# Patient Record
Sex: FEMALE | Race: WHITE | NOT HISPANIC OR LATINO | Employment: FULL TIME | URBAN - METROPOLITAN AREA
[De-identification: names, ages, dates, MRNs, and addresses within clinical notes are randomized per-mention and may not be internally consistent; named-entity substitution may affect disease eponyms.]

---

## 2017-05-26 ENCOUNTER — OFFICE VISIT - HEALTHEAST (OUTPATIENT)
Dept: CARDIOLOGY | Facility: CLINIC | Age: 62
End: 2017-05-26

## 2017-05-26 DIAGNOSIS — I10 ESSENTIAL HYPERTENSION: ICD-10-CM

## 2017-05-26 DIAGNOSIS — E78.5 DYSLIPIDEMIA: ICD-10-CM

## 2017-05-26 DIAGNOSIS — I25.10 CORONARY ARTERY DISEASE INVOLVING NATIVE CORONARY ARTERY OF NATIVE HEART WITHOUT ANGINA PECTORIS: ICD-10-CM

## 2017-05-26 LAB — LDLC SERPL CALC-MCNC: 98 MG/DL

## 2017-05-26 RX ORDER — GLIPIZIDE 2.5 MG/1
1 TABLET, EXTENDED RELEASE ORAL PRN
Refills: 11 | Status: ON HOLD | COMMUNITY
Start: 2017-05-11 | End: 2023-04-18

## 2017-05-26 ASSESSMENT — MIFFLIN-ST. JEOR: SCORE: 1269.83

## 2017-06-17 ENCOUNTER — HEALTH MAINTENANCE LETTER (OUTPATIENT)
Age: 62
End: 2017-06-17

## 2018-03-22 ENCOUNTER — COMMUNICATION - HEALTHEAST (OUTPATIENT)
Dept: CARDIOLOGY | Facility: CLINIC | Age: 63
End: 2018-03-22

## 2018-09-20 ENCOUNTER — AMBULATORY - HEALTHEAST (OUTPATIENT)
Dept: CARDIOLOGY | Facility: CLINIC | Age: 63
End: 2018-09-20

## 2018-09-20 ENCOUNTER — COMMUNICATION - HEALTHEAST (OUTPATIENT)
Dept: ADMINISTRATIVE | Facility: CLINIC | Age: 63
End: 2018-09-20

## 2018-09-20 DIAGNOSIS — E78.5 DYSLIPIDEMIA: ICD-10-CM

## 2021-05-28 ENCOUNTER — RECORDS - HEALTHEAST (OUTPATIENT)
Dept: ADMINISTRATIVE | Facility: CLINIC | Age: 66
End: 2021-05-28

## 2021-05-31 VITALS — HEIGHT: 60 IN | WEIGHT: 176 LBS | BODY MASS INDEX: 34.55 KG/M2

## 2021-06-16 PROBLEM — I65.02 VERTEBRAL ARTERY OCCLUSION, LEFT: Status: ACTIVE | Noted: 2019-04-10

## 2021-06-25 NOTE — PROGRESS NOTES
Progress Notes by Vitaly Salazar MD at 5/26/2017  1:50 PM     Author: Vitaly Salazar MD Service: -- Author Type: Physician    Filed: 5/26/2017  2:30 PM Encounter Date: 5/26/2017 Status: Signed    : Vitaly Salazar MD (Physician)                  [facility]/Heart           Thank you Dr. Cortez for asking the [facility] team to participate in the care of your patient, Lisseth Qureshi.     Impression and Plan     1. Coronary artery disease. Lissteh has known coronary artery disease as described below in History of Present Illness.     This is been stable.  Patient at this time reports no concerning chest discomfort or other similar symptoms.     2. Hypertension. Blood pressure is very reasonable in the office today at 114/60 mmHg..     3.  Dyslipidemia.  Lipid profile 24 May 2016 revealed  mg/dL and HDL 47 mg/dL.  Patient has been intolerant to statin therapy.  Plan to continue ezetimibe and will obtain a repeat direct LDL today.    Plan on follow up in one year.         History of Present Illness    Once again I would like to thank you again for asking me to participate in the care of your patient, Lisseth Qureshi.  As you know, but to reiterate for my own records, Lisseth Qureshi is a 62 y.o. female with coronary disease. She underwent coronary angiography 19 October 2012, at which time she was found to have significant disease involving the 1st obtuse marginal. She had successful angioplasty and stenting of the vessel.     In January 2015 the patient had presented to [facility] with symptoms of chest discomfort, albeit felt to be somewhat atypical. Nuclear perfusion study was performed during that admission which did return abnormal (described below). The findings, however, were not necessarily commensurate with her angiographic findings of October 2012. Consequently, CT angiography was pursued as opposed to direct angiography.      CT scan revealed  perhaps some limiting flow in her prior stented region (obtuse marginal), but otherwise no significant obstructive disease. It is worth noting that she did have worsening renal insufficiency after her CT scan due to the contrast. We discussed her symptoms and objective studies in detail at prior visit. Given the patient's subjective stability, her renal insufficiency, and in accordance with her wishes; medical therapy was pursued.     In follow-up today, patient continues to deny any chest discomfort. She states her breathing is at/near baseline.  She reports no palpitations or lightheadedness.    Further review of systems is otherwise negative/noncontributory (medical record and 13 point review of systems reviewed as well and pertinent positives noted).         Cardiac Diagnostics       CT coronary angiogram 29 January 2015:  1. Coronary atherosclerosis with previous stent in the obtuse marginal branch showing signs of in-stent restenosis.  2. Mild-to-moderate plaque demonstrated throughout other vessel distributions but no high-grade stenosis is identified.     Nuclear perfusion imaging 5 January 2015:  1. Regadenoson stress nuclear study is abnormal.    2. There is evidence of diffuse sub-endocardial ischemia which can indicate multivessel coronary artery disease.  3. Normal resting left ventricular ejection fraction of 64%.     Coronary angiogram 19 October 2012:  1. Right dominant coronary arterial system.    2. There was moderate disease involving the mid LAD.    3. The right coronary artery had mild-moderate disease in the mid segment.    4. The 1st obtuse marginal had severe disease proximally. This was successfully angioplastied and stented (2.75×12 mm Promus Element Plus stent).    5. Left ventriculography was deferred due to renal insufficiency.             Physical Examination       /60 (Patient Site: Right Arm, Patient Position: Sitting, Cuff Size: Adult Regular)  Pulse 72  Resp 16  Ht 5'  (1.524 m)  Wt 176 lb (79.8 kg)  BMI 34.37 kg/m2        Wt Readings from Last 3 Encounters:   05/26/17 176 lb (79.8 kg)   05/31/16 176 lb (79.8 kg)   11/17/15 172 lb (78 kg)     The patient is alert and oriented times three. Sclerae are anicteric. Mucosal membranes are moist. Jugular venous pressure is normal. No significant adenopathy/thyromegally appreciated. Lungs are clear with good expansion. On cardiovascular exam, the patient has a regular S1 and S2. Abdomen is soft and non-tender. Extremities reveal no clubbing, cyanosis, or edema.         Family History/Social History/Risk Factors   Patient does not smoke.  Family history of hypertension.         Medications  Allergies   Current Outpatient Prescriptions   Medication Sig Dispense Refill   ? BUPROPION HCL (WELLBUTRIN ORAL) Take 300 mg by mouth every morning.      ? coenzyme Q10 (COQ-10) 100 mg capsule Use As Directed.     ? glipiZIDE (GLUCOTROL) 2.5 MG 24 hr tablet Take 1 tablet by mouth as needed.  11   ? levothyroxine (SYNTHROID, LEVOTHROID) 75 MCG tablet Take 75 mcg by mouth daily.     ? lisinopril (PRINIVIL,ZESTRIL) 40 MG tablet Take 40 mg by mouth daily.     ? melatonin 3 mg Tab Take 3 mg by mouth bedtime as needed.     ? metFORMIN (GLUCOPHAGE) 1000 MG tablet Take 500 mg by mouth 2 (two) times a day with meals.      ? multivitamin therapeutic (THERAGRAN) tablet Take 1 tablet by mouth daily.     ? omega-3 fatty acids-vitamin E (FISH OIL) 1,000 mg cap Take 1 capsule by mouth 2 (two) times a day.     ? red yeast rice 600 mg Tab Take 1 tablet by mouth daily.     ? ezetimibe (ZETIA) 10 mg tablet Take 1 tablet (10 mg total) by mouth daily. 90 tablet 3   ? glipiZIDE (GLUCOTROL) 5 MG 24 hr tablet      ? LORazepam (ATIVAN) 0.5 MG tablet        No current facility-administered medications for this visit.       Allergies   Allergen Reactions   ? Guaifenesin Swelling     Felt throat swelling  Guaifenesin with codeine   ? Penicillins      Unknown, allergy occurred  at 6 months old   ? Rosuvastatin Calcium Unknown          Lab Results   Lab Results   Component Value Date     (L) 06/07/2015    K 4.5 06/07/2015     06/07/2015    CO2 22 06/07/2015    BUN 23 (H) 06/07/2015    CREATININE 1.55 (H) 06/07/2015    CALCIUM 9.3 06/07/2015     Lab Results   Component Value Date    WBC 5.1 06/07/2015    WBC 6.3 01/04/2015    HGB 11.7 (L) 06/07/2015    HCT 33.7 (L) 06/07/2015    MCV 84 06/07/2015     06/07/2015     Lab Results   Component Value Date    CHOL 188 05/24/2016    TRIG 85 05/24/2016    HDL 47 (L) 05/24/2016    LDLCALC 124 05/24/2016    LDLDIRECT 122 03/20/2015     Lab Results   Component Value Date    INR 0.98 01/04/2015     Lab Results   Component Value Date    TROPONINI 0.02 01/05/2015    TROPONINI <0.01 01/04/2015    TROPONINI <0.01 10/18/2012     Lab Results   Component Value Date    TSH 1.10 03/20/2015

## 2021-07-10 ENCOUNTER — HEALTH MAINTENANCE LETTER (OUTPATIENT)
Age: 66
End: 2021-07-10

## 2021-09-04 ENCOUNTER — HEALTH MAINTENANCE LETTER (OUTPATIENT)
Age: 66
End: 2021-09-04

## 2021-10-30 ENCOUNTER — HEALTH MAINTENANCE LETTER (OUTPATIENT)
Age: 66
End: 2021-10-30

## 2022-02-19 ENCOUNTER — HEALTH MAINTENANCE LETTER (OUTPATIENT)
Age: 67
End: 2022-02-19

## 2022-06-11 ENCOUNTER — HEALTH MAINTENANCE LETTER (OUTPATIENT)
Age: 67
End: 2022-06-11

## 2022-10-16 ENCOUNTER — HEALTH MAINTENANCE LETTER (OUTPATIENT)
Age: 67
End: 2022-10-16

## 2023-04-01 ENCOUNTER — HEALTH MAINTENANCE LETTER (OUTPATIENT)
Age: 68
End: 2023-04-01

## 2023-04-06 ENCOUNTER — APPOINTMENT (OUTPATIENT)
Dept: RADIOLOGY | Facility: HOSPITAL | Age: 68
End: 2023-04-06
Attending: EMERGENCY MEDICINE
Payer: COMMERCIAL

## 2023-04-06 ENCOUNTER — HOSPITAL ENCOUNTER (EMERGENCY)
Facility: HOSPITAL | Age: 68
Discharge: HOME | End: 2023-04-07
Attending: EMERGENCY MEDICINE | Admitting: EMERGENCY MEDICINE
Payer: COMMERCIAL

## 2023-04-06 VITALS
HEART RATE: 78 BPM | RESPIRATION RATE: 16 BRPM | WEIGHT: 172 LBS | BODY MASS INDEX: 33.77 KG/M2 | DIASTOLIC BLOOD PRESSURE: 78 MMHG | OXYGEN SATURATION: 97 % | HEIGHT: 60 IN | SYSTOLIC BLOOD PRESSURE: 143 MMHG | TEMPERATURE: 97.4 F

## 2023-04-06 DIAGNOSIS — R07.9 CHEST PAIN, UNSPECIFIED TYPE: ICD-10-CM

## 2023-04-06 LAB
ANION GAP SERPL CALCULATED.3IONS-SCNC: 12 MMOL/L (ref 7–15)
BASOPHILS # BLD AUTO: 0.1 10E3/UL (ref 0–0.2)
BASOPHILS NFR BLD AUTO: 1 %
BUN SERPL-MCNC: 20 MG/DL (ref 8–23)
CALCIUM SERPL-MCNC: 9.2 MG/DL (ref 8.8–10.2)
CHLORIDE SERPL-SCNC: 101 MMOL/L (ref 98–107)
CREAT SERPL-MCNC: 1.05 MG/DL (ref 0.51–0.95)
DEPRECATED HCO3 PLAS-SCNC: 25 MMOL/L (ref 22–29)
EOSINOPHIL # BLD AUTO: 0.2 10E3/UL (ref 0–0.7)
EOSINOPHIL NFR BLD AUTO: 3 %
ERYTHROCYTE [DISTWIDTH] IN BLOOD BY AUTOMATED COUNT: 13.8 % (ref 10–15)
GFR SERPL CREATININE-BSD FRML MDRD: 59 ML/MIN/1.73M2
GLUCOSE BLDC GLUCOMTR-MCNC: 236 MG/DL (ref 70–99)
GLUCOSE SERPL-MCNC: 242 MG/DL (ref 70–99)
HCT VFR BLD AUTO: 37.7 % (ref 35–47)
HGB BLD-MCNC: 12.3 G/DL (ref 11.7–15.7)
IMM GRANULOCYTES # BLD: 0 10E3/UL
IMM GRANULOCYTES NFR BLD: 0 %
LYMPHOCYTES # BLD AUTO: 1.8 10E3/UL (ref 0.8–5.3)
LYMPHOCYTES NFR BLD AUTO: 27 %
MCH RBC QN AUTO: 28.1 PG (ref 26.5–33)
MCHC RBC AUTO-ENTMCNC: 32.6 G/DL (ref 31.5–36.5)
MCV RBC AUTO: 86 FL (ref 78–100)
MONOCYTES # BLD AUTO: 0.5 10E3/UL (ref 0–1.3)
MONOCYTES NFR BLD AUTO: 7 %
NEUTROPHILS # BLD AUTO: 4.1 10E3/UL (ref 1.6–8.3)
NEUTROPHILS NFR BLD AUTO: 62 %
NRBC # BLD AUTO: 0 10E3/UL
NRBC BLD AUTO-RTO: 0 /100
PLATELET # BLD AUTO: 213 10E3/UL (ref 150–450)
POTASSIUM SERPL-SCNC: 3.8 MMOL/L (ref 3.4–5.3)
RBC # BLD AUTO: 4.37 10E6/UL (ref 3.8–5.2)
SODIUM SERPL-SCNC: 138 MMOL/L (ref 136–145)
TROPONIN T SERPL HS-MCNC: 11 NG/L
WBC # BLD AUTO: 6.7 10E3/UL (ref 4–11)

## 2023-04-06 PROCEDURE — 99285 EMERGENCY DEPT VISIT HI MDM: CPT | Mod: 25

## 2023-04-06 PROCEDURE — 36415 COLL VENOUS BLD VENIPUNCTURE: CPT | Performed by: EMERGENCY MEDICINE

## 2023-04-06 PROCEDURE — 71046 X-RAY EXAM CHEST 2 VIEWS: CPT

## 2023-04-06 PROCEDURE — 80048 BASIC METABOLIC PNL TOTAL CA: CPT | Performed by: EMERGENCY MEDICINE

## 2023-04-06 PROCEDURE — 84484 ASSAY OF TROPONIN QUANT: CPT | Performed by: EMERGENCY MEDICINE

## 2023-04-06 PROCEDURE — 93005 ELECTROCARDIOGRAM TRACING: CPT | Performed by: STUDENT IN AN ORGANIZED HEALTH CARE EDUCATION/TRAINING PROGRAM

## 2023-04-06 PROCEDURE — 85025 COMPLETE CBC W/AUTO DIFF WBC: CPT | Performed by: EMERGENCY MEDICINE

## 2023-04-06 PROCEDURE — 82962 GLUCOSE BLOOD TEST: CPT

## 2023-04-06 PROCEDURE — 93005 ELECTROCARDIOGRAM TRACING: CPT | Performed by: EMERGENCY MEDICINE

## 2023-04-06 ASSESSMENT — ENCOUNTER SYMPTOMS
FEVER: 0
NAUSEA: 0
SHORTNESS OF BREATH: 1
DIAPHORESIS: 0
LIGHT-HEADEDNESS: 0
COUGH: 0

## 2023-04-06 ASSESSMENT — ACTIVITIES OF DAILY LIVING (ADL): ADLS_ACUITY_SCORE: 35

## 2023-04-07 LAB
ATRIAL RATE - MUSE: 78 BPM
DIASTOLIC BLOOD PRESSURE - MUSE: NORMAL MMHG
INTERPRETATION ECG - MUSE: NORMAL
P AXIS - MUSE: 55 DEGREES
PR INTERVAL - MUSE: 154 MS
QRS DURATION - MUSE: 86 MS
QT - MUSE: 384 MS
QTC - MUSE: 437 MS
R AXIS - MUSE: 55 DEGREES
SYSTOLIC BLOOD PRESSURE - MUSE: NORMAL MMHG
T AXIS - MUSE: 83 DEGREES
VENTRICULAR RATE- MUSE: 78 BPM

## 2023-04-07 NOTE — DISCHARGE INSTRUCTIONS
Start taking a daily aspirin 81 mg is fine.  Do that until you are seen in the cardiology clinic and complete the work-up.  Discussed with them whether or not to continue taking the aspirin after that.    In the meantime return here as needed if you have any more episodes of chest pain or difficulty breathing for repeat evaluation.    Continue your current medications.    Purchase a blood pressure cuff from the pharmacy.  I would like you to start checking your blood pressure once in the morning and once in the evening, write down the numbers and bring them with to your cardiology clinic appointment.  Do not check your blood pressure more often than that.

## 2023-04-07 NOTE — ED PROVIDER NOTES
Emergency Department Encounter     Evaluation Date & Time:   4/6/2023 10:26 PM    CHIEF COMPLAINT:  Chest Pain and Back Pain      Triage Note:  Pt arrives from home with a friend for evaluation of chest pain that started around 9pm, Pt was laying in bed reading when she had a sudden onset of midsternal chest tightness. Pain radiates to her upper back and she feels SOB. Pain has been constant since. Had a stent placed in 2012 and is not on blood thinners.   Pt took a full dose ASA PTA.      Triage Assessment       Row Name 04/06/23 2221       Triage Assessment (Adult)    Airway WDL WDL       Respiratory WDL    Respiratory WDL X;rhythm/pattern    Rhythm/Pattern, Respiratory shortness of breath       Skin Circulation/Temperature WDL    Skin Circulation/Temperature WDL WDL       Cardiac WDL    Cardiac WDL X;chest pain       Chest Pain Assessment    Chest Pain Location midsternal    Chest Pain Radiation back    Character tightness    Precipitating Factors nothing    Associated Signs/Symptoms dyspnea       Peripheral/Neurovascular WDL    Peripheral Neurovascular WDL WDL       Cognitive/Neuro/Behavioral WDL    Cognitive/Neuro/Behavioral WDL WDL                      FINAL IMPRESSION:  No diagnosis found.    Impression and Plan     ED COURSE & MEDICAL DECISION MAKING:        ED Course as of 04/06/23 2353   Thu Apr 06, 2023   2322 Lisseth is here with an episode of chest pain.  She does have a history of coronary artery disease with a history of remote stent greater than 10 years ago but has not had any warning symptoms recently of progression of heart disease.  She does have a history of GERD and her symptoms did start while she was trying to lay down in bed at night to get ready for sleeping, so was thinking it may have been an attack of her GERD but it did not go away as it usually does when she sat up.  With the pain radiating to the back and her history of high blood pressure would have to consider the possibility of  dissection, but her symptoms are entirely resolved making that certainly less likely and she does have equal pulses in all extremities.  She has no infectious complaints to suggest pneumonia although on examination there are some slight crackles in her right upper lobe so we will do a chest x-ray on her.  Her initial EKG shows no acute findings suggestive of cardiac ischemia so we will pare with high-sensitivity troponin and based on the results of that will either be able to discharge home with close outpatient follow-up with a rapid access clinic for cardiology versus need repeat troponin.  She will notify us if she has any further episodes while here.  She did take aspirin at home so we will not need to repeat that at this time, and she is having no further chest pain so no nitroglycerin at this time.  Her blood pressure is elevated but she is a bit anxious and in the ER so if discharged we will have her follow her blood pressures at home.  She has no gi complaints associated with this to suggest cholecystitis or pancreatitis.   2343 Her high-sensitivity troponin is within normal limits for female.  Her other blood work is unremarkable without any acute significant renal failure.  So, she will be discharged home.  She will follow-up with her primary care physician for her elevated blood pressures here and return as needed if cxr unremarkable   2346 Cxr wnl.  She is only on Norvasc now for blood pressure control and has not been checking her blood pressures at home so she will start to do that to get an idea of what they have been at home before she goes into her clinic appointment.  Referral made for rapid access clinic.       11:18 PM I met with patient for initial encounter.  11:46 PM I updated patient with results and plan for discharge.    At the conclusion of the encounter I discussed the results of all the tests and the disposition. The questions were answered. The patient or family acknowledged  understanding and was agreeable with the care plan.          0 minutes of critical care time        MEDICATIONS GIVEN IN THE EMERGENCY DEPARTMENT:  Medications - No data to display    NEW PRESCRIPTIONS STARTED AT TODAY'S ED VISIT:  New Prescriptions    No medications on file       HPI     The history is provided by the patient. No  was used.        Lisseth Qureshi is a 67 year old female with a pertinent history of DMII, HTN, CAD, who presents to this ED via walk-in for evaluation of chest pain.    Patient reports chest pain that began around 9 PM when she was laying in bed a few hours after eating, and lasted until 11 PM. She notes that the pain radiated to her back with associated shortness of breath. She die take aspirin with mild relief. Patient denies any history of similar symptoms. She denies any recent travel or tobacco use.     Patient denies diaphoresis, light headedness, cough, fever, leg swelling, nausea, belching, and all other complaints at this time.    REVIEW OF SYSTEMS:  Review of Systems   Constitutional: Negative for diaphoresis and fever.   Respiratory: Positive for shortness of breath (Resolved). Negative for cough.    Cardiovascular: Positive for chest pain (resolved). Negative for leg swelling.   Gastrointestinal: Negative for nausea.   Neurological: Negative for light-headedness.   All other systems reviewed and are negative.    remainder of systems are all otherwise negative.        Medical History     No past medical history on file.    Past Surgical History:   Procedure Laterality Date     CORONARY STENT PLACEMENT  10/19/2012    glenna 1st obtuse marginal     SHOULDER SURGERY      frozen shoulder       Family History   Problem Relation Age of Onset     Hypertension Mother      Mitral Valve Replacement No family hx of        Social History     Tobacco Use     Smoking status: Former     Packs/day: 1.50     Years: 35.00     Pack years: 52.50     Types: Cigarettes     Quit  date: 2005     Years since quittin.2     Tobacco comments:     pt quit 10 years ago (she had smoked x 35 years)   Substance Use Topics     Alcohol use: No     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  LORazepam (ATIVAN) 0.5 MG tablet  MEDICATION CANNOT BE REORDERED - PLEASE MANUALLY REORDER AND DISCONTINUE THE OLD ORDER  melatonin 3 mg Tab  metFORMIN (GLUCOPHAGE) 1000 MG tablet  multivitamin therapeutic (THERAGRAN) tablet  ondansetron (ZOFRAN) 4 MG tablet  red yeast rice 600 mg Tab        Physical Exam     First Vitals:  Patient Vitals for the past 24 hrs:   BP Temp Temp src Pulse Resp SpO2 Height Weight   23 2245 (!) 194/83 -- -- 83 16 97 % -- --   23 2218 (!) 162/102 97.4  F (36.3  C) Temporal 97 22 97 % 1.524 m (5') 78 kg (172 lb)       PHYSICAL EXAM:   Constitutional:   Patient sitting up in bed, easily conversive  HENT:  Patient wearing a mask, voice normal   Eyes:  PERRL, EOMI, Conjunctiva normal, No discharge, no scleral icterus.  Respiratory:  Breathing easily, slight crackles to right upper lobe, otherwise clear to auscultation  Cardiovascular:   Regular rate and rhythm, nl s1s2 0 murmurs, rubs, or gallops.  Peripheral pulses dp, pt, and radial are wnl.  No peripheral edema   GI:  Bowel sounds normal, Soft, No tenderness, No flank tenderness, nondistended.  : No CVA tenderness.   Musculoskeletal:  Moves all extremities.  No erythematous or swollen major joints,   Integument:  Skin color normal  Lymphatic:  No cervical lymphadenopathy  Neurologic:   Alert & oriented x 3, Normal motor function, Normal sensory function, No focal deficits noted. Normal speech.  Psychiatric:  Affect normal, Judgment normal, Mood normal.     Results     LAB AND RADIOLOGY:  All pertinent labs reviewed and interpreted  Results for orders placed or performed during the hospital encounter of 04/06/23   Glucose by meter     Status: Abnormal   Result Value Ref Range    GLUCOSE BY METER POCT 236 (H) 70 - 99 mg/dL   CBC with platelets differential     Status: None (In process)    Narrative    The following orders were created for panel order CBC with platelets differential.  Procedure                               Abnormality         Status                     ---------                               -----------         ------                     CBC with platelets and d...[[id number]]                      In process                   Please view results for these tests on the individual orders.         ECG:    Performed at: 2233    Impression: nsr rate of 78, pr 154ms, qrs 86ms, qtc 437 ms, prt axes 55 55 83.  Nonspecific diffuse flat st segments.  Compared to 1/4/15 no significant changes noted.      I have independently reviewed and interpreted the EKS(s) documented above    PROCEDURES:  Procedures:      [facility] Documentation     Medical Decision Making    History:    Supplemental history from: Documented in chart, if applicable and N/A    External Record(s) reviewed: Documented in chart, if applicable.    Work Up:    Chart documentation includes differential considered and any EKGs or imaging independently interpreted by provider, where specified.    In additional to work up documented, I considered the following work up: Documented in chart, if applicable.    External consultation:    Discussion of management with another provider: Documented in chart, if applicable    Complicating factors:    Care impacted by chronic illness: Diabetes, Heart Disease and Hypertension    Care  affected by social determinants of health: N/A    Disposition considerations: Discharge. I prescribed additional prescription strength medication(s) as charted. See documentation for any additional details.       The creation of this record is based on the scribe s observations of the work being performed by Emanuel Merchant and the provider s statements to them. This document has been checked and approved by MD Sarah Carlson MD  Emergency Medicine  [facility] EMERGENCY DEPARTMENT        Sarah Guevara MD  04/06/23 8649

## 2023-04-07 NOTE — ED TRIAGE NOTES
Pt arrives from home with a friend for evaluation of chest pain that started around 9pm, Pt was laying in bed reading when she had a sudden onset of midsternal chest tightness. Pain radiates to her upper back and she feels SOB. Pain has been constant since. Had a stent placed in 2012 and is not on blood thinners.   Pt took a full dose ASA PTA.      Triage Assessment       Row Name 04/06/23 2226       Triage Assessment (Adult)    Airway WDL WDL       Respiratory WDL    Respiratory WDL X;rhythm/pattern    Rhythm/Pattern, Respiratory shortness of breath       Skin Circulation/Temperature WDL    Skin Circulation/Temperature WDL WDL       Cardiac WDL    Cardiac WDL X;chest pain       Chest Pain Assessment    Chest Pain Location midsternal    Chest Pain Radiation back    Character tightness    Precipitating Factors nothing    Associated Signs/Symptoms dyspnea       Peripheral/Neurovascular WDL    Peripheral Neurovascular WDL WDL       Cognitive/Neuro/Behavioral WDL    Cognitive/Neuro/Behavioral WDL WDL

## 2023-04-11 LAB
ABO/RH(D): NORMAL
ANTIBODY SCREEN: NEGATIVE
SPECIMEN EXPIRATION DATE: NORMAL

## 2023-04-12 ENCOUNTER — PREP FOR PROCEDURE (OUTPATIENT)
Dept: CARDIOLOGY | Facility: CLINIC | Age: 68
End: 2023-04-12

## 2023-04-12 ENCOUNTER — OFFICE VISIT (OUTPATIENT)
Dept: CARDIOLOGY | Facility: CLINIC | Age: 68
End: 2023-04-12
Attending: EMERGENCY MEDICINE
Payer: COMMERCIAL

## 2023-04-12 ENCOUNTER — TELEPHONE (OUTPATIENT)
Dept: CARDIOLOGY | Facility: CLINIC | Age: 68
End: 2023-04-12

## 2023-04-12 VITALS
BODY MASS INDEX: 34.36 KG/M2 | HEART RATE: 77 BPM | HEIGHT: 60 IN | OXYGEN SATURATION: 96 % | SYSTOLIC BLOOD PRESSURE: 140 MMHG | WEIGHT: 175 LBS | RESPIRATION RATE: 12 BRPM | DIASTOLIC BLOOD PRESSURE: 64 MMHG

## 2023-04-12 DIAGNOSIS — E78.2 MIXED HYPERLIPIDEMIA: ICD-10-CM

## 2023-04-12 DIAGNOSIS — I25.84 CORONARY ARTERY DISEASE DUE TO CALCIFIED CORONARY LESION: ICD-10-CM

## 2023-04-12 DIAGNOSIS — I20.0 ACCELERATING ANGINA (H): ICD-10-CM

## 2023-04-12 DIAGNOSIS — I20.0 ACCELERATING ANGINA (H): Primary | ICD-10-CM

## 2023-04-12 DIAGNOSIS — R06.09 DYSPNEA ON EXERTION: ICD-10-CM

## 2023-04-12 DIAGNOSIS — Z78.9 STATIN INTOLERANCE: ICD-10-CM

## 2023-04-12 DIAGNOSIS — Z79.4 TYPE 2 DIABETES MELLITUS WITHOUT COMPLICATION, WITH LONG-TERM CURRENT USE OF INSULIN (H): ICD-10-CM

## 2023-04-12 DIAGNOSIS — I25.10 CORONARY ARTERY DISEASE DUE TO CALCIFIED CORONARY LESION: Primary | ICD-10-CM

## 2023-04-12 DIAGNOSIS — I25.10 CORONARY ARTERY DISEASE DUE TO CALCIFIED CORONARY LESION: ICD-10-CM

## 2023-04-12 DIAGNOSIS — I25.110 CORONARY ARTERY DISEASE INVOLVING NATIVE CORONARY ARTERY OF NATIVE HEART WITH UNSTABLE ANGINA PECTORIS (H): Primary | ICD-10-CM

## 2023-04-12 DIAGNOSIS — Z01.812 PRE-PROCEDURE LAB EXAM: ICD-10-CM

## 2023-04-12 DIAGNOSIS — I25.84 CORONARY ARTERY DISEASE DUE TO CALCIFIED CORONARY LESION: Primary | ICD-10-CM

## 2023-04-12 DIAGNOSIS — E11.9 TYPE 2 DIABETES MELLITUS WITHOUT COMPLICATION, WITH LONG-TERM CURRENT USE OF INSULIN (H): ICD-10-CM

## 2023-04-12 DIAGNOSIS — R07.9 CHEST PAIN, UNSPECIFIED TYPE: ICD-10-CM

## 2023-04-12 LAB
ANION GAP SERPL CALCULATED.3IONS-SCNC: 14 MMOL/L (ref 5–18)
BLOOD BANK CHART COMMENT: NORMAL
BUN SERPL-MCNC: 20 MG/DL (ref 8–22)
CALCIUM SERPL-MCNC: 9.7 MG/DL (ref 8.5–10.5)
CHLORIDE BLD-SCNC: 102 MMOL/L (ref 98–107)
CO2 SERPL-SCNC: 22 MMOL/L (ref 22–31)
CREAT SERPL-MCNC: 1.09 MG/DL (ref 0.6–1.1)
ERYTHROCYTE [DISTWIDTH] IN BLOOD BY AUTOMATED COUNT: 13.8 % (ref 10–15)
GFR SERPL CREATININE-BSD FRML MDRD: 56 ML/MIN/1.73M2
GLUCOSE BLD-MCNC: 132 MG/DL (ref 70–125)
HCT VFR BLD AUTO: 37.6 % (ref 35–47)
HGB BLD-MCNC: 12.4 G/DL (ref 11.7–15.7)
MCH RBC QN AUTO: 28 PG (ref 26.5–33)
MCHC RBC AUTO-ENTMCNC: 33 G/DL (ref 31.5–36.5)
MCV RBC AUTO: 85 FL (ref 78–100)
PLATELET # BLD AUTO: 263 10E3/UL (ref 150–450)
POTASSIUM BLD-SCNC: 3.9 MMOL/L (ref 3.5–5)
RBC # BLD AUTO: 4.43 10E6/UL (ref 3.8–5.2)
SODIUM SERPL-SCNC: 138 MMOL/L (ref 136–145)
SPECIMEN EXPIRATION DATE: NORMAL
WBC # BLD AUTO: 5.6 10E3/UL (ref 4–11)

## 2023-04-12 PROCEDURE — 99205 OFFICE O/P NEW HI 60 MIN: CPT | Performed by: INTERNAL MEDICINE

## 2023-04-12 PROCEDURE — 86900 BLOOD TYPING SEROLOGIC ABO: CPT | Performed by: INTERNAL MEDICINE

## 2023-04-12 PROCEDURE — 80048 BASIC METABOLIC PNL TOTAL CA: CPT | Performed by: INTERNAL MEDICINE

## 2023-04-12 PROCEDURE — 36415 COLL VENOUS BLD VENIPUNCTURE: CPT | Performed by: INTERNAL MEDICINE

## 2023-04-12 PROCEDURE — 85027 COMPLETE CBC AUTOMATED: CPT | Performed by: INTERNAL MEDICINE

## 2023-04-12 PROCEDURE — 86901 BLOOD TYPING SEROLOGIC RH(D): CPT | Performed by: INTERNAL MEDICINE

## 2023-04-12 PROCEDURE — 86850 RBC ANTIBODY SCREEN: CPT | Performed by: INTERNAL MEDICINE

## 2023-04-12 RX ORDER — NICOTINE POLACRILEX 4 MG
15-30 LOZENGE BUCCAL
Status: CANCELLED | OUTPATIENT
Start: 2023-04-18

## 2023-04-12 RX ORDER — METOPROLOL SUCCINATE 25 MG/1
25 TABLET, EXTENDED RELEASE ORAL DAILY
Qty: 90 TABLET | Refills: 3 | Status: SHIPPED | OUTPATIENT
Start: 2023-04-12 | End: 2023-05-03

## 2023-04-12 RX ORDER — AMLODIPINE BESYLATE 10 MG/1
10 TABLET ORAL DAILY
COMMUNITY
Start: 2022-12-08

## 2023-04-12 RX ORDER — LIDOCAINE 40 MG/G
CREAM TOPICAL
Status: CANCELLED | OUTPATIENT
Start: 2023-04-12

## 2023-04-12 RX ORDER — DEXTROSE MONOHYDRATE 25 G/50ML
25-50 INJECTION, SOLUTION INTRAVENOUS
Status: CANCELLED | OUTPATIENT
Start: 2023-04-18

## 2023-04-12 RX ORDER — SODIUM CHLORIDE 9 MG/ML
INJECTION, SOLUTION INTRAVENOUS CONTINUOUS
Status: CANCELLED | OUTPATIENT
Start: 2023-04-18

## 2023-04-12 RX ORDER — ASPIRIN 81 MG/1
243 TABLET, CHEWABLE ORAL ONCE
Status: CANCELLED | OUTPATIENT
Start: 2023-04-18

## 2023-04-12 RX ORDER — ASPIRIN 325 MG
325 TABLET ORAL ONCE
Status: CANCELLED | OUTPATIENT
Start: 2023-04-18 | End: 2023-04-12

## 2023-04-12 RX ORDER — FENTANYL CITRATE 50 UG/ML
25 INJECTION, SOLUTION INTRAMUSCULAR; INTRAVENOUS
Status: CANCELLED | OUTPATIENT
Start: 2023-04-18

## 2023-04-12 NOTE — H&P (VIEW-ONLY)
HEART CARE ENCOUNTER CONSULTATON NOTE      [facility]  [phone]      Assessment/Recommendations   Assessment:   1.  Unstable angina (new), anginal chest pressure with walking flat surface, resolves with rest.  Noted the past 2 weeks.  Seen in ED for severe chest pain episode on 4/6/2023  2. Coronary Artery Disease s/p D1 stenting (2012, 2.75×12 mm Promus Element Plus stent)  2.  Hypertension  3.  Hyperlipidemia, LDL: 180 (uncontrolled). ASCVD (10 year risk): 35.5% (very high)  4.  Statin intolerance   5.  Diabetes mellitus type 2.  A1c 6.5  6.  Carotid artery plaque on CT scan    Plan:   1. Start ASA 81 mg daily  2.  Discussed PCSK9-inhibitor given CAD, LDL>70, DM, ASCVD 10 year >35%.  Recommend starting Repatha 140 mg Q14 days.    3.  Continue lisinopril for hypertension  4.  Discussed starting beta-blocker as well (metoprolol XL 25 mg daily)  5.  Recommend coronary angiogram poss percutaneous coronary intevention.  Advised patient to have angiogram urgently due to progressive anginal symptoms.  She is aware of the severity of this condition which is severe and life-threatening.  6.  Recommend complete echocardiogram    Follow-up post discharge.  Patient prefers to follow-up with myself long-term.       History of Present Illness/Subjective    HPI: Lisesth Qureshi is a 67 year old female known coronary disease, statin intolerance not on statin therapy with elevated LDL, diabetes, hypertension who presents to cardiology clinic in consultation for unstable angina.    According to patient she has been developing chest pressure with walking on a flat surface requiring her to stop.  When she stops her symptoms resolved in 2 to 3 minutes.  She also had 1 episode of severe chest pain which woke her from sleep on April 6, 2023 requiring her to come to the emergency department.  Reviewed emergency department notes.    Currently she has ongoing dyspnea which is new.  She was previously able to do  yoga and workout but unable to do any of her routine exercise at this time.    She is at high risk for progression of her coronary artery disease due to statin intolerance and an LDL of 183.  She is not aware of PCSK9 inhibitors and will plan to start her on a PCSK9 inhibitor after angiogram.  She is also not on aspirin therapy.  We will restart aspirin 81 mg daily.  She should also be started on beta-blocker which we will consider starting as well.     Echocardiogram Results: Pending    Coronary Angiogram: 2007  CORONARY FINDINGS:   1. Coronary circulation is right dominant.   2. Left main:  Left main coronary artery is medium large in size and length with minor nonocclusive disease present, gives rise to the left anterior descending and left circumflex coronary artery.     3. Left circumflex coronary artery is a medium large caliber vessel, gives rise to 2 major obtuse marginal branches.     4. First obtuse marginal branch is a small size branch, which arises very proximally from the circumflex.  It has mild nonocclusive disease present.   5. Second obtuse marginal branch is a medium size vessel, which arises from the mid circumflex.  It is tortuous with mild nonocclusive coronary artery disease.   6. The circumflex in the mid to distal segment after the second obtuse marginal branch has a discrete 40% lesion seen.  The remainder of the circumflex tapers distally and has minor nonocclusive disease present.     7. Left anterior descending:  The left anterior descending is a large caliber vessel, it extends all the way to apex.  Gives rise to several septal perforators and one major diagonal vessel.  There is minor nonocclusive disease present in the left anterior descending and its branches.  8. Right coronary artery:  The right coronary artery is a large caliber dominant vessel, gives rise to a conus branch in the very proximal portion, RV marginal branch in the midportion, and bifurcates into a posterior  descending artery branch and a posterolateral branch.  Posterolateral branch then further subdivides into smaller posterolateral segments.  The proximal portion of the vessel has a 25% discrete narrowing.  The midportion of the right coronary artery has a 40% diffuse disease.  The distal portion of the right coronary artery and its branches have minor nonocclusive disease following this.          CTA coronary: 2015  CT OF THE HEART WITH CONTRAST, CORONARY CT ANGIOGRAPHY:  DOMINANT SYSTEM:  Right.  Normal coronary origins.     LEFT CORONARY ARTERY:  LEFT MAIN TRUNK:  There is extrinsic calcification but no stenosis is identified.   This is a short vessel.  LEFT ANTERIOR DESCENDING: There is an ostial stenosis of 25% to 50% consisting of  mixed plaque.  There is scattered foci throughout the proximal and mid-left anterior  descending of mixed plaque narrowing the lumen by 25% to 50%.  No high-grade  stenosis is demonstrated in the left anterior descending or its diagonal branches.       LEFT CIRCUMFLEX:  There is calcified plaque narrowing the lumen by less than 25% in  the proximal portion of the vessel.  In the mid-portion of the vessel there is a  calcified plaque narrowing the lumen by 25% to 50%.  The first and major obtuse  marginal branch has a stent; the stent size is unspecified.  There appears to be  diminished distal flow and poor visualization of graft patency suggestive of  in-stent restenosis.  The more distal obtuse marginal branch is widely patent with  no stenosis or plaque.   RIGHT CORONARY ARTERY:   Dominant distribution.  There is irregular mixed plaque  throughout the proximal portion of the vessel.  In the mid- and distal portion of  the vessel, there is more mixed plaque 25% to 50% stenotic.  The posterior  descending and posterior ventricular branches are small in caliber but have no  evidence of stenosis or plaque.         CT: 2019:   The right common carotid artery is patent. Examination of  "the right carotid bulb demonstrates calcified and atherosclerotic plaque without evidence of hemodynamically significant stenosis.     The left common carotid artery is patent. Examination of the left carotid bulb demonstrates calcified and atherosclerotic plaque without evidence of hemodynamically significant stenosis.     Physical Examination  Review of Systems   Vitals: BP (!) 140/64 (BP Location: Right arm, Patient Position: Sitting, Cuff Size: Adult Regular)   Pulse 77   Resp 12   Ht 1.511 m (4' 11.5\")   Wt 79.4 kg (175 lb)   SpO2 96%   BMI 34.75 kg/m    BMI= There is no height or weight on file to calculate BMI.  Wt Readings from Last 3 Encounters:   04/06/23 78 kg (172 lb)   05/26/17 79.8 kg (176 lb)   05/31/16 79.8 kg (176 lb)        Pleasant   ENT/Mouth: membranes moist, no oral lesions or bleeding gums.      EYES:  no scleral icterus, normal conjunctivae       Chest/Lungs:   lungs are clear to auscultation, no rales or wheezing, no sternal scar, equal chest wall expansion    Cardiovascular:   Regular. Normal first and second heart sounds with no murmurs, rubs, or gallops; the carotid, radial and posterior tibial pulses are intact, Jugular venous pressure normal, no edema bilaterally    Abdomen:  no tenderness; bowel sounds are present   Extremities: no cyanosis or clubbing   Skin: no xanthelasma, warm.    Neurologic: normal  bilateral, no tremors     Psychiatric: alert and oriented x3, calm        Please refer above for cardiac ROS details.        Medical History  Surgical History Family History Social History   No past medical history on file.  Past Surgical History:   Procedure Laterality Date     CORONARY STENT PLACEMENT  10/19/2012    glenna 1st obtuse marginal     SHOULDER SURGERY      frozen shoulder     Family History   Problem Relation Age of Onset     Hypertension Mother      Mitral Valve Replacement No family hx of         Social History     Socioeconomic History     Marital status: Single "     Spouse name: Not on file     Number of children: Not on file     Years of education: Not on file     Highest education level: Not on file   Occupational History     Not on file   Tobacco Use     Smoking status: Former     Packs/day: 1.50     Years: 35.00     Pack years: 52.50     Types: Cigarettes     Quit date: 2005     Years since quittin.2     Smokeless tobacco: Not on file     Tobacco comments:     pt quit 10 years ago (she had smoked x 35 years)   Vaping Use     Vaping status: Not on file   Substance and Sexual Activity     Alcohol use: No     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     Drug use: No     Sexual activity: Not on file   Other Topics Concern     Not on file   Social History Narrative    Exercises 3-4 days per week     Social Determinants of Health     Financial Resource Strain: Not on file   Food Insecurity: Not on file   Transportation Needs: Not on file   Physical Activity: Not on file   Stress: Not on file   Social Connections: Not on file   Intimate Partner Violence: Not on file   Housing Stability: Not on file           Medications  Allergies   Current Outpatient Medications   Medication Sig Dispense Refill     BUPROPION HCL (WELLBUTRIN ORAL) [BUPROPION HCL (WELLBUTRIN ORAL)] Take 300 mg by mouth every morning.        coenzyme Q10 (COQ-10) 100 mg capsule [COENZYME Q10 (COQ-10) 100 MG CAPSULE] Use As Directed.       cyclobenzaprine (FLEXERIL) 10 MG tablet [CYCLOBENZAPRINE (FLEXERIL) 10 MG TABLET] Take 1 tablet (10 mg total) by mouth 2 (two) times a day as needed for muscle spasms. 20 tablet 0     ezetimibe (ZETIA) 10 mg tablet [EZETIMIBE (ZETIA) 10 MG TABLET] Take 1 tablet (10 mg total) by mouth daily. 90 tablet 3     glipiZIDE (GLUCOTROL) 2.5 MG 24 hr tablet [GLIPIZIDE (GLUCOTROL) 2.5 MG 24 HR TABLET] Take 1 tablet by mouth as needed.  11     glipiZIDE (GLUCOTROL) 5 MG 24 hr tablet [GLIPIZIDE (GLUCOTROL) 5 MG 24 HR TABLET]        levothyroxine (SYNTHROID,  LEVOTHROID) 75 MCG tablet [LEVOTHYROXINE (SYNTHROID, LEVOTHROID) 75 MCG TABLET] Take 75 mcg by mouth daily.       lidocaine (LIDODERM) 5 % [LIDOCAINE (LIDODERM) 5 %] Remove & Discard patch within 12 hours or as directed by MD 15 patch 0     lisinopril (PRINIVIL,ZESTRIL) 40 MG tablet [LISINOPRIL (PRINIVIL,ZESTRIL) 40 MG TABLET] Take 40 mg by mouth daily.       LORazepam (ATIVAN) 0.5 MG tablet [LORAZEPAM (ATIVAN) 0.5 MG TABLET]        MEDICATION CANNOT BE REORDERED - PLEASE MANUALLY REORDER AND DISCONTINUE THE OLD ORDER [OMEGA-3 FATTY ACIDS-VITAMIN E (FISH OIL) 1,000 MG CAP] Take 1 capsule by mouth 2 (two) times a day.       melatonin 3 mg Tab [MELATONIN 3 MG TAB] Take 3 mg by mouth bedtime as needed.       metFORMIN (GLUCOPHAGE) 1000 MG tablet [METFORMIN (GLUCOPHAGE) 1000 MG TABLET] Take 500 mg by mouth 2 (two) times a day with meals.        multivitamin therapeutic (THERAGRAN) tablet [MULTIVITAMIN THERAPEUTIC (THERAGRAN) TABLET] Take 1 tablet by mouth daily.       ondansetron (ZOFRAN) 4 MG tablet [ONDANSETRON (ZOFRAN) 4 MG TABLET] Take 1 tablet (4 mg total) by mouth every 6 (six) hours. 12 tablet 0     red yeast rice 600 mg Tab [RED YEAST RICE 600 MG TAB] Take 1 tablet by mouth daily.         Allergies   Allergen Reactions     Guaifenesin Swelling     Felt throat swelling, Guaifenesin with codeine     Penicillins Unknown     Unknown, allergy occurred at 6 months old     Rosuvastatin Calcium [Rosuvastatin] Unknown          Lab Results    Chemistry/lipid CBC Cardiac Enzymes/BNP/TSH/INR   Recent Labs   Lab Test 05/26/17  1420 05/24/16  0735   CHOL  --  188   HDL  --  47*   LDL 98 124   TRIG  --  85     Recent Labs   Lab Test 05/26/17  1420 05/24/16  0735 03/20/15  1703   LDL 98 124 122     Recent Labs   Lab Test 04/06/23  2300      POTASSIUM 3.8   CHLORIDE 101   CO2 25   *   BUN 20.0   CR 1.05*   GFRESTIMATED 59*   AMBERLY 9.2     Recent Labs   Lab Test 04/06/23  2300 04/10/19  1851   CR 1.05* 1.27*     Recent  Labs   Lab Test 03/20/15  1703   A1C 6.5*          Recent Labs   Lab Test 04/06/23  2300   WBC 6.7   HGB 12.3   HCT 37.7   MCV 86        Recent Labs   Lab Test 04/06/23  2300 04/10/19  1851   HGB 12.3 12.2    No results for input(s): TROPONINI in the last 84583 hours.  No results for input(s): BNP, NTBNPI, NTBNP in the last 89059 hours.  No results for input(s): TSH in the last 89926 hours.  Recent Labs   Lab Test 04/10/19  1851   INR 1.01        Oswald Shaffer DO

## 2023-04-12 NOTE — TELEPHONE ENCOUNTER
Lisseth Qureshi  [address] [address]  [phone] (home)     PCP:  System, Provider Not In  H&P completed by:  BONITA  Admit date 4/18 Arrival time:  0730  Anticoagulation:  NA  Previous PCI: Yes  Bypass Grafts: No  Renal Issues: No  Diabetic?: Yes  Device?: No    Ambulation status: ambulatory     Reason for Visit:  Patient seen for pre-procedure education in preparation for: Coronary Angiogram with Possible PCI    Procedure Prep:  EKG results obtained, dated: To be completed on day of cath lab procedure  Hemogram results obtained: Completed on 4/12  Basic Metabolic Panel results obtained: Completed on 4/12  Pertinent cardiac test results: in epic       Patient Education    1. Your arrival time is 0730.  [place] [address] - Main Entrance of the Hospital  2. Please plan on being at the hospital all day.  3. At any time, emergencies and/or urgent cases may come up which could delay the start of your procedure.    COVID Testing Instructions  *Mandatory COVID Testing:   ALL Patients will need to complete a COVID test no sooner than 4 days prior to their procedure (regardless of vaccination status).      To schedule COVID testing Please call [phone]    If you want to complete this at an outside facility please call them to find out if they will have the results within the appropriate time frame and their fax number.  You will need to provide us with that information so we can send the order.    The facility completing the test will need to fax the results to [phone]    If you are running into and issues please call us.     Pre-procedure instructions  Take your temperature in the morning prior to coming in.  If your temperature is 100 F please call  [facility] [phone] and notify them.  If you do not have access to a thermometer at home, please come in for testing.  If you are running a temperature your procedure may be  rescheduled.  Patient instructed to not Eat or Drink after midnight.  Patient instructed to shower the evening before or the morning of the procedure.  Patient instructed to arrange for transportation home following procedure from a responsible family member or friend. No driving for at least 24 hours.  Patient instructed to have a responsible adult with them for 24 hours post-procedure.  Post-procedure follow up process.  Conscious sedation discussed.      Pre-procedure medication instructions.  Continue medications as scheduled, with a small amount of water on the day of the procedure unless indicated. (NO Diabetic Medications or Blood Thinners)  Pt instructed not to consume Alcohol, Tobacco, Caffeine, or Carbonated beverages 12 hours prior to procedure.  Patient instructed to take 324 mg of Aspirin the night before and morning of procedure: Yes  Other medication: instructed to only take prescription medications the  a.m. of the procedure. NO oral DM meds              Diabetic Medication Instructions  ? DO NOT take any oral diabetic medication, short-acting diabetes medications/insulin, humalog or regular insulin the morning of your test  ? Take   dose of long-acting insulin (Lantus, Levemir) the day of your test  ? Hold Oral Diabetic on the day of the procedure and for 48 hours after IV contrast given  ? Remember to  bring your glucometer and insulin with you to take after your test if needed                Patient states understanding of procedure and agrees to proceed.    *PATIENTS RECORDS AVAILABLE IN [facility] UNLESS OTHERWISE INDICATED*      Patient Active Problem List   Diagnosis     Hypothyroidism     Type 2 Diabetes Mellitus     Anemia     Hypertension     Coronary Artery Disease     Chest pain     BILL (acute kidney injury) (H)     Vertebral artery occlusion, left       Current Outpatient Medications   Medication Sig Dispense Refill     amLODIPine (NORVASC) 10 MG tablet Take 10 mg by mouth daily        BUPROPION HCL (WELLBUTRIN ORAL) [BUPROPION HCL (WELLBUTRIN ORAL)] Take 300 mg by mouth every morning.        coenzyme Q10 (COQ-10) 100 mg capsule [COENZYME Q10 (COQ-10) 100 MG CAPSULE] Use As Directed.       cyclobenzaprine (FLEXERIL) 10 MG tablet [CYCLOBENZAPRINE (FLEXERIL) 10 MG TABLET] Take 1 tablet (10 mg total) by mouth 2 (two) times a day as needed for muscle spasms. (Patient not taking: Reported on 4/12/2023) 20 tablet 0     ezetimibe (ZETIA) 10 mg tablet [EZETIMIBE (ZETIA) 10 MG TABLET] Take 1 tablet (10 mg total) by mouth daily. (Patient not taking: Reported on 4/12/2023) 90 tablet 3     glipiZIDE (GLUCOTROL) 2.5 MG 24 hr tablet [GLIPIZIDE (GLUCOTROL) 2.5 MG 24 HR TABLET] Take 1 tablet by mouth as needed.  11     glipiZIDE (GLUCOTROL) 5 MG 24 hr tablet [GLIPIZIDE (GLUCOTROL) 5 MG 24 HR TABLET]        levothyroxine (SYNTHROID, LEVOTHROID) 75 MCG tablet [LEVOTHYROXINE (SYNTHROID, LEVOTHROID) 75 MCG TABLET] Take 75 mcg by mouth daily.       lidocaine (LIDODERM) 5 % [LIDOCAINE (LIDODERM) 5 %] Remove & Discard patch within 12 hours or as directed by MD (Patient not taking: Reported on 4/12/2023) 15 patch 0     lisinopril (PRINIVIL,ZESTRIL) 40 MG tablet [LISINOPRIL (PRINIVIL,ZESTRIL) 40 MG TABLET] Take 40 mg by mouth daily. (Patient not taking: Reported on 4/12/2023)       LORazepam (ATIVAN) 0.5 MG tablet [LORAZEPAM (ATIVAN) 0.5 MG TABLET]  (Patient not taking: Reported on 4/12/2023)       MEDICATION CANNOT BE REORDERED - PLEASE MANUALLY REORDER AND DISCONTINUE THE OLD ORDER [OMEGA-3 FATTY ACIDS-VITAMIN E (FISH OIL) 1,000 MG CAP] Take 1 capsule by mouth 2 (two) times a day.       melatonin 3 mg Tab [MELATONIN 3 MG TAB] Take 3 mg by mouth bedtime as needed.       metFORMIN (GLUCOPHAGE) 1000 MG tablet [METFORMIN (GLUCOPHAGE) 1000 MG TABLET] Take 500 mg by mouth 2 (two) times a day with meals.        metoprolol succinate ER (TOPROL XL) 25 MG 24 hr tablet Take 1 tablet (25 mg) by mouth daily 90 tablet 3      multivitamin therapeutic (THERAGRAN) tablet [MULTIVITAMIN THERAPEUTIC (THERAGRAN) TABLET] Take 1 tablet by mouth daily.       ondansetron (ZOFRAN) 4 MG tablet [ONDANSETRON (ZOFRAN) 4 MG TABLET] Take 1 tablet (4 mg total) by mouth every 6 (six) hours. (Patient not taking: Reported on 4/12/2023) 12 tablet 0     red yeast rice 600 mg Tab [RED YEAST RICE 600 MG TAB] Take 1 tablet by mouth daily.         Allergies   Allergen Reactions     Guaifenesin Swelling     Felt throat swelling, Guaifenesin with codeine     Penicillins Unknown     Unknown, allergy occurred at 6 months old     Rosuvastatin Calcium [Rosuvastatin] Unknown       Luz Elena Payton RN on 4/12/2023 at 10:37 AM        Diana will be patient's

## 2023-04-12 NOTE — LETTER
4/12/2023    Provider Not In System       RE: Lisseth Qureshi       Dear Colleague,     I had the pleasure of seeing Lisseth Qureshi in the [facility].    HEART CARE ENCOUNTER CONSULTATON NOTE      [facility]  [phone]      Assessment/Recommendations   Assessment:   1.  Unstable angina (new), anginal chest pressure with walking flat surface, resolves with rest.  Noted the past 2 weeks.  Seen in ED for severe chest pain episode on 4/6/2023  2. Coronary Artery Disease s/p D1 stenting (2012, 2.75×12 mm Promus Element Plus stent)  2.  Hypertension  3.  Hyperlipidemia, LDL: 180 (uncontrolled). ASCVD (10 year risk): 35.5% (very high)  4.  Statin intolerance   5.  Diabetes mellitus type 2.  A1c 6.5  6.  Carotid artery plaque on CT scan    Plan:   1. Start ASA 81 mg daily  2.  Discussed PCSK9-inhibitor given CAD, LDL>70, DM, ASCVD 10 year >35%.  Recommend starting Repatha 140 mg Q14 days.    3.  Continue lisinopril for hypertension  4.  Discussed starting beta-blocker as well (metoprolol XL 25 mg daily)  5.  Recommend coronary angiogram poss percutaneous coronary intevention.  Advised patient to have angiogram urgently due to progressive anginal symptoms.  She is aware of the severity of this condition which is severe and life-threatening.  6.  Recommend complete echocardiogram    Follow-up post discharge.  Patient prefers to follow-up with myself long-term.       History of Present Illness/Subjective    HPI: Lisseth Qureshi is a 67 year old female known coronary disease, statin intolerance not on statin therapy with elevated LDL, diabetes, hypertension who presents to cardiology clinic in consultation for unstable angina.    According to patient she has been developing chest pressure with walking on a flat surface requiring her to stop.  When she stops her symptoms resolved in 2 to 3 minutes.  She also had 1 episode of severe chest pain which woke her from sleep on April 6, 2023  requiring her to come to the emergency department.  Reviewed emergency department notes.    Currently she has ongoing dyspnea which is new.  She was previously able to do yoga and workout but unable to do any of her routine exercise at this time.    She is at high risk for progression of her coronary artery disease due to statin intolerance and an LDL of 183.  She is not aware of PCSK9 inhibitors and will plan to start her on a PCSK9 inhibitor after angiogram.  She is also not on aspirin therapy.  We will restart aspirin 81 mg daily.  She should also be started on beta-blocker which we will consider starting as well.     Echocardiogram Results: Pending    Coronary Angiogram: 2007  CORONARY FINDINGS:   1. Coronary circulation is right dominant.   2. Left main:  Left main coronary artery is medium large in size and length with minor nonocclusive disease present, gives rise to the left anterior descending and left circumflex coronary artery.     3. Left circumflex coronary artery is a medium large caliber vessel, gives rise to 2 major obtuse marginal branches.     4. First obtuse marginal branch is a small size branch, which arises very proximally from the circumflex.  It has mild nonocclusive disease present.   5. Second obtuse marginal branch is a medium size vessel, which arises from the mid circumflex.  It is tortuous with mild nonocclusive coronary artery disease.   6. The circumflex in the mid to distal segment after the second obtuse marginal branch has a discrete 40% lesion seen.  The remainder of the circumflex tapers distally and has minor nonocclusive disease present.     7. Left anterior descending:  The left anterior descending is a large caliber vessel, it extends all the way to apex.  Gives rise to several septal perforators and one major diagonal vessel.  There is minor nonocclusive disease present in the left anterior descending and its branches.  8. Right coronary artery:  The right coronary artery is  a large caliber dominant vessel, gives rise to a conus branch in the very proximal portion, RV marginal branch in the midportion, and bifurcates into a posterior descending artery branch and a posterolateral branch.  Posterolateral branch then further subdivides into smaller posterolateral segments.  The proximal portion of the vessel has a 25% discrete narrowing.  The midportion of the right coronary artery has a 40% diffuse disease.  The distal portion of the right coronary artery and its branches have minor nonocclusive disease following this.          CTA coronary: 2015  CT OF THE HEART WITH CONTRAST, CORONARY CT ANGIOGRAPHY:  DOMINANT SYSTEM:  Right.  Normal coronary origins.     LEFT CORONARY ARTERY:  LEFT MAIN TRUNK:  There is extrinsic calcification but no stenosis is identified.   This is a short vessel.  LEFT ANTERIOR DESCENDING: There is an ostial stenosis of 25% to 50% consisting of  mixed plaque.  There is scattered foci throughout the proximal and mid-left anterior  descending of mixed plaque narrowing the lumen by 25% to 50%.  No high-grade  stenosis is demonstrated in the left anterior descending or its diagonal branches.       LEFT CIRCUMFLEX:  There is calcified plaque narrowing the lumen by less than 25% in  the proximal portion of the vessel.  In the mid-portion of the vessel there is a  calcified plaque narrowing the lumen by 25% to 50%.  The first and major obtuse  marginal branch has a stent; the stent size is unspecified.  There appears to be  diminished distal flow and poor visualization of graft patency suggestive of  in-stent restenosis.  The more distal obtuse marginal branch is widely patent with  no stenosis or plaque.   RIGHT CORONARY ARTERY:   Dominant distribution.  There is irregular mixed plaque  throughout the proximal portion of the vessel.  In the mid- and distal portion of  the vessel, there is more mixed plaque 25% to 50% stenotic.  The posterior  descending and posterior  "ventricular branches are small in caliber but have no  evidence of stenosis or plaque.         CT: 2019:   The right common carotid artery is patent. Examination of the right carotid bulb demonstrates calcified and atherosclerotic plaque without evidence of hemodynamically significant stenosis.     The left common carotid artery is patent. Examination of the left carotid bulb demonstrates calcified and atherosclerotic plaque without evidence of hemodynamically significant stenosis.     Physical Examination  Review of Systems   Vitals: BP (!) 140/64 (BP Location: Right arm, Patient Position: Sitting, Cuff Size: Adult Regular)   Pulse 77   Resp 12   Ht 1.511 m (4' 11.5\")   Wt 79.4 kg (175 lb)   SpO2 96%   BMI 34.75 kg/m    BMI= There is no height or weight on file to calculate BMI.  Wt Readings from Last 3 Encounters:   04/06/23 78 kg (172 lb)   05/26/17 79.8 kg (176 lb)   05/31/16 79.8 kg (176 lb)        Pleasant   ENT/Mouth: membranes moist, no oral lesions or bleeding gums.      EYES:  no scleral icterus, normal conjunctivae       Chest/Lungs:   lungs are clear to auscultation, no rales or wheezing, no sternal scar, equal chest wall expansion    Cardiovascular:   Regular. Normal first and second heart sounds with no murmurs, rubs, or gallops; the carotid, radial and posterior tibial pulses are intact, Jugular venous pressure normal, no edema bilaterally    Abdomen:  no tenderness; bowel sounds are present   Extremities: no cyanosis or clubbing   Skin: no xanthelasma, warm.    Neurologic: normal  bilateral, no tremors     Psychiatric: alert and oriented x3, calm        Please refer above for cardiac ROS details.        Medical History  Surgical History Family History Social History   No past medical history on file.  Past Surgical History:   Procedure Laterality Date    CORONARY STENT PLACEMENT  10/19/2012    glenna 1st obtuse marginal    SHOULDER SURGERY      frozen shoulder     Family History   Problem " Relation Age of Onset    Hypertension Mother     Mitral Valve Replacement No family hx of         Social History     Socioeconomic History    Marital status: Single     Spouse name: Not on file    Number of children: Not on file    Years of education: Not on file    Highest education level: Not on file   Occupational History    Not on file   Tobacco Use    Smoking status: Former     Packs/day: 1.50     Years: 35.00     Pack years: 52.50     Types: Cigarettes     Quit date: 2005     Years since quittin.2    Smokeless tobacco: Not on file    Tobacco comments:     pt quit 10 years ago (she had smoked x 35 years)   Vaping Use    Vaping status: Not on file   Substance and Sexual Activity    Alcohol use: No     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    Drug use: No    Sexual activity: Not on file   Other Topics Concern    Not on file   Social History Narrative    Exercises 3-4 days per week     Social Determinants of Health     Financial Resource Strain: Not on file   Food Insecurity: Not on file   Transportation Needs: Not on file   Physical Activity: Not on file   Stress: Not on file   Social Connections: Not on file   Intimate Partner Violence: Not on file   Housing Stability: Not on file           Medications  Allergies   Current Outpatient Medications   Medication Sig Dispense Refill    BUPROPION HCL (WELLBUTRIN ORAL) [BUPROPION HCL (WELLBUTRIN ORAL)] Take 300 mg by mouth every morning.       coenzyme Q10 (COQ-10) 100 mg capsule [COENZYME Q10 (COQ-10) 100 MG CAPSULE] Use As Directed.      cyclobenzaprine (FLEXERIL) 10 MG tablet [CYCLOBENZAPRINE (FLEXERIL) 10 MG TABLET] Take 1 tablet (10 mg total) by mouth 2 (two) times a day as needed for muscle spasms. 20 tablet 0    ezetimibe (ZETIA) 10 mg tablet [EZETIMIBE (ZETIA) 10 MG TABLET] Take 1 tablet (10 mg total) by mouth daily. 90 tablet 3    glipiZIDE (GLUCOTROL) 2.5 MG 24 hr tablet [GLIPIZIDE (GLUCOTROL) 2.5 MG 24 HR TABLET] Take 1 tablet by  mouth as needed.  11    glipiZIDE (GLUCOTROL) 5 MG 24 hr tablet [GLIPIZIDE (GLUCOTROL) 5 MG 24 HR TABLET]       levothyroxine (SYNTHROID, LEVOTHROID) 75 MCG tablet [LEVOTHYROXINE (SYNTHROID, LEVOTHROID) 75 MCG TABLET] Take 75 mcg by mouth daily.      lidocaine (LIDODERM) 5 % [LIDOCAINE (LIDODERM) 5 %] Remove & Discard patch within 12 hours or as directed by MD 15 patch 0    lisinopril (PRINIVIL,ZESTRIL) 40 MG tablet [LISINOPRIL (PRINIVIL,ZESTRIL) 40 MG TABLET] Take 40 mg by mouth daily.      LORazepam (ATIVAN) 0.5 MG tablet [LORAZEPAM (ATIVAN) 0.5 MG TABLET]       MEDICATION CANNOT BE REORDERED - PLEASE MANUALLY REORDER AND DISCONTINUE THE OLD ORDER [OMEGA-3 FATTY ACIDS-VITAMIN E (FISH OIL) 1,000 MG CAP] Take 1 capsule by mouth 2 (two) times a day.      melatonin 3 mg Tab [MELATONIN 3 MG TAB] Take 3 mg by mouth bedtime as needed.      metFORMIN (GLUCOPHAGE) 1000 MG tablet [METFORMIN (GLUCOPHAGE) 1000 MG TABLET] Take 500 mg by mouth 2 (two) times a day with meals.       multivitamin therapeutic (THERAGRAN) tablet [MULTIVITAMIN THERAPEUTIC (THERAGRAN) TABLET] Take 1 tablet by mouth daily.      ondansetron (ZOFRAN) 4 MG tablet [ONDANSETRON (ZOFRAN) 4 MG TABLET] Take 1 tablet (4 mg total) by mouth every 6 (six) hours. 12 tablet 0    red yeast rice 600 mg Tab [RED YEAST RICE 600 MG TAB] Take 1 tablet by mouth daily.         Allergies   Allergen Reactions    Guaifenesin Swelling     Felt throat swelling, Guaifenesin with codeine    Penicillins Unknown     Unknown, allergy occurred at 6 months old    Rosuvastatin Calcium [Rosuvastatin] Unknown          Lab Results    Chemistry/lipid CBC Cardiac Enzymes/BNP/TSH/INR   Recent Labs   Lab Test 05/26/17  1420 05/24/16  0735   CHOL  --  188   HDL  --  47*   LDL 98 124   TRIG  --  85     Recent Labs   Lab Test 05/26/17  1420 05/24/16  0735 03/20/15  1703   LDL 98 124 122     Recent Labs   Lab Test 04/06/23  2300      POTASSIUM 3.8   CHLORIDE 101   CO2 25   *   BUN  20.0   CR 1.05*   GFRESTIMATED 59*   AMBERLY 9.2     Recent Labs   Lab Test 04/06/23  2300 04/10/19  1851   CR 1.05* 1.27*     Recent Labs   Lab Test 03/20/15  1703   A1C 6.5*          Recent Labs   Lab Test 04/06/23  2300   WBC 6.7   HGB 12.3   HCT 37.7   MCV 86        Recent Labs   Lab Test 04/06/23 2300 04/10/19  1851   HGB 12.3 12.2    No results for input(s): TROPONINI in the last 65419 hours.  No results for input(s): BNP, NTBNPI, NTBNP in the last 51505 hours.  No results for input(s): TSH in the last 51784 hours.  Recent Labs   Lab Test 04/10/19  1851   INR 1.01        Oswald Shaffer DO        Thank you for allowing me to participate in the care of your patient.      Sincerely,     Oswald Shaffer DO     [facility]  cc:   Sarah Guevara MD  [address] [place] [address]

## 2023-04-12 NOTE — TELEPHONE ENCOUNTER
----- Message from Candice Dupree sent at 4/12/2023 10:20 AM CDT -----  Regarding: MAT ORDERING  Case type: CA POSS PCI    Procedure Physician(s): BRANDAN/CHETAN    Procedure Date and Patient Arrival Time: Tuesday 4/18, with arrival time of 730AM    H&P: Completed on 4/12 MAT    Pre-Procedure Lab Appt: Wednesday 4/12 at     Alerts/Important Info: DIABETIC    THANKS!  СЕРГЕЙ

## 2023-04-12 NOTE — PROGRESS NOTES
HEART CARE ENCOUNTER CONSULTATON NOTE      [facility]  [phone]      Assessment/Recommendations   Assessment:   1.  Unstable angina (new), anginal chest pressure with walking flat surface, resolves with rest.  Noted the past 2 weeks.  Seen in ED for severe chest pain episode on 4/6/2023  2. Coronary Artery Disease s/p D1 stenting (2012, 2.75×12 mm Promus Element Plus stent)  2.  Hypertension  3.  Hyperlipidemia, LDL: 180 (uncontrolled). ASCVD (10 year risk): 35.5% (very high)  4.  Statin intolerance   5.  Diabetes mellitus type 2.  A1c 6.5  6.  Carotid artery plaque on CT scan    Plan:   1. Start ASA 81 mg daily  2.  Discussed PCSK9-inhibitor given CAD, LDL>70, DM, ASCVD 10 year >35%.  Recommend starting Repatha 140 mg Q14 days.    3.  Continue lisinopril for hypertension  4.  Discussed starting beta-blocker as well (metoprolol XL 25 mg daily)  5.  Recommend coronary angiogram poss percutaneous coronary intevention.  Advised patient to have angiogram urgently due to progressive anginal symptoms.  She is aware of the severity of this condition which is severe and life-threatening.  6.  Recommend complete echocardiogram    Follow-up post discharge.  Patient prefers to follow-up with myself long-term.       History of Present Illness/Subjective    HPI: Lisseth Qureshi is a 67 year old female known coronary disease, statin intolerance not on statin therapy with elevated LDL, diabetes, hypertension who presents to cardiology clinic in consultation for unstable angina.    According to patient she has been developing chest pressure with walking on a flat surface requiring her to stop.  When she stops her symptoms resolved in 2 to 3 minutes.  She also had 1 episode of severe chest pain which woke her from sleep on April 6, 2023 requiring her to come to the emergency department.  Reviewed emergency department notes.    Currently she has ongoing dyspnea which is new.  She was previously able to do  yoga and workout but unable to do any of her routine exercise at this time.    She is at high risk for progression of her coronary artery disease due to statin intolerance and an LDL of 183.  She is not aware of PCSK9 inhibitors and will plan to start her on a PCSK9 inhibitor after angiogram.  She is also not on aspirin therapy.  We will restart aspirin 81 mg daily.  She should also be started on beta-blocker which we will consider starting as well.     Echocardiogram Results: Pending    Coronary Angiogram: 2007  CORONARY FINDINGS:   1. Coronary circulation is right dominant.   2. Left main:  Left main coronary artery is medium large in size and length with minor nonocclusive disease present, gives rise to the left anterior descending and left circumflex coronary artery.     3. Left circumflex coronary artery is a medium large caliber vessel, gives rise to 2 major obtuse marginal branches.     4. First obtuse marginal branch is a small size branch, which arises very proximally from the circumflex.  It has mild nonocclusive disease present.   5. Second obtuse marginal branch is a medium size vessel, which arises from the mid circumflex.  It is tortuous with mild nonocclusive coronary artery disease.   6. The circumflex in the mid to distal segment after the second obtuse marginal branch has a discrete 40% lesion seen.  The remainder of the circumflex tapers distally and has minor nonocclusive disease present.     7. Left anterior descending:  The left anterior descending is a large caliber vessel, it extends all the way to apex.  Gives rise to several septal perforators and one major diagonal vessel.  There is minor nonocclusive disease present in the left anterior descending and its branches.  8. Right coronary artery:  The right coronary artery is a large caliber dominant vessel, gives rise to a conus branch in the very proximal portion, RV marginal branch in the midportion, and bifurcates into a posterior  descending artery branch and a posterolateral branch.  Posterolateral branch then further subdivides into smaller posterolateral segments.  The proximal portion of the vessel has a 25% discrete narrowing.  The midportion of the right coronary artery has a 40% diffuse disease.  The distal portion of the right coronary artery and its branches have minor nonocclusive disease following this.          CTA coronary: 2015  CT OF THE HEART WITH CONTRAST, CORONARY CT ANGIOGRAPHY:  DOMINANT SYSTEM:  Right.  Normal coronary origins.     LEFT CORONARY ARTERY:  LEFT MAIN TRUNK:  There is extrinsic calcification but no stenosis is identified.   This is a short vessel.  LEFT ANTERIOR DESCENDING: There is an ostial stenosis of 25% to 50% consisting of  mixed plaque.  There is scattered foci throughout the proximal and mid-left anterior  descending of mixed plaque narrowing the lumen by 25% to 50%.  No high-grade  stenosis is demonstrated in the left anterior descending or its diagonal branches.       LEFT CIRCUMFLEX:  There is calcified plaque narrowing the lumen by less than 25% in  the proximal portion of the vessel.  In the mid-portion of the vessel there is a  calcified plaque narrowing the lumen by 25% to 50%.  The first and major obtuse  marginal branch has a stent; the stent size is unspecified.  There appears to be  diminished distal flow and poor visualization of graft patency suggestive of  in-stent restenosis.  The more distal obtuse marginal branch is widely patent with  no stenosis or plaque.   RIGHT CORONARY ARTERY:   Dominant distribution.  There is irregular mixed plaque  throughout the proximal portion of the vessel.  In the mid- and distal portion of  the vessel, there is more mixed plaque 25% to 50% stenotic.  The posterior  descending and posterior ventricular branches are small in caliber but have no  evidence of stenosis or plaque.         CT: 2019:   The right common carotid artery is patent. Examination of  "the right carotid bulb demonstrates calcified and atherosclerotic plaque without evidence of hemodynamically significant stenosis.     The left common carotid artery is patent. Examination of the left carotid bulb demonstrates calcified and atherosclerotic plaque without evidence of hemodynamically significant stenosis.     Physical Examination  Review of Systems   Vitals: BP (!) 140/64 (BP Location: Right arm, Patient Position: Sitting, Cuff Size: Adult Regular)   Pulse 77   Resp 12   Ht 1.511 m (4' 11.5\")   Wt 79.4 kg (175 lb)   SpO2 96%   BMI 34.75 kg/m    BMI= There is no height or weight on file to calculate BMI.  Wt Readings from Last 3 Encounters:   04/06/23 78 kg (172 lb)   05/26/17 79.8 kg (176 lb)   05/31/16 79.8 kg (176 lb)        Pleasant   ENT/Mouth: membranes moist, no oral lesions or bleeding gums.      EYES:  no scleral icterus, normal conjunctivae       Chest/Lungs:   lungs are clear to auscultation, no rales or wheezing, no sternal scar, equal chest wall expansion    Cardiovascular:   Regular. Normal first and second heart sounds with no murmurs, rubs, or gallops; the carotid, radial and posterior tibial pulses are intact, Jugular venous pressure normal, no edema bilaterally    Abdomen:  no tenderness; bowel sounds are present   Extremities: no cyanosis or clubbing   Skin: no xanthelasma, warm.    Neurologic: normal  bilateral, no tremors     Psychiatric: alert and oriented x3, calm        Please refer above for cardiac ROS details.        Medical History  Surgical History Family History Social History   No past medical history on file.  Past Surgical History:   Procedure Laterality Date     CORONARY STENT PLACEMENT  10/19/2012    glenna 1st obtuse marginal     SHOULDER SURGERY      frozen shoulder     Family History   Problem Relation Age of Onset     Hypertension Mother      Mitral Valve Replacement No family hx of         Social History     Socioeconomic History     Marital status: Single "     Spouse name: Not on file     Number of children: Not on file     Years of education: Not on file     Highest education level: Not on file   Occupational History     Not on file   Tobacco Use     Smoking status: Former     Packs/day: 1.50     Years: 35.00     Pack years: 52.50     Types: Cigarettes     Quit date: 2005     Years since quittin.2     Smokeless tobacco: Not on file     Tobacco comments:     pt quit 10 years ago (she had smoked x 35 years)   Vaping Use     Vaping status: Not on file   Substance and Sexual Activity     Alcohol use: No     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     Drug use: No     Sexual activity: Not on file   Other Topics Concern     Not on file   Social History Narrative    Exercises 3-4 days per week     Social Determinants of Health     Financial Resource Strain: Not on file   Food Insecurity: Not on file   Transportation Needs: Not on file   Physical Activity: Not on file   Stress: Not on file   Social Connections: Not on file   Intimate Partner Violence: Not on file   Housing Stability: Not on file           Medications  Allergies   Current Outpatient Medications   Medication Sig Dispense Refill     BUPROPION HCL (WELLBUTRIN ORAL) [BUPROPION HCL (WELLBUTRIN ORAL)] Take 300 mg by mouth every morning.        coenzyme Q10 (COQ-10) 100 mg capsule [COENZYME Q10 (COQ-10) 100 MG CAPSULE] Use As Directed.       cyclobenzaprine (FLEXERIL) 10 MG tablet [CYCLOBENZAPRINE (FLEXERIL) 10 MG TABLET] Take 1 tablet (10 mg total) by mouth 2 (two) times a day as needed for muscle spasms. 20 tablet 0     ezetimibe (ZETIA) 10 mg tablet [EZETIMIBE (ZETIA) 10 MG TABLET] Take 1 tablet (10 mg total) by mouth daily. 90 tablet 3     glipiZIDE (GLUCOTROL) 2.5 MG 24 hr tablet [GLIPIZIDE (GLUCOTROL) 2.5 MG 24 HR TABLET] Take 1 tablet by mouth as needed.  11     glipiZIDE (GLUCOTROL) 5 MG 24 hr tablet [GLIPIZIDE (GLUCOTROL) 5 MG 24 HR TABLET]        levothyroxine (SYNTHROID,  LEVOTHROID) 75 MCG tablet [LEVOTHYROXINE (SYNTHROID, LEVOTHROID) 75 MCG TABLET] Take 75 mcg by mouth daily.       lidocaine (LIDODERM) 5 % [LIDOCAINE (LIDODERM) 5 %] Remove & Discard patch within 12 hours or as directed by MD 15 patch 0     lisinopril (PRINIVIL,ZESTRIL) 40 MG tablet [LISINOPRIL (PRINIVIL,ZESTRIL) 40 MG TABLET] Take 40 mg by mouth daily.       LORazepam (ATIVAN) 0.5 MG tablet [LORAZEPAM (ATIVAN) 0.5 MG TABLET]        MEDICATION CANNOT BE REORDERED - PLEASE MANUALLY REORDER AND DISCONTINUE THE OLD ORDER [OMEGA-3 FATTY ACIDS-VITAMIN E (FISH OIL) 1,000 MG CAP] Take 1 capsule by mouth 2 (two) times a day.       melatonin 3 mg Tab [MELATONIN 3 MG TAB] Take 3 mg by mouth bedtime as needed.       metFORMIN (GLUCOPHAGE) 1000 MG tablet [METFORMIN (GLUCOPHAGE) 1000 MG TABLET] Take 500 mg by mouth 2 (two) times a day with meals.        multivitamin therapeutic (THERAGRAN) tablet [MULTIVITAMIN THERAPEUTIC (THERAGRAN) TABLET] Take 1 tablet by mouth daily.       ondansetron (ZOFRAN) 4 MG tablet [ONDANSETRON (ZOFRAN) 4 MG TABLET] Take 1 tablet (4 mg total) by mouth every 6 (six) hours. 12 tablet 0     red yeast rice 600 mg Tab [RED YEAST RICE 600 MG TAB] Take 1 tablet by mouth daily.         Allergies   Allergen Reactions     Guaifenesin Swelling     Felt throat swelling, Guaifenesin with codeine     Penicillins Unknown     Unknown, allergy occurred at 6 months old     Rosuvastatin Calcium [Rosuvastatin] Unknown          Lab Results    Chemistry/lipid CBC Cardiac Enzymes/BNP/TSH/INR   Recent Labs   Lab Test 05/26/17  1420 05/24/16  0735   CHOL  --  188   HDL  --  47*   LDL 98 124   TRIG  --  85     Recent Labs   Lab Test 05/26/17  1420 05/24/16  0735 03/20/15  1703   LDL 98 124 122     Recent Labs   Lab Test 04/06/23  2300      POTASSIUM 3.8   CHLORIDE 101   CO2 25   *   BUN 20.0   CR 1.05*   GFRESTIMATED 59*   AMBERLY 9.2     Recent Labs   Lab Test 04/06/23  2300 04/10/19  1851   CR 1.05* 1.27*     Recent  Labs   Lab Test 03/20/15  1703   A1C 6.5*          Recent Labs   Lab Test 04/06/23  2300   WBC 6.7   HGB 12.3   HCT 37.7   MCV 86        Recent Labs   Lab Test 04/06/23  2300 04/10/19  1851   HGB 12.3 12.2    No results for input(s): TROPONINI in the last 47766 hours.  No results for input(s): BNP, NTBNPI, NTBNP in the last 83053 hours.  No results for input(s): TSH in the last 46467 hours.  Recent Labs   Lab Test 04/10/19  1851   INR 1.01        Oswald Shaffer DO

## 2023-04-12 NOTE — TELEPHONE ENCOUNTER
Plan:   1. Start ASA 81 mg daily  2.  Discussed PCSK9-inhibitor given CAD, LDL>70, DM, ASCVD 10 year >35%.  Recommend starting Repatha 140 mg Q14 days.    3.  Continue lisinopril for hypertension  4.  Discussed starting beta-blocker as well (metoprolol XL 25 mg daily)  5.  Recommend coronary angiogram poss percutaneous coronary intevention.  Advised patient to have angiogram urgently due to progressive anginal symptoms.  She is aware of the severity of this condition which is severe and life-threatening.  6.  Recommend complete echocardiogram          ==echo placed- noted in chart note from Dr. Shaffer. Msg to procedure  to see if this can be done same day of procedure. -[facility]

## 2023-04-18 ENCOUNTER — HOSPITAL ENCOUNTER (OUTPATIENT)
Dept: CARDIOLOGY | Facility: HOSPITAL | Age: 68
Discharge: HOME | End: 2023-04-18
Attending: INTERNAL MEDICINE | Admitting: INTERNAL MEDICINE
Payer: COMMERCIAL

## 2023-04-18 ENCOUNTER — HOSPITAL ENCOUNTER (OUTPATIENT)
Facility: HOSPITAL | Age: 68
Discharge: HOME | End: 2023-04-18
Attending: INTERNAL MEDICINE | Admitting: INTERNAL MEDICINE
Payer: COMMERCIAL

## 2023-04-18 VITALS
RESPIRATION RATE: 18 BRPM | BODY MASS INDEX: 35.28 KG/M2 | TEMPERATURE: 97.6 F | WEIGHT: 175 LBS | SYSTOLIC BLOOD PRESSURE: 135 MMHG | DIASTOLIC BLOOD PRESSURE: 62 MMHG | HEART RATE: 57 BPM | OXYGEN SATURATION: 92 % | HEIGHT: 59 IN

## 2023-04-18 DIAGNOSIS — I25.10 CORONARY ARTERY DISEASE DUE TO CALCIFIED CORONARY LESION: ICD-10-CM

## 2023-04-18 DIAGNOSIS — I25.84 CORONARY ARTERY DISEASE DUE TO CALCIFIED CORONARY LESION: ICD-10-CM

## 2023-04-18 DIAGNOSIS — R06.09 DYSPNEA ON EXERTION: ICD-10-CM

## 2023-04-18 DIAGNOSIS — I20.0 ACCELERATING ANGINA (H): ICD-10-CM

## 2023-04-18 LAB
ACT BLD: 284 SECONDS (ref 74–150)
ACT BLD: 326 SECONDS (ref 74–150)
ATRIAL RATE - MUSE: 55 BPM
CHOLEST SERPL-MCNC: 199 MG/DL
DIASTOLIC BLOOD PRESSURE - MUSE: NORMAL MMHG
HDLC SERPL-MCNC: 41 MG/DL
INTERPRETATION ECG - MUSE: NORMAL
LDLC SERPL CALC-MCNC: 132 MG/DL
LVEF ECHO: NORMAL
NONHDLC SERPL-MCNC: 158 MG/DL
P AXIS - MUSE: 46 DEGREES
PR INTERVAL - MUSE: 170 MS
QRS DURATION - MUSE: 82 MS
QT - MUSE: 468 MS
QTC - MUSE: 447 MS
R AXIS - MUSE: 27 DEGREES
SYSTOLIC BLOOD PRESSURE - MUSE: NORMAL MMHG
T AXIS - MUSE: 21 DEGREES
TRIGL SERPL-MCNC: 129 MG/DL
VENTRICULAR RATE- MUSE: 55 BPM

## 2023-04-18 PROCEDURE — 80061 LIPID PANEL: CPT | Performed by: INTERNAL MEDICINE

## 2023-04-18 PROCEDURE — 93458 L HRT ARTERY/VENTRICLE ANGIO: CPT | Mod: 26 | Performed by: INTERNAL MEDICINE

## 2023-04-18 PROCEDURE — 99153 MOD SED SAME PHYS/QHP EA: CPT | Performed by: INTERNAL MEDICINE

## 2023-04-18 PROCEDURE — 255N000002 HC RX 255 OP 636: Performed by: INTERNAL MEDICINE

## 2023-04-18 PROCEDURE — 272N000001 HC OR GENERAL SUPPLY STERILE: Performed by: INTERNAL MEDICINE

## 2023-04-18 PROCEDURE — 250N000009 HC RX 250: Performed by: INTERNAL MEDICINE

## 2023-04-18 PROCEDURE — 92928 PRQ TCAT PLMT NTRAC ST 1 LES: CPT | Mod: RC | Performed by: INTERNAL MEDICINE

## 2023-04-18 PROCEDURE — 250N000011 HC RX IP 250 OP 636: Performed by: INTERNAL MEDICINE

## 2023-04-18 PROCEDURE — 85347 COAGULATION TIME ACTIVATED: CPT

## 2023-04-18 PROCEDURE — C1874 STENT, COATED/COV W/DEL SYS: HCPCS | Performed by: INTERNAL MEDICINE

## 2023-04-18 PROCEDURE — 250N000013 HC RX MED GY IP 250 OP 250 PS 637: Performed by: INTERNAL MEDICINE

## 2023-04-18 PROCEDURE — 93010 ELECTROCARDIOGRAM REPORT: CPT | Performed by: INTERNAL MEDICINE

## 2023-04-18 PROCEDURE — 93005 ELECTROCARDIOGRAM TRACING: CPT

## 2023-04-18 PROCEDURE — C1887 CATHETER, GUIDING: HCPCS | Performed by: INTERNAL MEDICINE

## 2023-04-18 PROCEDURE — 999N000054 HC STATISTIC EKG NON-CHARGEABLE

## 2023-04-18 PROCEDURE — C1725 CATH, TRANSLUMIN NON-LASER: HCPCS | Performed by: INTERNAL MEDICINE

## 2023-04-18 PROCEDURE — 93306 TTE W/DOPPLER COMPLETE: CPT | Mod: 26 | Performed by: INTERNAL MEDICINE

## 2023-04-18 PROCEDURE — C9600 PERC DRUG-EL COR STENT SING: HCPCS | Performed by: INTERNAL MEDICINE

## 2023-04-18 PROCEDURE — 99152 MOD SED SAME PHYS/QHP 5/>YRS: CPT | Performed by: INTERNAL MEDICINE

## 2023-04-18 PROCEDURE — C1894 INTRO/SHEATH, NON-LASER: HCPCS | Performed by: INTERNAL MEDICINE

## 2023-04-18 PROCEDURE — 93010 ELECTROCARDIOGRAM REPORT: CPT | Mod: 77 | Performed by: INTERNAL MEDICINE

## 2023-04-18 PROCEDURE — 93460 R&L HRT ART/VENTRICLE ANGIO: CPT | Performed by: INTERNAL MEDICINE

## 2023-04-18 PROCEDURE — 93458 L HRT ARTERY/VENTRICLE ANGIO: CPT | Mod: XU | Performed by: INTERNAL MEDICINE

## 2023-04-18 PROCEDURE — 36415 COLL VENOUS BLD VENIPUNCTURE: CPT | Performed by: INTERNAL MEDICINE

## 2023-04-18 PROCEDURE — 258N000003 HC RX IP 258 OP 636: Performed by: INTERNAL MEDICINE

## 2023-04-18 PROCEDURE — C1769 GUIDE WIRE: HCPCS | Performed by: INTERNAL MEDICINE

## 2023-04-18 DEVICE — STENT CORONARY DES SYNERGY XD MR US 2.50X32MM H7493941832250: Type: IMPLANTABLE DEVICE | Status: FUNCTIONAL

## 2023-04-18 DEVICE — STENT CORONARY DES SYNERGY XD MR US 2.25X38MM H7493941838220: Type: IMPLANTABLE DEVICE | Site: CORONARY | Status: FUNCTIONAL

## 2023-04-18 RX ORDER — FLUMAZENIL 0.1 MG/ML
0.2 INJECTION, SOLUTION INTRAVENOUS
Status: DISCONTINUED | OUTPATIENT
Start: 2023-04-18 | End: 2023-04-18 | Stop reason: HOSPADM

## 2023-04-18 RX ORDER — LIDOCAINE 40 MG/G
CREAM TOPICAL
Status: DISCONTINUED | OUTPATIENT
Start: 2023-04-18 | End: 2023-04-18 | Stop reason: HOSPADM

## 2023-04-18 RX ORDER — FENTANYL CITRATE 50 UG/ML
25 INJECTION, SOLUTION INTRAMUSCULAR; INTRAVENOUS
Status: DISCONTINUED | OUTPATIENT
Start: 2023-04-18 | End: 2023-04-18 | Stop reason: HOSPADM

## 2023-04-18 RX ORDER — HYDRALAZINE HYDROCHLORIDE 20 MG/ML
10 INJECTION INTRAMUSCULAR; INTRAVENOUS EVERY 4 HOURS PRN
Status: DISCONTINUED | OUTPATIENT
Start: 2023-04-18 | End: 2023-04-18 | Stop reason: HOSPADM

## 2023-04-18 RX ORDER — NITROGLYCERIN 5 MG/ML
VIAL (ML) INTRAVENOUS
Status: DISCONTINUED | OUTPATIENT
Start: 2023-04-18 | End: 2023-04-18 | Stop reason: HOSPADM

## 2023-04-18 RX ORDER — NITROGLYCERIN 0.4 MG/1
0.4 TABLET SUBLINGUAL EVERY 5 MIN PRN
Status: DISCONTINUED | OUTPATIENT
Start: 2023-04-18 | End: 2023-04-18 | Stop reason: HOSPADM

## 2023-04-18 RX ORDER — NICOTINE POLACRILEX 4 MG
15-30 LOZENGE BUCCAL
Status: DISCONTINUED | OUTPATIENT
Start: 2023-04-18 | End: 2023-04-18 | Stop reason: HOSPADM

## 2023-04-18 RX ORDER — DIPHENHYDRAMINE HYDROCHLORIDE 50 MG/ML
INJECTION INTRAMUSCULAR; INTRAVENOUS
Status: DISCONTINUED | OUTPATIENT
Start: 2023-04-18 | End: 2023-04-18 | Stop reason: HOSPADM

## 2023-04-18 RX ORDER — HEPARIN SODIUM 1000 [USP'U]/ML
INJECTION, SOLUTION INTRAVENOUS; SUBCUTANEOUS
Status: DISCONTINUED | OUTPATIENT
Start: 2023-04-18 | End: 2023-04-18 | Stop reason: HOSPADM

## 2023-04-18 RX ORDER — ASPIRIN 81 MG/1
243 TABLET, CHEWABLE ORAL ONCE
Status: COMPLETED | OUTPATIENT
Start: 2023-04-18 | End: 2023-04-18

## 2023-04-18 RX ORDER — NALOXONE HYDROCHLORIDE 0.4 MG/ML
0.2 INJECTION, SOLUTION INTRAMUSCULAR; INTRAVENOUS; SUBCUTANEOUS
Status: DISCONTINUED | OUTPATIENT
Start: 2023-04-18 | End: 2023-04-18 | Stop reason: HOSPADM

## 2023-04-18 RX ORDER — GLIPIZIDE 2.5 MG/1
2.5 TABLET, EXTENDED RELEASE ORAL EVERY EVENING
COMMUNITY
End: 2023-11-13

## 2023-04-18 RX ORDER — EPTIFIBATIDE 2 MG/ML
INJECTION, SOLUTION INTRAVENOUS
Status: DISCONTINUED | OUTPATIENT
Start: 2023-04-18 | End: 2023-04-18 | Stop reason: HOSPADM

## 2023-04-18 RX ORDER — FENTANYL CITRATE 50 UG/ML
INJECTION, SOLUTION INTRAMUSCULAR; INTRAVENOUS
Status: DISCONTINUED | OUTPATIENT
Start: 2023-04-18 | End: 2023-04-18 | Stop reason: HOSPADM

## 2023-04-18 RX ORDER — NALOXONE HYDROCHLORIDE 0.4 MG/ML
0.4 INJECTION, SOLUTION INTRAMUSCULAR; INTRAVENOUS; SUBCUTANEOUS
Status: DISCONTINUED | OUTPATIENT
Start: 2023-04-18 | End: 2023-04-18 | Stop reason: HOSPADM

## 2023-04-18 RX ORDER — EXENATIDE 2 MG/.85ML
2 INJECTION, SUSPENSION, EXTENDED RELEASE SUBCUTANEOUS
COMMUNITY

## 2023-04-18 RX ORDER — ASPIRIN 325 MG
325 TABLET ORAL ONCE
Status: COMPLETED | OUTPATIENT
Start: 2023-04-18 | End: 2023-04-18

## 2023-04-18 RX ORDER — IODIXANOL 320 MG/ML
INJECTION, SOLUTION INTRAVASCULAR
Status: DISCONTINUED | OUTPATIENT
Start: 2023-04-18 | End: 2023-04-18 | Stop reason: HOSPADM

## 2023-04-18 RX ORDER — ASPIRIN 81 MG/1
81 TABLET, CHEWABLE ORAL ONCE
Status: DISCONTINUED | OUTPATIENT
Start: 2023-04-18 | End: 2023-04-18

## 2023-04-18 RX ORDER — ONDANSETRON 4 MG/1
4 TABLET, ORALLY DISINTEGRATING ORAL EVERY 6 HOURS PRN
Status: DISCONTINUED | OUTPATIENT
Start: 2023-04-18 | End: 2023-04-18 | Stop reason: HOSPADM

## 2023-04-18 RX ORDER — ONDANSETRON 2 MG/ML
4 INJECTION INTRAMUSCULAR; INTRAVENOUS EVERY 6 HOURS PRN
Status: DISCONTINUED | OUTPATIENT
Start: 2023-04-18 | End: 2023-04-18 | Stop reason: HOSPADM

## 2023-04-18 RX ORDER — SODIUM CHLORIDE 9 MG/ML
INJECTION, SOLUTION INTRAVENOUS CONTINUOUS
Status: DISCONTINUED | OUTPATIENT
Start: 2023-04-18 | End: 2023-04-18 | Stop reason: HOSPADM

## 2023-04-18 RX ORDER — DEXTROSE MONOHYDRATE 25 G/50ML
25-50 INJECTION, SOLUTION INTRAVENOUS
Status: DISCONTINUED | OUTPATIENT
Start: 2023-04-18 | End: 2023-04-18 | Stop reason: HOSPADM

## 2023-04-18 RX ORDER — OXYCODONE HYDROCHLORIDE 5 MG/1
5 TABLET ORAL EVERY 4 HOURS PRN
Status: DISCONTINUED | OUTPATIENT
Start: 2023-04-18 | End: 2023-04-18 | Stop reason: HOSPADM

## 2023-04-18 RX ORDER — CLOPIDOGREL BISULFATE 75 MG/1
TABLET ORAL
Status: DISCONTINUED | OUTPATIENT
Start: 2023-04-18 | End: 2023-04-18 | Stop reason: HOSPADM

## 2023-04-18 RX ORDER — ACETAMINOPHEN 325 MG/1
650 TABLET ORAL EVERY 4 HOURS PRN
Status: DISCONTINUED | OUTPATIENT
Start: 2023-04-18 | End: 2023-04-18 | Stop reason: HOSPADM

## 2023-04-18 RX ORDER — CHLORAL HYDRATE 500 MG
2 CAPSULE ORAL EVERY EVENING
COMMUNITY

## 2023-04-18 RX ORDER — ATROPINE SULFATE 0.1 MG/ML
0.5 INJECTION INTRAVENOUS
Status: DISCONTINUED | OUTPATIENT
Start: 2023-04-18 | End: 2023-04-18 | Stop reason: HOSPADM

## 2023-04-18 RX ORDER — CLOPIDOGREL BISULFATE 75 MG/1
75 TABLET ORAL DAILY
Status: DISCONTINUED | OUTPATIENT
Start: 2023-04-19 | End: 2023-04-18 | Stop reason: HOSPADM

## 2023-04-18 RX ORDER — METOPROLOL TARTRATE 1 MG/ML
5 INJECTION, SOLUTION INTRAVENOUS
Status: DISCONTINUED | OUTPATIENT
Start: 2023-04-18 | End: 2023-04-18 | Stop reason: HOSPADM

## 2023-04-18 RX ORDER — ASPIRIN 81 MG/1
81 TABLET ORAL DAILY
Status: DISCONTINUED | OUTPATIENT
Start: 2023-04-19 | End: 2023-04-18 | Stop reason: HOSPADM

## 2023-04-18 RX ORDER — OXYCODONE HYDROCHLORIDE 5 MG/1
10 TABLET ORAL EVERY 4 HOURS PRN
Status: DISCONTINUED | OUTPATIENT
Start: 2023-04-18 | End: 2023-04-18 | Stop reason: HOSPADM

## 2023-04-18 RX ORDER — CLOPIDOGREL BISULFATE 75 MG/1
75 TABLET ORAL DAILY
Qty: 90 TABLET | Refills: 3 | Status: SHIPPED | OUTPATIENT
Start: 2023-04-19 | End: 2023-05-12

## 2023-04-18 RX ADMIN — SODIUM CHLORIDE: 9 INJECTION, SOLUTION INTRAVENOUS at 09:07

## 2023-04-18 RX ADMIN — PERFLUTREN 2.5 ML: 6.52 INJECTION, SUSPENSION INTRAVENOUS at 14:45

## 2023-04-18 ASSESSMENT — ACTIVITIES OF DAILY LIVING (ADL)
ADLS_ACUITY_SCORE: 35

## 2023-04-18 ASSESSMENT — EJECTION FRACTION: EF_VALUE: .13

## 2023-04-18 NOTE — Clinical Note
Pt noted to have swelling under right eye and sclera also swelling, Dr Guerra notified, benadryl given

## 2023-04-18 NOTE — INTERVAL H&P NOTE
"I have reviewed the surgical (or preoperative) H&P that is linked to this encounter, and examined the patient. There are no significant changes    Clinical Conditions Present on Arrival:  Clinically Significant Risk Factors Present on Admission                  # Obesity: Estimated body mass index is 35.35 kg/m  as calculated from the following:    Height as of this encounter: 1.499 m (4' 11\").    Weight as of this encounter: 79.4 kg (175 lb).       "

## 2023-04-18 NOTE — Clinical Note
Stent deployed in the middle right coronary artery. Max pressure = 16 carley. Total duration = 32 seconds.

## 2023-04-18 NOTE — Clinical Note
The first balloon was inserted into the right coronary artery and middle right coronary artery.Max pressure = 8 carley. Total duration = 20 seconds.     Max pressure = 8 carley. Total duration = 15 seconds.    Balloon reinflated a second time: Max pressure = 8 carley. Total duration = 15 seconds.  Balloon reinflated a third time: Max pressure = 8 carley. Total duration = 20 seconds.

## 2023-04-18 NOTE — Clinical Note
Stent deployed in the distal right coronary artery. Max pressure = 16 carley. Total duration = 22 seconds.

## 2023-04-18 NOTE — Clinical Note
The first balloon was inserted into the right coronary artery and middle right coronary artery.Max pressure = 16 carley. Total duration = 7 seconds.     Max pressure = 16 carley. Total duration = 10 seconds.    Balloon reinflated a second time: Max pressure = 16 carley. Total duration = 10 seconds.  Balloon reinflated a third time: Max pressure = 16 carley. Total duration = 6 seconds.  Balloon reinflated a fourth time: Max pressure = 16 carley. Total  duration = 10 seconds.  Balloon reinflated a fourth time: Max pressure = 16 carley. Total duration = 16 seconds.

## 2023-04-18 NOTE — Clinical Note
Max pressure = 7 carley. Total duration = 26 seconds.     Max pressure = 8 carley. Total duration = 15 seconds.    Balloon reinflated a second time: Max pressure = 8 carley. Total duration = 15 seconds.  Balloon reinflated a third time: Max pressure = 12 carley. Total duration = 33 seconds.  Balloon reinflated a fourth time: Max pressure = 10 carley. Total duration = 14 seconds.

## 2023-04-18 NOTE — Clinical Note
The first balloon was inserted into the right coronary artery and distal right coronary artery.Max pressure = 6 carley. Total duration = 5 seconds.     Max pressure = 12 carley. Total duration = 6 seconds.    Balloon reinflated a second time: Max pressure = 12 carley. Total duration = 6 seconds.  Balloon reinflated a third time: Max pressure = 12 carley. Total duration = 5 seconds.

## 2023-04-18 NOTE — PROGRESS NOTES
Went over discharge instructions with Pt and family including follow up care. Pt and family verbalized understanding of instructions.

## 2023-04-18 NOTE — PRE-PROCEDURE
GENERAL PRE-PROCEDURE:   Procedure:  Coronary angiogram with possible PCI  Date/Time:  4/18/2023 9:30 AM    Written consent obtained?: Yes    Risks and benefits: Risks, benefits and alternatives were discussed    Consent given by:  Patient  Patient states understanding of procedure being performed: Yes    Patient's understanding of procedure matches consent: Yes    Procedure consent matches procedure scheduled: Yes    Expected level of sedation:  Moderate  Appropriately NPO:  Yes  ASA Class:  3 (UA, CAD; s/p D1 PCI, HTN, HLD, statin intolerance, DM type II, carotid artery plaque on CT, Class I obesity; BMI 34.75kg/m2 )  Mallampati  :  Grade 2- soft palate, base of uvula, tonsillar pillars, and portion of posterior pharyngeal wall visible  Lungs:  Lungs clear with good breath sounds bilaterally  Heart:  Normal heart sounds and rate  History & Physical reviewed:  History and physical reviewed and no updates needed  Statement of review:  I have reviewed the lab findings, diagnostic data, medications, and the plan for sedation

## 2023-04-18 NOTE — Clinical Note
The first balloon was inserted into the right coronary artery and middle right coronary artery.Max pressure = 10 carley. Total duration = 27 seconds.     Max pressure = 10 carley. Total duration = 18 seconds.    Balloon reinflated a second time: Max pressure = 10 carley. Total duration = 18 seconds.

## 2023-04-18 NOTE — Clinical Note
The first balloon was inserted into the right coronary artery and distal right coronary artery.Max pressure = 6 carley. Total duration = 30 seconds.

## 2023-04-19 ENCOUNTER — TELEPHONE (OUTPATIENT)
Dept: CARDIOLOGY | Facility: CLINIC | Age: 68
End: 2023-04-19
Payer: COMMERCIAL

## 2023-04-19 LAB
ATRIAL RATE - MUSE: 58 BPM
DIASTOLIC BLOOD PRESSURE - MUSE: NORMAL MMHG
INTERPRETATION ECG - MUSE: NORMAL
P AXIS - MUSE: 41 DEGREES
PR INTERVAL - MUSE: 174 MS
QRS DURATION - MUSE: 76 MS
QT - MUSE: 342 MS
QTC - MUSE: 335 MS
R AXIS - MUSE: 19 DEGREES
SYSTOLIC BLOOD PRESSURE - MUSE: NORMAL MMHG
T AXIS - MUSE: 43 DEGREES
VENTRICULAR RATE- MUSE: 58 BPM

## 2023-04-19 NOTE — TELEPHONE ENCOUNTER
[facility] Call Center    Phone Message    May a detailed message be left on voicemail: yes     Reason for Call: Other: when pt picked up her new meds yesterday she was also given a new script for amLODIPine (NORVASC) 10 MG tablet.  Pt states she already takes that and wanted to know if she should now take 2 tabs.  Advised pt to stick w/one until she receives clarification from Dr. Guerra.  Please call pt on her cell to confirm.     Action Taken: Message routed to:  [facility] cardio    Travel Screening: Not Applicable     Thank you!  [facility]

## 2023-04-19 NOTE — TELEPHONE ENCOUNTER
Return call to patient who stated she was given new Rx for Amlodipine when she picked up her Plavix and ASA yesterday and questioned if she is supposed to be taking an additional dose - patient confirmed she is presently taking Amlodipine 10mg daily and that Dr. Guerra's name was not on Rx - patient stated name of prescriber is Anne Rosado.    Informed patient that records indicate Amlodipine Rx has not been filled by cardiology team and was refilled thru PCP clinic - explained to patient that Dr. Guerra did not adjust Amlodipine dose yesterday after PCI and that she should continue taking only 10mg daily - patient verbalized understanding and confirmed follow-up with Dr. Guerra on 4-25-23.  mg

## 2023-04-19 NOTE — H&P
Chart reviewed. Patient seen and examined.   Questions answered.   Right radial pulse good.  Agree with assessment and plan outlined below by DAVION Ponce, CARLEEN  Will proceed with study as planned.  Javier Guerra MD on 4/19/2023 at 8:46 AM

## 2023-04-25 ENCOUNTER — OFFICE VISIT (OUTPATIENT)
Dept: CARDIOLOGY | Facility: CLINIC | Age: 68
End: 2023-04-25
Payer: COMMERCIAL

## 2023-04-25 VITALS
BODY MASS INDEX: 35.55 KG/M2 | RESPIRATION RATE: 14 BRPM | WEIGHT: 176 LBS | HEART RATE: 64 BPM | DIASTOLIC BLOOD PRESSURE: 50 MMHG | SYSTOLIC BLOOD PRESSURE: 124 MMHG

## 2023-04-25 DIAGNOSIS — I20.9 ANGINA PECTORIS (H): Primary | ICD-10-CM

## 2023-04-25 PROCEDURE — 99214 OFFICE O/P EST MOD 30 MIN: CPT | Performed by: INTERNAL MEDICINE

## 2023-04-25 RX ORDER — BUPROPION HYDROCHLORIDE 300 MG/1
300 TABLET ORAL DAILY
COMMUNITY
Start: 2023-04-19

## 2023-04-25 NOTE — PROGRESS NOTES
HEART CARE ENCOUNTER NOTE      [facility]  [phone]      Assessment/Recommendations   Assessment:   1.CAD with angina, no recurrence of angina since PCI last week      Plan:   1.  No medication changes  2.  keep rehab appointment in June  3.  Follow-up 1 year.  Return sooner if symptoms recur or progress.           History of Present Illness/Subjective    HPI: Lisseth Qureshi is a 67 year old female with a history of coronary disease and previous stenting of circumflex marginal branch was admitted last week with progressive symptoms of back and neck pain which is her anginal equivalent.  At study she is found to have sequential subtotal stenosis in the right coronary artery underwent a long complicated stenting procedure with a nice angiographic result.  She is discharged home.  She is done well the last few days without any recurrence of her anginal equivalent symptoms.    Studies reviewed:  Coronary angiography was reviewed directly with the patient.  The proximal mid and distal right coronary stenoses were well covered by 2 long stents.  BALTA-3 flow post completion.         Physical Examination  Review of Systems   /50 (BP Location: Right arm, Patient Position: Sitting, Cuff Size: Adult Regular)   Pulse 64   Resp 14   Wt 79.8 kg (176 lb)   BMI 35.55 kg/m    Body mass index is 35.55 kg/m .  Wt Readings from Last 3 Encounters:   04/25/23 79.8 kg (176 lb)   04/18/23 79.4 kg (175 lb)   04/12/23 79.4 kg (175 lb)       General Appearance:   Pleasant middle-age female appears stated age. no acute distress, normal body habitus   ENT/Mouth: Oropharynx normal    EYES:  no scleral icterus, normal conjunctivae. No eyelid xanthelasma   Neck: No cervical lymphadenopathy  Thyroid not enlarged or nodular  No JVD   Respiratory:   lungs clear , no rales or wheezing, Normal chest wall expansion    Cardiovascular:   Regular rhythm, normal rate. Normal 1st and 2nd heart sounds.  No significant  murmurs, no rubs or gallops;   radial pulses are full and equal   Jugular venous pressure is normal   Abdomen/GI:  No tenderness, no organomegaly, no pulsatile masses. NBS.   Extremities: No cyanosis or clubbing.  No peripheral edema right radial puncture site is healed normally.  Normal radial pulse and capillary fill the fingertips.   Skin: No rash or lesions   Heme/lymph/ Immunology No lymphadenopathy, petechiae   Neurologic: Alert and oriented. No focal motor weakness, gait appears normal.       Psychiatric: Pleasant, calm, appropriate affect.          No known hepatic, renal, pulmonary, or CNS disorders. The remainder of his complete ROS is negative except as noted above.         Medical History  Surgical History Family History Social History   No past medical history on file.  Past Surgical History:   Procedure Laterality Date     CORONARY STENT PLACEMENT  10/19/2012    glenna 1st obtuse marginal     CV CORONARY ANGIOGRAM N/A 2023    Procedure: Coronary Angiogram;  Surgeon: Javier Guerra MD;  Location: [facility] CATH [facility] CV     CV LEFT HEART CATH N/A 2023    Procedure: Left Heart Catheterization;  Surgeon: Javier Guerra MD;  Location: [facility] LAB CV     CV PCI N/A 2023    Procedure: Percutaneous Coronary Intervention;  Surgeon: Javier Guerra MD;  Location: [facility] CATH LAB CV     SHOULDER SURGERY      frozen shoulder     Family History   Problem Relation Age of Onset     Hypertension Mother      Mitral Valve Replacement No family hx of         Social History     Socioeconomic History     Marital status: Single     Spouse name: Not on file     Number of children: Not on file     Years of education: Not on file     Highest education level: Not on file   Occupational History     Not on file   Tobacco Use     Smoking status: Former     Packs/day: 1.50     Years: 35.00     Pack years: 52.50     Types: Cigarettes     Quit date: 2005     Years since quittin.2     Smokeless  tobacco: Not on file     Tobacco comments:     pt quit 10 years ago (she had smoked x 35 years)   Vaping Use     Vaping status: Not on file   Substance and Sexual Activity     Alcohol use: No     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     Drug use: No     Sexual activity: Not on file   Other Topics Concern     Not on file   Social History Narrative    Exercises 3-4 days per week     Social Determinants of Health     Financial Resource Strain: Not on file   Food Insecurity: Not on file   Transportation Needs: Not on file   Physical Activity: Not on file   Stress: Not on file   Social Connections: Not on file   Intimate Partner Violence: Not on file   Housing Stability: Not on file           Medications  Allergies   Current Outpatient Medications   Medication Sig Dispense Refill     amLODIPine (NORVASC) 10 MG tablet Take 10 mg by mouth daily       aspirin (ASA) 81 MG EC tablet Take 1 tablet (81 mg) by mouth daily Start tomorrow. 30 tablet 3     buPROPion (WELLBUTRIN XL) 300 MG 24 hr tablet Take 300 mg by mouth daily       clopidogrel (PLAVIX) 75 MG tablet Take 1 tablet (75 mg) by mouth daily 90 tablet 3     coenzyme Q10 (COQ-10) 100 mg capsule 100 mg every evening       exenatide ER (BYDUREON BCISE) 2 MG/0.85ML auto-injector Inject 2 mg Subcutaneous every 7 days Once a week on Sunday       fish oil-omega-3 fatty acids 1000 MG capsule Take 2 g by mouth every evening       levothyroxine (SYNTHROID, LEVOTHROID) 75 MCG tablet [LEVOTHYROXINE (SYNTHROID, LEVOTHROID) 75 MCG TABLET] Take 75 mcg by mouth daily.       melatonin 3 mg Tab [MELATONIN 3 MG TAB] Take 3 mg by mouth bedtime as needed.       metFORMIN (GLUCOPHAGE) 1000 MG tablet Take 1,000 mg by mouth 2 times daily (with meals)       metoprolol succinate ER (TOPROL XL) 25 MG 24 hr tablet Take 1 tablet (25 mg) by mouth daily 90 tablet 3     multivitamin therapeutic (THERAGRAN) tablet [MULTIVITAMIN THERAPEUTIC (THERAGRAN) TABLET] Take 1 tablet by mouth  daily.       red yeast rice 600 mg Tab [RED YEAST RICE 600 MG TAB] Take 1 tablet by mouth daily.       ezetimibe (ZETIA) 10 mg tablet [EZETIMIBE (ZETIA) 10 MG TABLET] Take 1 tablet (10 mg total) by mouth daily. (Patient not taking: Reported on 4/25/2023) 90 tablet 3     glipiZIDE (GLUCOTROL XL) 2.5 MG 24 hr tablet Take 2.5 mg by mouth every evening Takes 2 tablets every evening. (Patient not taking: Reported on 4/25/2023)         Allergies   Allergen Reactions     Codeine      Other reaction(s): *Unknown     Guaifenesin Swelling     Felt throat swelling, Guaifenesin with codeine     Penicillins Unknown     Unknown, allergy occurred at 6 months old     Rosuvastatin Calcium [Rosuvastatin] Unknown          Lab Results    Chemistry/lipid CBC Cardiac Enzymes/BNP/TSH/INR   Recent Labs   Lab Test 04/18/23  0836   CHOL 199   HDL 41*   *   TRIG 129     Recent Labs   Lab Test 04/18/23  0836 05/26/17  1420 05/24/16  0735   * 98 124     Recent Labs   Lab Test 04/12/23  1054      POTASSIUM 3.9   CHLORIDE 102   CO2 22   *   BUN 20   CR 1.09   GFRESTIMATED 56*   AMBERLY 9.7     Recent Labs   Lab Test 04/12/23  1054 04/06/23  2300 04/10/19  1851   CR 1.09 1.05* 1.27*     Recent Labs   Lab Test 03/20/15  1703   A1C 6.5*          Recent Labs   Lab Test 04/12/23  1054   WBC 5.6   HGB 12.4   HCT 37.6   MCV 85        Recent Labs   Lab Test 04/12/23  1054 04/06/23  2300 04/10/19  1851   HGB 12.4 12.3 12.2    No results for input(s): TROPONINI in the last 44354 hours.  No results for input(s): BNP, NTBNPI, NTBNP in the last 96548 hours.  No results for input(s): TSH in the last 90083 hours.  Recent Labs   Lab Test 04/10/19  1851   INR 1.01        Javier Guerra MD

## 2023-04-25 NOTE — LETTER
4/25/2023    Provider Not In System       RE: Lisseth Qureshi       Dear Colleague,     I had the pleasure of seeing Lisseth Qureshi in the [facility] [place] [facility].    HEART CARE ENCOUNTER NOTE      [facility]  [phone]      Assessment/Recommendations   Assessment:   1.CAD with angina, no recurrence of angina since PCI last week      Plan:   1.  No medication changes  2.  keep rehab appointment in June  3.  Follow-up 1 year.  Return sooner if symptoms recur or progress.           History of Present Illness/Subjective    HPI: Lisseth Qureshi is a 67 year old female with a history of coronary disease and previous stenting of circumflex marginal branch was admitted last week with progressive symptoms of back and neck pain which is her anginal equivalent.  At study she is found to have sequential subtotal stenosis in the right coronary artery underwent a long complicated stenting procedure with a nice angiographic result.  She is discharged home.  She is done well the last few days without any recurrence of her anginal equivalent symptoms.    Studies reviewed:  Coronary angiography was reviewed directly with the patient.  The proximal mid and distal right coronary stenoses were well covered by 2 long stents.  BALTA-3 flow post completion.         Physical Examination  Review of Systems   /50 (BP Location: Right arm, Patient Position: Sitting, Cuff Size: Adult Regular)   Pulse 64   Resp 14   Wt 79.8 kg (176 lb)   BMI 35.55 kg/m    Body mass index is 35.55 kg/m .  Wt Readings from Last 3 Encounters:   04/25/23 79.8 kg (176 lb)   04/18/23 79.4 kg (175 lb)   04/12/23 79.4 kg (175 lb)       General Appearance:   Pleasant middle-age female appears stated age. no acute distress, normal body habitus   ENT/Mouth: Oropharynx normal    EYES:  no scleral icterus, normal conjunctivae. No eyelid xanthelasma   Neck: No cervical lymphadenopathy  Thyroid not enlarged or nodular  No JVD    Respiratory:   lungs clear , no rales or wheezing, Normal chest wall expansion    Cardiovascular:   Regular rhythm, normal rate. Normal 1st and 2nd heart sounds.  No significant murmurs, no rubs or gallops;   radial pulses are full and equal   Jugular venous pressure is normal   Abdomen/GI:  No tenderness, no organomegaly, no pulsatile masses. NBS.   Extremities: No cyanosis or clubbing.  No peripheral edema right radial puncture site is healed normally.  Normal radial pulse and capillary fill the fingertips.   Skin: No rash or lesions   Heme/lymph/ Immunology No lymphadenopathy, petechiae   Neurologic: Alert and oriented. No focal motor weakness, gait appears normal.       Psychiatric: Pleasant, calm, appropriate affect.          No known hepatic, renal, pulmonary, or CNS disorders. The remainder of his complete ROS is negative except as noted above.         Medical History  Surgical History Family History Social History   No past medical history on file.  Past Surgical History:   Procedure Laterality Date    CORONARY STENT PLACEMENT  10/19/2012    glenna 1st obtuse marginal    CV CORONARY ANGIOGRAM N/A 4/18/2023    Procedure: Coronary Angiogram;  Surgeon: Javier Guerra MD;  Location: [facility] CV    CV LEFT HEART CATH N/A 4/18/2023    Procedure: Left Heart Catheterization;  Surgeon: Javier Guerra MD;  Location: [facility] CV    CV PCI N/A 4/18/2023    Procedure: Percutaneous Coronary Intervention;  Surgeon: Javier Guerra MD;  Location: [facility] CV    SHOULDER SURGERY      frozen shoulder     Family History   Problem Relation Age of Onset    Hypertension Mother     Mitral Valve Replacement No family hx of         Social History     Socioeconomic History    Marital status: Single     Spouse name: Not on file    Number of children: Not on file    Years of education: Not on file    Highest education level: Not on file   Occupational History    Not on file   Tobacco Use    Smoking  status: Former     Packs/day: 1.50     Years: 35.00     Pack years: 52.50     Types: Cigarettes     Quit date: 2005     Years since quittin.2    Smokeless tobacco: Not on file    Tobacco comments:     pt quit 10 years ago (she had smoked x 35 years)   Vaping Use    Vaping status: Not on file   Substance and Sexual Activity    Alcohol use: No     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    Drug use: No    Sexual activity: Not on file   Other Topics Concern    Not on file   Social History Narrative    Exercises 3-4 days per week     Social Determinants of Health     Financial Resource Strain: Not on file   Food Insecurity: Not on file   Transportation Needs: Not on file   Physical Activity: Not on file   Stress: Not on file   Social Connections: Not on file   Intimate Partner Violence: Not on file   Housing Stability: Not on file           Medications  Allergies   Current Outpatient Medications   Medication Sig Dispense Refill    amLODIPine (NORVASC) 10 MG tablet Take 10 mg by mouth daily      aspirin (ASA) 81 MG EC tablet Take 1 tablet (81 mg) by mouth daily Start tomorrow. 30 tablet 3    buPROPion (WELLBUTRIN XL) 300 MG 24 hr tablet Take 300 mg by mouth daily      clopidogrel (PLAVIX) 75 MG tablet Take 1 tablet (75 mg) by mouth daily 90 tablet 3    coenzyme Q10 (COQ-10) 100 mg capsule 100 mg every evening      exenatide ER (BYDUREON BCISE) 2 MG/0.85ML auto-injector Inject 2 mg Subcutaneous every 7 days Once a week on       fish oil-omega-3 fatty acids 1000 MG capsule Take 2 g by mouth every evening      levothyroxine (SYNTHROID, LEVOTHROID) 75 MCG tablet [LEVOTHYROXINE (SYNTHROID, LEVOTHROID) 75 MCG TABLET] Take 75 mcg by mouth daily.      melatonin 3 mg Tab [MELATONIN 3 MG TAB] Take 3 mg by mouth bedtime as needed.      metFORMIN (GLUCOPHAGE) 1000 MG tablet Take 1,000 mg by mouth 2 times daily (with meals)      metoprolol succinate ER (TOPROL XL) 25 MG 24 hr tablet Take 1 tablet (25  mg) by mouth daily 90 tablet 3    multivitamin therapeutic (THERAGRAN) tablet [MULTIVITAMIN THERAPEUTIC (THERAGRAN) TABLET] Take 1 tablet by mouth daily.      red yeast rice 600 mg Tab [RED YEAST RICE 600 MG TAB] Take 1 tablet by mouth daily.      ezetimibe (ZETIA) 10 mg tablet [EZETIMIBE (ZETIA) 10 MG TABLET] Take 1 tablet (10 mg total) by mouth daily. (Patient not taking: Reported on 4/25/2023) 90 tablet 3    glipiZIDE (GLUCOTROL XL) 2.5 MG 24 hr tablet Take 2.5 mg by mouth every evening Takes 2 tablets every evening. (Patient not taking: Reported on 4/25/2023)         Allergies   Allergen Reactions    Codeine      Other reaction(s): *Unknown    Guaifenesin Swelling     Felt throat swelling, Guaifenesin with codeine    Penicillins Unknown     Unknown, allergy occurred at 6 months old    Rosuvastatin Calcium [Rosuvastatin] Unknown          Lab Results    Chemistry/lipid CBC Cardiac Enzymes/BNP/TSH/INR   Recent Labs   Lab Test 04/18/23  0836   CHOL 199   HDL 41*   *   TRIG 129     Recent Labs   Lab Test 04/18/23  0836 05/26/17  1420 05/24/16  0735   * 98 124     Recent Labs   Lab Test 04/12/23  1054      POTASSIUM 3.9   CHLORIDE 102   CO2 22   *   BUN 20   CR 1.09   GFRESTIMATED 56*   AMBERLY 9.7     Recent Labs   Lab Test 04/12/23  1054 04/06/23  2300 04/10/19  1851   CR 1.09 1.05* 1.27*     Recent Labs   Lab Test 03/20/15  1703   A1C 6.5*          Recent Labs   Lab Test 04/12/23  1054   WBC 5.6   HGB 12.4   HCT 37.6   MCV 85        Recent Labs   Lab Test 04/12/23  1054 04/06/23  2300 04/10/19  1851   HGB 12.4 12.3 12.2    No results for input(s): TROPONINI in the last 94119 hours.  No results for input(s): BNP, NTBNPI, NTBNP in the last 23132 hours.  No results for input(s): TSH in the last 74986 hours.  Recent Labs   Lab Test 04/10/19  1851   INR 1.01        Javier Guerra MD        Thank you for allowing me to participate in the care of your patient.      Sincerely,     Javier  TEJA Geurra MD     [facility]  cc:   No referring provider defined for this encounter.

## 2023-05-03 ENCOUNTER — OFFICE VISIT (OUTPATIENT)
Dept: CARDIOLOGY | Facility: CLINIC | Age: 68
End: 2023-05-03
Payer: COMMERCIAL

## 2023-05-03 VITALS
SYSTOLIC BLOOD PRESSURE: 138 MMHG | RESPIRATION RATE: 14 BRPM | HEART RATE: 68 BPM | DIASTOLIC BLOOD PRESSURE: 62 MMHG | BODY MASS INDEX: 35.75 KG/M2 | WEIGHT: 177 LBS

## 2023-05-03 DIAGNOSIS — Z79.4 TYPE 2 DIABETES MELLITUS WITHOUT COMPLICATION, WITH LONG-TERM CURRENT USE OF INSULIN (H): ICD-10-CM

## 2023-05-03 DIAGNOSIS — Z78.9 STATIN INTOLERANCE: ICD-10-CM

## 2023-05-03 DIAGNOSIS — E11.9 TYPE 2 DIABETES MELLITUS WITHOUT COMPLICATION, WITH LONG-TERM CURRENT USE OF INSULIN (H): ICD-10-CM

## 2023-05-03 DIAGNOSIS — E78.2 MIXED HYPERLIPIDEMIA: ICD-10-CM

## 2023-05-03 DIAGNOSIS — I25.110 CORONARY ARTERY DISEASE INVOLVING NATIVE CORONARY ARTERY OF NATIVE HEART WITH UNSTABLE ANGINA PECTORIS (H): ICD-10-CM

## 2023-05-03 PROCEDURE — 99214 OFFICE O/P EST MOD 30 MIN: CPT | Performed by: INTERNAL MEDICINE

## 2023-05-03 NOTE — PATIENT INSTRUCTIONS
Please contact direct nurses line Monday through Friday 8 AM to 5 PM @ [phone]    After-hours contact cardiology office at [phone].

## 2023-05-03 NOTE — LETTER
5/3/2023    Provider Not In System       RE: Lisseth Qureshi       Dear Colleague,     I had the pleasure of seeing Lisseth Qureshi in the ealth [facility].    HEART CARE ENCOUNTER CONSULTATON NOTE      [facility]  [phone]      Assessment/Recommendations   Assessment:   1.  Coronary Artery Disease s/p D1 stenting (2012, 2.75×12 mm Promus Element Plus stent), recent stent to mid and distal RCA (4/18/2023).  2.  Hypertension  3.  Hyperlipidemia, LDL: 180 (uncontrolled). ASCVD (10 year risk): 35.5% (very high)  LDL Cholesterol Calculated   Date Value Ref Range Status   04/18/2023 132 (H) <=100 mg/dL Final     LDL Cholesterol Direct   Date Value Ref Range Status   05/26/2017 98 <=129 mg/dl Final     4.  Statin intolerance   5.  Diabetes mellitus type 2.  A1c 6.5  6.  Carotid artery plaque on CT scan    Plan:   1.  ASA 81 mg daily  2.  Plavix 75 mg daily.   3.  Start Repatha 140 mg Q14 days.   LDL > 70.  Statin intolearnace.  Progressive CAD.     4.  Continue lisinopril for hypertension  5.  Hold Metoprolol XL 25 mg daily given fatigue, mood changes and sleep changes  6.  Patient scheduled to start cardiac rehab.        History of Present Illness/Subjective    HPI: Lisseth Qureshi is a 67 year old female known coronary disease, statin intolerance not on statin therapy with elevated LDL, diabetes, hypertension who presents to cardiology clinic after recent coronary angiogram and percutaneous intervention.      Patient notes resolution of dyspnea and chest pain.  Severe neck pain resolved after PCI as well.   Noting fatigue and change in sleep with metoprolol.  Will hold.     Discussed LDL goals and PCSK9-inhibitors.        Echo: 4/18/2023  Left ventricular size, wall motion and function are normal. The ejection  fraction is 60-65%.  Normal right ventricle size and systolic function.  No hemodynamically significant valvular abnormalities on 2D or color flow  imaging.    Coronary  Angiogram: 4/18/2023    1st Mrg lesion is 60% stenosed.    Ost Cx to Prox Cx lesion is 30% stenosed.    Mid RCA lesion is 95% stenosed.    Dist RCA lesion is 99% stenosed.    Ost RCA to Prox RCA lesion is 50% stenosed.     60% in-stent narrowing small 1st OM(this has the appearance of incomplete stent expansion from original deployment.)  Sequential 90 and 95% narrowing in mid-distal RCA (smaller caliber vessel)  LAD small caliber but no severe  stenoses  Successful PCI MID AND DISTAL RCA with JI x2 (long stent used to cover mid lesion and extended to the ostial plaque as the ostium was probably disrupted by deeply intubating guiding catheter necessary to deliver stents distally.)     Coronary Angiogram: 2007  CORONARY FINDINGS:   1. Coronary circulation is right dominant.   2. Left main:  Left main coronary artery is medium large in size and length with minor nonocclusive disease present, gives rise to the left anterior descending and left circumflex coronary artery.     3. Left circumflex coronary artery is a medium large caliber vessel, gives rise to 2 major obtuse marginal branches.     4. First obtuse marginal branch is a small size branch, which arises very proximally from the circumflex.  It has mild nonocclusive disease present.   5. Second obtuse marginal branch is a medium size vessel, which arises from the mid circumflex.  It is tortuous with mild nonocclusive coronary artery disease.   6. The circumflex in the mid to distal segment after the second obtuse marginal branch has a discrete 40% lesion seen.  The remainder of the circumflex tapers distally and has minor nonocclusive disease present.     7. Left anterior descending:  The left anterior descending is a large caliber vessel, it extends all the way to apex.  Gives rise to several septal perforators and one major diagonal vessel.  There is minor nonocclusive disease present in the left anterior descending and its branches.  8. Right coronary  artery:  The right coronary artery is a large caliber dominant vessel, gives rise to a conus branch in the very proximal portion, RV marginal branch in the midportion, and bifurcates into a posterior descending artery branch and a posterolateral branch.  Posterolateral branch then further subdivides into smaller posterolateral segments.  The proximal portion of the vessel has a 25% discrete narrowing.  The midportion of the right coronary artery has a 40% diffuse disease.  The distal portion of the right coronary artery and its branches have minor nonocclusive disease following this.          CTA coronary: 2015  CT OF THE HEART WITH CONTRAST, CORONARY CT ANGIOGRAPHY:  DOMINANT SYSTEM:  Right.  Normal coronary origins.     LEFT CORONARY ARTERY:  LEFT MAIN TRUNK:  There is extrinsic calcification but no stenosis is identified.   This is a short vessel.  LEFT ANTERIOR DESCENDING: There is an ostial stenosis of 25% to 50% consisting of  mixed plaque.  There is scattered foci throughout the proximal and mid-left anterior  descending of mixed plaque narrowing the lumen by 25% to 50%.  No high-grade  stenosis is demonstrated in the left anterior descending or its diagonal branches.       LEFT CIRCUMFLEX:  There is calcified plaque narrowing the lumen by less than 25% in  the proximal portion of the vessel.  In the mid-portion of the vessel there is a  calcified plaque narrowing the lumen by 25% to 50%.  The first and major obtuse  marginal branch has a stent; the stent size is unspecified.  There appears to be  diminished distal flow and poor visualization of graft patency suggestive of  in-stent restenosis.  The more distal obtuse marginal branch is widely patent with  no stenosis or plaque.   RIGHT CORONARY ARTERY:   Dominant distribution.  There is irregular mixed plaque  throughout the proximal portion of the vessel.  In the mid- and distal portion of  the vessel, there is more mixed plaque 25% to 50% stenotic.  The  posterior  descending and posterior ventricular branches are small in caliber but have no  evidence of stenosis or plaque.         CT: 2019:   The right common carotid artery is patent. Examination of the right carotid bulb demonstrates calcified and atherosclerotic plaque without evidence of hemodynamically significant stenosis.     The left common carotid artery is patent. Examination of the left carotid bulb demonstrates calcified and atherosclerotic plaque without evidence of hemodynamically significant stenosis.     Physical Examination  Review of Systems   Vitals: /62 (BP Location: Right arm, Patient Position: Sitting, Cuff Size: Adult Regular)   Pulse 68   Resp 14   Wt 80.3 kg (177 lb)   BMI 35.75 kg/m    BMI= There is no height or weight on file to calculate BMI.  Wt Readings from Last 3 Encounters:   04/25/23 79.8 kg (176 lb)   04/18/23 79.4 kg (175 lb)   04/12/23 79.4 kg (175 lb)        Pleasant   ENT/Mouth: membranes moist, no oral lesions or bleeding gums.      EYES:  no scleral icterus, normal conjunctivae       Chest/Lungs:   lungs are clear to auscultation, no rales or wheezing, no sternal scar, equal chest wall expansion    Cardiovascular:   Regular. Normal first and second heart sounds with no murmurs, rubs, or gallops; the carotid, radial and posterior tibial pulses are intact, Jugular venous pressure normal, no edema bilaterally    Abdomen:  no tenderness; bowel sounds are present   Extremities: no cyanosis or clubbing   Skin: no xanthelasma, warm.    Neurologic: normal  bilateral, no tremors     Psychiatric: alert and oriented x3, calm        Please refer above for cardiac ROS details.        Medical History  Surgical History Family History Social History   No past medical history on file.  Past Surgical History:   Procedure Laterality Date    CORONARY STENT PLACEMENT  10/19/2012    glenna 1st obtuse marginal    CV CORONARY ANGIOGRAM N/A 4/18/2023    Procedure: Coronary Angiogram;   Surgeon: Javier Guerra MD;  Location: [facility] CATH LAB CV    CV LEFT HEART CATH N/A 2023    Procedure: Left Heart Catheterization;  Surgeon: Javier Guerra MD;  Location: [facility] CATH LAB CV    CV PCI N/A 2023    Procedure: Percutaneous Coronary Intervention;  Surgeon: Javier Guerra MD;  Location: [facility] CATH [facility] CV    SHOULDER SURGERY      frozen shoulder     Family History   Problem Relation Age of Onset    Hypertension Mother     Mitral Valve Replacement No family hx of         Social History     Socioeconomic History    Marital status: Single     Spouse name: Not on file    Number of children: Not on file    Years of education: Not on file    Highest education level: Not on file   Occupational History    Not on file   Tobacco Use    Smoking status: Former     Packs/day: 1.50     Years: 35.00     Pack years: 52.50     Types: Cigarettes     Quit date: 2005     Years since quittin.2    Smokeless tobacco: Not on file    Tobacco comments:     pt quit 10 years ago (she had smoked x 35 years)   Vaping Use    Vaping status: Not on file   Substance and Sexual Activity    Alcohol use: No     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    Drug use: No    Sexual activity: Not on file   Other Topics Concern    Not on file   Social History Narrative    Exercises 3-4 days per week     Social Determinants of Health     Financial Resource Strain: Not on file   Food Insecurity: Not on file   Transportation Needs: Not on file   Physical Activity: Not on file   Stress: Not on file   Social Connections: Not on file   Intimate Partner Violence: Not on file   Housing Stability: Not on file           Medications  Allergies   Current Outpatient Medications   Medication Sig Dispense Refill    amLODIPine (NORVASC) 10 MG tablet Take 10 mg by mouth daily      aspirin (ASA) 81 MG EC tablet Take 1 tablet (81 mg) by mouth daily Start tomorrow. 30 tablet 3    buPROPion (WELLBUTRIN XL) 300 MG 24 hr  tablet Take 300 mg by mouth daily      clopidogrel (PLAVIX) 75 MG tablet Take 1 tablet (75 mg) by mouth daily 90 tablet 3    coenzyme Q10 (COQ-10) 100 mg capsule 100 mg every evening      exenatide ER (BYDUREON BCISE) 2 MG/0.85ML auto-injector Inject 2 mg Subcutaneous every 7 days Once a week on Sunday      ezetimibe (ZETIA) 10 mg tablet [EZETIMIBE (ZETIA) 10 MG TABLET] Take 1 tablet (10 mg total) by mouth daily. (Patient not taking: Reported on 4/25/2023) 90 tablet 3    fish oil-omega-3 fatty acids 1000 MG capsule Take 2 g by mouth every evening      glipiZIDE (GLUCOTROL XL) 2.5 MG 24 hr tablet Take 2.5 mg by mouth every evening Takes 2 tablets every evening. (Patient not taking: Reported on 4/25/2023)      levothyroxine (SYNTHROID, LEVOTHROID) 75 MCG tablet [LEVOTHYROXINE (SYNTHROID, LEVOTHROID) 75 MCG TABLET] Take 75 mcg by mouth daily.      melatonin 3 mg Tab [MELATONIN 3 MG TAB] Take 3 mg by mouth bedtime as needed.      metFORMIN (GLUCOPHAGE) 1000 MG tablet Take 1,000 mg by mouth 2 times daily (with meals)      metoprolol succinate ER (TOPROL XL) 25 MG 24 hr tablet Take 1 tablet (25 mg) by mouth daily 90 tablet 3    multivitamin therapeutic (THERAGRAN) tablet [MULTIVITAMIN THERAPEUTIC (THERAGRAN) TABLET] Take 1 tablet by mouth daily.      red yeast rice 600 mg Tab [RED YEAST RICE 600 MG TAB] Take 1 tablet by mouth daily.         Allergies   Allergen Reactions    Codeine      Other reaction(s): *Unknown    Guaifenesin Swelling     Felt throat swelling, Guaifenesin with codeine    Penicillins Unknown     Unknown, allergy occurred at 6 months old    Rosuvastatin Calcium [Rosuvastatin] Unknown          Lab Results    Chemistry/lipid CBC Cardiac Enzymes/BNP/TSH/INR   Recent Labs   Lab Test 05/26/17  1420 05/24/16  0735   CHOL  --  188   HDL  --  47*   LDL 98 124   TRIG  --  85     Recent Labs   Lab Test 05/26/17  1420 05/24/16  0735 03/20/15  1703   LDL 98 124 122     Recent Labs   Lab Test 04/06/23  2300       POTASSIUM 3.8   CHLORIDE 101   CO2 25   *   BUN 20.0   CR 1.05*   GFRESTIMATED 59*   AMBERLY 9.2     Recent Labs   Lab Test 04/06/23  2300 04/10/19  1851   CR 1.05* 1.27*     Recent Labs   Lab Test 03/20/15  1703   A1C 6.5*          Recent Labs   Lab Test 04/06/23 2300   WBC 6.7   HGB 12.3   HCT 37.7   MCV 86        Recent Labs   Lab Test 04/06/23  2300 04/10/19  1851   HGB 12.3 12.2    No results for input(s): TROPONINI in the last 82076 hours.  No results for input(s): BNP, NTBNPI, NTBNP in the last 90336 hours.  No results for input(s): TSH in the last 80643 hours.  Recent Labs   Lab Test 04/10/19  1851   INR 1.01        Oswald Shaffer DO          Thank you for allowing me to participate in the care of your patient.      Sincerely,     Oswald Shaffer DO     [facility]  cc:   Oswald Shaffer DO  [address] [place] [address]

## 2023-05-03 NOTE — PROGRESS NOTES
HEART CARE ENCOUNTER CONSULTATON NOTE      [facility]  [phone]      Assessment/Recommendations   Assessment:   1.  Coronary Artery Disease s/p D1 stenting (2012, 2.75×12 mm Promus Element Plus stent), recent stent to mid and distal RCA (4/18/2023).  2.  Hypertension  3.  Hyperlipidemia, LDL: 180 (uncontrolled). ASCVD (10 year risk): 35.5% (very high)  LDL Cholesterol Calculated   Date Value Ref Range Status   04/18/2023 132 (H) <=100 mg/dL Final     LDL Cholesterol Direct   Date Value Ref Range Status   05/26/2017 98 <=129 mg/dl Final     4.  Statin intolerance   5.  Diabetes mellitus type 2.  A1c 6.5  6.  Carotid artery plaque on CT scan    Plan:   1.  ASA 81 mg daily  2.  Plavix 75 mg daily.   3.  Start Repatha 140 mg Q14 days.   LDL > 70.  Statin intolearnace.  Progressive CAD.     4.  Continue lisinopril for hypertension  5.  Hold Metoprolol XL 25 mg daily given fatigue, mood changes and sleep changes  6.  Patient scheduled to start cardiac rehab.        History of Present Illness/Subjective    HPI: Lisseth Qureshi is a 67 year old female known coronary disease, statin intolerance not on statin therapy with elevated LDL, diabetes, hypertension who presents to cardiology clinic after recent coronary angiogram and percutaneous intervention.      Patient notes resolution of dyspnea and chest pain.  Severe neck pain resolved after PCI as well.   Noting fatigue and change in sleep with metoprolol.  Will hold.     Discussed LDL goals and PCSK9-inhibitors.        Echo: 4/18/2023  Left ventricular size, wall motion and function are normal. The ejection  fraction is 60-65%.  Normal right ventricle size and systolic function.  No hemodynamically significant valvular abnormalities on 2D or color flow  imaging.    Coronary Angiogram: 4/18/2023     1st Mrg lesion is 60% stenosed.     Ost Cx to Prox Cx lesion is 30% stenosed.     Mid RCA lesion is 95% stenosed.     Dist RCA lesion is 99%  stenosed.     Ost RCA to Prox RCA lesion is 50% stenosed.     1. 60% in-stent narrowing small 1st OM(this has the appearance of incomplete stent expansion from original deployment.)  2. Sequential 90 and 95% narrowing in mid-distal RCA (smaller caliber vessel)  3. LAD small caliber but no severe  stenoses  4. Successful PCI MID AND DISTAL RCA with JI x2 (long stent used to cover mid lesion and extended to the ostial plaque as the ostium was probably disrupted by deeply intubating guiding catheter necessary to deliver stents distally.)     Coronary Angiogram: 2007  CORONARY FINDINGS:   1. Coronary circulation is right dominant.   2. Left main:  Left main coronary artery is medium large in size and length with minor nonocclusive disease present, gives rise to the left anterior descending and left circumflex coronary artery.     3. Left circumflex coronary artery is a medium large caliber vessel, gives rise to 2 major obtuse marginal branches.     4. First obtuse marginal branch is a small size branch, which arises very proximally from the circumflex.  It has mild nonocclusive disease present.   5. Second obtuse marginal branch is a medium size vessel, which arises from the mid circumflex.  It is tortuous with mild nonocclusive coronary artery disease.   6. The circumflex in the mid to distal segment after the second obtuse marginal branch has a discrete 40% lesion seen.  The remainder of the circumflex tapers distally and has minor nonocclusive disease present.     7. Left anterior descending:  The left anterior descending is a large caliber vessel, it extends all the way to apex.  Gives rise to several septal perforators and one major diagonal vessel.  There is minor nonocclusive disease present in the left anterior descending and its branches.  8. Right coronary artery:  The right coronary artery is a large caliber dominant vessel, gives rise to a conus branch in the very proximal portion, RV marginal branch in the  midportion, and bifurcates into a posterior descending artery branch and a posterolateral branch.  Posterolateral branch then further subdivides into smaller posterolateral segments.  The proximal portion of the vessel has a 25% discrete narrowing.  The midportion of the right coronary artery has a 40% diffuse disease.  The distal portion of the right coronary artery and its branches have minor nonocclusive disease following this.          CTA coronary: 2015  CT OF THE HEART WITH CONTRAST, CORONARY CT ANGIOGRAPHY:  DOMINANT SYSTEM:  Right.  Normal coronary origins.     LEFT CORONARY ARTERY:  LEFT MAIN TRUNK:  There is extrinsic calcification but no stenosis is identified.   This is a short vessel.  LEFT ANTERIOR DESCENDING: There is an ostial stenosis of 25% to 50% consisting of  mixed plaque.  There is scattered foci throughout the proximal and mid-left anterior  descending of mixed plaque narrowing the lumen by 25% to 50%.  No high-grade  stenosis is demonstrated in the left anterior descending or its diagonal branches.       LEFT CIRCUMFLEX:  There is calcified plaque narrowing the lumen by less than 25% in  the proximal portion of the vessel.  In the mid-portion of the vessel there is a  calcified plaque narrowing the lumen by 25% to 50%.  The first and major obtuse  marginal branch has a stent; the stent size is unspecified.  There appears to be  diminished distal flow and poor visualization of graft patency suggestive of  in-stent restenosis.  The more distal obtuse marginal branch is widely patent with  no stenosis or plaque.   RIGHT CORONARY ARTERY:   Dominant distribution.  There is irregular mixed plaque  throughout the proximal portion of the vessel.  In the mid- and distal portion of  the vessel, there is more mixed plaque 25% to 50% stenotic.  The posterior  descending and posterior ventricular branches are small in caliber but have no  evidence of stenosis or plaque.         CT: 2019:   The right  common carotid artery is patent. Examination of the right carotid bulb demonstrates calcified and atherosclerotic plaque without evidence of hemodynamically significant stenosis.     The left common carotid artery is patent. Examination of the left carotid bulb demonstrates calcified and atherosclerotic plaque without evidence of hemodynamically significant stenosis.     Physical Examination  Review of Systems   Vitals: /62 (BP Location: Right arm, Patient Position: Sitting, Cuff Size: Adult Regular)   Pulse 68   Resp 14   Wt 80.3 kg (177 lb)   BMI 35.75 kg/m    BMI= There is no height or weight on file to calculate BMI.  Wt Readings from Last 3 Encounters:   04/25/23 79.8 kg (176 lb)   04/18/23 79.4 kg (175 lb)   04/12/23 79.4 kg (175 lb)        Pleasant   ENT/Mouth: membranes moist, no oral lesions or bleeding gums.      EYES:  no scleral icterus, normal conjunctivae       Chest/Lungs:   lungs are clear to auscultation, no rales or wheezing, no sternal scar, equal chest wall expansion    Cardiovascular:   Regular. Normal first and second heart sounds with no murmurs, rubs, or gallops; the carotid, radial and posterior tibial pulses are intact, Jugular venous pressure normal, no edema bilaterally    Abdomen:  no tenderness; bowel sounds are present   Extremities: no cyanosis or clubbing   Skin: no xanthelasma, warm.    Neurologic: normal  bilateral, no tremors     Psychiatric: alert and oriented x3, calm        Please refer above for cardiac ROS details.        Medical History  Surgical History Family History Social History   No past medical history on file.  Past Surgical History:   Procedure Laterality Date     CORONARY STENT PLACEMENT  10/19/2012    glenna 1st obtuse marginal     CV CORONARY ANGIOGRAM N/A 4/18/2023    Procedure: Coronary Angiogram;  Surgeon: Javier Guerra MD;  [facility] CATH LAB CV     CV LEFT HEART CATH N/A 4/18/2023    Procedure: Left Heart Catheterization;  Surgeon:  Javier Guerra MD;  Location: [facility] CV     CV PCI N/A 2023    Procedure: Percutaneous Coronary Intervention;  Surgeon: Javier Guerra MD;  Location: [facility] CV     SHOULDER SURGERY      frozen shoulder     Family History   Problem Relation Age of Onset     Hypertension Mother      Mitral Valve Replacement No family hx of         Social History     Socioeconomic History     Marital status: Single     Spouse name: Not on file     Number of children: Not on file     Years of education: Not on file     Highest education level: Not on file   Occupational History     Not on file   Tobacco Use     Smoking status: Former     Packs/day: 1.50     Years: 35.00     Pack years: 52.50     Types: Cigarettes     Quit date: 2005     Years since quittin.2     Smokeless tobacco: Not on file     Tobacco comments:     pt quit 10 years ago (she had smoked x 35 years)   Vaping Use     Vaping status: Not on file   Substance and Sexual Activity     Alcohol use: No     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     Drug use: No     Sexual activity: Not on file   Other Topics Concern     Not on file   Social History Narrative    Exercises 3-4 days per week     Social Determinants of Health     Financial Resource Strain: Not on file   Food Insecurity: Not on file   Transportation Needs: Not on file   Physical Activity: Not on file   Stress: Not on file   Social Connections: Not on file   Intimate Partner Violence: Not on file   Housing Stability: Not on file           Medications  Allergies   Current Outpatient Medications   Medication Sig Dispense Refill     amLODIPine (NORVASC) 10 MG tablet Take 10 mg by mouth daily       aspirin (ASA) 81 MG EC tablet Take 1 tablet (81 mg) by mouth daily Start tomorrow. 30 tablet 3     buPROPion (WELLBUTRIN XL) 300 MG 24 hr tablet Take 300 mg by mouth daily       clopidogrel (PLAVIX) 75 MG tablet Take 1 tablet (75 mg) by mouth daily 90 tablet 3      coenzyme Q10 (COQ-10) 100 mg capsule 100 mg every evening       exenatide ER (BYDUREON BCISE) 2 MG/0.85ML auto-injector Inject 2 mg Subcutaneous every 7 days Once a week on Sunday       ezetimibe (ZETIA) 10 mg tablet [EZETIMIBE (ZETIA) 10 MG TABLET] Take 1 tablet (10 mg total) by mouth daily. (Patient not taking: Reported on 4/25/2023) 90 tablet 3     fish oil-omega-3 fatty acids 1000 MG capsule Take 2 g by mouth every evening       glipiZIDE (GLUCOTROL XL) 2.5 MG 24 hr tablet Take 2.5 mg by mouth every evening Takes 2 tablets every evening. (Patient not taking: Reported on 4/25/2023)       levothyroxine (SYNTHROID, LEVOTHROID) 75 MCG tablet [LEVOTHYROXINE (SYNTHROID, LEVOTHROID) 75 MCG TABLET] Take 75 mcg by mouth daily.       melatonin 3 mg Tab [MELATONIN 3 MG TAB] Take 3 mg by mouth bedtime as needed.       metFORMIN (GLUCOPHAGE) 1000 MG tablet Take 1,000 mg by mouth 2 times daily (with meals)       metoprolol succinate ER (TOPROL XL) 25 MG 24 hr tablet Take 1 tablet (25 mg) by mouth daily 90 tablet 3     multivitamin therapeutic (THERAGRAN) tablet [MULTIVITAMIN THERAPEUTIC (THERAGRAN) TABLET] Take 1 tablet by mouth daily.       red yeast rice 600 mg Tab [RED YEAST RICE 600 MG TAB] Take 1 tablet by mouth daily.         Allergies   Allergen Reactions     Codeine      Other reaction(s): *Unknown     Guaifenesin Swelling     Felt throat swelling, Guaifenesin with codeine     Penicillins Unknown     Unknown, allergy occurred at 6 months old     Rosuvastatin Calcium [Rosuvastatin] Unknown          Lab Results    Chemistry/lipid CBC Cardiac Enzymes/BNP/TSH/INR   Recent Labs   Lab Test 05/26/17  1420 05/24/16  0735   CHOL  --  188   HDL  --  47*   LDL 98 124   TRIG  --  85     Recent Labs   Lab Test 05/26/17  1420 05/24/16  0735 03/20/15  1703   LDL 98 124 122     Recent Labs   Lab Test 04/06/23  2300      POTASSIUM 3.8   CHLORIDE 101   CO2 25   *   BUN 20.0   CR 1.05*   GFRESTIMATED 59*   AMBERLY 9.2      Recent Labs   Lab Test 04/06/23  2300 04/10/19  1851   CR 1.05* 1.27*     Recent Labs   Lab Test 03/20/15  1703   A1C 6.5*          Recent Labs   Lab Test 04/06/23  2300   WBC 6.7   HGB 12.3   HCT 37.7   MCV 86        Recent Labs   Lab Test 04/06/23  2300 04/10/19  1851   HGB 12.3 12.2    No results for input(s): TROPONINI in the last 47971 hours.  No results for input(s): BNP, NTBNPI, NTBNP in the last 06683 hours.  No results for input(s): TSH in the last 52262 hours.  Recent Labs   Lab Test 04/10/19  1851   INR 1.01        Oswald Shaffer DO

## 2023-05-04 ENCOUNTER — TELEPHONE (OUTPATIENT)
Dept: CARDIOLOGY | Facility: CLINIC | Age: 68
End: 2023-05-04
Payer: COMMERCIAL

## 2023-05-04 DIAGNOSIS — E78.2 MIXED HYPERLIPIDEMIA: ICD-10-CM

## 2023-05-04 DIAGNOSIS — R06.09 DYSPNEA ON EXERTION: ICD-10-CM

## 2023-05-04 DIAGNOSIS — I25.110 CORONARY ARTERY DISEASE INVOLVING NATIVE CORONARY ARTERY OF NATIVE HEART WITH UNSTABLE ANGINA PECTORIS (H): Primary | ICD-10-CM

## 2023-05-04 DIAGNOSIS — Z78.9 STATIN INTOLERANCE: ICD-10-CM

## 2023-05-04 NOTE — TELEPHONE ENCOUNTER
----- Message from Oswald Shaffer DO sent at 5/3/2023  7:48 PM CDT -----  Patient needs to be started on Repatha 140 mg Q14 days.  Indication: CAD, statin intolerance, coronary PCI.     Thanks,     Vamshi

## 2023-05-10 NOTE — TELEPHONE ENCOUNTER
Central Prior Authorization Team   Phone: [phone]    PA Initiation    Medication: Repatha SureClick 140MG/ML auto-injectors     Insurance Company: [employer] ([facility]) - Phone [phone] Fax [phone]  Pharmacy Filling the Rx: [facility] - [place], [place] - [address]  Filling Pharmacy Phone: [phone]  Filling Pharmacy Fax:    Start Date: 5/10/2023

## 2023-05-12 ENCOUNTER — TELEPHONE (OUTPATIENT)
Dept: CARDIOLOGY | Facility: CLINIC | Age: 68
End: 2023-05-12
Payer: COMMERCIAL

## 2023-05-12 DIAGNOSIS — I25.10 CORONARY ARTERY DISEASE DUE TO CALCIFIED CORONARY LESION: ICD-10-CM

## 2023-05-12 DIAGNOSIS — I25.84 CORONARY ARTERY DISEASE DUE TO CALCIFIED CORONARY LESION: ICD-10-CM

## 2023-05-12 RX ORDER — CLOPIDOGREL BISULFATE 75 MG/1
75 TABLET ORAL DAILY
Qty: 90 TABLET | Refills: 3 | Status: ON HOLD | OUTPATIENT
Start: 2023-05-12 | End: 2023-11-27

## 2023-05-12 NOTE — TELEPHONE ENCOUNTER
[facility] Call Center    Phone Message    May a detailed message be left on voicemail: yes     Reason for Call: Medication Refill Request    Has the patient contacted the pharmacy for the refill? Yes   Name of medication being requested: clopidogrel (PLAVIX)  aspirin (ASA)  Provider who prescribed the medication: mike  Pharmacy:    [facility] - [facility] [address]    Date medication is needed: asap   Patient stated these medications were refilled but got [facility] patient [facility], patient is out of theses medications, please advise thank you    Action Taken: Other: cardiology    Travel Screening: Not Applicable   Thank you!  [facility]

## 2023-05-12 NOTE — TELEPHONE ENCOUNTER
Prior Authorization Approval    Authorization Effective Date: 5/10/2023  Authorization Expiration Date: 11/10/2023  Medication: Repatha SureClick 140MG/ML auto-injectors    Approved Dose/Quantity:   Reference #:     Insurance Company: [employer] ([facility]) - Phone [phone] Fax [phone]  Expected CoPay:       CoPay Card Available:      Foundation Assistance Needed:    Which Pharmacy is filling the prescription (Not needed for infusion/clinic administered): [facility] - [place], [place] - [address]  Pharmacy Notified: Yes  Patient Notified: Yes

## 2023-05-15 RX ORDER — EVOLOCUMAB 140 MG/ML
140 INJECTION, SOLUTION SUBCUTANEOUS
Qty: 6 ML | Refills: 3 | Status: SHIPPED | OUTPATIENT
Start: 2023-05-15 | End: 2023-06-14

## 2023-06-06 ENCOUNTER — HOSPITAL ENCOUNTER (OUTPATIENT)
Dept: CARDIAC REHAB | Facility: HOSPITAL | Age: 68
Discharge: HOME | End: 2023-06-06
Attending: INTERNAL MEDICINE
Payer: COMMERCIAL

## 2023-06-06 DIAGNOSIS — I25.84 CORONARY ARTERY DISEASE DUE TO CALCIFIED CORONARY LESION: ICD-10-CM

## 2023-06-06 DIAGNOSIS — I25.10 CORONARY ARTERY DISEASE DUE TO CALCIFIED CORONARY LESION: ICD-10-CM

## 2023-06-06 LAB
GLUCOSE BLDC GLUCOMTR-MCNC: 69 MG/DL (ref 70–99)
GLUCOSE BLDC GLUCOMTR-MCNC: 96 MG/DL (ref 70–99)
GLUCOSE BLDC GLUCOMTR-MCNC: 98 MG/DL (ref 70–99)

## 2023-06-06 PROCEDURE — 93798 PHYS/QHP OP CAR RHAB W/ECG: CPT

## 2023-06-06 PROCEDURE — 93797 PHYS/QHP OP CAR RHAB WO ECG: CPT

## 2023-06-12 ENCOUNTER — HOSPITAL ENCOUNTER (OUTPATIENT)
Dept: CARDIAC REHAB | Facility: HOSPITAL | Age: 68
Discharge: HOME | End: 2023-06-12
Attending: INTERNAL MEDICINE
Payer: COMMERCIAL

## 2023-06-12 PROCEDURE — 93798 PHYS/QHP OP CAR RHAB W/ECG: CPT

## 2023-06-14 ENCOUNTER — HOSPITAL ENCOUNTER (OUTPATIENT)
Dept: CARDIAC REHAB | Facility: HOSPITAL | Age: 68
Discharge: HOME | End: 2023-06-14
Attending: INTERNAL MEDICINE
Payer: COMMERCIAL

## 2023-06-14 DIAGNOSIS — Z78.9 STATIN INTOLERANCE: ICD-10-CM

## 2023-06-14 DIAGNOSIS — R06.09 DYSPNEA ON EXERTION: ICD-10-CM

## 2023-06-14 DIAGNOSIS — E78.2 MIXED HYPERLIPIDEMIA: ICD-10-CM

## 2023-06-14 DIAGNOSIS — I25.110 CORONARY ARTERY DISEASE INVOLVING NATIVE CORONARY ARTERY OF NATIVE HEART WITH UNSTABLE ANGINA PECTORIS (H): ICD-10-CM

## 2023-06-14 PROCEDURE — 93798 PHYS/QHP OP CAR RHAB W/ECG: CPT

## 2023-06-14 RX ORDER — EVOLOCUMAB 140 MG/ML
140 INJECTION, SOLUTION SUBCUTANEOUS
Qty: 6 ML | Refills: 3 | Status: SHIPPED | OUTPATIENT
Start: 2023-06-14 | End: 2024-03-01

## 2023-06-16 ENCOUNTER — HOSPITAL ENCOUNTER (OUTPATIENT)
Dept: CARDIAC REHAB | Facility: HOSPITAL | Age: 68
Discharge: HOME | End: 2023-06-16
Attending: INTERNAL MEDICINE
Payer: COMMERCIAL

## 2023-06-16 PROCEDURE — 93798 PHYS/QHP OP CAR RHAB W/ECG: CPT

## 2023-06-19 ENCOUNTER — HOSPITAL ENCOUNTER (OUTPATIENT)
Dept: CARDIAC REHAB | Facility: HOSPITAL | Age: 68
Discharge: HOME | End: 2023-06-19
Attending: INTERNAL MEDICINE
Payer: COMMERCIAL

## 2023-06-19 PROCEDURE — 93798 PHYS/QHP OP CAR RHAB W/ECG: CPT

## 2023-06-21 ENCOUNTER — HOSPITAL ENCOUNTER (OUTPATIENT)
Dept: CARDIAC REHAB | Facility: HOSPITAL | Age: 68
Discharge: HOME | End: 2023-06-21
Attending: INTERNAL MEDICINE
Payer: COMMERCIAL

## 2023-06-21 PROCEDURE — 93797 PHYS/QHP OP CAR RHAB WO ECG: CPT

## 2023-06-21 PROCEDURE — 93798 PHYS/QHP OP CAR RHAB W/ECG: CPT

## 2023-06-23 ENCOUNTER — HOSPITAL ENCOUNTER (OUTPATIENT)
Dept: CARDIAC REHAB | Facility: HOSPITAL | Age: 68
Discharge: HOME | End: 2023-06-23
Attending: INTERNAL MEDICINE
Payer: COMMERCIAL

## 2023-06-23 LAB — GLUCOSE BLDC GLUCOMTR-MCNC: 88 MG/DL (ref 70–99)

## 2023-06-23 PROCEDURE — 93798 PHYS/QHP OP CAR RHAB W/ECG: CPT

## 2023-06-26 ENCOUNTER — HOSPITAL ENCOUNTER (OUTPATIENT)
Dept: CARDIAC REHAB | Facility: HOSPITAL | Age: 68
Discharge: HOME | End: 2023-06-26
Attending: INTERNAL MEDICINE
Payer: COMMERCIAL

## 2023-06-26 PROCEDURE — 93798 PHYS/QHP OP CAR RHAB W/ECG: CPT

## 2023-06-28 ENCOUNTER — HOSPITAL ENCOUNTER (OUTPATIENT)
Dept: CARDIAC REHAB | Facility: HOSPITAL | Age: 68
Discharge: HOME | End: 2023-06-28
Attending: INTERNAL MEDICINE
Payer: COMMERCIAL

## 2023-06-28 PROCEDURE — 93798 PHYS/QHP OP CAR RHAB W/ECG: CPT

## 2023-06-30 ENCOUNTER — HOSPITAL ENCOUNTER (OUTPATIENT)
Dept: CARDIAC REHAB | Facility: HOSPITAL | Age: 68
Discharge: HOME | End: 2023-06-30
Attending: INTERNAL MEDICINE
Payer: COMMERCIAL

## 2023-06-30 PROCEDURE — 93798 PHYS/QHP OP CAR RHAB W/ECG: CPT

## 2023-07-05 ENCOUNTER — HOSPITAL ENCOUNTER (OUTPATIENT)
Dept: CARDIAC REHAB | Facility: HOSPITAL | Age: 68
Discharge: HOME | End: 2023-07-05
Attending: INTERNAL MEDICINE
Payer: COMMERCIAL

## 2023-07-05 PROCEDURE — 93798 PHYS/QHP OP CAR RHAB W/ECG: CPT

## 2023-07-07 ENCOUNTER — HOSPITAL ENCOUNTER (OUTPATIENT)
Dept: CARDIAC REHAB | Facility: HOSPITAL | Age: 68
Discharge: HOME | End: 2023-07-07
Attending: INTERNAL MEDICINE
Payer: COMMERCIAL

## 2023-07-07 PROCEDURE — 93798 PHYS/QHP OP CAR RHAB W/ECG: CPT

## 2023-07-10 ENCOUNTER — HOSPITAL ENCOUNTER (OUTPATIENT)
Dept: CARDIAC REHAB | Facility: HOSPITAL | Age: 68
Discharge: HOME | End: 2023-07-10
Attending: INTERNAL MEDICINE
Payer: COMMERCIAL

## 2023-07-10 PROCEDURE — 93798 PHYS/QHP OP CAR RHAB W/ECG: CPT

## 2023-07-12 ENCOUNTER — HOSPITAL ENCOUNTER (OUTPATIENT)
Dept: CARDIAC REHAB | Facility: HOSPITAL | Age: 68
Discharge: HOME | End: 2023-07-12
Attending: INTERNAL MEDICINE
Payer: COMMERCIAL

## 2023-07-12 PROCEDURE — 93798 PHYS/QHP OP CAR RHAB W/ECG: CPT

## 2023-07-14 ENCOUNTER — HOSPITAL ENCOUNTER (OUTPATIENT)
Dept: CARDIAC REHAB | Facility: HOSPITAL | Age: 68
Discharge: HOME | End: 2023-07-14
Attending: INTERNAL MEDICINE
Payer: COMMERCIAL

## 2023-07-14 PROCEDURE — 93798 PHYS/QHP OP CAR RHAB W/ECG: CPT

## 2023-07-17 ENCOUNTER — HOSPITAL ENCOUNTER (OUTPATIENT)
Dept: CARDIAC REHAB | Facility: HOSPITAL | Age: 68
Discharge: HOME | End: 2023-07-17
Attending: INTERNAL MEDICINE
Payer: COMMERCIAL

## 2023-07-17 PROCEDURE — 93798 PHYS/QHP OP CAR RHAB W/ECG: CPT

## 2023-07-21 ENCOUNTER — HOSPITAL ENCOUNTER (OUTPATIENT)
Dept: CARDIAC REHAB | Facility: HOSPITAL | Age: 68
Discharge: HOME | End: 2023-07-21
Attending: INTERNAL MEDICINE
Payer: COMMERCIAL

## 2023-07-21 PROCEDURE — 93798 PHYS/QHP OP CAR RHAB W/ECG: CPT

## 2023-07-26 ENCOUNTER — HOSPITAL ENCOUNTER (OUTPATIENT)
Dept: CARDIAC REHAB | Facility: HOSPITAL | Age: 68
Discharge: HOME | End: 2023-07-26
Attending: INTERNAL MEDICINE
Payer: COMMERCIAL

## 2023-07-26 PROCEDURE — 93798 PHYS/QHP OP CAR RHAB W/ECG: CPT

## 2023-07-27 ENCOUNTER — HOSPITAL ENCOUNTER (OUTPATIENT)
Dept: CARDIAC REHAB | Facility: HOSPITAL | Age: 68
Discharge: HOME | End: 2023-07-27
Attending: INTERNAL MEDICINE | Admitting: INTERNAL MEDICINE
Payer: COMMERCIAL

## 2023-07-27 PROCEDURE — 93798 PHYS/QHP OP CAR RHAB W/ECG: CPT

## 2023-07-27 PROCEDURE — 93797 PHYS/QHP OP CAR RHAB WO ECG: CPT

## 2023-08-15 ENCOUNTER — OFFICE VISIT (OUTPATIENT)
Dept: CARDIOLOGY | Facility: CLINIC | Age: 68
End: 2023-08-15
Attending: INTERNAL MEDICINE
Payer: COMMERCIAL

## 2023-08-15 VITALS
BODY MASS INDEX: 33.57 KG/M2 | HEART RATE: 65 BPM | HEIGHT: 60 IN | WEIGHT: 171 LBS | SYSTOLIC BLOOD PRESSURE: 134 MMHG | RESPIRATION RATE: 16 BRPM | DIASTOLIC BLOOD PRESSURE: 72 MMHG

## 2023-08-15 DIAGNOSIS — E78.2 MIXED HYPERLIPIDEMIA: ICD-10-CM

## 2023-08-15 DIAGNOSIS — E11.9 TYPE 2 DIABETES MELLITUS WITHOUT COMPLICATION, WITH LONG-TERM CURRENT USE OF INSULIN (H): ICD-10-CM

## 2023-08-15 DIAGNOSIS — Z79.4 TYPE 2 DIABETES MELLITUS WITHOUT COMPLICATION, WITH LONG-TERM CURRENT USE OF INSULIN (H): ICD-10-CM

## 2023-08-15 DIAGNOSIS — Z78.9 STATIN INTOLERANCE: ICD-10-CM

## 2023-08-15 DIAGNOSIS — I10 ESSENTIAL HYPERTENSION: ICD-10-CM

## 2023-08-15 DIAGNOSIS — I25.10 CORONARY ARTERY DISEASE INVOLVING NATIVE CORONARY ARTERY OF NATIVE HEART WITHOUT ANGINA PECTORIS: Primary | ICD-10-CM

## 2023-08-15 PROBLEM — I20.0 ACCELERATING ANGINA (H): Status: RESOLVED | Noted: 2023-04-18 | Resolved: 2023-08-15

## 2023-08-15 LAB
ALBUMIN SERPL BCG-MCNC: 4.6 G/DL (ref 3.5–5.2)
ALP SERPL-CCNC: 77 U/L (ref 35–104)
ALT SERPL W P-5'-P-CCNC: 24 U/L (ref 0–50)
ANION GAP SERPL CALCULATED.3IONS-SCNC: 11 MMOL/L (ref 7–15)
AST SERPL W P-5'-P-CCNC: 21 U/L (ref 0–45)
BILIRUB SERPL-MCNC: 0.3 MG/DL
BUN SERPL-MCNC: 22 MG/DL (ref 8–23)
CALCIUM SERPL-MCNC: 9.5 MG/DL (ref 8.8–10.2)
CHLORIDE SERPL-SCNC: 103 MMOL/L (ref 98–107)
CHOLEST SERPL-MCNC: 186 MG/DL
CREAT SERPL-MCNC: 1.09 MG/DL (ref 0.51–0.95)
DEPRECATED HCO3 PLAS-SCNC: 27 MMOL/L (ref 22–29)
GFR SERPL CREATININE-BSD FRML MDRD: 56 ML/MIN/1.73M2
GLUCOSE SERPL-MCNC: 154 MG/DL (ref 70–99)
HDLC SERPL-MCNC: 56 MG/DL
LDLC SERPL CALC-MCNC: 106 MG/DL
NONHDLC SERPL-MCNC: 130 MG/DL
POTASSIUM SERPL-SCNC: 4.6 MMOL/L (ref 3.4–5.3)
PROT SERPL-MCNC: 7.1 G/DL (ref 6.4–8.3)
SODIUM SERPL-SCNC: 141 MMOL/L (ref 136–145)
TRIGL SERPL-MCNC: 122 MG/DL

## 2023-08-15 PROCEDURE — 99214 OFFICE O/P EST MOD 30 MIN: CPT | Performed by: INTERNAL MEDICINE

## 2023-08-15 PROCEDURE — 36415 COLL VENOUS BLD VENIPUNCTURE: CPT | Performed by: INTERNAL MEDICINE

## 2023-08-15 PROCEDURE — 80061 LIPID PANEL: CPT | Performed by: INTERNAL MEDICINE

## 2023-08-15 PROCEDURE — 80053 COMPREHEN METABOLIC PANEL: CPT | Performed by: INTERNAL MEDICINE

## 2023-08-15 NOTE — PATIENT INSTRUCTIONS
Please contact direct nurses line Monday through Friday 8 AM to 5 PM @ [phone]    After-hours contact cardiology office at [phone].      Can stop Plavix on 4/18/2024  Continue other medications   Will check lipids, kidney function and liver function.

## 2023-08-15 NOTE — LETTER
8/15/2023    Provider Not In System       RE: Lisseth Qureshi       Dear Colleague,     I had the pleasure of seeing Lisseth Qureshi in the [facility].    HEART CARE ENCOUNTER CONSULTATON NOTE      [facility]  [phone]      Assessment/Recommendations   Assessment:   1.  Coronary Artery Disease s/p D1 stenting (2012, 2.75×12 mm Promus Element Plus stent), recent stent to mid and distal RCA (4/18/2023).  No angina.  Classic angina prior to stents.    2.  Hypertension  3.  Hyperlipidemia, LDL goal < 70.   4.  Statin intolerance   5.  Diabetes mellitus type 2.  A1c 6.5  6.  Carotid artery plaque on CT scan    Plan:   1.  ASA 81 mg daily  2.  Plavix 75 mg daily, until April 18, 2024  3.  Continue Repatha 140 mg Q14 days.   LDL goal < 70.  Statin intolearnace.  Check lipids and CMP today.   4.  Continue lisinopril for hypertension  5. Continue amlodipine for HTN     History of Present Illness/Subjective    HPI: Lisseth Qureshi is a 67 year old female known coronary disease, statin intolerance not on statin therapy with elevated LDL, diabetes, hypertension who presents to cardiology clinic in follow-up.     Currently patient is doing well from a cardiovascular standpoint.  She denies any active anginal symptoms.  Denies any bleeding issues dual antiplatelet therapy.  Continues to exercise frequently.  A1c has improved to 6.7.    Reviewed lipids in detail.  We will recheck her lipids now she started a PCSK9 inhibitor.  No side effects to Repatha.  No injection site issues.    Reviewed in detail her angiogram results.  Reviewed her prior echo results.    Echo: 4/18/2023, reviewed  Left ventricular size, wall motion and function are normal. The ejection  fraction is 60-65%.  Normal right ventricle size and systolic function.  No hemodynamically significant valvular abnormalities on 2D or color flow  imaging.    Coronary Angiogram: 4/18/2023     1st Mrg lesion is 60% stenosed.     Ost  Cx to Prox Cx lesion is 30% stenosed.     Mid RCA lesion is 95% stenosed.     Dist RCA lesion is 99% stenosed.     Ost RCA to Prox RCA lesion is 50% stenosed.     60% in-stent narrowing small 1st OM(this has the appearance of incomplete stent expansion from original deployment.)  Sequential 90 and 95% narrowing in mid-distal RCA (smaller caliber vessel)  LAD small caliber but no severe  stenoses  Successful PCI MID AND DISTAL RCA with JI x2 (long stent used to cover mid lesion and extended to the ostial plaque as the ostium was probably disrupted by deeply intubating guiding catheter necessary to deliver stents distally.)     Coronary Angiogram: 2007  CORONARY FINDINGS:   1. Coronary circulation is right dominant.   2. Left main:  Left main coronary artery is medium large in size and length with minor nonocclusive disease present, gives rise to the left anterior descending and left circumflex coronary artery.     3. Left circumflex coronary artery is a medium large caliber vessel, gives rise to 2 major obtuse marginal branches.     4. First obtuse marginal branch is a small size branch, which arises very proximally from the circumflex.  It has mild nonocclusive disease present.   5. Second obtuse marginal branch is a medium size vessel, which arises from the mid circumflex.  It is tortuous with mild nonocclusive coronary artery disease.   6. The circumflex in the mid to distal segment after the second obtuse marginal branch has a discrete 40% lesion seen.  The remainder of the circumflex tapers distally and has minor nonocclusive disease present.     7. Left anterior descending:  The left anterior descending is a large caliber vessel, it extends all the way to apex.  Gives rise to several septal perforators and one major diagonal vessel.  There is minor nonocclusive disease present in the left anterior descending and its branches.  8. Right coronary artery:  The right coronary artery is a large caliber dominant  vessel, gives rise to a conus branch in the very proximal portion, RV marginal branch in the midportion, and bifurcates into a posterior descending artery branch and a posterolateral branch.  Posterolateral branch then further subdivides into smaller posterolateral segments.  The proximal portion of the vessel has a 25% discrete narrowing.  The midportion of the right coronary artery has a 40% diffuse disease.  The distal portion of the right coronary artery and its branches have minor nonocclusive disease following this.          CTA coronary: 2015  CT OF THE HEART WITH CONTRAST, CORONARY CT ANGIOGRAPHY:  DOMINANT SYSTEM:  Right.  Normal coronary origins.     LEFT CORONARY ARTERY:  LEFT MAIN TRUNK:  There is extrinsic calcification but no stenosis is identified.   This is a short vessel.  LEFT ANTERIOR DESCENDING: There is an ostial stenosis of 25% to 50% consisting of  mixed plaque.  There is scattered foci throughout the proximal and mid-left anterior  descending of mixed plaque narrowing the lumen by 25% to 50%.  No high-grade  stenosis is demonstrated in the left anterior descending or its diagonal branches.       LEFT CIRCUMFLEX:  There is calcified plaque narrowing the lumen by less than 25% in  the proximal portion of the vessel.  In the mid-portion of the vessel there is a  calcified plaque narrowing the lumen by 25% to 50%.  The first and major obtuse  marginal branch has a stent; the stent size is unspecified.  There appears to be  diminished distal flow and poor visualization of graft patency suggestive of  in-stent restenosis.  The more distal obtuse marginal branch is widely patent with  no stenosis or plaque.   RIGHT CORONARY ARTERY:   Dominant distribution.  There is irregular mixed plaque  throughout the proximal portion of the vessel.  In the mid- and distal portion of  the vessel, there is more mixed plaque 25% to 50% stenotic.  The posterior  descending and posterior ventricular branches are  "small in caliber but have no  evidence of stenosis or plaque.         CT: 2019:   The right common carotid artery is patent. Examination of the right carotid bulb demonstrates calcified and atherosclerotic plaque without evidence of hemodynamically significant stenosis.     The left common carotid artery is patent. Examination of the left carotid bulb demonstrates calcified and atherosclerotic plaque without evidence of hemodynamically significant stenosis.     Physical Examination  Review of Systems   Vitals: /72   Pulse 65   Resp 16   Ht 1.511 m (4' 11.5\")   Wt 77.6 kg (171 lb)   BMI 33.96 kg/m    BMI= Body mass index is 33.96 kg/m .  Wt Readings from Last 3 Encounters:   08/15/23 77.6 kg (171 lb)   05/03/23 80.3 kg (177 lb)   04/25/23 79.8 kg (176 lb)        Pleasant, no change   ENT/Mouth: membranes moist, no oral lesions or bleeding gums.      EYES:  no scleral icterus, normal conjunctivae       Chest/Lungs:   lungs are clear to auscultation, no rales or wheezing, no sternal scar, equal chest wall expansion    Cardiovascular:   Regular. Normal first and second heart sounds with faint systolic murmur. No rubs, or gallops; the carotid, radial and posterior tibial pulses are intact.  No edema.     Abdomen:  no tenderness; bowel sounds are present   Extremities: no cyanosis or clubbing   Skin: no xanthelasma, warm.    Neurologic: normal  bilateral, no tremors     Psychiatric: alert and oriented x3, calm        Please refer above for cardiac ROS details.        Medical History  Surgical History Family History Social History   No past medical history on file.  Past Surgical History:   Procedure Laterality Date     CORONARY STENT PLACEMENT  10/19/2012    glenna 1st obtuse marginal     CV CORONARY ANGIOGRAM N/A 4/18/2023    Procedure: Coronary Angiogram;  Surgeon: Javier Guerra MD;  Location: [facility] CATH LAB CV     CV LEFT HEART CATH N/A 4/18/2023    Procedure: Left Heart Catheterization;  Surgeon: " Javier Guerra MD;  Location: [facility] CV     CV PCI N/A 2023    Procedure: Percutaneous Coronary Intervention;  Surgeon: Javier Guerra MD;  Location: [facility] CV     SHOULDER SURGERY      frozen shoulder     Family History   Problem Relation Age of Onset     Hypertension Mother      Mitral Valve Replacement No family hx of         Social History     Socioeconomic History     Marital status: Single     Spouse name: Not on file     Number of children: Not on file     Years of education: Not on file     Highest education level: Not on file   Occupational History     Not on file   Tobacco Use     Smoking status: Former     Packs/day: 1.50     Years: 35.00     Pack years: 52.50     Types: Cigarettes     Quit date: 2005     Years since quittin.5     Smokeless tobacco: Not on file     Tobacco comments:     pt quit 10 years ago (she had smoked x 35 years)   Substance and Sexual Activity     Alcohol use: No     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     Drug use: No     Sexual activity: Not on file   Other Topics Concern     Not on file   Social History Narrative    Exercises 3-4 days per week     Social Determinants of Health     Financial Resource Strain: Not on file   Food Insecurity: Not on file   Transportation Needs: Not on file   Physical Activity: Not on file   Stress: Not on file   Social Connections: Not on file   Intimate Partner Violence: Not on file   Housing Stability: Not on file           Medications  Allergies   Current Outpatient Medications   Medication Sig Dispense Refill     amLODIPine (NORVASC) 10 MG tablet Take 10 mg by mouth daily       aspirin (ASA) 81 MG EC tablet Take 1 tablet (81 mg) by mouth daily Start tomorrow. 90 tablet 3     buPROPion (WELLBUTRIN XL) 300 MG 24 hr tablet Take 300 mg by mouth daily       clopidogrel (PLAVIX) 75 MG tablet Take 1 tablet (75 mg) by mouth daily 90 tablet 3     coenzyme Q10 (COQ-10) 100 mg capsule 100 mg  every evening       evolocumab (REPATHA SURECLICK) 140 MG/ML prefilled autoinjector Inject 1 mL (140 mg) Subcutaneous every 14 days 6 mL 3     exenatide ER (BYDUREON BCISE) 2 MG/0.85ML auto-injector Inject 2 mg Subcutaneous every 7 days Once a week on Sunday       fish oil-omega-3 fatty acids 1000 MG capsule Take 2 g by mouth every evening       glipiZIDE (GLUCOTROL XL) 2.5 MG 24 hr tablet Take 2.5 mg by mouth every evening Takes 1 extra tablet every evening if needed       levothyroxine (SYNTHROID, LEVOTHROID) 75 MCG tablet [LEVOTHYROXINE (SYNTHROID, LEVOTHROID) 75 MCG TABLET] Take 75 mcg by mouth daily.       melatonin 3 mg Tab [MELATONIN 3 MG TAB] Take 3 mg by mouth bedtime as needed.       metFORMIN (GLUCOPHAGE) 1000 MG tablet Take 1,000 mg by mouth 2 times daily (with meals)       multivitamin therapeutic (THERAGRAN) tablet [MULTIVITAMIN THERAPEUTIC (THERAGRAN) TABLET] Take 1 tablet by mouth daily.       red yeast rice 600 mg Tab [RED YEAST RICE 600 MG TAB] Take 1 tablet by mouth daily.       ezetimibe (ZETIA) 10 mg tablet [EZETIMIBE (ZETIA) 10 MG TABLET] Take 1 tablet (10 mg total) by mouth daily. (Patient not taking: Reported on 5/3/2023) 90 tablet 3       Allergies   Allergen Reactions     Codeine      Other reaction(s): *Unknown     Guaifenesin Swelling     Felt throat swelling, Guaifenesin with codeine     Penicillins Unknown     Unknown, allergy occurred at 6 months old     Rosuvastatin Calcium [Rosuvastatin] Unknown          Lab Results    Chemistry/lipid CBC Cardiac Enzymes/BNP/TSH/INR   Recent Labs   Lab Test 05/26/17  1420 05/24/16  0735   CHOL  --  188   HDL  --  47*   LDL 98 124   TRIG  --  85     Recent Labs   Lab Test 05/26/17  1420 05/24/16  0735 03/20/15  1703   LDL 98 124 122     Recent Labs   Lab Test 04/06/23  2300      POTASSIUM 3.8   CHLORIDE 101   CO2 25   *   BUN 20.0   CR 1.05*   GFRESTIMATED 59*   AMBERLY 9.2     Recent Labs   Lab Test 04/06/23  2300 04/10/19  1851   CR 1.05*  1.27*     Recent Labs   Lab Test 03/20/15  1703   A1C 6.5*          Recent Labs   Lab Test 04/06/23  2300   WBC 6.7   HGB 12.3   HCT 37.7   MCV 86        Recent Labs   Lab Test 04/06/23  2300 04/10/19  1851   HGB 12.3 12.2    No results for input(s): TROPONINI in the last 06451 hours.  No results for input(s): BNP, NTBNPI, NTBNP in the last 00268 hours.  No results for input(s): TSH in the last 35066 hours.  Recent Labs   Lab Test 04/10/19  1851   INR 1.01        Oswald Shaffer DO                                        Thank you for allowing me to participate in the care of your patient.      Sincerely,     Oswald Shaffer DO     [facility]  cc:   Oswald Shaffer DO  [address] [place] [address]

## 2023-08-15 NOTE — PROGRESS NOTES
HEART CARE ENCOUNTER CONSULTATON NOTE      [facility]  [phone]      Assessment/Recommendations   Assessment:   1.  Coronary Artery Disease s/p D1 stenting (2012, 2.75×12 mm Promus Element Plus stent), recent stent to mid and distal RCA (4/18/2023).  No angina.  Classic angina prior to stents.    2.  Hypertension  3.  Hyperlipidemia, LDL goal < 70.   4.  Statin intolerance   5.  Diabetes mellitus type 2.  A1c 6.5  6.  Carotid artery plaque on CT scan    Plan:   1.  ASA 81 mg daily  2.  Plavix 75 mg daily, until April 18, 2024  3.  Continue Repatha 140 mg Q14 days.   LDL goal < 70.  Statin intolearnace.  Check lipids and CMP today.   4.  Continue lisinopril for hypertension  5. Continue amlodipine for HTN     History of Present Illness/Subjective    HPI: Lisseth Qureshi is a 67 year old female known coronary disease, statin intolerance not on statin therapy with elevated LDL, diabetes, hypertension who presents to cardiology clinic in follow-up.     Currently patient is doing well from a cardiovascular standpoint.  She denies any active anginal symptoms.  Denies any bleeding issues dual antiplatelet therapy.  Continues to exercise frequently.  A1c has improved to 6.7.    Reviewed lipids in detail.  We will recheck her lipids now she started a PCSK9 inhibitor.  No side effects to Repatha.  No injection site issues.    Reviewed in detail her angiogram results.  Reviewed her prior echo results.    Echo: 4/18/2023, reviewed  Left ventricular size, wall motion and function are normal. The ejection  fraction is 60-65%.  Normal right ventricle size and systolic function.  No hemodynamically significant valvular abnormalities on 2D or color flow  imaging.    Coronary Angiogram: 4/18/2023    1st Mrg lesion is 60% stenosed.    Ost Cx to Prox Cx lesion is 30% stenosed.    Mid RCA lesion is 95% stenosed.    Dist RCA lesion is 99% stenosed.    Ost RCA to Prox RCA lesion is 50% stenosed.     60% in-stent  narrowing small 1st OM(this has the appearance of incomplete stent expansion from original deployment.)  Sequential 90 and 95% narrowing in mid-distal RCA (smaller caliber vessel)  LAD small caliber but no severe  stenoses  Successful PCI MID AND DISTAL RCA with JI x2 (long stent used to cover mid lesion and extended to the ostial plaque as the ostium was probably disrupted by deeply intubating guiding catheter necessary to deliver stents distally.)     Coronary Angiogram: 2007  CORONARY FINDINGS:   1. Coronary circulation is right dominant.   2. Left main:  Left main coronary artery is medium large in size and length with minor nonocclusive disease present, gives rise to the left anterior descending and left circumflex coronary artery.     3. Left circumflex coronary artery is a medium large caliber vessel, gives rise to 2 major obtuse marginal branches.     4. First obtuse marginal branch is a small size branch, which arises very proximally from the circumflex.  It has mild nonocclusive disease present.   5. Second obtuse marginal branch is a medium size vessel, which arises from the mid circumflex.  It is tortuous with mild nonocclusive coronary artery disease.   6. The circumflex in the mid to distal segment after the second obtuse marginal branch has a discrete 40% lesion seen.  The remainder of the circumflex tapers distally and has minor nonocclusive disease present.     7. Left anterior descending:  The left anterior descending is a large caliber vessel, it extends all the way to apex.  Gives rise to several septal perforators and one major diagonal vessel.  There is minor nonocclusive disease present in the left anterior descending and its branches.  8. Right coronary artery:  The right coronary artery is a large caliber dominant vessel, gives rise to a conus branch in the very proximal portion, RV marginal branch in the midportion, and bifurcates into a posterior descending artery branch and a  posterolateral branch.  Posterolateral branch then further subdivides into smaller posterolateral segments.  The proximal portion of the vessel has a 25% discrete narrowing.  The midportion of the right coronary artery has a 40% diffuse disease.  The distal portion of the right coronary artery and its branches have minor nonocclusive disease following this.          CTA coronary: 2015  CT OF THE HEART WITH CONTRAST, CORONARY CT ANGIOGRAPHY:  DOMINANT SYSTEM:  Right.  Normal coronary origins.     LEFT CORONARY ARTERY:  LEFT MAIN TRUNK:  There is extrinsic calcification but no stenosis is identified.   This is a short vessel.  LEFT ANTERIOR DESCENDING: There is an ostial stenosis of 25% to 50% consisting of  mixed plaque.  There is scattered foci throughout the proximal and mid-left anterior  descending of mixed plaque narrowing the lumen by 25% to 50%.  No high-grade  stenosis is demonstrated in the left anterior descending or its diagonal branches.       LEFT CIRCUMFLEX:  There is calcified plaque narrowing the lumen by less than 25% in  the proximal portion of the vessel.  In the mid-portion of the vessel there is a  calcified plaque narrowing the lumen by 25% to 50%.  The first and major obtuse  marginal branch has a stent; the stent size is unspecified.  There appears to be  diminished distal flow and poor visualization of graft patency suggestive of  in-stent restenosis.  The more distal obtuse marginal branch is widely patent with  no stenosis or plaque.   RIGHT CORONARY ARTERY:   Dominant distribution.  There is irregular mixed plaque  throughout the proximal portion of the vessel.  In the mid- and distal portion of  the vessel, there is more mixed plaque 25% to 50% stenotic.  The posterior  descending and posterior ventricular branches are small in caliber but have no  evidence of stenosis or plaque.         CT: 2019:   The right common carotid artery is patent. Examination of the right carotid bulb  "demonstrates calcified and atherosclerotic plaque without evidence of hemodynamically significant stenosis.     The left common carotid artery is patent. Examination of the left carotid bulb demonstrates calcified and atherosclerotic plaque without evidence of hemodynamically significant stenosis.     Physical Examination  Review of Systems   Vitals: /72   Pulse 65   Resp 16   Ht 1.511 m (4' 11.5\")   Wt 77.6 kg (171 lb)   BMI 33.96 kg/m    BMI= Body mass index is 33.96 kg/m .  Wt Readings from Last 3 Encounters:   08/15/23 77.6 kg (171 lb)   05/03/23 80.3 kg (177 lb)   04/25/23 79.8 kg (176 lb)        Pleasant, no change   ENT/Mouth: membranes moist, no oral lesions or bleeding gums.      EYES:  no scleral icterus, normal conjunctivae       Chest/Lungs:   lungs are clear to auscultation, no rales or wheezing, no sternal scar, equal chest wall expansion    Cardiovascular:   Regular. Normal first and second heart sounds with faint systolic murmur. No rubs, or gallops; the carotid, radial and posterior tibial pulses are intact.  No edema.     Abdomen:  no tenderness; bowel sounds are present   Extremities: no cyanosis or clubbing   Skin: no xanthelasma, warm.    Neurologic: normal  bilateral, no tremors     Psychiatric: alert and oriented x3, calm        Please refer above for cardiac ROS details.        Medical History  Surgical History Family History Social History   No past medical history on file.  Past Surgical History:   Procedure Laterality Date    CORONARY STENT PLACEMENT  10/19/2012    glenna 1st obtuse marginal    CV CORONARY ANGIOGRAM N/A 4/18/2023    Procedure: Coronary Angiogram;  Surgeon: Javier Guerra MD;  Location: [facility] CV    CV LEFT HEART CATH N/A 4/18/2023    Procedure: Left Heart Catheterization;  Surgeon: Javier Guerra MD;  Location: [facility] CV    CV PCI N/A 4/18/2023    Procedure: Percutaneous Coronary Intervention;  Surgeon: Javier Guerra MD;  " Location: [facility] CV    SHOULDER SURGERY      frozen shoulder     Family History   Problem Relation Age of Onset    Hypertension Mother     Mitral Valve Replacement No family hx of         Social History     Socioeconomic History    Marital status: Single     Spouse name: Not on file    Number of children: Not on file    Years of education: Not on file    Highest education level: Not on file   Occupational History    Not on file   Tobacco Use    Smoking status: Former     Packs/day: 1.50     Years: 35.00     Pack years: 52.50     Types: Cigarettes     Quit date: 2005     Years since quittin.5    Smokeless tobacco: Not on file    Tobacco comments:     pt quit 10 years ago (she had smoked x 35 years)   Substance and Sexual Activity    Alcohol use: No     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    Drug use: No    Sexual activity: Not on file   Other Topics Concern    Not on file   Social History Narrative    Exercises 3-4 days per week     Social Determinants of Health     Financial Resource Strain: Not on file   Food Insecurity: Not on file   Transportation Needs: Not on file   Physical Activity: Not on file   Stress: Not on file   Social Connections: Not on file   Intimate Partner Violence: Not on file   Housing Stability: Not on file           Medications  Allergies   Current Outpatient Medications   Medication Sig Dispense Refill    amLODIPine (NORVASC) 10 MG tablet Take 10 mg by mouth daily      aspirin (ASA) 81 MG EC tablet Take 1 tablet (81 mg) by mouth daily Start tomorrow. 90 tablet 3    buPROPion (WELLBUTRIN XL) 300 MG 24 hr tablet Take 300 mg by mouth daily      clopidogrel (PLAVIX) 75 MG tablet Take 1 tablet (75 mg) by mouth daily 90 tablet 3    coenzyme Q10 (COQ-10) 100 mg capsule 100 mg every evening      evolocumab (REPATHA SURECLICK) 140 MG/ML prefilled autoinjector Inject 1 mL (140 mg) Subcutaneous every 14 days 6 mL 3    exenatide ER (BYDUREON BCISE) 2 MG/0.85ML  auto-injector Inject 2 mg Subcutaneous every 7 days Once a week on Sunday      fish oil-omega-3 fatty acids 1000 MG capsule Take 2 g by mouth every evening      glipiZIDE (GLUCOTROL XL) 2.5 MG 24 hr tablet Take 2.5 mg by mouth every evening Takes 1 extra tablet every evening if needed      levothyroxine (SYNTHROID, LEVOTHROID) 75 MCG tablet [LEVOTHYROXINE (SYNTHROID, LEVOTHROID) 75 MCG TABLET] Take 75 mcg by mouth daily.      melatonin 3 mg Tab [MELATONIN 3 MG TAB] Take 3 mg by mouth bedtime as needed.      metFORMIN (GLUCOPHAGE) 1000 MG tablet Take 1,000 mg by mouth 2 times daily (with meals)      multivitamin therapeutic (THERAGRAN) tablet [MULTIVITAMIN THERAPEUTIC (THERAGRAN) TABLET] Take 1 tablet by mouth daily.      red yeast rice 600 mg Tab [RED YEAST RICE 600 MG TAB] Take 1 tablet by mouth daily.      ezetimibe (ZETIA) 10 mg tablet [EZETIMIBE (ZETIA) 10 MG TABLET] Take 1 tablet (10 mg total) by mouth daily. (Patient not taking: Reported on 5/3/2023) 90 tablet 3       Allergies   Allergen Reactions    Codeine      Other reaction(s): *Unknown    Guaifenesin Swelling     Felt throat swelling, Guaifenesin with codeine    Penicillins Unknown     Unknown, allergy occurred at 6 months old    Rosuvastatin Calcium [Rosuvastatin] Unknown          Lab Results    Chemistry/lipid CBC Cardiac Enzymes/BNP/TSH/INR   Recent Labs   Lab Test 05/26/17  1420 05/24/16  0735   CHOL  --  188   HDL  --  47*   LDL 98 124   TRIG  --  85     Recent Labs   Lab Test 05/26/17  1420 05/24/16  0735 03/20/15  1703   LDL 98 124 122     Recent Labs   Lab Test 04/06/23  2300      POTASSIUM 3.8   CHLORIDE 101   CO2 25   *   BUN 20.0   CR 1.05*   GFRESTIMATED 59*   AMBERLY 9.2     Recent Labs   Lab Test 04/06/23  2300 04/10/19  1851   CR 1.05* 1.27*     Recent Labs   Lab Test 03/20/15  1703   A1C 6.5*          Recent Labs   Lab Test 04/06/23  2300   WBC 6.7   HGB 12.3   HCT 37.7   MCV 86        Recent Labs   Lab Test 04/06/23  2300  04/10/19  1851   HGB 12.3 12.2    No results for input(s): TROPONINI in the last 11548 hours.  No results for input(s): BNP, NTBNPI, NTBNP in the last 18650 hours.  No results for input(s): TSH in the last 10951 hours.  Recent Labs   Lab Test 04/10/19  1851   INR 1.01        Oswald Shaffer DO

## 2023-08-26 ENCOUNTER — HEALTH MAINTENANCE LETTER (OUTPATIENT)
Age: 68
End: 2023-08-26

## 2023-09-12 ASSESSMENT — ACTIVITIES OF DAILY LIVING (ADL): CURRENT_FUNCTION: NO ASSISTANCE NEEDED

## 2023-09-12 ASSESSMENT — ENCOUNTER SYMPTOMS
ABDOMINAL PAIN: 0
FEVER: 0
HEARTBURN: 0
NERVOUS/ANXIOUS: 1
WEAKNESS: 0
MYALGIAS: 0
EYE PAIN: 0
CONSTIPATION: 1
PARESTHESIAS: 0
PALPITATIONS: 0
SHORTNESS OF BREATH: 0
ARTHRALGIAS: 1
FREQUENCY: 1
SORE THROAT: 0
JOINT SWELLING: 0
HEMATURIA: 0
HEADACHES: 0
DYSURIA: 0
HEMATOCHEZIA: 0
DIARRHEA: 0
CHILLS: 0
NAUSEA: 0
COUGH: 0
DIZZINESS: 0
BREAST MASS: 0

## 2023-09-13 ENCOUNTER — OFFICE VISIT (OUTPATIENT)
Dept: FAMILY MEDICINE | Facility: CLINIC | Age: 68
End: 2023-09-13
Payer: COMMERCIAL

## 2023-09-13 VITALS
OXYGEN SATURATION: 97 % | DIASTOLIC BLOOD PRESSURE: 76 MMHG | HEIGHT: 60 IN | TEMPERATURE: 98.2 F | RESPIRATION RATE: 16 BRPM | WEIGHT: 173 LBS | BODY MASS INDEX: 33.96 KG/M2 | SYSTOLIC BLOOD PRESSURE: 132 MMHG | HEART RATE: 63 BPM

## 2023-09-13 DIAGNOSIS — E03.9 HYPOTHYROIDISM, UNSPECIFIED TYPE: ICD-10-CM

## 2023-09-13 DIAGNOSIS — Z23 ENCOUNTER FOR IMMUNIZATION: ICD-10-CM

## 2023-09-13 DIAGNOSIS — Z12.11 SCREEN FOR COLON CANCER: ICD-10-CM

## 2023-09-13 DIAGNOSIS — Z00.00 ROUTINE GENERAL MEDICAL EXAMINATION AT A HEALTH CARE FACILITY: Primary | ICD-10-CM

## 2023-09-13 DIAGNOSIS — Z79.4 TYPE 2 DIABETES MELLITUS WITHOUT COMPLICATION, WITH LONG-TERM CURRENT USE OF INSULIN (H): ICD-10-CM

## 2023-09-13 DIAGNOSIS — E78.5 HYPERLIPIDEMIA LDL GOAL <70: ICD-10-CM

## 2023-09-13 DIAGNOSIS — I25.10 CORONARY ARTERY DISEASE INVOLVING NATIVE HEART WITHOUT ANGINA PECTORIS, UNSPECIFIED VESSEL OR LESION TYPE: ICD-10-CM

## 2023-09-13 DIAGNOSIS — Z12.31 VISIT FOR SCREENING MAMMOGRAM: ICD-10-CM

## 2023-09-13 DIAGNOSIS — Z13.820 SCREENING FOR OSTEOPOROSIS: ICD-10-CM

## 2023-09-13 DIAGNOSIS — Z11.59 NEED FOR HEPATITIS C SCREENING TEST: ICD-10-CM

## 2023-09-13 DIAGNOSIS — E11.9 TYPE 2 DIABETES MELLITUS WITHOUT COMPLICATION, WITH LONG-TERM CURRENT USE OF INSULIN (H): ICD-10-CM

## 2023-09-13 DIAGNOSIS — Z00.00 ENCOUNTER FOR MEDICAL EXAMINATION TO ESTABLISH CARE: ICD-10-CM

## 2023-09-13 LAB
ALBUMIN SERPL BCG-MCNC: 4.5 G/DL (ref 3.5–5.2)
ALP SERPL-CCNC: 75 U/L (ref 35–104)
ALT SERPL W P-5'-P-CCNC: 21 U/L (ref 0–50)
ANION GAP SERPL CALCULATED.3IONS-SCNC: 15 MMOL/L (ref 7–15)
AST SERPL W P-5'-P-CCNC: 19 U/L (ref 0–45)
BILIRUB SERPL-MCNC: 0.2 MG/DL
BUN SERPL-MCNC: 22.7 MG/DL (ref 8–23)
CALCIUM SERPL-MCNC: 9.5 MG/DL (ref 8.8–10.2)
CHLORIDE SERPL-SCNC: 104 MMOL/L (ref 98–107)
CREAT SERPL-MCNC: 0.98 MG/DL (ref 0.51–0.95)
CREAT UR-MCNC: 40.1 MG/DL
DEPRECATED HCO3 PLAS-SCNC: 23 MMOL/L (ref 22–29)
EGFRCR SERPLBLD CKD-EPI 2021: 63 ML/MIN/1.73M2
GLUCOSE SERPL-MCNC: 129 MG/DL (ref 70–99)
HBA1C MFR BLD: 7.4 % (ref 0–5.6)
MICROALBUMIN UR-MCNC: 26.1 MG/L
MICROALBUMIN/CREAT UR: 65.09 MG/G CR (ref 0–25)
POTASSIUM SERPL-SCNC: 4.2 MMOL/L (ref 3.4–5.3)
PROT SERPL-MCNC: 7.5 G/DL (ref 6.4–8.3)
SODIUM SERPL-SCNC: 142 MMOL/L (ref 136–145)
TSH SERPL DL<=0.005 MIU/L-ACNC: 3.45 UIU/ML (ref 0.3–4.2)

## 2023-09-13 PROCEDURE — 82570 ASSAY OF URINE CREATININE: CPT

## 2023-09-13 PROCEDURE — 82043 UR ALBUMIN QUANTITATIVE: CPT

## 2023-09-13 PROCEDURE — 80053 COMPREHEN METABOLIC PANEL: CPT

## 2023-09-13 PROCEDURE — 90472 IMMUNIZATION ADMIN EACH ADD: CPT

## 2023-09-13 PROCEDURE — 99207 PR FOOT EXAM NO CHARGE: CPT | Mod: 25

## 2023-09-13 PROCEDURE — 36415 COLL VENOUS BLD VENIPUNCTURE: CPT

## 2023-09-13 PROCEDURE — 90677 PCV20 VACCINE IM: CPT

## 2023-09-13 PROCEDURE — 84443 ASSAY THYROID STIM HORMONE: CPT

## 2023-09-13 PROCEDURE — 99214 OFFICE O/P EST MOD 30 MIN: CPT | Mod: 25

## 2023-09-13 PROCEDURE — 90715 TDAP VACCINE 7 YRS/> IM: CPT

## 2023-09-13 PROCEDURE — 99397 PER PM REEVAL EST PAT 65+ YR: CPT | Mod: 25

## 2023-09-13 PROCEDURE — 83036 HEMOGLOBIN GLYCOSYLATED A1C: CPT

## 2023-09-13 PROCEDURE — 90471 IMMUNIZATION ADMIN: CPT

## 2023-09-13 ASSESSMENT — ENCOUNTER SYMPTOMS
FEVER: 0
DIARRHEA: 0
EYE PAIN: 0
DYSURIA: 0
NERVOUS/ANXIOUS: 1
SHORTNESS OF BREATH: 0
COUGH: 0
HEADACHES: 0
WEAKNESS: 0
ABDOMINAL PAIN: 0
DIZZINESS: 0
HEARTBURN: 0
HEMATURIA: 0
MYALGIAS: 0
CONSTIPATION: 1
ARTHRALGIAS: 1
CHILLS: 0
PALPITATIONS: 0
JOINT SWELLING: 0
FREQUENCY: 1
PARESTHESIAS: 0
SORE THROAT: 0
NAUSEA: 0
BREAST MASS: 0
HEMATOCHEZIA: 0

## 2023-09-13 ASSESSMENT — ACTIVITIES OF DAILY LIVING (ADL): CURRENT_FUNCTION: NO ASSISTANCE NEEDED

## 2023-09-13 NOTE — PROGRESS NOTES
"SUBJECTIVE:   Lisseth is a 68 year old who presents for Preventive Visit.      9/13/2023     7:47 AM   Additional Questions   Roomed by Naida Leon   Accompanied by Self       Are you in the first 12 months of your Medicare coverage?  No    Healthy Habits:     In general, how would you rate your overall health?  Fair    Frequency of exercise:  2-3 days/week    Duration of exercise:  15-30 minutes    Do you usually eat at least 4 servings of fruit and vegetables a day, include whole grains    & fiber and avoid regularly eating high fat or \"junk\" foods?  Yes    Taking medications regularly:  Yes    Medication side effects:  None    Ability to successfully perform activities of daily living:  No assistance needed    Home Safety:  No safety concerns identified    Hearing Impairment:  No hearing concerns    In the past 6 months, have you been bothered by leaking of urine?  No    In general, how would you rate your overall mental or emotional health?  Good    Additional concerns today:  No        Have you ever done Advance Care Planning? (For example, a Health Directive, POLST, or a discussion with a medical provider or your loved ones about your wishes): Yes, advance care planning is on file.    {Hearing Test Done (Optional):427748}   Fall risk  Fallen 2 or more times in the past year?: No  Any fall with injury in the past year?: No    Cognitive Screening   1) Repeat 3 items (Leader, Season, Table)    2) Clock draw: NORMAL  3) 3 item recall: Recalls 3 objects  Results: 3 items recalled: COGNITIVE IMPAIRMENT LESS LIKELY    Mini-CogTM Copyright VALENTINO Eldridge. Licensed by the author for use in [facility]; reprinted with permission (kishore@.[facility]). All rights reserved.      Do you have sleep apnea, excessive snoring or daytime drowsiness? 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  If the prescreen question answer is YES, complete the full AUDIT  :102158}        2023     6:38 PM   Alcohol Use   Prescreen: >3 drinks/day or >7 drinks/week? Not Applicable   {add AUDIT responses (Optional) (A score of 7 for adult men is an indication of hazardous drinking; a score of 8 or more is an indication of an alcohol use disorder.  A score of 7 or more for adult women is an indication of hazardous drinking or an alchohol use disorder):400117}  Do you have a current opioid prescription? No  Do you use any other controlled substances or medications that are not prescribed by a provider? None  {Provider  If there are gaps in the social history shown above, please follow the link and refresh the note Link to Social and Substance History :724808}    {Outside tests to abstract? :447141}    {additional problems to add (Optional):209058}    Current providers sharing in care for this patient include: {Rooming staff:  Please update Care Team from storyboard, refresh this note and then delete this statement}  Patient Care Team:  System, Provider Not In as PCP - General (Clinic)  Oswald Shaffer DO as Assigned Heart and Vascular Provider    The following health maintenance items are reviewed in Epic and correct as of today:  Health Maintenance   Topic Date Due    DEXA  Never done    MICROALBUMIN  Never done    TSH W/FREE T4  REFLEX  Never done    DIABETIC FOOT EXAM  Never done    ANNUAL REVIEW OF HM ORDERS  Never done    ADVANCE CARE PLANNING  Never done    EYE EXAM  Never done    HEPATITIS C SCREENING  Never done    ZOSTER IMMUNIZATION (1 of 2) Never done    A1C  06/20/2015    MAMMO SCREENING  12/19/2019    INFLUENZA VACCINE (1) 09/14/2023 (Originally 9/1/2023)    Pneumococcal Vaccine: 65+ Years (2 - PCV) 09/14/2023 (Originally 11/4/2000)    DTAP/TDAP/TD IMMUNIZATION (2 - Td or Tdap) 09/14/2023 (Originally 10/30/2022)    COVID-19 Vaccine (5 - Pfizer series) 09/14/2023 (Originally 4/8/2023)    MEDICARE ANNUAL WELLNESS VISIT  12/08/2023    BMP  08/15/2024    LIPID  08/15/2024    FALL RISK ASSESSMENT  09/13/2024    COLORECTAL CANCER SCREENING  04/15/2025    PHQ-2 (once per calendar year)  Completed    IPV IMMUNIZATION  Aged Out    HPV IMMUNIZATION  Aged Out    MENINGITIS IMMUNIZATION  Aged Out    LUNG CANCER SCREENING  Discontinued     {Chronicprobdata (optional):507104}  {Decision Support (Optional):689395}        9/12/2023     6:39 PM   Breast CA Risk Assessment (FHS-7)   Do you have a family history of breast, colon, or ovarian cancer? No / Unknown       {If any of the questions to the BCRA (FHS-7) are answered yes, consider ordering referral for genetic counseling (Optional) :714533}  {AMB Mammogram Decision Support (Optional) :549304}  Pertinent mammograms are reviewed under the imaging tab.    Review of Systems   Constitutional:  Negative for chills and fever.   HENT:  Negative for congestion, ear pain, hearing loss and sore throat.    Eyes:  Negative for pain and visual disturbance.   Respiratory:  Negative for cough and shortness of breath.    Cardiovascular:  Negative for chest pain, palpitations and peripheral edema.   Gastrointestinal:  Positive for constipation. Negative for abdominal pain, diarrhea, heartburn, hematochezia and nausea.   Breasts:  Negative for tenderness, breast mass and discharge.   Genitourinary:  Positive  "for frequency. Negative for dysuria, genital sores, hematuria, pelvic pain, urgency, vaginal bleeding and vaginal discharge.   Musculoskeletal:  Positive for arthralgias. Negative for joint swelling and myalgias.   Skin:  Negative for rash.   Neurological:  Negative for dizziness, weakness, headaches and paresthesias.   Psychiatric/Behavioral:  Negative for mood changes. The patient is nervous/anxious.      {ROS COMP (Optional):112506}    OBJECTIVE:   /76 (BP Location: Right arm, Patient Position: Sitting, Cuff Size: Adult Regular)   Pulse 63   Temp 98.2  F (36.8  C) (Oral)   Resp 16   Ht 1.511 m (4' 11.5\")   Wt 78.5 kg (173 lb)   LMP  (LMP Unknown)   SpO2 97%   BMI 34.36 kg/m   Estimated body mass index is 34.36 kg/m  as calculated from the following:    Height as of this encounter: 1.511 m (4' 11.5\").    Weight as of this encounter: 78.5 kg (173 lb).  Physical Exam  {Exam (Optional) :445188}    {Diagnostic Test Results (Optional):653128}    ASSESSMENT / PLAN:   {Diag Picklist:972961}    {Patient advised of split billing (Optional):673414}      COUNSELING:  {Medicare Counselin}      BMI:   Estimated body mass index is 34.36 kg/m  as calculated from the following:    Height as of this encounter: 1.511 m (4' 11.5\").    Weight as of this encounter: 78.5 kg (173 lb).   {Weight Management Plan needed for ACO:647948}      She reports that she quit smoking about 18 years ago. Her smoking use included cigarettes. She has a 52.50 pack-year smoking history. She has been exposed to tobacco smoke. She does not have any smokeless tobacco history on file.      Appropriate preventive services were discussed with this patient, including applicable screening as appropriate for cardiovascular disease, diabetes, osteopenia/osteoporosis, and glaucoma.  As appropriate for age/gender, discussed screening for colorectal cancer, prostate cancer, breast cancer, and cervical cancer. Checklist reviewing preventive " services available has been given to the patient.    Reviewed patients plan of care and provided an AVS. The {CarePlan:382419} for Lisseth meets the Care Plan requirement. This Care Plan has been established and reviewed with the {PATIENT, FAMILY MEMBER, CAREGIVER:948450}.    {Counseling Resources  [place] Preventive Services Task Force  Cholesterol Screening  Health diet/nutrition  Pooled Cohorts Equation Calculator  [employer] MyPlate  ASA Prophylaxis  Lung CA Screening  Osteoporosis prevention/bone health :085611}  {Breast Cancer Risk Calculator  BRCA-Related Cancer Risk Assessment FHS-7 Tool :475371}    MAVERICK Lira [facility]    Identified Health Risks:  {Medicare required documentation of substance and opioid use disorders screening :897543}

## 2023-09-13 NOTE — PATIENT INSTRUCTIONS
Preventive Health Recommendations    See your health care provider every year to  Review health changes.   Discuss preventive care.    Review your medicines if your doctor has prescribed any.    You no longer need a yearly Pap test unless you've had an abnormal Pap test in the past 10 years. If you have vaginal symptoms, such as bleeding or discharge, be sure to talk with your provider about a Pap test.    Every 1 to 2 years, have a mammogram.  If you are over 69, talk with your health care provider about whether or not you want to continue having screening mammograms.    Every 10 years, have a colonoscopy. Or, have a yearly FIT test (stool test). These exams will check for colon cancer.     Have a cholesterol test every 5 years, or more often if your doctor advises it.     Have a diabetes test (fasting glucose) every three years. If you are at risk for diabetes, you should have this test more often.     At age 65, have a bone density scan (DEXA) to check for osteoporosis (brittle bone disease).    Shots:  Get a flu shot each year.  Get a tetanus shot every 10 years.  Talk to your doctor about your pneumonia vaccines. There are now two you should receive - Pneumovax (PPSV 23) and Prevnar (PCV 13).  Talk to your pharmacist about the shingles vaccine.  Talk to your doctor about the hepatitis B vaccine.    Nutrition:   Eat at least 5 servings of fruits and vegetables each day.    Eat whole-grain bread, whole-wheat pasta and brown rice instead of white grains and rice.    Get adequate about Calcium and Vitamin D.     Lifestyle  Exercise at least 150 minutes a week (30 minutes a day, 5 days a week). This will help you control your weight and prevent disease.    Limit alcohol to one drink per day.    No smoking.     Wear sunscreen to prevent skin cancer.     See your dentist twice a year for an exam and cleaning.    See your eye doctor every 1 to 2 years to screen for conditions such as glaucoma, macular degeneration,  cataracts, etc.    Personalized Prevention Plan  You are due for the preventive services outlined below.  Your care team is available to assist you in scheduling these services.  If you have already completed any of these items, please share that information with your care team to update in your medical record.    Health Maintenance Due   Topic Date Due     Osteoporosis Screening  Never done     Kidney Microalbumin Urine Test  Never done     Thyroid Function Lab  Never done     Diabetic Foot Exam  Never done     Discuss Advance Care Planning  Never done     Eye Exam  Never done     Hepatitis C Screening  Never done     Zoster (Shingles) Vaccine (1 of 2) Never done     A1C Lab  06/20/2015     Mammogram  12/19/2019

## 2023-09-13 NOTE — PROGRESS NOTES
"   SUBJECTIVE:   CC: Lisseth is an 68 year old who presents for preventive health visit.       9/13/2023     7:47 AM   Additional Questions   Roomed by Naida Leon   Accompanied by Self     Patient is here for establish care visit.  She is due for physical.    She has been on Wellbutrin for \"30 years.\"  No depressive symptoms.  Always has anxiety attacks there.  She is happy with the control she has on Wellbutrin and does not want to change.  No side effects of concern.  She does tell me with her mental health she does anything she knows she is not supposed to, like sugar.  When she eats sugar her arthritis flares up.    She has a history of coronary artery disease, with stenting.  First was in 2012, related to dizziness.  Second was in April 2023.  She is currently taking Plavix and baby aspirin daily.  No chest pain, shortness of breath, trouble breathing, heart palpitations, swelling in her legs, headaches, or dizziness.    History of type 2 diabetes.  She takes glipizide and metformin daily. Bydureon weekly. She does check her sugars at home. 134 this morning. Last A1C was 6.7. No excessive thirst, hunger or urination.  She usually has A1c down near 6.  She follows with endocrinology.  No numbness or tingling in her feet.  She is getting an eye exam in October.    She takes Synthroid daily, she has been on this for over 30 years, does not know initial diagnosis.  She has been on the 75 mcg for quite some time.  No hair or skin changes.  Some fatigue but not increasing, no unexplained weight changes.      She has been having a lot of right hip pain.  Historically she has been diagnosed with right hip arthritis.  It in the past it felt like it was giving her issues because it was \"like a dead leg.\"  This time it hurts a lot worse and its the entire leg.  It starts in the outside of the right hip, goes down into the groin but also down into the buttocks.  Goes down into the upper thigh.  She does have pain when she lays " "on the right side and puts pressure against it.  No weakness or falls.    She lives at  home with her sister.     She had a colonoscopy in the past and she drank everything and she did not clear out. She eats higher carb diet becaesu this helps her go. If she eats a healthy diet she can not have a BM.  Intermittent constipation has been something she is dealt with for a long time.  No abdominal pain, nausea or vomiting.  Not well managed with diet as if she tries to avoid carbs and sugars she has less bowel movements, again this is asymptomatic.    Healthy Habits:     In general, how would you rate your overall health?  Fair    Frequency of exercise:  2-3 days/week    Duration of exercise:  15-30 minutes    Do you usually eat at least 4 servings of fruit and vegetables a day, include whole grains    & fiber and avoid regularly eating high fat or \"junk\" foods?  Yes    Taking medications regularly:  Yes    Medication side effects:  None    Ability to successfully perform activities of daily living:  No assistance needed    Home Safety:  No safety concerns identified    Hearing Impairment:  No hearing concerns    In the past 6 months, have you been bothered by leaking of urine?  No    In general, how would you rate your overall mental or emotional health?  Good    Additional concerns today:  No      Today's PHQ-2 Score:       9/12/2023     6:38 PM   PHQ-2 ( 1999 [employer])   Q1: Little interest or pleasure in doing things 0   Q2: Feeling down, depressed or hopeless 0   PHQ-2 Score 0   Q1: Little interest or pleasure in doing things Not at all   Q2: Feeling down, depressed or hopeless Not at all   PHQ-2 Score 0     Have you ever done Advance Care Planning? (For example, a Health Directive, POLST, or a discussion with a medical provider or your loved ones about your wishes): No, advance care planning information given to patient to review.  Patient declined advance care planning discussion at this time.    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 Not Applicable     Reviewed orders with patient.  Reviewed health maintenance and updated orders accordingly - Yes    Breast Cancer Screenin/12/2023     6:39 PM   Breast CA Risk Assessment (FHS-7)   Do you have a family history of breast, colon, or ovarian cancer? No / Unknown     Pertinent mammograms are reviewed under the imaging tab.    History of abnormal Pap smear:     Negative PAP/HPV: 10/2017  Negative PAP:   Negative PAP:   Negative PAP:   Pap: \"Endocervical cells and/or squamous metaplastic cells present. WNL\"     Reviewed and updated as needed this visit by clinical staff   Tobacco  Allergies  Meds              Reviewed and updated as needed this visit by Provider       Med Hx  Surg Hx  Fam Hx           Review of Systems   Constitutional:  Negative for chills and fever.   HENT:  Negative for congestion, ear pain, hearing loss and sore throat.    Eyes:  Negative for pain and visual disturbance.   Respiratory:  Negative for cough and shortness of breath.    Cardiovascular:  Negative for chest pain, palpitations and peripheral edema.   Gastrointestinal:  Positive for constipation. Negative for abdominal pain, diarrhea, heartburn, hematochezia and nausea.   Breasts:  Negative for tenderness, breast mass and discharge.   Genitourinary:  Positive for frequency. Negative for dysuria, genital sores, hematuria, pelvic pain, urgency, vaginal bleeding and vaginal discharge. " "  Musculoskeletal:  Positive for arthralgias. Negative for joint swelling and myalgias.   Skin:  Negative for rash.   Neurological:  Negative for dizziness, weakness, headaches and paresthesias.   Psychiatric/Behavioral:  Negative for mood changes. The patient is nervous/anxious.      OBJECTIVE:   /76 (BP Location: Right arm, Patient Position: Sitting, Cuff Size: Adult Regular)   Pulse 63   Temp 98.2  F (36.8  C) (Oral)   Resp 16   Ht 1.511 m (4' 11.5\")   Wt 78.5 kg (173 lb)   LMP  (LMP Unknown)   SpO2 97%   BMI 34.36 kg/m    Physical Exam  Vitals reviewed.   Constitutional:       General: She is not in acute distress.  HENT:      Right Ear: Tympanic membrane, ear canal and external ear normal.      Left Ear: Tympanic membrane, ear canal and external ear normal.   Eyes:      Extraocular Movements: Extraocular movements intact.      Pupils: Pupils are equal, round, and reactive to light.   Neck:      Vascular: No carotid bruit.   Cardiovascular:      Rate and Rhythm: Normal rate and regular rhythm.      Pulses: Normal pulses.      Heart sounds: Normal heart sounds. No murmur heard.  Pulmonary:      Effort: Pulmonary effort is normal. No respiratory distress.      Breath sounds: No wheezing.   Abdominal:      General: Abdomen is flat. Bowel sounds are normal. There is no distension.   Musculoskeletal:         General: Normal range of motion.      Cervical back: Normal range of motion. No rigidity.      Right lower leg: No edema.      Left lower leg: No edema.   Feet:      Right foot:      Protective Sensation: 5 sites tested.  5 sites sensed.      Skin integrity: Skin integrity normal.      Left foot:      Protective Sensation: 5 sites tested.  5 sites sensed.      Skin integrity: Skin integrity normal.   Lymphadenopathy:      Cervical: No cervical adenopathy.   Skin:     General: Skin is warm and dry.   Neurological:      General: No focal deficit present.      Mental Status: She is alert.      Cranial " Nerves: No cranial nerve deficit.      Sensory: No sensory deficit.      Motor: No weakness.      Gait: Gait normal.   Psychiatric:         Mood and Affect: Mood normal.         Thought Content: Thought content normal.       ASSESSMENT/PLAN:   Encounter for medical examination to establish care  Routine general medical examination at a health care facility  On exam, pleseant and healthy 68 year old. Follows with cardiolgoy for CAD. Follows with endocrinology for diabetes and thyroid management. Vital signs at goal today. Physical exam is not revealing of any acute, concerning findings.     Type 2 diabetes mellitus without complication, with long-term current use of insulin (H)  Controlled and stable historically.   Complications include: None  Blood sugars at home seem at goal.   continue with current management at this time, but may need to adjust based on A1C today.   Patient is on aspirin, and evolocumab.   Will check A1C today. Last A1C 3 months ago was 6.5.   Lipid panel checked less than a year ago.  Microalbumin checked today.  Increase dietary efforts and physical activity.  Routine diabetic retinopathy screening: Scheduled for 10/2024  Blood pressure today: 132/76  Vaccines due today discussed.  Foot exam completed.     - FOOT EXAM  - Albumin Random Urine Quantitative with Creat Ratio  - HEMOGLOBIN A1C    Hypothyroidism, unspecified type  Chronic, no acute symptoms. Due for TSH check, will order for today. Adjust dose as needed.   - TSH WITH FREE T4 REFLEX    Visit for screening mammogram  Discussed recommended screening, patient agrees, ordered.   - MA SCREENING DIGITAL BILAT - Future  (s+30); Future    Encounter for immunization  Due for PCV20, tetanus, flu, and updated COVID. Will get PCV20 and TDAP today. Will get flu and COVID when COVID available. Declines flu today.   - Pneumococcal 20 Valent Conjugate (PCV20)  - TDAP 7+ (ADACEL,BOOSTRIX)    Screen for colon cancer  Reviewed results, repeat  "recommended in 2016, not completed. Patient will have this done. She had poor response to prep previously. Would recommend double prep.   - Colonoscopy Screening  Referral; Future    Screening for osteoporosis  Discussed recommended screening, patient agrees, ordered.   - DEXA HIP/PELVIS/SPINE - Future; Future    Coronary artery disease involving native heart without angina pectoris, unspecified vessel or lesion type  S/p stenting in 2012 and 4/18/2023 (distal RCA). Follows with cardiology. No angina, edema, SOB. BP today 132/76, HR 63. Taking medications. On plavix until 04/18/2024. Taking ASA 81 mg daily. Continue current management.    Hyperlipidemia LDL goal <70  On Repatha, managed by cardiology. Last lipid panel 8/15/2023. Showed decreased LDL at 106, continue with Repatha.     Patient has been advised of split billing requirements and indicates understanding: Yes    COUNSELING:  Reviewed preventive health counseling, as reflected in patient instructions       Regular exercise       Healthy diet/nutrition       Pneumococcal Vaccine          Osteoporosis prevention/bone health       Colorectal Cancer Screening       Consider Hep C screening for all patients one time for ages 18-79 years    BMI:   Estimated body mass index is 34.36 kg/m  as calculated from the following:    Height as of this encounter: 1.511 m (4' 11.5\").    Weight as of this encounter: 78.5 kg (173 lb).   Weight management plan: Discussed healthy diet and exercise guidelines    She reports that she quit smoking about 18 years ago. Her smoking use included cigarettes. She has a 52.50 pack-year smoking history. She has been exposed to tobacco smoke. She does not have any smokeless tobacco history on file.    At the end of the visit, I confirmed understanding of what was discussed. Patient has no further questions or concerns that were brought up at this time.     Gaby Caal, DNP, APRN, FNP-C   "

## 2023-09-19 ENCOUNTER — OFFICE VISIT (OUTPATIENT)
Dept: FAMILY MEDICINE | Facility: CLINIC | Age: 68
End: 2023-09-19
Payer: COMMERCIAL

## 2023-09-19 ENCOUNTER — NURSE TRIAGE (OUTPATIENT)
Dept: NURSING | Facility: CLINIC | Age: 68
End: 2023-09-19
Payer: COMMERCIAL

## 2023-09-19 ENCOUNTER — HOSPITAL ENCOUNTER (OUTPATIENT)
Dept: GENERAL RADIOLOGY | Facility: HOSPITAL | Age: 68
Discharge: HOME | End: 2023-09-19
Attending: PHYSICIAN ASSISTANT | Admitting: PHYSICIAN ASSISTANT
Payer: COMMERCIAL

## 2023-09-19 VITALS
OXYGEN SATURATION: 95 % | RESPIRATION RATE: 18 BRPM | TEMPERATURE: 98 F | SYSTOLIC BLOOD PRESSURE: 165 MMHG | DIASTOLIC BLOOD PRESSURE: 73 MMHG | HEART RATE: 77 BPM

## 2023-09-19 DIAGNOSIS — L03.113 CELLULITIS OF RIGHT ARM: ICD-10-CM

## 2023-09-19 DIAGNOSIS — J06.9 VIRAL UPPER RESPIRATORY TRACT INFECTION WITH COUGH: Primary | ICD-10-CM

## 2023-09-19 DIAGNOSIS — J06.9 VIRAL UPPER RESPIRATORY TRACT INFECTION WITH COUGH: ICD-10-CM

## 2023-09-19 PROCEDURE — 99214 OFFICE O/P EST MOD 30 MIN: CPT | Performed by: PHYSICIAN ASSISTANT

## 2023-09-19 PROCEDURE — 87635 SARS-COV-2 COVID-19 AMP PRB: CPT | Performed by: PHYSICIAN ASSISTANT

## 2023-09-19 PROCEDURE — 71046 X-RAY EXAM CHEST 2 VIEWS: CPT

## 2023-09-19 RX ORDER — CEFDINIR 300 MG/1
300 CAPSULE ORAL 2 TIMES DAILY
Qty: 14 CAPSULE | Refills: 0 | Status: SHIPPED | OUTPATIENT
Start: 2023-09-19 | End: 2023-09-26

## 2023-09-19 RX ORDER — ALBUTEROL SULFATE 90 UG/1
1-2 AEROSOL, METERED RESPIRATORY (INHALATION) EVERY 6 HOURS
Qty: 8.5 G | Refills: 0 | Status: SHIPPED | OUTPATIENT
Start: 2023-09-19 | End: 2023-11-13

## 2023-09-19 NOTE — TELEPHONE ENCOUNTER
Seen 9/13 , 6 days ago by PCP, developed a sore throat,Nasal discharge,  fever,cough the next day.  Cough is increasing, fatigue, nasal discharge persists. Non productive cough. In April got 2 cardiac stents placed. Home Negative test . Cough is very persistant, SOB with any exertion.     Protocol reviewed, Disposition: to be seen today in office. Transferred [place] [facility], if no opening[place] [facility] [place] Call back with further questions/concerns.     CRUZ DE JESUS RN  [facility] nurse advisors  9/19/2023  3:10 PM  Reason for Disposition   MILD difficulty breathing (e.g., minimal/no SOB at rest, SOB with walking, pulse <100) and still present when not coughing    Additional Information   Negative: Bluish (or gray) lips or face   Negative: SEVERE difficulty breathing (e.g., struggling for each breath, speaks in single words)   Negative: Rapid onset of cough and has hives   Negative: Coughing started suddenly after medicine, an allergic food or bee sting   Negative: Difficulty breathing after exposure to flames, smoke, or fumes   Negative: Sounds like a life-threatening emergency to the triager   Negative: Previous asthma attacks and this feels like asthma attack   Negative: Dry cough (non-productive; no sputum or minimal clear sputum) and within 14 days of COVID-19 Exposure   Negative: MODERATE difficulty breathing (e.g., speaks in phrases, SOB even at rest, pulse 100-120) and still present when not coughing   Negative: Chest pain present when not coughing   Negative: Passed out (i.e., fainted, collapsed and was not responding)   Negative: Patient sounds very sick or weak to the triager    Protocols used: Cough-A-OH

## 2023-09-19 NOTE — PATIENT INSTRUCTIONS
Viral URI:  Patient was educated on the natural course of viral condition. Chest x-ray is negative. COVID PCR is pending. Use albuterol inhaler as prescribed. Side effects discussed. Conservative measures discussed including rest, increased fluids, humidifier, and over-the-counter cough suppressants. See your primary care provider if symptoms do not improve in 7 days. Seek emergency care if you develop shortness of breath or fever over 103.    Cellulitis:  Patient was educated on the natural course of condition secondary to vaccine. Take medication as prescribed. Side effects discussed.  Conservative measures discussed including over-the-counter analgesics (Tylenol). Observe carefully for any signs of increased redness or pain in the affected area. See your primary care provider if symptoms worsen or do not improve in 3 days. Seek emergency care if you develop severe pain, streaking, or fever.

## 2023-09-19 NOTE — PROGRESS NOTES
URGENT CARE VISIT:    SUBJECTIVE:   Lisseth Qureshi is a 68 year old female presenting with a chief complaint of stuffy nose, cough - productive, and shortness of breath. Onset was 1 week(s) ago and cough started 1 day ago. She denies the following symptoms: sore throat. Course of illness is same. Treatment measures tried include None tried with no relief of symptoms.  Predisposing factors include None.    She also has redness and warmness on her right arm since 4 days ago. She had a vaccine administered 4 days ago. No treatments tried.     PMH: No past medical history on file.  Allergies: Codeine, Guaifenesin, Penicillins, and Rosuvastatin calcium [rosuvastatin]   Medications:   Current Outpatient Medications   Medication Sig Dispense Refill    albuterol (PROAIR HFA/PROVENTIL HFA/VENTOLIN HFA) 108 (90 Base) MCG/ACT inhaler Inhale 1-2 puffs into the lungs every 6 hours for 5 days 8.5 g 0    amLODIPine (NORVASC) 10 MG tablet Take 10 mg by mouth daily      aspirin (ASA) 81 MG EC tablet Take 1 tablet (81 mg) by mouth daily Start tomorrow. 90 tablet 3    buPROPion (WELLBUTRIN XL) 300 MG 24 hr tablet Take 300 mg by mouth daily      cefdinir (OMNICEF) 300 MG capsule Take 1 capsule (300 mg) by mouth 2 times daily for 7 days 14 capsule 0    clopidogrel (PLAVIX) 75 MG tablet Take 1 tablet (75 mg) by mouth daily 90 tablet 3    coenzyme Q10 (COQ-10) 100 mg capsule 100 mg every evening      evolocumab (REPATHA SURECLICK) 140 MG/ML prefilled autoinjector Inject 1 mL (140 mg) Subcutaneous every 14 days 6 mL 3    exenatide ER (BYDUREON BCISE) 2 MG/0.85ML auto-injector Inject 2 mg Subcutaneous every 7 days Once a week on Sunday      fish oil-omega-3 fatty acids 1000 MG capsule Take 2 g by mouth every evening      glipiZIDE (GLUCOTROL XL) 2.5 MG 24 hr tablet Take 2.5 mg by mouth every evening Takes 1 extra tablet every evening if needed      levothyroxine (SYNTHROID, LEVOTHROID) 75 MCG tablet [LEVOTHYROXINE (SYNTHROID, LEVOTHROID)  75 MCG TABLET] Take 75 mcg by mouth daily.      melatonin 3 mg Tab [MELATONIN 3 MG TAB] Take 3 mg by mouth bedtime as needed.      metFORMIN (GLUCOPHAGE) 1000 MG tablet Take 1,000 mg by mouth 2 times daily (with meals)      multivitamin therapeutic (THERAGRAN) tablet [MULTIVITAMIN THERAPEUTIC (THERAGRAN) TABLET] Take 1 tablet by mouth daily.      red yeast rice 600 mg Tab [RED YEAST RICE 600 MG TAB] Take 1 tablet by mouth daily.       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    OBJECTIVE:  BP (!) 165/73   Pulse 77   Temp 98  F (36.7  C) (Oral)   Resp 18   LMP  (LMP Unknown)   SpO2 95%   GENERAL APPEARANCE: healthy, alert and no distress  EYES: EOMI,  PERRL, conjunctiva clear  HENT: ear canals and TM's normal.  Nose and mouth without ulcers, erythema or lesions  NECK: supple, nontender, no lymphadenopathy  RESP: lungs clear to auscultation - no rales, rhonchi or wheezes  CV: regular rates and rhythm, normal S1 S2, no murmur noted  SKIN: 6 cm of irregularly shaped erythema over right lateral arm. Warm to touch    Labs:    Results for orders placed or performed during the hospital encounter of 23   XR Chest 2 Views     Status: None    Narrative    EXAM: XR CHEST 2 VIEWS  LOCATION: [facility]  DATE: 2023    INDICATION:  Viral upper respiratory tract infection with cough  COMPARISON: Chest radiograph 2023      Impression    IMPRESSION: Negative chest.       ASSESSMENT:    ICD-10-CM    1. Viral upper respiratory  tract infection with cough  J06.9 XR Chest 2 Views     Symptomatic COVID-19 Virus (Coronavirus) by PCR Nose     albuterol (PROAIR HFA/PROVENTIL HFA/VENTOLIN HFA) 108 (90 Base) MCG/ACT inhaler      2. Cellulitis of right arm  L03.113 cefdinir (OMNICEF) 300 MG capsule          PLAN:  30 minutes spent by me on the date of the encounter doing chart review, review of outside records, review of test results, interpretation of tests, patient visit, and documentation   Patient Instructions   Viral URI:  Patient was educated on the natural course of viral condition. Chest x-ray is negative. COVID PCR is pending. Use albuterol inhaler as prescribed. Side effects discussed. Conservative measures discussed including rest, increased fluids, humidifier, and over-the-counter cough suppressants. See your primary care provider if symptoms do not improve in 7 days. Seek emergency care if you develop shortness of breath or fever over 103.    Cellulitis:  Patient was educated on the natural course of condition secondary to vaccine. Take medication as prescribed. Side effects discussed.  Conservative measures discussed including over-the-counter analgesics (Tylenol). Observe carefully for any signs of increased redness or pain in the affected area. See your primary care provider if symptoms worsen or do not improve in 3 days. Seek emergency care if you develop severe pain, streaking, or fever.       Patient verbalized understanding and is agreeable to plan. The patient was discharged ambulatory and in stable condition.    Giovanna Flores PA-C ....................  9/19/2023   5:59 PM

## 2023-09-20 LAB — SARS-COV-2 RNA RESP QL NAA+PROBE: NEGATIVE

## 2023-10-11 ENCOUNTER — TELEPHONE (OUTPATIENT)
Dept: CARDIOLOGY | Facility: CLINIC | Age: 68
End: 2023-10-11
Payer: COMMERCIAL

## 2023-10-11 NOTE — TELEPHONE ENCOUNTER
Central Prior Authorization Team   Phone: [phone]    PA Initiation    Medication: evolocumab (REPATHA SURECLICK) 140 MG/ML prefilled autoinjector - [place] RENEWAL  Insurance Company: [employer] Part D - Phone [phone] Fax [phone]  Pharmacy Filling the Rx: [facility] - [place], [place] - [address] [place] [address]  Filling Pharmacy Phone: [phone]  Filling Pharmacy Fax:    Start Date: 10/11/2023

## 2023-10-13 ENCOUNTER — TELEPHONE (OUTPATIENT)
Dept: CARDIOLOGY | Facility: CLINIC | Age: 68
End: 2023-10-13
Payer: COMMERCIAL

## 2023-10-13 NOTE — TELEPHONE ENCOUNTER
Called patient to review recent elevated blood pressure reading.  Per [place] Community Me[place], patients blood pressure is out of parameters and recheck blood pressure is recommended.    Last Blood Pressure: 165/73  Last Heart Rate: 77  Date: 9/19/23  Location: Other Specialty    Today's Blood Pressure: 134/72  Today's Heart Rate: 65  Location: Home BP    Patient reported blood pressure updated in Epic. Blood pressure falls within [place] Community [place] guidelines.  Patient will follow up as previously advised.    KEY Henao

## 2023-10-13 NOTE — TELEPHONE ENCOUNTER
Prior Authorization Approval    Authorization Effective Date: 10/11/2023  Authorization Expiration Date: 10/11/2024  Medication: evolocumab (REPATHA SURECLICK) 140 MG/ML prefilled autoinjector - [place] RENEWAL  Approved Dose/Quantity:   Reference #:     Insurance Company: [employer] Part D - Phone [phone] Fax [phone]  Expected CoPay:       CoPay Card Available:      Foundation Assistance Needed:    Which Pharmacy is filling the prescription (Not needed for infusion/clinic administered): [facility] - [address]  Pharmacy Notified:  yes  Patient Notified:  yes- Pharmacy will contact patient when ready to /ship

## 2023-11-01 ENCOUNTER — TRANSFERRED RECORDS (OUTPATIENT)
Dept: MULTI SPECIALTY CLINIC | Facility: CLINIC | Age: 68
End: 2023-11-01
Payer: COMMERCIAL

## 2023-11-01 LAB
RETINOPATHY: NORMAL
RETINOPATHY: POSITIVE

## 2023-11-13 ENCOUNTER — HOSPITAL ENCOUNTER (INPATIENT)
Facility: HOSPITAL | Age: 68
LOS: 14 days | Discharge: HOME | DRG: 234 | End: 2023-11-27
Attending: EMERGENCY MEDICINE | Admitting: STUDENT IN AN ORGANIZED HEALTH CARE EDUCATION/TRAINING PROGRAM
Payer: COMMERCIAL

## 2023-11-13 ENCOUNTER — APPOINTMENT (OUTPATIENT)
Dept: RADIOLOGY | Facility: HOSPITAL | Age: 68
DRG: 234 | End: 2023-11-13
Attending: EMERGENCY MEDICINE
Payer: COMMERCIAL

## 2023-11-13 DIAGNOSIS — I25.10 CORONARY ARTERY DISEASE INVOLVING NATIVE CORONARY ARTERY OF NATIVE HEART, UNSPECIFIED WHETHER ANGINA PRESENT: ICD-10-CM

## 2023-11-13 DIAGNOSIS — R07.9 CHEST PAIN, UNSPECIFIED TYPE: ICD-10-CM

## 2023-11-13 DIAGNOSIS — Z95.1 S/P CABG (CORONARY ARTERY BYPASS GRAFT): Primary | ICD-10-CM

## 2023-11-13 DIAGNOSIS — D64.9 ANEMIA, UNSPECIFIED TYPE: ICD-10-CM

## 2023-11-13 DIAGNOSIS — T82.855A: ICD-10-CM

## 2023-11-13 DIAGNOSIS — E61.1 IRON DEFICIENCY: ICD-10-CM

## 2023-11-13 LAB
ABO/RH(D): NORMAL
ALBUMIN SERPL BCG-MCNC: 4.4 G/DL (ref 3.5–5.2)
ALP SERPL-CCNC: 76 U/L (ref 35–104)
ALT SERPL W P-5'-P-CCNC: 30 U/L (ref 0–50)
ANION GAP SERPL CALCULATED.3IONS-SCNC: 16 MMOL/L (ref 7–15)
ANTIBODY SCREEN: NEGATIVE
APTT PPP: 29 SECONDS (ref 22–38)
AST SERPL W P-5'-P-CCNC: 23 U/L (ref 0–45)
BASOPHILS # BLD AUTO: 0.1 10E3/UL (ref 0–0.2)
BASOPHILS NFR BLD AUTO: 1 %
BILIRUB SERPL-MCNC: 0.2 MG/DL
BUN SERPL-MCNC: 20.2 MG/DL (ref 8–23)
CALCIUM SERPL-MCNC: 8.9 MG/DL (ref 8.8–10.2)
CHLORIDE SERPL-SCNC: 99 MMOL/L (ref 98–107)
CREAT SERPL-MCNC: 1.09 MG/DL (ref 0.51–0.95)
DEPRECATED HCO3 PLAS-SCNC: 24 MMOL/L (ref 22–29)
EGFRCR SERPLBLD CKD-EPI 2021: 56 ML/MIN/1.73M2
EOSINOPHIL # BLD AUTO: 0.1 10E3/UL (ref 0–0.7)
EOSINOPHIL NFR BLD AUTO: 2 %
ERYTHROCYTE [DISTWIDTH] IN BLOOD BY AUTOMATED COUNT: 15.4 % (ref 10–15)
GLUCOSE BLDC GLUCOMTR-MCNC: 102 MG/DL (ref 70–99)
GLUCOSE BLDC GLUCOMTR-MCNC: 140 MG/DL (ref 70–99)
GLUCOSE SERPL-MCNC: 137 MG/DL (ref 70–99)
HCT VFR BLD AUTO: 30.8 % (ref 35–47)
HGB BLD-MCNC: 9.5 G/DL (ref 11.7–15.7)
HOLD SPECIMEN: NORMAL
IMM GRANULOCYTES # BLD: 0 10E3/UL
IMM GRANULOCYTES NFR BLD: 1 %
INR PPP: 1.06 (ref 0.85–1.15)
IRON BINDING CAPACITY (ROCHE): 399 UG/DL (ref 240–430)
IRON SATN MFR SERPL: 4 % (ref 15–46)
IRON SERPL-MCNC: 17 UG/DL (ref 37–145)
LIPASE SERPL-CCNC: 33 U/L (ref 13–60)
LYMPHOCYTES # BLD AUTO: 1.4 10E3/UL (ref 0.8–5.3)
LYMPHOCYTES NFR BLD AUTO: 22 %
MAGNESIUM SERPL-MCNC: 1.8 MG/DL (ref 1.7–2.3)
MCH RBC QN AUTO: 24.2 PG (ref 26.5–33)
MCHC RBC AUTO-ENTMCNC: 30.8 G/DL (ref 31.5–36.5)
MCV RBC AUTO: 79 FL (ref 78–100)
MONOCYTES # BLD AUTO: 0.5 10E3/UL (ref 0–1.3)
MONOCYTES NFR BLD AUTO: 7 %
NEUTROPHILS # BLD AUTO: 4.3 10E3/UL (ref 1.6–8.3)
NEUTROPHILS NFR BLD AUTO: 67 %
NRBC # BLD AUTO: 0 10E3/UL
NRBC BLD AUTO-RTO: 0 /100
PLATELET # BLD AUTO: 292 10E3/UL (ref 150–450)
POTASSIUM SERPL-SCNC: 4.1 MMOL/L (ref 3.4–5.3)
PROT SERPL-MCNC: 7 G/DL (ref 6.4–8.3)
RBC # BLD AUTO: 3.92 10E6/UL (ref 3.8–5.2)
SODIUM SERPL-SCNC: 139 MMOL/L (ref 135–145)
SPECIMEN EXPIRATION DATE: NORMAL
TROPONIN T SERPL HS-MCNC: 15 NG/L
TROPONIN T SERPL HS-MCNC: 20 NG/L
VIT B12 SERPL-MCNC: 413 PG/ML (ref 232–1245)
WBC # BLD AUTO: 6.5 10E3/UL (ref 4–11)

## 2023-11-13 PROCEDURE — 210N000001 HC R&B IMCU HEART CARE

## 2023-11-13 PROCEDURE — 83690 ASSAY OF LIPASE: CPT | Performed by: EMERGENCY MEDICINE

## 2023-11-13 PROCEDURE — 36415 COLL VENOUS BLD VENIPUNCTURE: CPT | Performed by: EMERGENCY MEDICINE

## 2023-11-13 PROCEDURE — 99221 1ST HOSP IP/OBS SF/LOW 40: CPT | Mod: 25 | Performed by: INTERNAL MEDICINE

## 2023-11-13 PROCEDURE — 85730 THROMBOPLASTIN TIME PARTIAL: CPT | Performed by: EMERGENCY MEDICINE

## 2023-11-13 PROCEDURE — 84484 ASSAY OF TROPONIN QUANT: CPT | Performed by: EMERGENCY MEDICINE

## 2023-11-13 PROCEDURE — 82962 GLUCOSE BLOOD TEST: CPT

## 2023-11-13 PROCEDURE — 80053 COMPREHEN METABOLIC PANEL: CPT | Performed by: EMERGENCY MEDICINE

## 2023-11-13 PROCEDURE — 85610 PROTHROMBIN TIME: CPT | Performed by: EMERGENCY MEDICINE

## 2023-11-13 PROCEDURE — 82607 VITAMIN B-12: CPT | Performed by: STUDENT IN AN ORGANIZED HEALTH CARE EDUCATION/TRAINING PROGRAM

## 2023-11-13 PROCEDURE — 93005 ELECTROCARDIOGRAM TRACING: CPT | Performed by: STUDENT IN AN ORGANIZED HEALTH CARE EDUCATION/TRAINING PROGRAM

## 2023-11-13 PROCEDURE — 83550 IRON BINDING TEST: CPT | Performed by: STUDENT IN AN ORGANIZED HEALTH CARE EDUCATION/TRAINING PROGRAM

## 2023-11-13 PROCEDURE — 99285 EMERGENCY DEPT VISIT HI MDM: CPT | Mod: 25

## 2023-11-13 PROCEDURE — 83735 ASSAY OF MAGNESIUM: CPT | Performed by: EMERGENCY MEDICINE

## 2023-11-13 PROCEDURE — 250N000011 HC RX IP 250 OP 636: Mod: JZ | Performed by: EMERGENCY MEDICINE

## 2023-11-13 PROCEDURE — 250N000013 HC RX MED GY IP 250 OP 250 PS 637: Performed by: STUDENT IN AN ORGANIZED HEALTH CARE EDUCATION/TRAINING PROGRAM

## 2023-11-13 PROCEDURE — 85025 COMPLETE CBC W/AUTO DIFF WBC: CPT | Performed by: EMERGENCY MEDICINE

## 2023-11-13 PROCEDURE — 86923 COMPATIBILITY TEST ELECTRIC: CPT | Performed by: INTERNAL MEDICINE

## 2023-11-13 PROCEDURE — 86900 BLOOD TYPING SEROLOGIC ABO: CPT | Performed by: INTERNAL MEDICINE

## 2023-11-13 PROCEDURE — 71045 X-RAY EXAM CHEST 1 VIEW: CPT

## 2023-11-13 PROCEDURE — 96374 THER/PROPH/DIAG INJ IV PUSH: CPT

## 2023-11-13 PROCEDURE — 99223 1ST HOSP IP/OBS HIGH 75: CPT | Performed by: STUDENT IN AN ORGANIZED HEALTH CARE EDUCATION/TRAINING PROGRAM

## 2023-11-13 PROCEDURE — 250N000011 HC RX IP 250 OP 636: Mod: JZ | Performed by: STUDENT IN AN ORGANIZED HEALTH CARE EDUCATION/TRAINING PROGRAM

## 2023-11-13 RX ORDER — AMLODIPINE BESYLATE 5 MG/1
10 TABLET ORAL DAILY
Status: DISCONTINUED | OUTPATIENT
Start: 2023-11-14 | End: 2023-11-15

## 2023-11-13 RX ORDER — METFORMIN HCL 500 MG
1000 TABLET, EXTENDED RELEASE 24 HR ORAL
COMMUNITY

## 2023-11-13 RX ORDER — POLYETHYLENE GLYCOL 3350 17 G/17G
17 POWDER, FOR SOLUTION ORAL DAILY PRN
Status: DISCONTINUED | OUTPATIENT
Start: 2023-11-13 | End: 2023-11-22

## 2023-11-13 RX ORDER — DEXTROSE MONOHYDRATE 25 G/50ML
25-50 INJECTION, SOLUTION INTRAVENOUS
Status: DISCONTINUED | OUTPATIENT
Start: 2023-11-13 | End: 2023-11-22

## 2023-11-13 RX ORDER — NICOTINE POLACRILEX 4 MG
15-30 LOZENGE BUCCAL
Status: DISCONTINUED | OUTPATIENT
Start: 2023-11-13 | End: 2023-11-13

## 2023-11-13 RX ORDER — BUPROPION HYDROCHLORIDE 150 MG/1
300 TABLET ORAL DAILY
Status: DISCONTINUED | OUTPATIENT
Start: 2023-11-14 | End: 2023-11-27 | Stop reason: HOSPADM

## 2023-11-13 RX ORDER — ACETAMINOPHEN 650 MG/1
650 SUPPOSITORY RECTAL EVERY 6 HOURS PRN
Status: DISCONTINUED | OUTPATIENT
Start: 2023-11-13 | End: 2023-11-15

## 2023-11-13 RX ORDER — ACETAMINOPHEN 325 MG/1
650 TABLET ORAL EVERY 6 HOURS PRN
Status: DISCONTINUED | OUTPATIENT
Start: 2023-11-13 | End: 2023-11-15

## 2023-11-13 RX ORDER — DEXTROSE MONOHYDRATE 25 G/50ML
25-50 INJECTION, SOLUTION INTRAVENOUS
Status: DISCONTINUED | OUTPATIENT
Start: 2023-11-13 | End: 2023-11-13

## 2023-11-13 RX ORDER — HYDRALAZINE HYDROCHLORIDE 20 MG/ML
10 INJECTION INTRAMUSCULAR; INTRAVENOUS EVERY 6 HOURS PRN
Status: DISCONTINUED | OUTPATIENT
Start: 2023-11-13 | End: 2023-11-22

## 2023-11-13 RX ORDER — CLOPIDOGREL BISULFATE 75 MG/1
75 TABLET ORAL DAILY
Status: DISCONTINUED | OUTPATIENT
Start: 2023-11-14 | End: 2023-11-15

## 2023-11-13 RX ORDER — METFORMIN HCL 500 MG
500 TABLET, EXTENDED RELEASE 24 HR ORAL
COMMUNITY

## 2023-11-13 RX ORDER — NICOTINE POLACRILEX 4 MG
15-30 LOZENGE BUCCAL
Status: DISCONTINUED | OUTPATIENT
Start: 2023-11-13 | End: 2023-11-22

## 2023-11-13 RX ORDER — ISOSORBIDE MONONITRATE 30 MG/1
30 TABLET, EXTENDED RELEASE ORAL DAILY
Status: DISCONTINUED | OUTPATIENT
Start: 2023-11-13 | End: 2023-11-15

## 2023-11-13 RX ORDER — ASPIRIN 81 MG/1
81 TABLET ORAL DAILY
Status: DISCONTINUED | OUTPATIENT
Start: 2023-11-14 | End: 2023-11-22

## 2023-11-13 RX ORDER — ENOXAPARIN SODIUM 100 MG/ML
40 INJECTION SUBCUTANEOUS EVERY 24 HOURS
Status: DISCONTINUED | OUTPATIENT
Start: 2023-11-13 | End: 2023-11-15

## 2023-11-13 RX ORDER — LEVOTHYROXINE SODIUM 25 UG/1
75 TABLET ORAL DAILY
Status: DISCONTINUED | OUTPATIENT
Start: 2023-11-14 | End: 2023-11-27 | Stop reason: HOSPADM

## 2023-11-13 RX ADMIN — ISOSORBIDE MONONITRATE 30 MG: 30 TABLET, EXTENDED RELEASE ORAL at 20:59

## 2023-11-13 RX ADMIN — ENOXAPARIN SODIUM 40 MG: 40 INJECTION SUBCUTANEOUS at 20:59

## 2023-11-13 RX ADMIN — FAMOTIDINE 20 MG: 10 INJECTION, SOLUTION INTRAVENOUS at 12:23

## 2023-11-13 ASSESSMENT — ENCOUNTER SYMPTOMS
DIAPHORESIS: 0
NAUSEA: 0
SHORTNESS OF BREATH: 0
VOMITING: 0
BACK PAIN: 1
CHEST TIGHTNESS: 1
JOINT SWELLING: 0
DIZZINESS: 0

## 2023-11-13 ASSESSMENT — ACTIVITIES OF DAILY LIVING (ADL)
DIFFICULTY_EATING/SWALLOWING: NO
WEAR_GLASSES_OR_BLIND: NO
ADLS_ACUITY_SCORE: 18
CONCENTRATING,_REMEMBERING_OR_MAKING_DECISIONS_DIFFICULTY: NO
CHANGE_IN_FUNCTIONAL_STATUS_SINCE_ONSET_OF_CURRENT_ILLNESS/INJURY: NO
DOING_ERRANDS_INDEPENDENTLY_DIFFICULTY: NO
HEARING_DIFFICULTY_OR_DEAF: NO
ADLS_ACUITY_SCORE: 35
WALKING_OR_CLIMBING_STAIRS_DIFFICULTY: NO
DRESSING/BATHING_DIFFICULTY: NO
TOILETING_ISSUES: NO
ADLS_ACUITY_SCORE: 35
FALL_HISTORY_WITHIN_LAST_SIX_MONTHS: NO
ADLS_ACUITY_SCORE: 35
VISION_MANAGEMENT: READING GLASSES
ADLS_ACUITY_SCORE: 18
DIFFICULTY_COMMUNICATING: NO
ADLS_ACUITY_SCORE: 35

## 2023-11-13 NOTE — ED NOTES
Unable to get blood from IV for trop. Several EDT's have tried a straight stick but are unable to get blood. Called phlebotomy and said they would be here right away. Provider notified of delay

## 2023-11-13 NOTE — PHARMACY-ADMISSION MEDICATION HISTORY
Pharmacist Admission Medication History    Admission medication history is complete. The information provided in this note is only as accurate as the sources available at the time of the update.    Information Source(s): Patient and CareEverywhere/SureScripts via in-person    Pertinent Information: No longer taking glipizide due to hypoglycemia.    Changes made to PTA medication list:  Added: None  Deleted: glipizide  Changed: metformin    Medication Affordability:  Not including over the counter (OTC) medications, was there a time in the past 3 months when you did not take your medications as prescribed because of cost?: No    Allergies reviewed with patient and updates made in EHR: yes    Medication History Completed By: Chata Lasron RP 11/13/2023 2:57 PM    Prior to Admission medications    Medication Sig Last Dose Taking? Auth Provider Long Term End Date   amLODIPine (NORVASC) 10 MG tablet Take 10 mg by mouth daily 11/13/2023 at AM Yes Reported, Patient Yes    aspirin (ASA) 81 MG EC tablet Take 1 tablet (81 mg) by mouth daily Start tomorrow. 11/13/2023 at AM Yes Oswald Shaffer DO     buPROPion (WELLBUTRIN XL) 300 MG 24 hr tablet Take 300 mg by mouth daily 11/13/2023 at AM Yes Reported, Patient     clopidogrel (PLAVIX) 75 MG tablet Take 1 tablet (75 mg) by mouth daily 11/13/2023 at AM Yes Oswald Shaffer, DO Yes    coenzyme Q10 (COQ-10) 100 mg capsule 100 mg every evening 11/12/2023 at HS Yes Provider, Historical     evolocumab (REPATHA SURECLICK) 140 MG/ML prefilled autoinjector Inject 1 mL (140 mg) Subcutaneous every 14 days 11/5/2023 Yes Oswald Shaffer DO     exenatide ER (BYDUREON BCISE) 2 MG/0.85ML auto-injector Inject 2 mg Subcutaneous every 7 days Once a week on Sunday 11/12/2023 at Sundays Yes Reported, Patient Yes    fish oil-omega-3 fatty acids 1000 MG capsule Take 2 g by mouth every evening 11/12/2023 at hs Yes Reported, Patient     levothyroxine (SYNTHROID, LEVOTHROID)  75 MCG tablet [LEVOTHYROXINE (SYNTHROID, LEVOTHROID) 75 MCG TABLET] Take 75 mcg by mouth daily. 11/13/2023 at AM Yes Provider, Historical     melatonin 3 mg Tab [MELATONIN 3 MG TAB] Take 3 mg by mouth bedtime as needed. Past Month Yes Provider, Historical     metFORMIN (GLUCOPHAGE XR) 500 MG 24 hr tablet Take 500 mg by mouth daily before breakfast 11/13/2023 at AM Yes Unknown, Entered By History     metFORMIN (GLUCOPHAGE XR) 500 MG 24 hr tablet Take 1,000 mg by mouth daily (with dinner) 11/12/2023 at PM Yes Unknown, Entered By History     multivitamin therapeutic (THERAGRAN) tablet [MULTIVITAMIN THERAPEUTIC (THERAGRAN) TABLET] Take 1 tablet by mouth daily. 11/13/2023 Yes Provider, Historical     red yeast rice 600 mg Tab [RED YEAST RICE 600 MG TAB] Take 1 tablet by mouth daily. 11/12/2023 at PM Yes Provider, Historical

## 2023-11-13 NOTE — H&P
"[facility]    History and Physical - Hospitalist Service       Date of Admission:  11/13/2023    Assessment & Plan    Lisseth Qureshi is a 68 year old female admitted on 11/13/2023. She presented with chest pain of 1 day duration.  Past medical history significant for CAD S/P stent to RCA, type 2 diabetes , mellitus, hypertension, hypothyroidism, vertebral artery occlusion.     Chest pain secondary to possible ACS  -Patient presented with chest discomfort which tends to get better when she lies down and gets worse when she sits up.    -Troponin 20->15  -Had a history of angiogram done in April 2023 with stents placed to her RCA  -Cardiology evaluated patient in the ER.  Plan for coronary angiogram tomorrow a.m.  -Continue aspirin and Plavix    Hypertension  -Blood pressure slightly elevated  -Continue PTA amlodipine  oral daily  -Start Imdur 30 mg oral daily  -Hydralazine as needed    Type 2 diabetes mellitus  -Medium intensity insulin sliding scale  -Accu-Cheks  -Per glycemia protocol  -Hold home metformin and exenatide    Hypothyroidism  -Levothyroxine    Anemia  -Hemoglobin dropped from around 12-9.5  -Anemia work-up including occult blood  -Monitor        Diet: NPO for Medical/Clinical Reasons Except for: Meds    DVT Prophylaxis: Enoxaparin (Lovenox) SQ  [place] Catheter: Not present  Lines: None     Cardiac Monitoring: None  Code Status:  Full code    Clinically Significant Risk Factors Present on Admission                # Drug Induced Platelet Defect: home medication list includes an antiplatelet medication   # Hypertension: Noted on problem list     # DMII: A1C = 7.4 % (Ref range: 0.0 - 5.6 %) within past 6 months    # Obesity: Estimated body mass index is 33.33 kg/m  as calculated from the following:    Height as of this encounter: 1.499 m (4' 11\").    Weight as of this encounter: 74.8 kg (165 lb).              Disposition Plan      Expected Discharge Date: 11/15/2023              "     Claudia Diaz MD  Hospitalist Service  [facility]  Securely message with [employer] (more info)  Text page via [facility] Paging/Directory     ______________________________________________________________________    Chief Complaint   Chest pain    History is obtained from the patient    History of Present Illness   Lisseth Qureshi is a 68 year old female who presented with the above complaint.Past medical history significant for CAD S/P stent to RCA, type 2 diabetes , mellitus, hypertension, hypothyroidism, vertebral artery occlusion.  Patient presented with chest discomfort and pressure of 1 day duration.  She states pain gets better when she lies down and gets worse when she sits up and starts walking.  also noted to have significant drop of her hemoglobin.  Troponin in ER was 20 followed by 15.  Cardiologist evaluated patient in the ER and is planning to do coronary angiogram tomorrow morning.    Past Medical History    No past medical history on file.    Past Surgical History   Past Surgical History:   Procedure Laterality Date    CORONARY STENT PLACEMENT  10/19/2012    glenna 1st obtuse marginal    CV CORONARY ANGIOGRAM N/A 4/18/2023    Procedure: Coronary Angiogram;  Surgeon: Javier Guerra MD;  [facility]: [facility]    CV LEFT HEART CATH N/A 4/18/2023    Procedure: Left Heart Catheterization;  Surgeon: Javier Guerra MD;  Location: [facility] CV    CV PCI N/A 4/18/2023    Procedure: Percutaneous Coronary Intervention;  Surgeon: Javier Guerra MD;  Location: [facility] CV    SHOULDER SURGERY      frozen shoulder       Prior to Admission Medications   Prior to Admission Medications   Prescriptions Last Dose Informant Patient Reported? Taking?   amLODIPine (NORVASC) 10 MG tablet 11/13/2023 at AM  Yes Yes   Sig: Take 10 mg by mouth daily   aspirin (ASA) 81 MG EC tablet 11/13/2023 at AM  No Yes   Sig: Take 1 tablet (81 mg) by mouth daily Start tomorrow.    buPROPion (WELLBUTRIN XL) 300 MG 24 hr tablet 2023 at AM  Yes Yes   Sig: Take 300 mg by mouth daily   clopidogrel (PLAVIX) 75 MG tablet 2023 at AM  No Yes   Sig: Take 1 tablet (75 mg) by mouth daily   coenzyme Q10 (COQ-10) 100 mg capsule 2023 at HS  Yes Yes   Si mg every evening   evolocumab (REPATHA SURECLICK) 140 MG/ML prefilled autoinjector 2023  No Yes   Sig: Inject 1 mL (140 mg) Subcutaneous every 14 days   exenatide ER (BYDUREON BCISE) 2 MG/0.85ML auto-injector 2023 at Sundays  Yes Yes   Sig: Inject 2 mg Subcutaneous every 7 days Once a week on    fish oil-omega-3 fatty acids 1000 MG capsule 2023 at hs  Yes Yes   Sig: Take 2 g by mouth every evening   levothyroxine (SYNTHROID, LEVOTHROID) 75 MCG tablet 2023 at AM  Yes Yes   Sig: [LEVOTHYROXINE (SYNTHROID, LEVOTHROID) 75 MCG TABLET] Take 75 mcg by mouth daily.   melatonin 3 mg Tab Past Month  Yes Yes   Sig: [MELATONIN 3 MG TAB] Take 3 mg by mouth bedtime as needed.   metFORMIN (GLUCOPHAGE XR) 500 MG 24 hr tablet 2023 at AM  Yes Yes   Sig: Take 500 mg by mouth daily before breakfast   metFORMIN (GLUCOPHAGE XR) 500 MG 24 hr tablet 2023 at PM  Yes Yes   Sig: Take 1,000 mg by mouth daily (with dinner)   multivitamin therapeutic (THERAGRAN) tablet 2023  Yes Yes   Sig: [MULTIVITAMIN THERAPEUTIC (THERAGRAN) TABLET] Take 1 tablet by mouth daily.   red yeast rice 600 mg Tab 2023 at PM  Yes Yes   Sig: [RED YEAST RICE 600 MG TAB] Take 1 tablet by mouth daily.      Facility-Administered Medications: None           Physical Exam   Vital Signs: Temp: 98  F (36.7  C) Temp src: Oral BP: (!) 150/62 Pulse: 74   Resp: 15 SpO2: 96 % O2 Device: None (Room air)    Weight: 165 lbs 0 oz    General Appearance: No distress noted  Respiratory: Good air entry bilaterally  Cardiovascular: S1 and S2 are heard, no murmur or gallop  GI: Soft abdomen, no tenderness, normoactive bowel sounds  Skin: Intact and warm        Medical Decision Making       75 MINUTES SPENT BY ME on the date of service doing chart review, history, exam, documentation & further activities per the note.      Data

## 2023-11-13 NOTE — ED TRIAGE NOTES
Patient c/o chest pain and back pain last night around 9 pm.  No nausea, no vomiting.  Hx CAD     Triage Assessment (Adult)       Row Name 11/13/23 1131          Triage Assessment    Airway WDL WDL        Respiratory WDL    Respiratory WDL WDL        Skin Circulation/Temperature WDL    Skin Circulation/Temperature WDL WDL        Cardiac WDL    Cardiac WDL chest pain        Chest Pain Assessment    Chest Pain Location midsternal     Chest Pain Radiation back     Character pressure

## 2023-11-13 NOTE — ED PROVIDER NOTES
EMERGENCY DEPARTMENT ENCOUNTER      NAME: Lisseth Qureshi  AGE: 68 year old female  YOB: 1955  MRN: [id number]  EVALUATION DATE & TIME: 2023 11:33 AM    PCP: Shivani Caal    ED PROVIDER: Rafiq Fisher DO      Chief Complaint   Patient presents with    Chest Pain    Back Pain         FINAL IMPRESSION:  1. Chest pain, unspecified type    2. Anemia, unspecified type          ED COURSE & MEDICAL DECISION MAKIN-year-old female with a history of coronary artery disease requiring stent placement presented to the ED for evaluation of chest pain and back pain that began while laying in bed last evening.  Patient stated that her pain is worse with lying down and improves with sitting up or walking around.  The patient noted that the pain felt similar to what she experienced with the previous angina.  The patient denies any other associated symptoms.  Here in the ED the patient is slightly hypertensive upon arrival.  She is otherwise hemodynamically stable.  The patient did not appear to be in any obvious distress or discomfort at the time for initial evaluation.  The patient's physical exam was unremarkable.    The EKG revealed normal sinus rhythm with nonspecific ST segment changes noted.      The patient's hemoglobin was 9.5 which is a slight decrease from her normal baseline.  The patient's troponin was 20.  Remaining laboratory tests including BMP, hepatic panel, lipase, and magnesium are reassuring.  The patient's chest x-ray was nondiagnostic.    The patient's admission from earlier in the year when she was found to have significant stenosis in the RCA requiring stent placement was reviewed.  The patient's symptoms sound consistent with what she experienced today.  Also, her troponin was obtained in the ED at that time was 11.    The patient was reevaluated and informed of the lab and imaging results.  Patient was reporting minimal chest discomfort because she was sitting up in bed at the  time of reevaluation.  Because her symptoms are similar, admission was recommended for further evaluation.  The patient's case was also discussed with the on-call cardiologist Dr. Xavier who agreed with the admission.  The patient was evaluated in the ED by Dr. Xavier.      The patient's case was then discussed with the admitting hospitalist.  Repeat troponin was 15.       Pertinent Labs & Imaging studies reviewed. (See chart for details)  11:45 AM I met with the patient to gather history and to perform my initial exam. We discussed plans for the ED course, including diagnostic testing and treatment.     At the conclusion of the encounter I discussed the results of all of the tests and the disposition. The questions were answered. The patient or family acknowledged understanding and was agreeable with the care plan.     Medical Decision Making    History:  Supplemental history from: Documented in chart, if applicable  External Record(s) reviewed: Documented in chart, if applicable.    Work Up:  Chart documentation includes differential considered and any EKGs or imaging independently interpreted by provider, where specified.  In additional to work up documented, I considered the following work up: Documented in chart, if applicable.    External consultation:  Discussion of management with another provider: Documented in chart, if applicable    Complicating factors:  Care impacted by chronic illness: Chronic Kidney Disease, Diabetes, Heart Disease, Hyperlipidemia, and Hypertension  Care affected by social determinants of health: Access to Medical Care    Disposition considerations: Admit.      PPE worn: n95 mask, goggles    MEDICATIONS GIVEN IN THE EMERGENCY:  Medications   melatonin tablet 1 mg (has no administration in time range)   enoxaparin ANTICOAGULANT (LOVENOX) injection 40 mg (has no administration in time range)   acetaminophen (TYLENOL) tablet 650 mg (has no administration in time range)     Or    acetaminophen (TYLENOL) Suppository 650 mg (has no administration in time range)   polyethylene glycol (MIRALAX) Packet 17 g (has no administration in time range)   glucose gel 15-30 g (has no administration in time range)     Or   dextrose 50 % injection 25-50 mL (has no administration in time range)     Or   glucagon injection 1 mg (has no administration in time range)   insulin aspart (NovoLOG) injection (RAPID ACTING) (has no administration in time range)   famotidine (PEPCID) injection 20 mg (20 mg Intravenous $Given 11/13/23 1223)       NEW PRESCRIPTIONS STARTED AT TODAY'S ER VISIT  New Prescriptions    No medications on file          =================================================================    HPI    Patient information was obtained from: patient     Use of : N/A         Lisseth GARCIA Titus is a 68 year old female with a pertinent history of CKD, T2D,  who presents to this ED by walk-in for evaluation of chest and back pain. Patient reports developing chest tightness and back pain simultaneously last night at 9:00 PM after having been laying in bed for 30 minutes. She states she experiences discomfort when laying flat but feels better when sitting up and walking. The patient notes that the pain feels similar to before she had stents placed earlier this year except her pain was worse. She denies nausea, vomiting, dizziness, diaphoresis, shortness of breath, swelling or pain in her legs. She took TUMS last night but it did not help alleviate her symptoms. Pain is not pleuritic in nature. No change in medications within the last 2 weeks.       REVIEW OF SYSTEMS   Review of Systems   Constitutional:  Negative for diaphoresis.   Respiratory:  Positive for chest tightness. Negative for shortness of breath.    Gastrointestinal:  Negative for nausea and vomiting.   Musculoskeletal:  Positive for back pain. Negative for joint swelling.   Neurological:  Negative for dizziness.        PAST MEDICAL  HISTORY:  No past medical history on file.    PAST SURGICAL HISTORY:  Past Surgical History:   Procedure Laterality Date    CORONARY STENT PLACEMENT  10/19/2012    glenna 1st obtuse marginal    CV CORONARY ANGIOGRAM N/A 4/18/2023    Procedure: Coronary Angiogram;  Surgeon: Javier Guerra MD;  Location: [facility] CV    CV LEFT HEART CATH N/A 4/18/2023    Procedure: Left Heart Catheterization;  Surgeon: Javier Guerra MD;  Location: [facility] CV    CV PCI N/A 4/18/2023    Procedure: Percutaneous Coronary Intervention;  Surgeon: Javier Guerra MD;  Location: [facility] CV    SHOULDER SURGERY      frozen shoulder           CURRENT MEDICATIONS:    amLODIPine (NORVASC) 10 MG tablet  aspirin (ASA) 81 MG EC tablet  buPROPion (WELLBUTRIN XL) 300 MG 24 hr tablet  clopidogrel (PLAVIX) 75 MG tablet  coenzyme Q10 (COQ-10) 100 mg capsule  evolocumab (REPATHA SURECLICK) 140 MG/ML prefilled autoinjector  exenatide ER (BYDUREON BCISE) 2 MG/0.85ML auto-injector  fish oil-omega-3 fatty acids 1000 MG capsule  levothyroxine (SYNTHROID, LEVOTHROID) 75 MCG tablet  melatonin 3 mg Tab  metFORMIN (GLUCOPHAGE XR) 500 MG 24 hr tablet  metFORMIN (GLUCOPHAGE XR) 500 MG 24 hr tablet  multivitamin therapeutic (THERAGRAN) tablet  red yeast rice 600 mg Tab        ALLERGIES:  Allergies   Allergen Reactions    Codeine      Other reaction(s): *Unknown    Guaifenesin Swelling     Felt throat swelling, Guaifenesin with codeine    Penicillins Unknown     Unknown, allergy occurred at 6 months old    Rosuvastatin Calcium [Rosuvastatin] Unknown       FAMILY HISTORY:  Family History   Problem Relation Age of Onset    Hypertension Mother     Dementia Mother     Mitral Valve Replacement No family hx of        SOCIAL HISTORY:   Social History     Socioeconomic History    Marital status: Single   Tobacco Use    Smoking status: Former     Packs/day: 1.50     Years: 35.00     Additional pack years: 0.00     Total pack years: 52.50      "Types: Cigarettes     Quit date: 2005     Years since quittin.8     Passive exposure: Past    Tobacco comments:     pt quit 10 years ago (she had smoked x 35 years)   Vaping Use    Vaping Use: Never used   Substance and Sexual Activity    Alcohol use: No     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 Alert, Vital signs reviewed. NAD  HENT: ENT inspection is normal. Oropharynx is moist and clear.   Eye: Pupils are equal and reactive to light. EOMI  Neck: The neck is supple, with full ROM, with no evidence of meningismus.  Pulmonary: Currently in no acute respiratory distress. Normal, non labored respirations, the lung sounds are normal with good equal air movement. Clear to auscultation bilaterally.   Circulatory: Regular rate and rhythm. Peripheral pulses are strong and equal. No murmurs, rubs, or gallops.   Abdominal: The abdomen is soft. Nontender. No rigidity, guarding, or rebound. Bowel sounds normal.   Neurologic: Alert, oriented to person, place, and time. No motor deficit. No sensory deficit. Cranial nerves II through XII are intact.  Musculoskeletal: No extremity tenderness. Full range of motion in all extremities. No extremity edema.   Skin: Skin color is normal. No rash. Warm. Dry to touch.      LAB:  All pertinent labs reviewed and interpreted.  Results for orders placed or performed during the hospital encounter of 23   XR Chest Port 1 View    Impression    IMPRESSION: No focal airspace opacity. No pleural effusion or pneumothorax. Cardiac silhouette and mediastinal contours are stable. " Coronary stent.   Result Value Ref Range    INR 1.06 0.85 - 1.15   Partial thromboplastin time   Result Value Ref Range    aPTT 29 22 - 38 Seconds   Comprehensive metabolic panel   Result Value Ref Range    Sodium 139 135 - 145 mmol/L    Potassium 4.1 3.4 - 5.3 mmol/L    Carbon Dioxide (CO2) 24 22 - 29 mmol/L    Anion Gap 16 (H) 7 - 15 mmol/L    Urea Nitrogen 20.2 8.0 - 23.0 mg/dL    Creatinine 1.09 (H) 0.51 - 0.95 mg/dL    GFR Estimate 56 (L) >60 mL/min/1.73m2    Calcium 8.9 8.8 - 10.2 mg/dL    Chloride 99 98 - 107 mmol/L    Glucose 137 (H) 70 - 99 mg/dL    Alkaline Phosphatase 76 35 - 104 U/L    AST 23 0 - 45 U/L    ALT 30 0 - 50 U/L    Protein Total 7.0 6.4 - 8.3 g/dL    Albumin 4.4 3.5 - 5.2 g/dL    Bilirubin Total 0.2 <=1.2 mg/dL   Result Value Ref Range    Magnesium 1.8 1.7 - 2.3 mg/dL   Result Value Ref Range    Troponin T, High Sensitivity 20 (H) <=14 ng/L   Result Value Ref Range    Lipase 33 13 - 60 U/L   CBC with platelets and differential   Result Value Ref Range    WBC Count 6.5 4.0 - 11.0 10e3/uL    RBC Count 3.92 3.80 - 5.20 10e6/uL    Hemoglobin 9.5 (L) 11.7 - 15.7 g/dL    Hematocrit 30.8 (L) 35.0 - 47.0 %    MCV 79 78 - 100 fL    MCH 24.2 (L) 26.5 - 33.0 pg    MCHC 30.8 (L) 31.5 - 36.5 g/dL    RDW 15.4 (H) 10.0 - 15.0 %    Platelet Count 292 150 - 450 10e3/uL    % Neutrophils 67 %    % Lymphocytes 22 %    % Monocytes 7 %    % Eosinophils 2 %    % Basophils 1 %    % Immature Granulocytes 1 %    NRBCs per 100 WBC 0 <1 /100    Absolute Neutrophils 4.3 1.6 - 8.3 10e3/uL    Absolute Lymphocytes 1.4 0.8 - 5.3 10e3/uL    Absolute Monocytes 0.5 0.0 - 1.3 10e3/uL    Absolute Eosinophils 0.1 0.0 - 0.7 10e3/uL    Absolute Basophils 0.1 0.0 - 0.2 10e3/uL    Absolute Immature Granulocytes 0.0 <=0.4 10e3/uL    Absolute NRBCs 0.0 10e3/uL   Troponin T, High Sensitivity (now)   Result Value Ref Range    Troponin T, High Sensitivity 15 (H) <=14 ng/L   Extra Urine Collection   Result Value Ref Range    Hold Specimen  JIC    Extra Blue Top Tube   Result Value Ref Range    Hold Specimen JIC    Extra Purple Top Tube   Result Value Ref Range    Hold Specimen JIC    Glucose by meter   Result Value Ref Range    GLUCOSE BY METER POCT 102 (H) 70 - 99 mg/dL       RADIOLOGY:  Reviewed all pertinent imaging. Please see official radiology report.  XR Chest Port 1 View   Final Result   IMPRESSION: No focal airspace opacity. No pleural effusion or pneumothorax. Cardiac silhouette and mediastinal contours are stable. Coronary stent.          EKG:    Normal sinus rhythm.  Rate of 85.  Normal QRS.  Normal QT.  Nonspecific ST segment changes noted.  Compared to the EKG on 4/18/2023 normal sinus rhythm has replaced sinus pericardia and the nonspecific ST segment changes appear new.    I have independently reviewed and interpreted the EKG(s) documented above.      I, Jhonny Chanel, am serving as a scribe to document services personally performed by Rafiq Fisher DO based on my observation and the provider's statements to me. I, Rafiq Fisher, attest that Jhonny Chanel  is acting in a scribe capacity, has observed my performance of the services and has documented them in accordance with my direction.    Rafiq Fisher DO  Emergency Medicine  [facility] EMERGENCY DEPARTMENT  [facility] [facility] [id number] [phone]       Rafiq Fisher DO  11/13/23 3712

## 2023-11-13 NOTE — CONSULTS
"  Thank you,  No ref. provider found, for asking the [facility] Care team to see Ms. Lisseth Qureshi to evaluate       Assessment/Recommendations   Assessment/Plan:  Angina - some features atypical, but same as in spring prior to PCI - noted long segment of RCA with PCI in setting of DM - risk for ISR - plan npo post midnight, plan angiogrpahy tomorrow.  Cont medical therapy including asp/plavix, repatha, amlodipine, add imdur  Anemia - could be contributing to symptoms - defer to hospitalist for etiology    Follow up with  in Cardiology   Clinically Significant Risk Factors Present on Admission                # Drug Induced Platelet Defect: home medication list includes an antiplatelet medication   # Hypertension: Noted on problem list     # DMII: A1C = 7.4 % (Ref range: 0.0 - 5.6 %) within past 6 months    # Obesity: Estimated body mass index is 33.33 kg/m  as calculated from the following:    Height as of this encounter: 1.499 m (4' 11\").    Weight as of this encounter: 74.8 kg (165 lb).                 History of Present Illness/Subjective    Ms. Lisseth Qureshi is a 68 year old female with hx of CAD s/p PCI to RCA 4/23 long segment, DM presents with same symptoms as in April. Chest pain/pressure radiatess to her back, worse when laying flat not standing or sitting up.  But also chest pain on walking to the bathroom.  Taking her medicaiton, no PND/orthopnea but some MENG lately, no syncope.  We spent time discusing symptoms and CAD, along with alternative diagnosis         Physical Examination Review of Systems   BP (!) 150/62   Pulse 74   Temp 98  F (36.7  C) (Oral)   Resp 15   Ht 1.499 m (4' 11\")   Wt 74.8 kg (165 lb)   LMP  (LMP Unknown)   SpO2 96%   BMI 33.33 kg/m    Body mass index is 33.33 kg/m .  Wt Readings from Last 3 Encounters:   11/13/23 74.8 kg (165 lb)   09/13/23 78.5 kg (173 lb)   08/15/23 77.6 kg (171 lb)       Intake/Output Summary (Last 24 hours) at 11/13/2023 " 1536  Last data filed at 11/13/2023 1410  Gross per 24 hour   Intake --   Output 100 ml   Net -100 ml     General Appearance:   no distress, normal body habitus   ENT/Mouth: membranes moist, no oral lesions or bleeding gums.      EYES:  no scleral icterus, normal conjunctivae   Neck: no carotid bruits or thyromegaly   Chest/Lungs:   lungs are clear to auscultation, no rales or wheezing,  sternal scar, equal chest wall expansion    Cardiovascular:   Regular. Normal first and second heart sounds with no murmurs, rubs, or gallops; the carotid, radial and posterior tibial pulses are intact, Jugular venous pressure , edema bilaterally    Abdomen:  no organomegaly, masses, bruits, or tenderness; bowel sounds are present   Extremities: no cyanosis or clubbing   Skin: no xanthelasma, warm.    Neurologic: normal  bilateral, no tremors     Psychiatric: alert and oriented x3, calm     Review of Systems - 12 points nega other than above      Medical History  Surgical History Family History Social History   No past medical history on file. Past Surgical History:   Procedure Laterality Date    CORONARY STENT PLACEMENT  10/19/2012    glenna 1st obtuse marginal    CV CORONARY ANGIOGRAM N/A 4/18/2023    Procedure: Coronary Angiogram;  Surgeon: Javier Guerra MD;  Location: [facility] LAB CV    CV LEFT HEART CATH N/A 4/18/2023    Procedure: Left Heart Catheterization;  Surgeon: Javier Guerra MD;  Location: [facility] CATH LAB CV    CV PCI N/A 4/18/2023    Procedure: Percutaneous Coronary Intervention;  Surgeon: Javier Guerra MD;  Location: [facility] LAB CV    SHOULDER SURGERY      frozen shoulder    Family History   Problem Relation Age of Onset    Hypertension Mother     Dementia Mother     Mitral Valve Replacement No family hx of     Social History     Socioeconomic History    Marital status: Single     Spouse name: Not on file    Number of children: Not on file    Years of education: Not on file    Highest  education level: Not on file   Occupational History    Not on file   Tobacco Use    Smoking status: Former     Packs/day: 1.50     Years: 35.00     Additional pack years: 0.00     Total pack years: 52.50     Types: Cigarettes     Quit date: 2005     Years since quittin.8     Passive exposure: Past    Smokeless tobacco: Not on file    Tobacco comments:     pt quit 10 years ago (she had smoked x 35 years)   Vaping Use    Vaping Use: Never used   Substance and Sexual Activity    Alcohol use: No     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 Allergies   Allergen Reactions    Codeine      Other reaction(s): *Unknown    Guaifenesin Swelling     Felt throat swelling, Guaifenesin with codeine    Penicillins Unknown     Unknown, allergy occurred at 6 months old    Rosuvastatin Calcium [Rosuvastatin] Unknown         Lab Results    Chemistry/lipid CBC Cardiac Enzymes/BNP/TSH/INR   Lab Results   Component Value Date    CHOL 186 08/15/2023    HDL 56 08/15/2023    TRIG 122 08/15/2023    BUN 20.2 2023     2023    CO2 24 2023    Lab Results   Component Value Date    WBC 6.5 2023    HGB 9.5 (L) 2023    HCT 30.8 (L) 2023    MCV 79 2023     2023    Lab Results   Component Value Date    TSH 3.45 2023    INR 1.06 2023              Santos Xavier MD  Interventional Cardiology  [facility]

## 2023-11-13 NOTE — PROGRESS NOTES
PRIMARY DIAGNOSIS: CHEST PAIN    1. Ruled out acute coronary syndrome (negative or stable Troponin):  Yes  2. Pain Status: Improved-controlled with oral pain medications.  3. Appropriate provocative testing performed: No  - Stress Test Procedure: will have angiogram  - Interpretation of cardiac rhythm per telemetry tech: NA    4. Cleared by Consultants (if applicable):No  5. Return to near baseline physical activity: No  Discharge Planner Nurse   Safe discharge environment identified: No  Barriers to discharge: Yes       Entered by: Natalia Edwards RN 11/13/2023 5:50 PM     Patient alert and oriented x4. Patient is currently NPO. Patient is Diabetic. Last Blood sugar was 102 at 1715. Patient awaits if she will go to Cath Lab yet tonight or not. Several calls have been made. Patient is up ad heather. Patient will now be inpatient. Last Trop was 15 at 1500. Report given and will be transferred to Room 308

## 2023-11-14 PROBLEM — E61.1 IRON DEFICIENCY: Status: ACTIVE | Noted: 2023-11-14

## 2023-11-14 LAB
ANION GAP SERPL CALCULATED.3IONS-SCNC: 11 MMOL/L (ref 7–15)
BUN SERPL-MCNC: 19.3 MG/DL (ref 8–23)
CALCIUM SERPL-MCNC: 9.6 MG/DL (ref 8.8–10.2)
CHLORIDE SERPL-SCNC: 103 MMOL/L (ref 98–107)
CREAT SERPL-MCNC: 1.31 MG/DL (ref 0.51–0.95)
DEPRECATED HCO3 PLAS-SCNC: 26 MMOL/L (ref 22–29)
EGFRCR SERPLBLD CKD-EPI 2021: 45 ML/MIN/1.73M2
ERYTHROCYTE [DISTWIDTH] IN BLOOD BY AUTOMATED COUNT: 15.4 % (ref 10–15)
FOLATE SERPL-MCNC: 30.2 NG/ML (ref 4.6–34.8)
GLUCOSE BLDC GLUCOMTR-MCNC: 115 MG/DL (ref 70–99)
GLUCOSE BLDC GLUCOMTR-MCNC: 130 MG/DL (ref 70–99)
GLUCOSE BLDC GLUCOMTR-MCNC: 136 MG/DL (ref 70–99)
GLUCOSE BLDC GLUCOMTR-MCNC: 146 MG/DL (ref 70–99)
GLUCOSE SERPL-MCNC: 143 MG/DL (ref 70–99)
HBA1C MFR BLD: 6.4 %
HCT VFR BLD AUTO: 29.2 % (ref 35–47)
HGB BLD-MCNC: 8.9 G/DL (ref 11.7–15.7)
MCH RBC QN AUTO: 24 PG (ref 26.5–33)
MCHC RBC AUTO-ENTMCNC: 30.5 G/DL (ref 31.5–36.5)
MCV RBC AUTO: 79 FL (ref 78–100)
PLATELET # BLD AUTO: 289 10E3/UL (ref 150–450)
POTASSIUM SERPL-SCNC: 4.2 MMOL/L (ref 3.4–5.3)
RBC # BLD AUTO: 3.71 10E6/UL (ref 3.8–5.2)
SODIUM SERPL-SCNC: 140 MMOL/L (ref 135–145)
WBC # BLD AUTO: 4.8 10E3/UL (ref 4–11)

## 2023-11-14 PROCEDURE — 99152 MOD SED SAME PHYS/QHP 5/>YRS: CPT | Performed by: INTERNAL MEDICINE

## 2023-11-14 PROCEDURE — 93454 CORONARY ARTERY ANGIO S&I: CPT | Performed by: INTERNAL MEDICINE

## 2023-11-14 PROCEDURE — 93572 IV DOP VEL&/PRESS C FLO EA: CPT | Mod: 26 | Performed by: INTERNAL MEDICINE

## 2023-11-14 PROCEDURE — C1769 GUIDE WIRE: HCPCS | Performed by: INTERNAL MEDICINE

## 2023-11-14 PROCEDURE — 250N000013 HC RX MED GY IP 250 OP 250 PS 637: Performed by: STUDENT IN AN ORGANIZED HEALTH CARE EDUCATION/TRAINING PROGRAM

## 2023-11-14 PROCEDURE — 258N000003 HC RX IP 258 OP 636: Performed by: INTERNAL MEDICINE

## 2023-11-14 PROCEDURE — 99232 SBSQ HOSP IP/OBS MODERATE 35: CPT | Performed by: INTERNAL MEDICINE

## 2023-11-14 PROCEDURE — 250N000009 HC RX 250: Performed by: INTERNAL MEDICINE

## 2023-11-14 PROCEDURE — 93799 UNLISTED CV SVC/PROCEDURE: CPT | Performed by: INTERNAL MEDICINE

## 2023-11-14 PROCEDURE — 250N000013 HC RX MED GY IP 250 OP 250 PS 637: Performed by: NURSE PRACTITIONER

## 2023-11-14 PROCEDURE — 99153 MOD SED SAME PHYS/QHP EA: CPT | Performed by: INTERNAL MEDICINE

## 2023-11-14 PROCEDURE — 210N000001 HC R&B IMCU HEART CARE

## 2023-11-14 PROCEDURE — 82746 ASSAY OF FOLIC ACID SERUM: CPT | Performed by: STUDENT IN AN ORGANIZED HEALTH CARE EDUCATION/TRAINING PROGRAM

## 2023-11-14 PROCEDURE — 250N000011 HC RX IP 250 OP 636: Mod: JZ | Performed by: INTERNAL MEDICINE

## 2023-11-14 PROCEDURE — 83036 HEMOGLOBIN GLYCOSYLATED A1C: CPT | Performed by: INTERNAL MEDICINE

## 2023-11-14 PROCEDURE — 80048 BASIC METABOLIC PNL TOTAL CA: CPT | Performed by: STUDENT IN AN ORGANIZED HEALTH CARE EDUCATION/TRAINING PROGRAM

## 2023-11-14 PROCEDURE — 93454 CORONARY ARTERY ANGIO S&I: CPT | Mod: 26 | Performed by: INTERNAL MEDICINE

## 2023-11-14 PROCEDURE — 250N000011 HC RX IP 250 OP 636: Mod: JZ

## 2023-11-14 PROCEDURE — 36415 COLL VENOUS BLD VENIPUNCTURE: CPT | Performed by: STUDENT IN AN ORGANIZED HEALTH CARE EDUCATION/TRAINING PROGRAM

## 2023-11-14 PROCEDURE — C1894 INTRO/SHEATH, NON-LASER: HCPCS | Performed by: INTERNAL MEDICINE

## 2023-11-14 PROCEDURE — 272N000001 HC OR GENERAL SUPPLY STERILE: Performed by: INTERNAL MEDICINE

## 2023-11-14 PROCEDURE — 250N000011 HC RX IP 250 OP 636: Performed by: INTERNAL MEDICINE

## 2023-11-14 PROCEDURE — B2111ZZ FLUOROSCOPY OF MULTIPLE CORONARY ARTERIES USING LOW OSMOLAR CONTRAST: ICD-10-PCS | Performed by: INTERNAL MEDICINE

## 2023-11-14 PROCEDURE — 93571 IV DOP VEL&/PRESS C FLO 1ST: CPT | Mod: 26 | Performed by: INTERNAL MEDICINE

## 2023-11-14 PROCEDURE — 85041 AUTOMATED RBC COUNT: CPT | Performed by: STUDENT IN AN ORGANIZED HEALTH CARE EDUCATION/TRAINING PROGRAM

## 2023-11-14 PROCEDURE — 255N000002 HC RX 255 OP 636: Performed by: INTERNAL MEDICINE

## 2023-11-14 RX ORDER — OXYCODONE HYDROCHLORIDE 5 MG/1
5 TABLET ORAL EVERY 4 HOURS PRN
Status: DISCONTINUED | OUTPATIENT
Start: 2023-11-14 | End: 2023-11-22

## 2023-11-14 RX ORDER — NITROGLYCERIN 5 MG/ML
VIAL (ML) INTRAVENOUS
Status: DISCONTINUED | OUTPATIENT
Start: 2023-11-14 | End: 2023-11-14 | Stop reason: HOSPADM

## 2023-11-14 RX ORDER — NALOXONE HYDROCHLORIDE 0.4 MG/ML
0.4 INJECTION, SOLUTION INTRAMUSCULAR; INTRAVENOUS; SUBCUTANEOUS
Status: ACTIVE | OUTPATIENT
Start: 2023-11-14 | End: 2023-11-15

## 2023-11-14 RX ORDER — ATROPINE SULFATE 0.1 MG/ML
0.5 INJECTION INTRAVENOUS
Status: ACTIVE | OUTPATIENT
Start: 2023-11-14 | End: 2023-11-15

## 2023-11-14 RX ORDER — HYDRALAZINE HYDROCHLORIDE 20 MG/ML
10 INJECTION INTRAMUSCULAR; INTRAVENOUS
Status: DISCONTINUED | OUTPATIENT
Start: 2023-11-14 | End: 2023-11-15

## 2023-11-14 RX ORDER — HEPARIN SODIUM 1000 [USP'U]/ML
INJECTION, SOLUTION INTRAVENOUS; SUBCUTANEOUS
Status: DISCONTINUED | OUTPATIENT
Start: 2023-11-14 | End: 2023-11-14 | Stop reason: HOSPADM

## 2023-11-14 RX ORDER — SODIUM CHLORIDE 9 MG/ML
INJECTION, SOLUTION INTRAVENOUS CONTINUOUS
Status: DISCONTINUED | OUTPATIENT
Start: 2023-11-14 | End: 2023-11-14 | Stop reason: HOSPADM

## 2023-11-14 RX ORDER — DIAZEPAM 5 MG
5 TABLET ORAL ONCE
Status: COMPLETED | OUTPATIENT
Start: 2023-11-14 | End: 2023-11-14

## 2023-11-14 RX ORDER — ACETAMINOPHEN 325 MG/1
650 TABLET ORAL EVERY 4 HOURS PRN
Status: DISCONTINUED | OUTPATIENT
Start: 2023-11-14 | End: 2023-11-22

## 2023-11-14 RX ORDER — ASPIRIN 325 MG
325 TABLET ORAL ONCE
Status: DISCONTINUED | OUTPATIENT
Start: 2023-11-14 | End: 2023-11-14

## 2023-11-14 RX ORDER — NALOXONE HYDROCHLORIDE 0.4 MG/ML
0.2 INJECTION, SOLUTION INTRAMUSCULAR; INTRAVENOUS; SUBCUTANEOUS
Status: ACTIVE | OUTPATIENT
Start: 2023-11-14 | End: 2023-11-15

## 2023-11-14 RX ORDER — LIDOCAINE 40 MG/G
CREAM TOPICAL
Status: DISCONTINUED | OUTPATIENT
Start: 2023-11-14 | End: 2023-11-14 | Stop reason: HOSPADM

## 2023-11-14 RX ORDER — OXYCODONE HYDROCHLORIDE 5 MG/1
10 TABLET ORAL EVERY 4 HOURS PRN
Status: DISCONTINUED | OUTPATIENT
Start: 2023-11-14 | End: 2023-11-22

## 2023-11-14 RX ORDER — FLUMAZENIL 0.1 MG/ML
0.2 INJECTION, SOLUTION INTRAVENOUS
Status: ACTIVE | OUTPATIENT
Start: 2023-11-14 | End: 2023-11-15

## 2023-11-14 RX ORDER — FENTANYL CITRATE 50 UG/ML
25 INJECTION, SOLUTION INTRAMUSCULAR; INTRAVENOUS
Status: DISCONTINUED | OUTPATIENT
Start: 2023-11-14 | End: 2023-11-14

## 2023-11-14 RX ORDER — FENTANYL CITRATE 50 UG/ML
INJECTION, SOLUTION INTRAMUSCULAR; INTRAVENOUS
Status: DISCONTINUED | OUTPATIENT
Start: 2023-11-14 | End: 2023-11-14 | Stop reason: HOSPADM

## 2023-11-14 RX ORDER — IODIXANOL 320 MG/ML
INJECTION, SOLUTION INTRAVASCULAR
Status: DISCONTINUED | OUTPATIENT
Start: 2023-11-14 | End: 2023-11-14 | Stop reason: HOSPADM

## 2023-11-14 RX ORDER — ASPIRIN 81 MG/1
243 TABLET, CHEWABLE ORAL ONCE
Status: COMPLETED | OUTPATIENT
Start: 2023-11-14 | End: 2023-11-14

## 2023-11-14 RX ORDER — SODIUM CHLORIDE 9 MG/ML
75 INJECTION, SOLUTION INTRAVENOUS CONTINUOUS
Status: ACTIVE | OUTPATIENT
Start: 2023-11-14 | End: 2023-11-14

## 2023-11-14 RX ADMIN — ISOSORBIDE MONONITRATE 30 MG: 30 TABLET, EXTENDED RELEASE ORAL at 08:24

## 2023-11-14 RX ADMIN — CLOPIDOGREL BISULFATE 75 MG: 75 TABLET ORAL at 08:24

## 2023-11-14 RX ADMIN — IRON SUCROSE 300 MG: 20 INJECTION, SOLUTION INTRAVENOUS at 20:50

## 2023-11-14 RX ADMIN — DIAZEPAM 5 MG: 5 TABLET ORAL at 14:20

## 2023-11-14 RX ADMIN — Medication 81 MG: at 08:25

## 2023-11-14 RX ADMIN — AMLODIPINE BESYLATE 10 MG: 5 TABLET ORAL at 08:24

## 2023-11-14 RX ADMIN — LEVOTHYROXINE SODIUM 75 MCG: 0.03 TABLET ORAL at 08:24

## 2023-11-14 RX ADMIN — SODIUM CHLORIDE 75 ML/HR: 9 INJECTION, SOLUTION INTRAVENOUS at 16:33

## 2023-11-14 RX ADMIN — ASPIRIN 81 MG CHEWABLE TABLET 243 MG: 81 TABLET CHEWABLE at 14:20

## 2023-11-14 RX ADMIN — BUPROPION HYDROCHLORIDE 300 MG: 150 TABLET, FILM COATED, EXTENDED RELEASE ORAL at 08:24

## 2023-11-14 ASSESSMENT — ACTIVITIES OF DAILY LIVING (ADL)
ADLS_ACUITY_SCORE: 18

## 2023-11-14 NOTE — PROGRESS NOTES
Patient report given to [facility] nurse. Patient went in a w/c with one assist to [facility] for coronary angiogram prep. Patient saw DVD on coronary angiogram. Family bedside.

## 2023-11-14 NOTE — PLAN OF CARE
Problem: Chest Pain  Goal: Resolution of Chest Pain Symptoms  Outcome: Progressing   Goal Outcome Evaluation:       Pt alert & oriented x4. On RA. Lung sounds clear. Vitals stable. Independent in the room. Groin site clipped & cleaned. Kept NPO starting midnight. Plan to have an angiogram today.

## 2023-11-14 NOTE — PRE-PROCEDURE
GENERAL PRE-PROCEDURE:   Procedure:  Coronary angiogram with possible PCI  Date/Time:  11/14/2023 1:23 PM    Written consent obtained?: Yes    Risks and benefits: Risks, benefits and alternatives were discussed    Consent given by:  Patient  Patient states understanding of procedure being performed: Yes    Patient's understanding of procedure matches consent: Yes    Procedure consent matches procedure scheduled: Yes    Expected level of sedation:  Moderate  Appropriately NPO:  Yes  ASA Class:  3 (angina, CAD; s/p D1 PCI in 2012, s/p mid-dRCA PCI 4/2023, anemia, HTN, HLD, statin in tolerance, carotid artery plaque on CT, DM Type II)  Mallampati  :  Grade 2- soft palate, base of uvula, tonsillar pillars, and portion of posterior pharyngeal wall visible  Lungs:  Lungs clear with good breath sounds bilaterally  Heart:  Normal heart sounds and rate  History & Physical reviewed:  History and physical reviewed and updates made (see comment)  H&P Comments:  Clinically Significant Risk Factors Present on Admission    Cardiovascular : angina, CAD; s/p mid-dRCA PCI, HTN, HLD, statin in tolerance, carotid artery plaque on CT, DM Type II    Fluid & Electrolyte Disorders : Not present on admission    Gastroenterology : Not present on admission    Hematology/Oncology : Not present on admission    Nephrology : Not present on admission    Neurology : Not present on admission    Pulmonology : Not present on admission    Systemic : Class I obesity; BMI 33.12kg/m2    Statement of review:  I have reviewed the lab findings, diagnostic data, medications, and the plan for sedation      Sedation and Procedure Attestation:    I attest that Lisseth Qureshi underwent a pre-sedation assessment and is an appropriate candidate to undergo the planned procedure and planned sedation. Risks, benefits, and alternatives were discussed. The patient/representative was given an opportunity to ask questions and seems to understand. Informed consent was  obtained for the procedure, including sedation.    The patient was re-evaluated immediately prior to sedation administration.    Dilan Archuleta MD  Interventional Cardiology

## 2023-11-14 NOTE — PLAN OF CARE
Problem: Cardiac Catheterization (Diagnostic/Interventional)  Goal: Stable Heart Rate and Rhythm  Outcome: Progressing   Goal Outcome Evaluation:         Patient scheduled for a coronary angiogram today at 1430. NPO. Denied pain. LSC. Independent. Call light in reach.

## 2023-11-15 LAB
ANION GAP SERPL CALCULATED.3IONS-SCNC: 11 MMOL/L (ref 7–15)
BLD PROD TYP BPU: NORMAL
BLOOD COMPONENT TYPE: NORMAL
BUN SERPL-MCNC: 14.7 MG/DL (ref 8–23)
CALCIUM SERPL-MCNC: 9.3 MG/DL (ref 8.8–10.2)
CHLORIDE SERPL-SCNC: 107 MMOL/L (ref 98–107)
CODING SYSTEM: NORMAL
CREAT SERPL-MCNC: 0.92 MG/DL (ref 0.51–0.95)
CROSSMATCH: NORMAL
DEPRECATED HCO3 PLAS-SCNC: 23 MMOL/L (ref 22–29)
EGFRCR SERPLBLD CKD-EPI 2021: 68 ML/MIN/1.73M2
ERYTHROCYTE [DISTWIDTH] IN BLOOD BY AUTOMATED COUNT: 15.3 % (ref 10–15)
GLUCOSE BLDC GLUCOMTR-MCNC: 123 MG/DL (ref 70–99)
GLUCOSE BLDC GLUCOMTR-MCNC: 139 MG/DL (ref 70–99)
GLUCOSE BLDC GLUCOMTR-MCNC: 159 MG/DL (ref 70–99)
GLUCOSE BLDC GLUCOMTR-MCNC: 174 MG/DL (ref 70–99)
GLUCOSE SERPL-MCNC: 132 MG/DL (ref 70–99)
HCT VFR BLD AUTO: 27.1 % (ref 35–47)
HGB BLD-MCNC: 8.2 G/DL (ref 11.7–15.7)
ISSUE DATE AND TIME: NORMAL
MCH RBC QN AUTO: 23.8 PG (ref 26.5–33)
MCHC RBC AUTO-ENTMCNC: 30.3 G/DL (ref 31.5–36.5)
MCV RBC AUTO: 79 FL (ref 78–100)
PLATELET # BLD AUTO: 258 10E3/UL (ref 150–450)
POTASSIUM SERPL-SCNC: 3.7 MMOL/L (ref 3.4–5.3)
RBC # BLD AUTO: 3.44 10E6/UL (ref 3.8–5.2)
SODIUM SERPL-SCNC: 141 MMOL/L (ref 135–145)
UNIT ABO/RH: NORMAL
UNIT NUMBER: NORMAL
UNIT STATUS: NORMAL
UNIT TYPE ISBT: 5100
WBC # BLD AUTO: 4.8 10E3/UL (ref 4–11)

## 2023-11-15 PROCEDURE — 99232 SBSQ HOSP IP/OBS MODERATE 35: CPT | Performed by: INTERNAL MEDICINE

## 2023-11-15 PROCEDURE — 80048 BASIC METABOLIC PNL TOTAL CA: CPT | Performed by: INTERNAL MEDICINE

## 2023-11-15 PROCEDURE — 250N000013 HC RX MED GY IP 250 OP 250 PS 637: Performed by: INTERNAL MEDICINE

## 2023-11-15 PROCEDURE — 85014 HEMATOCRIT: CPT | Performed by: INTERNAL MEDICINE

## 2023-11-15 PROCEDURE — P9016 RBC LEUKOCYTES REDUCED: HCPCS | Performed by: INTERNAL MEDICINE

## 2023-11-15 PROCEDURE — 999N000127 HC STATISTIC PERIPHERAL IV START W US GUIDANCE

## 2023-11-15 PROCEDURE — 36415 COLL VENOUS BLD VENIPUNCTURE: CPT | Performed by: INTERNAL MEDICINE

## 2023-11-15 PROCEDURE — 99233 SBSQ HOSP IP/OBS HIGH 50: CPT | Performed by: INTERNAL MEDICINE

## 2023-11-15 PROCEDURE — 99222 1ST HOSP IP/OBS MODERATE 55: CPT | Mod: GC | Performed by: THORACIC SURGERY (CARDIOTHORACIC VASCULAR SURGERY)

## 2023-11-15 PROCEDURE — 250N000013 HC RX MED GY IP 250 OP 250 PS 637: Performed by: NURSE PRACTITIONER

## 2023-11-15 PROCEDURE — 250N000013 HC RX MED GY IP 250 OP 250 PS 637: Performed by: STUDENT IN AN ORGANIZED HEALTH CARE EDUCATION/TRAINING PROGRAM

## 2023-11-15 PROCEDURE — 210N000001 HC R&B IMCU HEART CARE

## 2023-11-15 RX ORDER — AMOXICILLIN 250 MG
2 CAPSULE ORAL 2 TIMES DAILY
Status: DISCONTINUED | OUTPATIENT
Start: 2023-11-15 | End: 2023-11-22

## 2023-11-15 RX ORDER — ATENOLOL 25 MG/1
25 TABLET ORAL 2 TIMES DAILY
Status: DISCONTINUED | OUTPATIENT
Start: 2023-11-15 | End: 2023-11-22

## 2023-11-15 RX ORDER — NALOXONE HYDROCHLORIDE 0.4 MG/ML
0.4 INJECTION, SOLUTION INTRAMUSCULAR; INTRAVENOUS; SUBCUTANEOUS
Status: DISCONTINUED | OUTPATIENT
Start: 2023-11-15 | End: 2023-11-22

## 2023-11-15 RX ORDER — ATENOLOL 25 MG/1
25 TABLET ORAL 2 TIMES DAILY
Status: DISCONTINUED | OUTPATIENT
Start: 2023-11-15 | End: 2023-11-15

## 2023-11-15 RX ORDER — NALOXONE HYDROCHLORIDE 0.4 MG/ML
0.2 INJECTION, SOLUTION INTRAMUSCULAR; INTRAVENOUS; SUBCUTANEOUS
Status: DISCONTINUED | OUTPATIENT
Start: 2023-11-15 | End: 2023-11-22

## 2023-11-15 RX ORDER — POLYETHYLENE GLYCOL 3350 17 G/17G
17 POWDER, FOR SOLUTION ORAL 2 TIMES DAILY
Status: DISCONTINUED | OUTPATIENT
Start: 2023-11-15 | End: 2023-11-22

## 2023-11-15 RX ORDER — AMLODIPINE BESYLATE 5 MG/1
5 TABLET ORAL 2 TIMES DAILY
Status: DISCONTINUED | OUTPATIENT
Start: 2023-11-16 | End: 2023-11-22

## 2023-11-15 RX ADMIN — Medication 81 MG: at 07:57

## 2023-11-15 RX ADMIN — ATENOLOL 25 MG: 25 TABLET ORAL at 18:35

## 2023-11-15 RX ADMIN — POLYETHYLENE GLYCOL 3350 17 G: 17 POWDER, FOR SOLUTION ORAL at 12:22

## 2023-11-15 RX ADMIN — CLOPIDOGREL BISULFATE 75 MG: 75 TABLET ORAL at 07:57

## 2023-11-15 RX ADMIN — SENNOSIDES AND DOCUSATE SODIUM 2 TABLET: 8.6; 5 TABLET ORAL at 21:33

## 2023-11-15 RX ADMIN — POLYETHYLENE GLYCOL 3350 17 G: 17 POWDER, FOR SOLUTION ORAL at 21:33

## 2023-11-15 RX ADMIN — NITROGLYCERIN 15 MG: 20 OINTMENT TOPICAL at 21:33

## 2023-11-15 RX ADMIN — BUPROPION HYDROCHLORIDE 300 MG: 150 TABLET, FILM COATED, EXTENDED RELEASE ORAL at 07:57

## 2023-11-15 RX ADMIN — SENNOSIDES AND DOCUSATE SODIUM 2 TABLET: 8.6; 5 TABLET ORAL at 12:22

## 2023-11-15 RX ADMIN — LEVOTHYROXINE SODIUM 75 MCG: 0.03 TABLET ORAL at 07:57

## 2023-11-15 RX ADMIN — ATENOLOL 25 MG: 25 TABLET ORAL at 21:33

## 2023-11-15 RX ADMIN — ISOSORBIDE MONONITRATE 30 MG: 30 TABLET, EXTENDED RELEASE ORAL at 07:58

## 2023-11-15 RX ADMIN — AMLODIPINE BESYLATE 10 MG: 5 TABLET ORAL at 07:58

## 2023-11-15 ASSESSMENT — ACTIVITIES OF DAILY LIVING (ADL)
ADLS_ACUITY_SCORE: 18

## 2023-11-15 NOTE — PROGRESS NOTES
[facility]    Medicine Progress Note - Hospitalist Service    Date of Admission:  11/13/2023    Assessment & Plan      Lisseth Qureshi is a 68 year old female admitted on 11/13/2023. She presented with chest pain of 1 day duration.  Past medical history significant for CAD S/P stent to RCA, type 2 diabetes , mellitus, hypertension, hypothyroidism, vertebral artery occlusion.     #Multivessel coronary disease involving mid LAD, proximal to mid LCx, severe in-stent restenosis of the dominant RCA  #ACS s/p PCI/stents in April 2023  #Unstable angina  -Patient presented with chest discomfort which tends to get better when she lies down and gets worse when she sits up.    -Troponin 20->15  -Had a history of angiogram done in April 2023 with stents placed to her RCA  -Coronary angiogram 11/14, showed severe RCA in-stent restenosis  -Cardiothoracic surgery consultation for evaluation of surgical revascularization  -Aspirin, Plavix, Imdur    Essential Hypertension  -Blood pressure slightly elevated  -Continue PTA amlodipine, started Imdur 30 mg daily  -Hydralazine as needed    Type 2 diabetes mellitus, A1c 7.4 in September  -Medium intensity insulin sliding scale  -Accu-Cheks  -Hypoglycemia protocol  -Hold home metformin and exenatide    Hypothyroidism  -Levothyroxine, TSH 3.45 in September    Microcytic anemia of iron deficiency  -Hemoglobin dropped from around 12 in June to 9.5 and further to 8.2.  -Checked normal folate and B12, iron low at 17  -Iron replacement, checking Hemoccult  -Last colonoscopy near 10 years ago, with incomplete prep  -EGD/colonoscopy as outpatient  -1 unit per BC on 11/15, given intermittent chest pain    Diet: Low Saturated Fat Na <2400 mg    DVT Prophylaxis: Enoxaparin (Lovenox) SQ  [place] Catheter: Not present  Lines: None     Cardiac Monitoring: ACTIVE order. Indication: Post- PCI/Angiogram (24 hours)  Code Status: Full Code      Clinically Significant Risk Factors        "           # Hypertension: Noted on problem list        # Obesity: Estimated body mass index is 33.12 kg/m  as calculated from the following:    Height as of this encounter: 1.499 m (4' 11\").    Weight as of this encounter: 74.4 kg (164 lb)., PRESENT ON ADMISSION         Disposition Plan    Expected Discharge Date: 11/16/2023             Jaylene Dhillon MD  Hospitalist Service  [facility]  Securely message with [employer] (more info)  Text page via [email or web address] Paging/Directory   ______________________________________________________________________    Interval History   Patient was admitted for unstable angina.  S/p PCI to RCA in April.  Underwent coronary angiogram 11/14, which showed multivessel coronary disease with severe RCA in-stent restenosis.  Patient was seen by cardiothoracic surgery, currently not recommending surgical vascularization, rather attempted PCI intervention.  If this is unsuccessful, then would consider CABG and his anemia resolved to some degree.  No chest pain at rest, occasional short-lived chest pain when ambulating in the room.    Physical Exam   Vital Signs: Temp: 98.3  F (36.8  C) Temp src: Oral BP: (!) 148/70 Pulse: 80   Resp: 18 SpO2: 96 % O2 Device: None (Room air)    Weight: 165 lbs 1.6 oz  General: Alert and oriented x 3. Not in obvious distress.  HEENT: NC, AT. Neck- supple, No JVP elevation, lymphadenopathy or thyromegaly. Trachea-central.  Chest: Clear to auscultation bilaterally.  Heart: S1S2 regular. No M/R/G.  Abdomen: Soft. NT, ND. No organomegaly. Bowel sounds- active.  Back: No spine tenderness. No CVA tenderness.  Extremities: No leg swelling. Peripheral pulses 2+ bilaterally.  Neuro: Cranial nerves 1-12 grossly normal. No focal neurological deficit  Medical Decision Making     50 MINUTES SPENT BY ME on the date of service doing chart review, history, exam, documentation & further activities per the note.      Data     I have personally reviewed the " following data over the past 24 hrs:    4.8  \   8.2 (L)   / 258     141 107 14.7 /  174 (H)   3.7 23 0.92 \     TSH: N/A T4: N/A A1C: N/A       Imaging results reviewed over the past 24 hrs:   No results found for this or any previous visit (from the past 24 hour(s)).

## 2023-11-15 NOTE — PROGRESS NOTES
CV Surgery in to see the patient and family bedside. Cardiologist bedside at the time. Plan to discuss plan of care. Lovenox discontinued. Medications adjusted. Call light in reach.

## 2023-11-15 NOTE — PROGRESS NOTES
[facility]    Medicine Progress Note - Hospitalist Service    Date of Admission:  11/13/2023    Assessment & Plan      Lisseth Qureshi is a 68 year old female admitted on 11/13/2023. She presented with chest pain of 1 day duration.  Past medical history significant for CAD S/P stent to RCA, type 2 diabetes , mellitus, hypertension, hypothyroidism, vertebral artery occlusion.     #Multivessel coronary disease involving mid LAD, proximal to mid LCx, severe in-stent restenosis of the dominant RCA  #ACS s/p PCI/stents in April 2023  #Unstable angina  -Patient presented with chest discomfort which tends to get better when she lies down and gets worse when she sits up.    -Troponin 20->15  -Had a history of angiogram done in April 2023 with stents placed to her RCA  -Coronary angiogram 11/14, showed severe RCA in-stent restenosis  -Cardiothoracic surgery consultation for evaluation of surgical revascularization  -Aspirin, Plavix, Imdur    Essential Hypertension  -Blood pressure slightly elevated  -Continue PTA amlodipine, started Imdur 30 mg daily  -Hydralazine as needed    Type 2 diabetes mellitus, A1c 7.4 in September  -Medium intensity insulin sliding scale  -Accu-Cheks  -Hypoglycemia protocol  -Hold home metformin and exenatide    Hypothyroidism  -Levothyroxine, TSH 3.45 in September    Microcytic anemia of iron deficiency  -Hemoglobin dropped from around 12-9.5  -Checked normal folate and B12, iron low at 17  -Iron replacement, check Hemoccult    Diet: Low Saturated Fat Na <2400 mg    DVT Prophylaxis: Enoxaparin (Lovenox) SQ  [place] Catheter: Not present  Lines: None     Cardiac Monitoring: ACTIVE order. Indication: Post- PCI/Angiogram (24 hours)  Code Status: Full Code      Clinically Significant Risk Factors                  # Hypertension: Noted on problem list       # DMII: A1C = 7.4 % (Ref range: 0.0 - 5.6 %) within past 6 months, PRESENT ON ADMISSION  # Obesity: Estimated body mass index  "is 33.12 kg/m  as calculated from the following:    Height as of this encounter: 1.499 m (4' 11\").    Weight as of this encounter: 74.4 kg (164 lb)., PRESENT ON ADMISSION         Disposition Plan     Expected Discharge Date: 11/15/2023                Jaylene Dhillon MD  Hospitalist Service  [facility]  Securely message with [employer] (more info)  Text page via [employer] Paging/Directory   ______________________________________________________________________    Interval History   Patient is new to me.  Chart reviewed.  Patient was seen and examined.  Admitted for unstable angina.  S/p PCI to RCA in April.  Underwent coronary angiogram today, which showed multivessel coronary disease.  Awaiting cardiothoracic surgery evaluation.  No chest pain at rest.  Started Imdur.    Physical Exam   Vital Signs: Temp: 98  F (36.7  C) Temp src: Oral BP: (!) 152/71 Pulse: 94   Resp: 18 SpO2: 93 % O2 Device: None (Room air) Oxygen Delivery: 2 LPM  Weight: 164 lbs 0 oz  General: Alert and oriented x 3. Not in obvious distress.  HEENT: NC, AT. Neck- supple, No JVP elevation, lymphadenopathy or thyromegaly. Trachea-central.  Chest: Clear to auscultation bilaterally.  Heart: S1S2 regular. No M/R/G.  Abdomen: Soft. NT, ND. No organomegaly. Bowel sounds- active.  Back: No spine tenderness. No CVA tenderness.  Extremities: No leg swelling. Peripheral pulses 2+ bilaterally.  Neuro: Cranial nerves 1-12 grossly normal. No focal neurological deficit  Medical Decision Making       40 MINUTES SPENT BY ME on the date of service doing chart review, history, exam, documentation & further activities per the note.      Data     I have personally reviewed the following data over the past 24 hrs:    4.8  \   8.9 (L)   / 289     140 103 19.3 /  115 (H)   4.2 26 1.31 (H) \       Imaging results reviewed over the past 24 hrs:   Recent Results (from the past 24 hour(s))   Cardiac Catheterization    Narrative    CARDIAC CATHETERIZATION " AND INTERVENTION REPORT    PATIENT NAME:  Lisseth Qureshi          MEDICAL RECORD NUMBER: [id number]  : 1955       PROCEDURE DATE: 2023        CONCLUSIONS: Multivessel coronary artery disease involving the mid LAD   (iFR 0.85), the proximal to mid left circumflex (iFR 0.67-0.70), and   severe in-stent restenosis of the dominant mid right coronary artery.      RECOMMENDATIONS:   Usual postprocedure cares, please see orders.  Recommend cardiothoracic surgery consultation for evaluation of surgical   revascularization - potential targets include the left anterior descending   artery, the OM 1 and 2 branches, and distal right coronary artery.  Aggressive risk factor modification for secondary prevention.  Defer evaluation and management of patient's acute anemia to primary   service.    PROCEDURE PERFORMED:   Selective right and left coronary angiography  iFR of the left anterior descending artery  iFR of the left circumflex artery    PRE-OP DIAGNOSIS:  Chest pain  Coronary artery disease  Acute anemia, etiology unclear    POST-OP DIAGNOSIS: Same     PROCEDURALISTS: Dilan Archuleta MD      DIAGNOSTIC PROCEDURAL DETAILS:   Signed, informed consent was obtained. The patient was placed on the table   and prepped and draped in the usual fashion. Time out and site   verification performed.   Access: Right radial artery  Catheters: JL 4, JR4  Hemostasis: TR band    PROCEDURAL MEDICATIONS:  Conscious sedation - midazolam and fentanyl, please see procedure log for   dosing.   Local anesthesia - 1% lidocaine   Procedural anticoagulation - 75 units/kg of heparin     CONTRAST VOLUME: 115 mL Visipaque  BLOOD LOSS: minimal   FLUIDS: None   SPECIMENS: None  COMPLICATIONS: None    FINDINGS:    Coronary Angiography:  LEFT MAIN: Short vessel with mild diffuse disease.  LEFT ANTERIOR DESCENDING: Large vessel that gives rise to a large diagonal   branch and multiple septal perforators.  The LAD wraps around the apex    distally.  The proximal LAD has mild diffuse disease followed by 50 to 70%   stenosis in the midportion.  Distally the LAD has mild diffuse disease.    The large diagonal branch has 60% proximal stenosis followed by mild   diffuse disease distally.  iFR of the left anterior descending artery was   0.9 in the midportion and 0.85 distally.  LEFT CIRCUMFLEX: Dominant vessel that gives rise to a large OM1 branch, a   large OM 2 branch, and terminates into a small vessel distally.  Proximal   to mid left circumflex has 50 to 70% stenosis followed by 50 to 70%   stenosis in the midportion at the bifurcation of the OM branches.  The OM1   has 50 to 70% proximal stenosis followed by mild diffuse disease distally.    The OM 2 has mild diffuse disease distally.  iFR of the OM 1 was 0.67.    iFR of OM 2 was 0.70.  RIGHT CORONARY ARTERY: Large dominant vessel that gives rise to right   posterior descending artery posterolateral system.  The proximal right   coronary artery has mild in-stent restenosis followed by 95% in-stent   restenosis in the midportion.  Distally the right coronary artery has   moderate diffuse disease.  The right posterior descending and   posterolateral system have mild to moderate diffuse disease.    INTERVENTIONAL DETAILS:   Lesion # 1 : 50 to 70% stenosis of the mid LAD  Lesion # 2 : 50 to 70% stenosis of the proximal and mid left circumflex    The left main coronary artery was engaged using a JL 4 diagnostic catheter   that provide adequate coaptation and support.  The lesions were traversed   with a Comet 2 wire and iFR was performed per standard protocol.      Please do not hesitate to contact me if you have any questions or   concerns.  I was present for the procedure in its entirety.     Dilan Archuleta MD  Interventional Cardiology

## 2023-11-15 NOTE — PLAN OF CARE
Assumed care 1900 to 0730. A&O x 4. Independent. Tele is NSR. Denies pain. Up to toilet. Patient slept for the majority of the shift. Call light within reach, able to make needs known. Bed alarm on for safety.    Problem: Dysrhythmia  Goal: Normalized Cardiac Rhythm  Outcome: Progressing     Problem: Chest Pain  Goal: Resolution of Chest Pain Symptoms  Outcome: Progressing     Problem: Cardiac Catheterization (Diagnostic/Interventional)  Goal: Optimal Pain Control and Function  Intervention: Prevent or Manage Pain  Recent Flowsheet Documentation  Taken 11/15/2023 0500 by JUAN KELLY  Pain Management Interventions:   pillow support provided   rest

## 2023-11-15 NOTE — CONSULTS
Cardiac surgery consult  Note     Merlinever GARCIA Titus  Code Status: Full Code  Primary Care Physician: Shivani Caal   : 1955   Age: 68 year old     Date of Service: 11/15/2023     Subjective     Chief Complaint: Chest pressure    History of present illness:     Pleasant 68-year-old female with a history of CAD s/p PCI to the RCA several years ago and then again in 2023.  She has reportedly been doing well but noted chest pain on .  Presented to the hospital and was taken for coronary angiogram yesterday which demonstrated in-stent restenosis of the RCA as well as disease in the LAD and circumflex territories.  Currently she feels well without chest pain.  She reports that she is not very active at home but can walk and go up stairs without issues.  She denies shortness of breath, fever, chills, nausea, vomiting.  She reports being anemic since her PCI in April.  Denies bloody bowel movements or hemoptysis.  She denies easy bleeding or bruising.    No prior history of surgery to the heart or lungs.    Takes Plavix and aspirin at home     Subjective   Review of Systems:   12 point ROS negative except for noted above     Patient Active Problem List   Diagnosis    Hypothyroidism    Type 2 diabetes mellitus without complication, with long-term current use of insulin (H)    Hypertension    Coronary artery disease involving native coronary artery with unstable angina pectoris (H)    Chest pain    Vertebral artery occlusion, left    Statin intolerance    Anemia, unspecified type    Chest pain, unspecified type    Iron deficiency     No past medical history on file.  Past Surgical History:   Procedure Laterality Date    CORONARY STENT PLACEMENT  10/19/2012    glenna 1st obtuse marginal    CV CORONARY ANGIOGRAM N/A 2023    Procedure: Coronary Angiogram;  Surgeon: Javier Guerra MD;  Location: [facility] CATH LAB CV    CV CORONARY ANGIOGRAM N/A 2023    Procedure: Coronary Angiogram;  Surgeon:  Dilan Archuleta MD;  Location: [facility] CATH LAB CV    CV INSTANTANEOUS WAVE-FREE RATIO N/A 2023    Procedure: Instantaneous Wave-Free Ratio;  Surgeon: Dilan Archuleta MD;  Location: [facility] CATH LAB CV    CV LEFT HEART CATH N/A 2023    Procedure: Left Heart Catheterization;  Surgeon: Javier Guerra MD;  Location: [facility] CATH LAB CV    CV PCI N/A 2023    Procedure: Percutaneous Coronary Intervention;  Surgeon: Javier Guerra MD;  Location: [facility] CATH LAB CV    SHOULDER SURGERY      frozen shoulder     Social History     Socioeconomic History    Marital status: Single     Spouse name: Not on file    Number of children: Not on file    Years of education: Not on file    Highest education level: Not on file   Occupational History    Not on file   Tobacco Use    Smoking status: Former     Packs/day: 1.50     Years: 35.00     Additional pack years: 0.00     Total pack years: 52.50     Types: Cigarettes     Quit date: 2005     Years since quittin.8     Passive exposure: Past    Smokeless tobacco: Not on file    Tobacco comments:     pt quit 10 years ago (she had smoked x 35 years)   Vaping Use    Vaping Use: Never used   Substance and Sexual Activity    Alcohol use: No     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    Drug use: No    Sexual activity: Not on file   Other Topics Concern    Not on file   Social History Narrative    Exercises 3-4 days per week     Social Determinants of Health     Financial Resource Strain: Not on file   Food Insecurity: Not on file   Transportation Needs: Not on file   Physical Activity: Not on file   Stress: Not on file   Social Connections: Not on file   Interpersonal Safety: Not on file   Housing Stability: Not on file     Family History   Problem Relation Age of Onset    Hypertension Mother     Dementia Mother     Mitral Valve Replacement No family hx of      Medications Prior to Admission   Medication Sig Dispense Refill Last Dose     amLODIPine (NORVASC) 10 MG tablet Take 10 mg by mouth daily   11/13/2023 at AM    aspirin (ASA) 81 MG EC tablet Take 1 tablet (81 mg) by mouth daily Start tomorrow. 90 tablet 3 11/13/2023 at AM    buPROPion (WELLBUTRIN XL) 300 MG 24 hr tablet Take 300 mg by mouth daily   11/13/2023 at AM    clopidogrel (PLAVIX) 75 MG tablet Take 1 tablet (75 mg) by mouth daily 90 tablet 3 11/13/2023 at AM    coenzyme Q10 (COQ-10) 100 mg capsule 100 mg every evening   11/12/2023 at HS    evolocumab (REPATHA SURECLICK) 140 MG/ML prefilled autoinjector Inject 1 mL (140 mg) Subcutaneous every 14 days 6 mL 3 11/5/2023    exenatide ER (BYDUREON BCISE) 2 MG/0.85ML auto-injector Inject 2 mg Subcutaneous every 7 days Once a week on Sunday 11/12/2023 at Sundays    fish oil-omega-3 fatty acids 1000 MG capsule Take 2 g by mouth every evening   11/12/2023 at hs    levothyroxine (SYNTHROID, LEVOTHROID) 75 MCG tablet [LEVOTHYROXINE (SYNTHROID, LEVOTHROID) 75 MCG TABLET] Take 75 mcg by mouth daily.   11/13/2023 at AM    melatonin 3 mg Tab [MELATONIN 3 MG TAB] Take 3 mg by mouth bedtime as needed.   Past Month    metFORMIN (GLUCOPHAGE XR) 500 MG 24 hr tablet Take 500 mg by mouth daily before breakfast   11/13/2023 at AM    metFORMIN (GLUCOPHAGE XR) 500 MG 24 hr tablet Take 1,000 mg by mouth daily (with dinner)   11/12/2023 at PM    multivitamin therapeutic (THERAGRAN) tablet [MULTIVITAMIN THERAPEUTIC (THERAGRAN) TABLET] Take 1 tablet by mouth daily.   11/13/2023    red yeast rice 600 mg Tab [RED YEAST RICE 600 MG TAB] Take 1 tablet by mouth daily.   11/12/2023 at PM     Allergies   Allergen Reactions    Codeine      Other reaction(s): *Unknown    Guaifenesin Swelling     Felt throat swelling, Guaifenesin with codeine    Penicillins Unknown     Unknown, allergy occurred at 6 months old    Rosuvastatin Calcium [Rosuvastatin] Unknown          Objective   Objective   BP (!) 154/71 (BP Location: Right arm)   Pulse 86   Temp 98.3  F (36.8  C)  "(Oral)   Resp 18   Ht 1.499 m (4' 11\")   Wt 74.9 kg (165 lb 1.6 oz)   LMP  (LMP Unknown)   SpO2 96%   BMI 33.35 kg/m      Temp  Min: 97.7  F (36.5  C)  Max: 98.5  F (36.9  C)  BP  Min: 127/58  Max: 157/74     Intake/Output Summary (Last 24 hours) at 11/15/2023 1441  Last data filed at 11/15/2023 0900  Gross per 24 hour   Intake 840 ml   Output 1875 ml   Net -1035 ml     I/O last 3 completed shifts:  In: 25 [P.O.:25]  Out:  [Urine:]     Physical Exam:   Gen- alert and oriented x3, NAD  HEENT- NC/AT, EOMI  Cardiac- RRR  Pulm- normal respiratory effort  Abd- soft, NT/ND,   - deferred  Extremities- no peripheral edema  Neuro- gross motor intact  Skin- no obvious rashes, bruises, or cuts     Pertinent Labs: Reviewed.     Arterial Blood Gases   No lab results found in last 7 days.    Complete Blood Count   Recent Labs   Lab 11/15/23  0437 23  0450 23  1214   WBC 4.8 4.8 6.5   HGB 8.2* 8.9* 9.5*    289 292       Basic Metabolic Panel  Recent Labs   Lab 11/15/23  0437 23  0450 23  1214    140 139   POTASSIUM 3.7 4.2 4.1   CHLORIDE 107 103 99   CO2 23 26 24   BUN 14.7 19.3 20.2   CR 0.92 1.31* 1.09*       Liver Function Tests  Recent Labs   Lab 23  1214   AST 23   ALT 30   ALKPHOS 76   BILITOTAL 0.2   ALBUMIN 4.4   INR 1.06       Coagulation Profile  Recent Labs   Lab 23  1214   INR 1.06   PTT 29          Pertinent Imaging (Last 24 hours):  Recent Results (from the past 24 hour(s))   Cardiac Catheterization    Narrative    CARDIAC CATHETERIZATION AND INTERVENTION REPORT    PATIENT NAME:  Lisseth Qureshi          MEDICAL RECORD NUMBER: [id number]  : 1955       PROCEDURE DATE: 2023        CONCLUSIONS: Multivessel coronary artery disease involving the mid LAD   (iFR 0.85), the proximal to mid left circumflex (iFR 0.67-0.70), and   severe in-stent restenosis of the dominant mid right coronary artery.      RECOMMENDATIONS:   Usual postprocedure " cares, please see orders.  Recommend cardiothoracic surgery consultation for evaluation of surgical   revascularization - potential targets include the left anterior descending   artery, the OM 1 and 2 branches, and distal right coronary artery.  Aggressive risk factor modification for secondary prevention.  Defer evaluation and management of patient's acute anemia to primary   service.    PROCEDURE PERFORMED:   Selective right and left coronary angiography  iFR of the left anterior descending artery  iFR of the left circumflex artery    PRE-OP DIAGNOSIS:  Chest pain  Coronary artery disease  Acute anemia, etiology unclear    POST-OP DIAGNOSIS: Same     PROCEDURALISTS: Dilan Archuleta MD      DIAGNOSTIC PROCEDURAL DETAILS:   Signed, informed consent was obtained. The patient was placed on the table   and prepped and draped in the usual fashion. Time out and site   verification performed.   Access: Right radial artery  Catheters: JL 4, JR4  Hemostasis: TR band    PROCEDURAL MEDICATIONS:  Conscious sedation - midazolam and fentanyl, please see procedure log for   dosing.   Local anesthesia - 1% lidocaine   Procedural anticoagulation - 75 units/kg of heparin     CONTRAST VOLUME: 115 mL Visipaque  BLOOD LOSS: minimal   FLUIDS: None   SPECIMENS: None  COMPLICATIONS: None    FINDINGS:    Coronary Angiography:  LEFT MAIN: Short vessel with mild diffuse disease.  LEFT ANTERIOR DESCENDING: Large vessel that gives rise to a large diagonal   branch and multiple septal perforators.  The LAD wraps around the apex   distally.  The proximal LAD has mild diffuse disease followed by 50 to 70%   stenosis in the midportion.  Distally the LAD has mild diffuse disease.    The large diagonal branch has 60% proximal stenosis followed by mild   diffuse disease distally.  iFR of the left anterior descending artery was   0.9 in the midportion and 0.85 distally.  LEFT CIRCUMFLEX: Dominant vessel that gives rise to a large OM1 branch, a    large OM 2 branch, and terminates into a small vessel distally.  Proximal   to mid left circumflex has 50 to 70% stenosis followed by 50 to 70%   stenosis in the midportion at the bifurcation of the OM branches.  The OM1   has 50 to 70% proximal stenosis followed by mild diffuse disease distally.    The OM 2 has mild diffuse disease distally.  iFR of the OM 1 was 0.67.    iFR of OM 2 was 0.70.  RIGHT CORONARY ARTERY: Large dominant vessel that gives rise to right   posterior descending artery posterolateral system.  The proximal right   coronary artery has mild in-stent restenosis followed by 95% in-stent   restenosis in the midportion.  Distally the right coronary artery has   moderate diffuse disease.  The right posterior descending and   posterolateral system have mild to moderate diffuse disease.    INTERVENTIONAL DETAILS:   Lesion # 1 : 50 to 70% stenosis of the mid LAD  Lesion # 2 : 50 to 70% stenosis of the proximal and mid left circumflex    The left main coronary artery was engaged using a JL 4 diagnostic catheter   that provide adequate coaptation and support.  The lesions were traversed   with a Comet 2 wire and iFR was performed per standard protocol.      Please do not hesitate to contact me if you have any questions or   concerns.  I was present for the procedure in its entirety.     Dilan Archuleta MD  Interventional Cardiology        Last Echo:  Echo result w/o MOPS: Interpretation Summary Left ventricular size, wall motion and function are normal. The ejectionfraction is 60-65%.Normal right ventricle size and systolic function.No hemodynamically significant valvular abnormalities on 2D or color flowimaging.               Current Medications     S  C  H  E  D  amLODIPine  10 mg Oral Daily    aspirin  81 mg Oral Daily    atenolol  25 mg Oral BID    buPROPion  300 mg Oral Daily    clopidogrel  75 mg Oral Daily    insulin aspart  1-7 Units Subcutaneous TID AC    insulin aspart  1-5 Units Subcutaneous  At Bedtime    insulin aspart   Subcutaneous TID w/meals    isosorbide mononitrate  30 mg Oral Daily    levothyroxine  75 mcg Oral Daily    polyethylene glycol  17 g Oral BID    senna-docusate  2 tablet Oral BID      G  T  T    P  R  N acetaminophen, glucose **OR** dextrose **OR** glucagon, HOLD MEDICATION, hydrALAZINE, melatonin, naloxone **OR** naloxone **OR** naloxone **OR** naloxone, oxyCODONE **OR** oxyCODONE, polyethylene glycol         Assessment     68 year old female with CAD s/p PCI, hypertension, diabetes       Plan     Labs and images reviewed.  Patient has significant in-stent restenosis in the RCA.  The disease in the left is more moderate.  50-70% visually with an IFR of 0.85 in the LAD, borderline.    Patient also noted to have anemia.  Is on aspirin and Plavix at home.  She would need a endoscopy and colonoscopy prior to CABG and full systemic heparinization to ensure that there are no sources of bleeding.  Plavix would need to be held prior to surgery    Discussed with cardiology.  Would consider percutaneous interventions to the right given the borderline disease on the left..  If this is unable to be performed then we would pursue full surgical revascularization.       Plan discussed with Dr. Gabriel       Signed:    Luiz Mckenzie MD 11/15/2023 at 2:41 PM  Cardiothoracic Surgery Fellow

## 2023-11-15 NOTE — PLAN OF CARE
Problem: Cardiac Catheterization (Diagnostic/Interventional)  Goal: Absence of Bleeding  Outcome: Progressing   Goal Outcome Evaluation:  Patient alert and oriented x 4. Right radial site C/D/I. Pulses palpable. No c/o pain. Patient did not want insulin for blood sugar 159. CV surgery consulted to see the patient regarding coronary angiogram results. Patient is NSR. Morning medications explained. Call light in reach.

## 2023-11-15 NOTE — PLAN OF CARE
Problem: Cardiac Catheterization (Diagnostic/Interventional)  Goal: Absence of Bleeding  Outcome: Progressing   TR band removed without complication. No signs of bleeding +CMS. Anxious for CV surgery to see. VSS

## 2023-11-15 NOTE — PROGRESS NOTES
Imp/plan:  Severe multivessel dz - reviewed angiography critical mid RCA focal ISR, severe dz in Circ and LAD narrowing (iFR 0.85), CT surgery visiting and we all discussed options, risks,benefits, goals and will discuss with CV surgery team re options.  Will start atenolol given less likely to cross BBB and cause mood swings/depression.  Cont asp, statin, amlodipine and atenolol   Discussed with CV and IC - will consult hematology and gastroenterology for anemia in planning for CABG or PCI next week.  Will stop clopidogrel and plan starting LMWH on 11/18 to prevent instent thrombosis of RCA.   Discussed with the pt - she and  her son agreed, noted some mild chest pressure - will stop imdur and start NTG paste and give a dose of atenolol now.     Current Facility-Administered Medications   Medication    acetaminophen (TYLENOL) tablet 650 mg    amLODIPine (NORVASC) tablet 10 mg    aspirin EC tablet 81 mg    buPROPion (WELLBUTRIN XL) 24 hr tablet 300 mg    clopidogrel (PLAVIX) tablet 75 mg    glucose gel 15-30 g    Or    dextrose 50 % injection 25-50 mL    Or    glucagon injection 1 mg    [Held by provider] enoxaparin ANTICOAGULANT (LOVENOX) injection 40 mg    HOLD:  Metformin and metformin containing medications if patient received IV contrast with acute kidney injury or severe chronic kidney disease (stage IV or stage V; i.e., eGFR less than 30)    hydrALAZINE (APRESOLINE) injection 10 mg    insulin aspart (NovoLOG) injection (RAPID ACTING)    insulin aspart (NovoLOG) injection (RAPID ACTING)    insulin aspart (NovoLOG) injection (RAPID ACTING)    isosorbide mononitrate (IMDUR) 24 hr tablet 30 mg    levothyroxine (SYNTHROID/LEVOTHROID) tablet 75 mcg    melatonin tablet 1 mg    naloxone (NARCAN) injection 0.2 mg    Or    naloxone (NARCAN) injection 0.4 mg    Or    naloxone (NARCAN) injection 0.2 mg    Or    naloxone (NARCAN) injection 0.4 mg    oxyCODONE (ROXICODONE) tablet 5 mg    Or    oxyCODONE (ROXICODONE) tablet  "10 mg    polyethylene glycol (MIRALAX) Packet 17 g    polyethylene glycol (MIRALAX) Packet 17 g    senna-docusate (SENOKOT-S/PERICOLACE) 8.6-50 MG per tablet 2 tablet     No past medical history on file.     Review of Systems: 12 points negative other than above    BP (!) 154/71 (BP Location: Right arm)   Pulse 86   Temp 98.3  F (36.8  C) (Oral)   Resp 18   Ht 1.499 m (4' 11\")   Wt 74.9 kg (165 lb 1.6 oz)   LMP  (LMP Unknown)   SpO2 96%   BMI 33.35 kg/m        Lab Results   Component Value Date    HGB 8.2 (L) 11/15/2023     Lab Results   Component Value Date     11/15/2023     No results found for: \"CREATININE\"  No components found for: \"K\"          Santos Xavier MD  Interventional Cardiology   [facility]  [phone]    "

## 2023-11-16 ENCOUNTER — PREP FOR PROCEDURE (OUTPATIENT)
Dept: ADMINISTRATIVE | Facility: CLINIC | Age: 68
End: 2023-11-16
Payer: COMMERCIAL

## 2023-11-16 DIAGNOSIS — I25.810 CORONARY ARTERY DISEASE INVOLVING AUTOLOGOUS ARTERY CORONARY BYPASS GRAFT WITHOUT ANGINA PECTORIS: Primary | ICD-10-CM

## 2023-11-16 DIAGNOSIS — I25.10 CAD (CORONARY ARTERY DISEASE): ICD-10-CM

## 2023-11-16 LAB
CREAT SERPL-MCNC: 1.02 MG/DL (ref 0.51–0.95)
EGFRCR SERPLBLD CKD-EPI 2021: 60 ML/MIN/1.73M2
ERYTHROCYTE [DISTWIDTH] IN BLOOD BY AUTOMATED COUNT: 15.2 % (ref 10–15)
GLUCOSE BLDC GLUCOMTR-MCNC: 113 MG/DL (ref 70–99)
GLUCOSE BLDC GLUCOMTR-MCNC: 133 MG/DL (ref 70–99)
GLUCOSE BLDC GLUCOMTR-MCNC: 137 MG/DL (ref 70–99)
GLUCOSE BLDC GLUCOMTR-MCNC: 98 MG/DL (ref 70–99)
HCT VFR BLD AUTO: 31.7 % (ref 35–47)
HEMOCCULT STL QL: POSITIVE
HGB BLD-MCNC: 9.7 G/DL (ref 11.7–15.7)
MAGNESIUM SERPL-MCNC: 2.1 MG/DL (ref 1.7–2.3)
MCH RBC QN AUTO: 24.3 PG (ref 26.5–33)
MCHC RBC AUTO-ENTMCNC: 30.6 G/DL (ref 31.5–36.5)
MCV RBC AUTO: 79 FL (ref 78–100)
PLATELET # BLD AUTO: 273 10E3/UL (ref 150–450)
POTASSIUM SERPL-SCNC: 4.1 MMOL/L (ref 3.4–5.3)
RBC # BLD AUTO: 4 10E6/UL (ref 3.8–5.2)
WBC # BLD AUTO: 5.1 10E3/UL (ref 4–11)

## 2023-11-16 PROCEDURE — 84132 ASSAY OF SERUM POTASSIUM: CPT | Performed by: INTERNAL MEDICINE

## 2023-11-16 PROCEDURE — 250N000013 HC RX MED GY IP 250 OP 250 PS 637: Performed by: INTERNAL MEDICINE

## 2023-11-16 PROCEDURE — 83735 ASSAY OF MAGNESIUM: CPT | Performed by: INTERNAL MEDICINE

## 2023-11-16 PROCEDURE — 250N000011 HC RX IP 250 OP 636: Mod: JZ | Performed by: STUDENT IN AN ORGANIZED HEALTH CARE EDUCATION/TRAINING PROGRAM

## 2023-11-16 PROCEDURE — 210N000001 HC R&B IMCU HEART CARE

## 2023-11-16 PROCEDURE — 250N000013 HC RX MED GY IP 250 OP 250 PS 637: Performed by: STUDENT IN AN ORGANIZED HEALTH CARE EDUCATION/TRAINING PROGRAM

## 2023-11-16 PROCEDURE — 82272 OCCULT BLD FECES 1-3 TESTS: CPT | Performed by: INTERNAL MEDICINE

## 2023-11-16 PROCEDURE — 250N000013 HC RX MED GY IP 250 OP 250 PS 637

## 2023-11-16 PROCEDURE — 36415 COLL VENOUS BLD VENIPUNCTURE: CPT | Performed by: INTERNAL MEDICINE

## 2023-11-16 PROCEDURE — 99231 SBSQ HOSP IP/OBS SF/LOW 25: CPT | Performed by: INTERNAL MEDICINE

## 2023-11-16 PROCEDURE — 250N000013 HC RX MED GY IP 250 OP 250 PS 637: Performed by: NURSE PRACTITIONER

## 2023-11-16 PROCEDURE — 82565 ASSAY OF CREATININE: CPT | Performed by: STUDENT IN AN ORGANIZED HEALTH CARE EDUCATION/TRAINING PROGRAM

## 2023-11-16 PROCEDURE — 85027 COMPLETE CBC AUTOMATED: CPT | Performed by: INTERNAL MEDICINE

## 2023-11-16 PROCEDURE — 99232 SBSQ HOSP IP/OBS MODERATE 35: CPT | Performed by: INTERNAL MEDICINE

## 2023-11-16 RX ORDER — ONDANSETRON 2 MG/ML
4 INJECTION INTRAMUSCULAR; INTRAVENOUS
Status: DISCONTINUED | OUTPATIENT
Start: 2023-11-17 | End: 2023-11-17 | Stop reason: HOSPADM

## 2023-11-16 RX ORDER — HYDROXYZINE HYDROCHLORIDE 25 MG/1
25 TABLET, FILM COATED ORAL 3 TIMES DAILY PRN
Status: COMPLETED | OUTPATIENT
Start: 2023-11-16 | End: 2023-11-17

## 2023-11-16 RX ORDER — MAGNESIUM CARB/ALUMINUM HYDROX 105-160MG
296 TABLET,CHEWABLE ORAL ONCE
Status: DISCONTINUED | OUTPATIENT
Start: 2023-11-17 | End: 2023-11-16

## 2023-11-16 RX ORDER — BISACODYL 5 MG
10 TABLET, DELAYED RELEASE (ENTERIC COATED) ORAL ONCE
Status: COMPLETED | OUTPATIENT
Start: 2023-11-16 | End: 2023-11-16

## 2023-11-16 RX ORDER — LIDOCAINE 40 MG/G
CREAM TOPICAL
Status: DISCONTINUED | OUTPATIENT
Start: 2023-11-16 | End: 2023-11-17 | Stop reason: HOSPADM

## 2023-11-16 RX ADMIN — HYDRALAZINE HYDROCHLORIDE 10 MG: 20 INJECTION INTRAMUSCULAR; INTRAVENOUS at 19:49

## 2023-11-16 RX ADMIN — BISACODYL 10 MG: 5 TABLET, COATED ORAL at 08:50

## 2023-11-16 RX ADMIN — SENNOSIDES AND DOCUSATE SODIUM 2 TABLET: 8.6; 5 TABLET ORAL at 21:01

## 2023-11-16 RX ADMIN — SODIUM PHOSPHATE, DIBASIC AND SODIUM PHOSPHATE, MONOBASIC 1 ENEMA: 7; 19 ENEMA RECTAL at 09:33

## 2023-11-16 RX ADMIN — HYDRALAZINE HYDROCHLORIDE 10 MG: 20 INJECTION INTRAMUSCULAR; INTRAVENOUS at 12:22

## 2023-11-16 RX ADMIN — SENNOSIDES AND DOCUSATE SODIUM 2 TABLET: 8.6; 5 TABLET ORAL at 08:49

## 2023-11-16 RX ADMIN — HYDROXYZINE HYDROCHLORIDE 25 MG: 25 TABLET, FILM COATED ORAL at 21:16

## 2023-11-16 RX ADMIN — AMLODIPINE BESYLATE 5 MG: 5 TABLET ORAL at 08:49

## 2023-11-16 RX ADMIN — NITROGLYCERIN 15 MG: 20 OINTMENT TOPICAL at 19:49

## 2023-11-16 RX ADMIN — BUPROPION HYDROCHLORIDE 300 MG: 150 TABLET, FILM COATED, EXTENDED RELEASE ORAL at 08:49

## 2023-11-16 RX ADMIN — AMLODIPINE BESYLATE 5 MG: 5 TABLET ORAL at 21:02

## 2023-11-16 RX ADMIN — ATENOLOL 25 MG: 25 TABLET ORAL at 21:02

## 2023-11-16 RX ADMIN — ACETAMINOPHEN 650 MG: 325 TABLET ORAL at 09:24

## 2023-11-16 RX ADMIN — POLYETHYLENE GLYCOL 3350 17 G: 17 POWDER, FOR SOLUTION ORAL at 08:49

## 2023-11-16 RX ADMIN — ATENOLOL 25 MG: 25 TABLET ORAL at 09:13

## 2023-11-16 RX ADMIN — Medication 81 MG: at 08:50

## 2023-11-16 RX ADMIN — LEVOTHYROXINE SODIUM 75 MCG: 0.03 TABLET ORAL at 08:49

## 2023-11-16 RX ADMIN — POLYETHYLENE GLYCOL 3350, SODIUM SULFATE ANHYDROUS, SODIUM BICARBONATE, SODIUM CHLORIDE, POTASSIUM CHLORIDE 4000 ML: 236; 22.74; 6.74; 5.86; 2.97 POWDER, FOR SOLUTION ORAL at 10:48

## 2023-11-16 ASSESSMENT — ACTIVITIES OF DAILY LIVING (ADL)
ADLS_ACUITY_SCORE: 18

## 2023-11-16 NOTE — PLAN OF CARE
Assumed care 2300 to 0730. A&O x 4. Independent. Tele is sinus bradycardia. Denies pain. Up to toilet. Call light within reach, able to make needs known. Bed alarm on for safety.    Problem: Dysrhythmia  Goal: Normalized Cardiac Rhythm  Outcome: Progressing     Problem: Cardiac Catheterization (Diagnostic/Interventional)  Goal: Absence of Vascular Access Complication  Intervention: Prevent and Manage Access Complications  Recent Flowsheet Documentation  Taken 11/16/2023 0432 by JUAN KELLY  Bleeding Precautions: monitored for signs of bleeding  Activity Management: activity adjusted per tolerance  Head of Bed (HOB) Positioning: HOB at 20-30 degrees

## 2023-11-16 NOTE — PROGRESS NOTES
Pt Aox4, pleasant affect, calm and cooperative with cares. Right radial site unchanged, WNL. Pt denies CP, SOB. VSS with ongoing elevated BP. 1 unit of PRBCs administered this afternoon without complications; well tolerated by Pt. Atenolol and topical nitroglycerin started this evening. Pt denies adverse effects. Plan to continue medically managing unstable angina, await hematology and gastroenterology consult. Nursing staff will continue to monitor.

## 2023-11-16 NOTE — CONSULTS
[place] [facility]    Lisseth Qureshi   [address] [facility] [address]  68 year old female  Admission Date/Time: 11/13/2023 11:33 AM    Primary Care Provider:  Shivani Caal    Requesting Physician: Jaylene Dhillon MD      CHIEF COMPLAINT:   Chest pressure    REASON FOR THE CONSULT:  Anemia    HPI:     Lisseth is a 68-year-old female with history of coronary artery disease status post PCI, diabetes mellitus type 2, hypertension, hypothyroidism, vertebral artery occlusion who was admitted on November 13 with chest pain.  He underwent coronary angiogram November 14 showing severe RCA in-stent restenosis as well as additional coronary artery disease..  CV surgery consulted for possible CABG.  Currently, the plan is to consider coronary artery bypass grafting or PCI next week pending evaluation of his anemia.  Patient is currently on aspirin and Plavix.    On admission she was found to be anemic with a hemoglobin of 9.5.  This was down from 12.4 in April 2023.  Iron saturation on admission was 4%.    She does not see any blood in her stool. No black stool. Last BM was last Thursday or Friday. She has baseline chronic constipation.     The patient last underwent colonoscopy in April 2014.  She was noted to have a very long and tortuous colon with a poor prep despite a double prep.  Diverticulosis was seen in the sigmoid colon.  Follow-up colonoscopy was recommended for 1 year.  This was not done.  She also underwent an upper endoscopy at that same time for nausea and vomiting as well as abdominal pain.  She was noted to have LA grade a esophagitis with a large hiatal hernia measuring 5 cm.  Biopsies from the small bowel were normal as were her esophagus biopsies.    REVIEW OF SYSTEMS: 13 point review of systems is negative except that noted above.    PAST MEDICAL HISTORY: CAD, HTN, DM2, hypothyroidism, chronic constipation    PAST SURGICAL HISTORY:   Past Surgical History:   Procedure Laterality  Date    CORONARY STENT PLACEMENT  10/19/2012    glenna 1st obtuse marginal    CV CORONARY ANGIOGRAM N/A 2023    Procedure: Coronary Angiogram;  Surgeon: Javier Guerra MD;  Location: [facility] CATH LAB CV    CV CORONARY ANGIOGRAM N/A 2023    Procedure: Coronary Angiogram;  Surgeon: Dilan Archuleta MD;  Location: [facility] CATH LAB CV    CV INSTANTANEOUS WAVE-FREE RATIO N/A 2023    Procedure: Instantaneous Wave-Free Ratio;  Surgeon: Dilan Archuleta MD;  Location: [facility] CATH LAB CV    CV LEFT HEART CATH N/A 2023    Procedure: Left Heart Catheterization;  Surgeon: Javier Guerra MD;  Location: [facility] CATH LAB CV    CV PCI N/A 2023    Procedure: Percutaneous Coronary Intervention;  Surgeon: Javier Guerra MD;  Location: [facility] CATH LAB CV    SHOULDER SURGERY      frozen shoulder       FAMILY HISTORY:   Family History   Problem Relation Age of Onset    Hypertension Mother     Dementia Mother     Mitral Valve Replacement No family hx of        SOCIAL HISTORY:   Social History     Tobacco Use    Smoking status: Former     Packs/day: 1.50     Years: 35.00     Additional pack years: 0.00     Total pack years: 52.50     Types: Cigarettes     Quit date: 2005     Years since quittin.8     Passive exposure: Past    Smokeless tobacco: Not on file    Tobacco comments:     pt quit 10 years ago (she had smoked x 35 years)   Substance Use Topics    Alcohol use: No     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 90 tablet 3 2023 at AM    buPROPion (WELLBUTRIN XL) 300 MG 24 hr tablet Take 300 mg by mouth daily   2023 at AM    clopidogrel (PLAVIX) 75 MG tablet Take 1 tablet (75 mg) by mouth daily 90 tablet 3  11/13/2023 at AM    coenzyme Q10 (COQ-10) 100 mg capsule 100 mg every evening   11/12/2023 at HS    evolocumab (REPATHA SURECLICK) 140 MG/ML prefilled autoinjector Inject 1 mL (140 mg) Subcutaneous every 14 days 6 mL 3 11/5/2023    exenatide ER (BYDUREON BCISE) 2 MG/0.85ML auto-injector Inject 2 mg Subcutaneous every 7 days Once a week on Sunday 11/12/2023 at Sundays    fish oil-omega-3 fatty acids 1000 MG capsule Take 2 g by mouth every evening   11/12/2023 at hs    levothyroxine (SYNTHROID, LEVOTHROID) 75 MCG tablet [LEVOTHYROXINE (SYNTHROID, LEVOTHROID) 75 MCG TABLET] Take 75 mcg by mouth daily.   11/13/2023 at AM    melatonin 3 mg Tab [MELATONIN 3 MG TAB] Take 3 mg by mouth bedtime as needed.   Past Month    metFORMIN (GLUCOPHAGE XR) 500 MG 24 hr tablet Take 500 mg by mouth daily before breakfast   11/13/2023 at AM    metFORMIN (GLUCOPHAGE XR) 500 MG 24 hr tablet Take 1,000 mg by mouth daily (with dinner)   11/12/2023 at PM    multivitamin therapeutic (THERAGRAN) tablet [MULTIVITAMIN THERAPEUTIC (THERAGRAN) TABLET] Take 1 tablet by mouth daily.   11/13/2023    red yeast rice 600 mg Tab [RED YEAST RICE 600 MG TAB] Take 1 tablet by mouth daily.   11/12/2023 at PM       ALLERGIES/SENSITIVITIES: Codeine, Guaifenesin, Penicillins, and Rosuvastatin calcium [rosuvastatin]    MEDICATIONS:  No current outpatient medications on file.       PHYSICAL EXAM:  Temp:  [97.8  F (36.6  C)-99  F (37.2  C)] 97.8  F (36.6  C)  Pulse:  [52-92] 58  Resp:  [18-20] 18  BP: (119-177)/() 138/66  SpO2:  [93 %-98 %] 93 %  Body mass index is 33.51 kg/m .  GEN: Well developed, well nourished 68 year old in no acute distress.  HEENT: sclera anicteric, moist mucous membranes.   LYMPH: No cervical lymphadenopathy  PULM: Nonlabored breathing. Breath sounds equal.   CARDIO: Regular rate  GI: Non-distended. Soft.  Non-tender to palpation.  No guarding. No rebound tenderness.  EXT: warm, no lower extremity edema  NEURO: Alert. No focal  defects.   PSYCH: Mental status appropriate, mood and affect normal.    SKIN: No rashes  MSK: No joint abnormalities    LABORATORY DATA:  CBC RESULTS:   Recent Labs   Lab Test 11/16/23 0436   WBC 5.1   RBC 4.00   HGB 9.7*   HCT 31.7*   MCV 79   MCH 24.3*   MCHC 30.6*   RDW 15.2*           CMP Results:   Recent Labs   Lab Test 11/16/23  0436 11/15/23  2024 11/15/23  0758 11/15/23  0437 11/13/23  1722 11/13/23  1214   NA  --   --   --  141   < > 139   POTASSIUM 4.1  --   --  3.7   < > 4.1   CHLORIDE  --   --   --  107   < > 99   CO2  --   --   --  23   < > 24   ANIONGAP  --   --   --  11   < > 16*   GLC  --  139*   < > 132*   < > 137*   BUN  --   --   --  14.7   < > 20.2   CR 1.02*  --   --  0.92   < > 1.09*   BILITOTAL  --   --   --   --   --  0.2   ALKPHOS  --   --   --   --   --  76   ALT  --   --   --   --   --  30   AST  --   --   --   --   --  23    < > = values in this interval not displayed.        INR Results:   Recent Labs   Lab Test 11/13/23  1214   INR 1.06          RELEVANT IMAGING:      No new abdominal imaging    ASSESSMENT:     Lisseth is a 68-year-old female with history of coronary artery disease status post PCI, diabetes mellitus type 2, hypertension, hypothyroidism, vertebral artery occlusion who was admitted on November 13 with chest pain.  She was subsequently found to have severe coronary artery disease with RCA in-stent restenosis and need for revascularization via either repeat PCI or CABG.  We were asked to see her given anemia.    #Normocytic anemia, likely iron deficient anemia  -On aspirin and Plavix  -No signs of overt bleeding.  FOBT ordered but not collected yet.  -Last colonoscopy and upper endoscopy in 2014.  Colonoscopy had poor prep and very long tortuous colon with diverticulosis.  Upper endoscopy showed a 5 cm hiatal hernia with LA grade a esophagitis.  Potential causes of recurrent anemia would include [place] erosions with large hiatal hernia, esophagitis, angiectasia's,  colon polyps or less likely colon cancer.    #Coronary artery disease  -In need for revascularization via PCI or CABG next week.  -Needs further evaluation for potential source of GI bleeding given anemia.    PLAN:    -We will plan for EGD and colonoscopy tomorrow.  - Clear liquid diet today  - Prep today with fleet enema given its been a week since her last BM. Will also give bisacodyl 10 mg PO once now.   - Start golytely prep this morning in case additional prep needed tonight/tomorrow morning.   - NPO after midnight      60 minutes of total time was spent providing patient care, including patient evaluation, reviewing documentation/test results and .           David Wong, DO  Thank you for the opportunity to participate in the care of this patient.   Please feel free to call me with any questions or concerns.  Phone number [phone].            CC: [facility], Shivani Caal

## 2023-11-16 NOTE — PROGRESS NOTES
Stool sample sent.  Patient is on her third cup of Go-lytely. She declined to eat breakfast.  No morning insulin given.  Encouraged her to talk to Hospitalist about insulin.  She is resistive to taking carb coverage with history of large BG fluctuation after receiving insulin in the past.

## 2023-11-16 NOTE — PROGRESS NOTES
Heart rate low 50s-60 while awake at rest.  Tele sinus dysrhythmia.  /66.Spoke with Cardiology.  Ok to give atenolol this morning.  Tyl and ice given for right wrist discomfort post angio (11/14).  Patient starting bowel prep.  Bedside commode provided.  Stool sample needed.  Patient wants to take a shower today.  She will discuss with cardiology when they stop in.  She denies chest pain so far today.  nitroglycerin patch taken off this morning.  She is in 12 hour nitroglycerin free period.

## 2023-11-16 NOTE — PLAN OF CARE
Problem: Gastrointestinal Bleeding  Goal: Optimal Coping with Acute Illness  11/16/2023 1331 by Sury Orozco, RN  Outcome: Progressing  11/16/2023 1331 by Sury Orozco, RN  Outcome: Progressing   Goal Outcome Evaluation:  Patient alert and oriented x 4. Occult positive. NPO after midnight for EGD if all cleared out. Drinking prep for EGD. Hydralazine 10 mg given for /82. Will follow up on BP. NO c/o pain. Friend bedside and supportive. Call light in reach.

## 2023-11-16 NOTE — PROGRESS NOTES
[facility]    Medicine Progress Note - Hospitalist Service    Date of Admission:  11/13/2023    Assessment & Plan      Lisseth Qureshi is a 68 year old female admitted on 11/13/2023. She presented with chest pain of 1 day duration.  Past medical history significant for CAD S/P stent to RCA, type 2 diabetes , mellitus, hypertension, hypothyroidism, vertebral artery occlusion.     #Multivessel coronary disease involving mid LAD, proximal to mid LCx, severe in-stent restenosis of the dominant RCA  #ACS s/p PCI/stents in April 2023  #Unstable angina  -Patient presented with chest discomfort which tends to get better when she lies down and gets worse when she sits up.    -Troponin 20->15  -Had a history of angiogram done in April 2023 with stents placed to her RCA  -Coronary angiogram 11/14, showed severe RCA in-stent restenosis  -Cardiothoracic surgery consultation for evaluation of surgical revascularization, not recommended currently  -Aspirin, Plavix, Imdur    Essential Hypertension  -Continue dPTA amlodipine, started Imdur 30 mg daily  -Hydralazine as needed    Type 2 diabetes mellitus, A1c 7.4 in September  -Medium intensity insulin sliding scale  -Accu-Cheks  -Hypoglycemia protocol  -Hold home metformin and exenatide given anticipated recurrent coronary angiogram/dye load    Hypothyroidism  -Levothyroxine, TSH 3.45 in September    Microcytic anemia of iron deficiency  Chronic slow transit constipation  History of esophagitis large hiatal hernia  -Hemoglobin dropped from around 12 in June to 9.5 and further to 8.2.  -Checked normal folate and B12, iron low at 17.  Iron saturation 4%.  -No melena or hematochezia  -Iron replacement, checking Hemoccult  -Last colonoscopy near 10 years ago, with incomplete prep  -EGD/colonoscopy as outpatient  -1 unit per BC on 11/15, given unstable angina.  Posttransfusion hemoglobin 9.7.  -Patient seen by GI, plan endoscopic evaluation with EGD/colonoscopy,  "started colonoscopy prep today.    Diet: NPO per Anesthesia Guidelines for Procedure/Surgery Except for: Meds    DVT Prophylaxis: Enoxaparin (Lovenox) SQ  [place] Catheter: Not present  Lines: None     Cardiac Monitoring: ACTIVE order. Indication: Post- PCI/Angiogram (24 hours)  Code Status: Full Code      Clinically Significant Risk Factors   # Hypertension: Noted on problem list        # Obesity: Estimated body mass index is 33.12 kg/m  as calculated from the following:    Height as of this encounter: 1.499 m (4' 11\").    Weight as of this encounter: 74.4 kg (164 lb)., PRESENT ON ADMISSION         Disposition Plan    Expected Discharge Date: 11/16/2023             Jaylene Dhillon MD  Hospitalist Service  [facility]  Securely message with [employer] (more info)  Text page via [employer] Paging/Directory   ______________________________________________________________________    Interval History   No recurrence of chest pain.  S/p 1 unit PRBCs yesterday for hemoglobin of 8.2, hemoglobin up to 9.7.  Tolerated transfusion.  No dyspnea, cough, abdominal pain, melena, hematochezia, fever.  Seen by GI, starting prep for colonoscopy/EGD tomorrow.    Physical Exam   Vital Signs: Temp: 97.4  F (36.3  C) Temp src: Axillary BP: (!) 167/74 Pulse: 66   Resp: 18 SpO2: 97 % O2 Device: None (Room air)    Weight: 165 lbs 14.4 oz  General: Alert and oriented x 3. Not in obvious distress.  HEENT: NC, AT. Neck- supple, No JVP elevation, lymphadenopathy or thyromegaly. Trachea-central.  Chest: Clear to auscultation bilaterally.  Heart: S1S2 regular. No M/R/G.  Abdomen: Soft. NT, ND. No organomegaly. Bowel sounds- active.  Back: No spine tenderness. No CVA tenderness.  Extremities: No leg swelling. Peripheral pulses 2+ bilaterally.  Neuro: Cranial nerves 1-12 grossly normal. No focal neurological deficit  Medical Decision Making     40 MINUTES SPENT BY ME on the date of service doing chart review, history, exam, " documentation & further activities per the note.      Data     I have personally reviewed the following data over the past 24 hrs:    4.8  \   8.2 (L)   / 258     141 107 14.7 /  174 (H)   3.7 23 0.92 \     TSH: N/A T4: N/A A1C: N/A       Imaging results reviewed over the past 24 hrs:   No results found for this or any previous visit (from the past 24 hour(s)).

## 2023-11-17 ENCOUNTER — ANESTHESIA (OUTPATIENT)
Dept: SURGERY | Facility: HOSPITAL | Age: 68
DRG: 234 | End: 2023-11-17
Payer: COMMERCIAL

## 2023-11-17 ENCOUNTER — APPOINTMENT (OUTPATIENT)
Dept: ULTRASOUND IMAGING | Facility: HOSPITAL | Age: 68
DRG: 234 | End: 2023-11-17
Attending: THORACIC SURGERY (CARDIOTHORACIC VASCULAR SURGERY)
Payer: COMMERCIAL

## 2023-11-17 ENCOUNTER — ANESTHESIA EVENT (OUTPATIENT)
Dept: SURGERY | Facility: HOSPITAL | Age: 68
DRG: 234 | End: 2023-11-17
Payer: COMMERCIAL

## 2023-11-17 LAB
ALBUMIN UR-MCNC: NEGATIVE MG/DL
APPEARANCE UR: CLEAR
BACTERIA #/AREA URNS HPF: ABNORMAL /HPF
BILIRUB UR QL STRIP: NEGATIVE
COLONOSCOPY: NORMAL
COLOR UR AUTO: COLORLESS
GLUCOSE BLDC GLUCOMTR-MCNC: 129 MG/DL (ref 70–99)
GLUCOSE BLDC GLUCOMTR-MCNC: 75 MG/DL (ref 70–99)
GLUCOSE BLDC GLUCOMTR-MCNC: 81 MG/DL (ref 70–99)
GLUCOSE BLDC GLUCOMTR-MCNC: 88 MG/DL (ref 70–99)
GLUCOSE UR STRIP-MCNC: NEGATIVE MG/DL
HGB UR QL STRIP: NEGATIVE
KETONES UR STRIP-MCNC: 20 MG/DL
LEUKOCYTE ESTERASE UR QL STRIP: NEGATIVE
NITRATE UR QL: NEGATIVE
PH UR STRIP: 6 [PH] (ref 5–7)
PREALB SERPL-MCNC: 22.3 MG/DL (ref 20–40)
RBC URINE: 0 /HPF
SP GR UR STRIP: 1 (ref 1–1.03)
SQUAMOUS EPITHELIAL: <1 /HPF
UROBILINOGEN UR STRIP-MCNC: <2 MG/DL
WBC URINE: 2 /HPF

## 2023-11-17 PROCEDURE — 370N000017 HC ANESTHESIA TECHNICAL FEE, PER MIN: Performed by: INTERNAL MEDICINE

## 2023-11-17 PROCEDURE — 250N000011 HC RX IP 250 OP 636: Performed by: ANESTHESIOLOGY

## 2023-11-17 PROCEDURE — 99232 SBSQ HOSP IP/OBS MODERATE 35: CPT | Performed by: INTERNAL MEDICINE

## 2023-11-17 PROCEDURE — 210N000001 HC R&B IMCU HEART CARE

## 2023-11-17 PROCEDURE — 250N000013 HC RX MED GY IP 250 OP 250 PS 637

## 2023-11-17 PROCEDURE — 258N000003 HC RX IP 258 OP 636: Performed by: ANESTHESIOLOGY

## 2023-11-17 PROCEDURE — 250N000013 HC RX MED GY IP 250 OP 250 PS 637: Performed by: INTERNAL MEDICINE

## 2023-11-17 PROCEDURE — 84134 ASSAY OF PREALBUMIN: CPT | Performed by: THORACIC SURGERY (CARDIOTHORACIC VASCULAR SURGERY)

## 2023-11-17 PROCEDURE — 250N000013 HC RX MED GY IP 250 OP 250 PS 637: Performed by: STUDENT IN AN ORGANIZED HEALTH CARE EDUCATION/TRAINING PROGRAM

## 2023-11-17 PROCEDURE — 0DBL8ZZ EXCISION OF TRANSVERSE COLON, VIA NATURAL OR ARTIFICIAL OPENING ENDOSCOPIC: ICD-10-PCS | Performed by: INTERNAL MEDICINE

## 2023-11-17 PROCEDURE — 0DBM8ZZ EXCISION OF DESCENDING COLON, VIA NATURAL OR ARTIFICIAL OPENING ENDOSCOPIC: ICD-10-PCS | Performed by: INTERNAL MEDICINE

## 2023-11-17 PROCEDURE — 250N000013 HC RX MED GY IP 250 OP 250 PS 637: Performed by: NURSE PRACTITIONER

## 2023-11-17 PROCEDURE — 999N000141 HC STATISTIC PRE-PROCEDURE NURSING ASSESSMENT: Performed by: INTERNAL MEDICINE

## 2023-11-17 PROCEDURE — 272N000001 HC OR GENERAL SUPPLY STERILE: Performed by: INTERNAL MEDICINE

## 2023-11-17 PROCEDURE — 93880 EXTRACRANIAL BILAT STUDY: CPT

## 2023-11-17 PROCEDURE — 250N000011 HC RX IP 250 OP 636: Mod: JZ | Performed by: STUDENT IN AN ORGANIZED HEALTH CARE EDUCATION/TRAINING PROGRAM

## 2023-11-17 PROCEDURE — 81001 URINALYSIS AUTO W/SCOPE: CPT | Performed by: THORACIC SURGERY (CARDIOTHORACIC VASCULAR SURGERY)

## 2023-11-17 PROCEDURE — 36415 COLL VENOUS BLD VENIPUNCTURE: CPT | Performed by: THORACIC SURGERY (CARDIOTHORACIC VASCULAR SURGERY)

## 2023-11-17 PROCEDURE — 88342 IMHCHEM/IMCYTCHM 1ST ANTB: CPT | Mod: TC | Performed by: INTERNAL MEDICINE

## 2023-11-17 PROCEDURE — 250N000009 HC RX 250: Performed by: ANESTHESIOLOGY

## 2023-11-17 PROCEDURE — 360N000075 HC SURGERY LEVEL 2, PER MIN: Performed by: INTERNAL MEDICINE

## 2023-11-17 RX ORDER — ONDANSETRON 4 MG/1
4 TABLET, ORALLY DISINTEGRATING ORAL EVERY 30 MIN PRN
Status: DISCONTINUED | OUTPATIENT
Start: 2023-11-17 | End: 2023-11-17 | Stop reason: HOSPADM

## 2023-11-17 RX ORDER — FLUMAZENIL 0.1 MG/ML
0.2 INJECTION, SOLUTION INTRAVENOUS
Status: ACTIVE | OUTPATIENT
Start: 2023-11-17 | End: 2023-11-18

## 2023-11-17 RX ORDER — SODIUM CHLORIDE, SODIUM LACTATE, POTASSIUM CHLORIDE, CALCIUM CHLORIDE 600; 310; 30; 20 MG/100ML; MG/100ML; MG/100ML; MG/100ML
INJECTION, SOLUTION INTRAVENOUS CONTINUOUS
Status: DISCONTINUED | OUTPATIENT
Start: 2023-11-17 | End: 2023-11-17 | Stop reason: HOSPADM

## 2023-11-17 RX ORDER — PROPOFOL 10 MG/ML
INJECTION, EMULSION INTRAVENOUS PRN
Status: DISCONTINUED | OUTPATIENT
Start: 2023-11-17 | End: 2023-11-17

## 2023-11-17 RX ORDER — LIDOCAINE 40 MG/G
CREAM TOPICAL
Status: DISCONTINUED | OUTPATIENT
Start: 2023-11-17 | End: 2023-11-17 | Stop reason: HOSPADM

## 2023-11-17 RX ORDER — PROPOFOL 10 MG/ML
INJECTION, EMULSION INTRAVENOUS CONTINUOUS PRN
Status: DISCONTINUED | OUTPATIENT
Start: 2023-11-17 | End: 2023-11-17

## 2023-11-17 RX ORDER — LABETALOL HYDROCHLORIDE 5 MG/ML
INJECTION, SOLUTION INTRAVENOUS PRN
Status: DISCONTINUED | OUTPATIENT
Start: 2023-11-17 | End: 2023-11-17

## 2023-11-17 RX ORDER — KETAMINE HYDROCHLORIDE 10 MG/ML
INJECTION INTRAMUSCULAR; INTRAVENOUS PRN
Status: DISCONTINUED | OUTPATIENT
Start: 2023-11-17 | End: 2023-11-17

## 2023-11-17 RX ORDER — OXYCODONE HYDROCHLORIDE 5 MG/1
5 TABLET ORAL
Status: DISCONTINUED | OUTPATIENT
Start: 2023-11-17 | End: 2023-11-17 | Stop reason: HOSPADM

## 2023-11-17 RX ORDER — LIDOCAINE HYDROCHLORIDE 10 MG/ML
INJECTION, SOLUTION INFILTRATION; PERINEURAL PRN
Status: DISCONTINUED | OUTPATIENT
Start: 2023-11-17 | End: 2023-11-17

## 2023-11-17 RX ORDER — TRANEXAMIC ACID 100 MG/ML
INJECTION, SOLUTION INTRAVENOUS
Status: DISCONTINUED
Start: 2023-11-17 | End: 2023-11-17 | Stop reason: HOSPADM

## 2023-11-17 RX ORDER — ONDANSETRON 2 MG/ML
4 INJECTION INTRAMUSCULAR; INTRAVENOUS EVERY 30 MIN PRN
Status: DISCONTINUED | OUTPATIENT
Start: 2023-11-17 | End: 2023-11-17 | Stop reason: HOSPADM

## 2023-11-17 RX ORDER — OXYCODONE HYDROCHLORIDE 5 MG/1
10 TABLET ORAL
Status: DISCONTINUED | OUTPATIENT
Start: 2023-11-17 | End: 2023-11-17 | Stop reason: HOSPADM

## 2023-11-17 RX ADMIN — LEVOTHYROXINE SODIUM 75 MCG: 0.03 TABLET ORAL at 08:04

## 2023-11-17 RX ADMIN — KETAMINE HYDROCHLORIDE 20 MG: 10 INJECTION INTRAMUSCULAR; INTRAVENOUS at 13:21

## 2023-11-17 RX ADMIN — POLYETHYLENE GLYCOL 3350, SODIUM SULFATE ANHYDROUS, SODIUM BICARBONATE, SODIUM CHLORIDE, POTASSIUM CHLORIDE 2000 ML: 236; 22.74; 6.74; 5.86; 2.97 POWDER, FOR SOLUTION ORAL at 06:30

## 2023-11-17 RX ADMIN — ATENOLOL 25 MG: 25 TABLET ORAL at 20:04

## 2023-11-17 RX ADMIN — MIDAZOLAM 2 MG: 1 INJECTION INTRAMUSCULAR; INTRAVENOUS at 12:54

## 2023-11-17 RX ADMIN — AMLODIPINE BESYLATE 5 MG: 5 TABLET ORAL at 08:04

## 2023-11-17 RX ADMIN — ATENOLOL 25 MG: 25 TABLET ORAL at 08:04

## 2023-11-17 RX ADMIN — ACETAMINOPHEN 650 MG: 325 TABLET ORAL at 19:53

## 2023-11-17 RX ADMIN — KETAMINE HYDROCHLORIDE 30 MG: 10 INJECTION INTRAMUSCULAR; INTRAVENOUS at 12:54

## 2023-11-17 RX ADMIN — Medication 81 MG: at 16:53

## 2023-11-17 RX ADMIN — LIDOCAINE HYDROCHLORIDE 5 ML: 10 INJECTION, SOLUTION INFILTRATION; PERINEURAL at 12:54

## 2023-11-17 RX ADMIN — LABETALOL HYDROCHLORIDE 5 MG: 5 INJECTION, SOLUTION INTRAVENOUS at 13:00

## 2023-11-17 RX ADMIN — SENNOSIDES AND DOCUSATE SODIUM 2 TABLET: 8.6; 5 TABLET ORAL at 20:04

## 2023-11-17 RX ADMIN — PROPOFOL 50 MCG/KG/MIN: 10 INJECTION, EMULSION INTRAVENOUS at 12:54

## 2023-11-17 RX ADMIN — SODIUM CHLORIDE, POTASSIUM CHLORIDE, SODIUM LACTATE AND CALCIUM CHLORIDE: 600; 310; 30; 20 INJECTION, SOLUTION INTRAVENOUS at 12:12

## 2023-11-17 RX ADMIN — POLYETHYLENE GLYCOL 3350 17 G: 17 POWDER, FOR SOLUTION ORAL at 20:03

## 2023-11-17 RX ADMIN — HYDRALAZINE HYDROCHLORIDE 10 MG: 20 INJECTION INTRAMUSCULAR; INTRAVENOUS at 15:41

## 2023-11-17 RX ADMIN — AMLODIPINE BESYLATE 5 MG: 5 TABLET ORAL at 20:04

## 2023-11-17 RX ADMIN — HYDROXYZINE HYDROCHLORIDE 25 MG: 25 TABLET, FILM COATED ORAL at 21:12

## 2023-11-17 RX ADMIN — BUPROPION HYDROCHLORIDE 300 MG: 150 TABLET, FILM COATED, EXTENDED RELEASE ORAL at 08:03

## 2023-11-17 RX ADMIN — NITROGLYCERIN 15 MG: 20 OINTMENT TOPICAL at 19:50

## 2023-11-17 RX ADMIN — PROPOFOL 50 MG: 10 INJECTION, EMULSION INTRAVENOUS at 12:54

## 2023-11-17 ASSESSMENT — ACTIVITIES OF DAILY LIVING (ADL)
ADLS_ACUITY_SCORE: 18

## 2023-11-17 ASSESSMENT — LIFESTYLE VARIABLES: TOBACCO_USE: 1

## 2023-11-17 NOTE — PROGRESS NOTES
Imp/plan:  CAD severe three vessel dz, off plavix on asp, cont NTG paste, would not discharge, await GI work up tomorrow and then plan starting LMWH on 11/18 with plans for CABG IF no contraindications.  If CABG not an option, then consider PCI limited to RCA (with risks for ISR/restenosis) and discussion of revascularization in the future of Circ/LAD.     Current Facility-Administered Medications   Medication    acetaminophen (TYLENOL) tablet 650 mg    amLODIPine (NORVASC) tablet 5 mg    aspirin EC tablet 81 mg    atenolol (TENORMIN) tablet 25 mg    buPROPion (WELLBUTRIN XL) 24 hr tablet 300 mg    glucose gel 15-30 g    Or    dextrose 50 % injection 25-50 mL    Or    glucagon injection 1 mg    HOLD:  Metformin and metformin containing medications if patient received IV contrast with acute kidney injury or severe chronic kidney disease (stage IV or stage V; i.e., eGFR less than 30)    hydrALAZINE (APRESOLINE) injection 10 mg    insulin aspart (NovoLOG) injection (RAPID ACTING)    insulin aspart (NovoLOG) injection (RAPID ACTING)    insulin aspart (NovoLOG) injection (RAPID ACTING)    levothyroxine (SYNTHROID/LEVOTHROID) tablet 75 mcg    lidocaine (LMX4) cream    lidocaine 1 % 0.1-1 mL    [START ON 11/17/2023] magnesium citrate solution 296 mL    melatonin tablet 1 mg    naloxone (NARCAN) injection 0.2 mg    Or    naloxone (NARCAN) injection 0.4 mg    Or    naloxone (NARCAN) injection 0.2 mg    Or    naloxone (NARCAN) injection 0.4 mg    nitroGLYcerin (NITRO-BID) 2 % ointment 15 mg    [START ON 11/17/2023] ondansetron (ZOFRAN) injection 4 mg    oxyCODONE (ROXICODONE) tablet 5 mg    Or    oxyCODONE (ROXICODONE) tablet 10 mg    polyethylene glycol (MIRALAX) Packet 17 g    polyethylene glycol (MIRALAX) Packet 17 g    senna-docusate (SENOKOT-S/PERICOLACE) 8.6-50 MG per tablet 2 tablet    sodium chloride (PF) 0.9% PF flush 3 mL    sodium chloride (PF) 0.9% PF flush 3 mL    sodium chloride 0.9% BOLUS 1-250 mL     No past  "medical history on file.     Review of Systems: 12 points negative other than above    BP (!) 167/74 (BP Location: Left arm)   Pulse 66   Temp 97.4  F (36.3  C) (Axillary)   Resp 18   Ht 1.499 m (4' 11\")   Wt 75.3 kg (165 lb 14.4 oz)   LMP  (LMP Unknown)   SpO2 97%   BMI 33.51 kg/m    J    Lab Results   Component Value Date    HGB 9.7 (L) 11/16/2023     Lab Results   Component Value Date     11/16/2023     No results found for: \"CREATININE\"  No components found for: \"K\"          Santos Xavier MD  Interventional Cardiology   [facility]  [phone]    "

## 2023-11-17 NOTE — ANESTHESIA CARE TRANSFER NOTE
Patient: Lisseth Qureshi    Procedure: Procedure(s):  ESOPHAGOGASTRODUODENOSCOPY WITH BIOPSIES AND HEMOCLIP APPLICATION  COLONOSCOPY WITH POLYPECTOMY, BIOPSY AND HEMOCLIP APPLICATION       Diagnosis: Anemia, unspecified type [D64.9]  Iron deficiency [E61.1]  Diagnosis Additional Information: No value filed.    Anesthesia Type:   MAC     Note:    Oropharynx: oropharynx clear of all foreign objects  Level of Consciousness: awake  Oxygen Supplementation: room air    Independent Airway: airway patency satisfactory and stable  Dentition: dentition unchanged  Vital Signs Stable: post-procedure vital signs reviewed and stable  Report to RN Given: handoff report given  Patient transferred to: Telemetry/Step Down Unit    Handoff Report: Identifed the Patient, Identified the Reponsible Provider, Reviewed the pertinent medical history, Discussed the surgical course, Reviewed Intra-OP anesthesia mangement and issues during anesthesia, Set expectations for post-procedure period and Allowed opportunity for questions and acknowledgement of understanding            Electronically Signed By: MAVERICK Rojo CRNA  November 17, 2023  2:17 PM

## 2023-11-17 NOTE — ANESTHESIA PREPROCEDURE EVALUATION
Anesthesia Pre-Procedure Evaluation    Patient: Lisseth Qureshi   MRN: [id number] : 1955        Procedure : Procedure(s):  ESOPHAGOGASTRODUODENOSCOPY  COLONOSCOPY          History reviewed. No pertinent past medical history.   Past Surgical History:   Procedure Laterality Date    CORONARY STENT PLACEMENT  10/19/2012    glenna 1st obtuse marginal    CV CORONARY ANGIOGRAM N/A 2023    Procedure: Coronary Angiogram;  Surgeon: Javier Guerra MD;  Location: [facility] CV    CV CORONARY ANGIOGRAM N/A 2023    Procedure: Coronary Angiogram;  Surgeon: Dilan Archuleta MD;  Location: [facility] CV    CV INSTANTANEOUS WAVE-FREE RATIO N/A 2023    Procedure: Instantaneous Wave-Free Ratio;  Surgeon: Dilan Archuleta MD;  Location: [facility] CV    CV LEFT HEART CATH N/A 2023    Procedure: Left Heart Catheterization;  Surgeon: Javier Guerra MD;  Location: [facility] CV    CV PCI N/A 2023    Procedure: Percutaneous Coronary Intervention;  Surgeon: Javier Guerra MD;  Location: [facility] CV    SHOULDER SURGERY      frozen shoulder      Allergies   Allergen Reactions    Codeine      Other reaction(s): *Unknown    Guaifenesin Swelling     Felt throat swelling, Guaifenesin with codeine    Penicillins Unknown     Unknown, allergy occurred at 6 months old    Rosuvastatin Calcium [Rosuvastatin] Unknown      Social History     Tobacco Use    Smoking status: Former     Packs/day: 1.50     Years: 35.00     Additional pack years: 0.00     Total pack years: 52.50     Types: Cigarettes     Quit date: 2005     Years since quittin.8     Passive exposure: Past    Smokeless tobacco: Not on file    Tobacco comments:     pt quit 10 years ago (she had smoked x 35 years)   Substance Use Topics    Alcohol use: No     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         ROS/MED HX  ENT/Pulmonary:     (+)                tobacco use, Past use,                      Neurologic:  - neg neurologic ROS     Cardiovascular: Comment: EKG  Sinus bradycardia   Low voltage QRS   Possible Septal infarct (cited on or before 18-APR-2023)   Abnormal ECG   When compared with ECG of 18-APR-2023 08:41,   QT has shortened   Confirmed by KATERINE GIORDANO MD LOC:WW ([id number]) on 4/19/2023 8:04:57 AM     Echo  Interpretation Summary     Left ventricular size, wall motion and function are normal. The ejection  fraction is 60-65%.  Normal right ventricle size and systolic function.  No hemodynamically significant valvular abnormalities on 2D or color flow  imaging.    Catheterization 11/14/23:  Coronary Angiography:  LEFT MAIN: Short vessel with mild diffuse disease.  LEFT ANTERIOR DESCENDING: Large vessel that gives rise to a large diagonal branch and multiple septal perforators. The LAD wraps around the apex distally. The proximal LAD has mild diffuse disease followed by 50 to 70% stenosis in the midportion. Distally the LAD has mild diffuse disease. The large diagonal branch has 60% proximal stenosis followed by mild diffuse disease distally. iFR of the left anterior descending artery was 0.9 in the midportion and 0.85 distally.  LEFT CIRCUMFLEX: Dominant vessel that gives rise to a large OM1 branch, a large OM 2 branch, and terminates into a small vessel distally. Proximal to mid left circumflex has 50 to 70% stenosis followed by 50 to 70% stenosis in the midportion at the bifurcation of the OM branches. The OM1 has 50 to 70% proximal stenosis followed by mild diffuse disease distally. The OM 2 has mild diffuse disease distally. iFR of the OM 1 was 0.67. iFR of OM 2 was 0.70.  RIGHT CORONARY ARTERY: Large dominant vessel that gives rise to right posterior descending artery posterolateral system. The proximal right coronary artery has mild in-stent  restenosis followed by 95% in-stent restenosis in the midportion. Distally the right coronary artery has moderate diffuse disease. The right posterior descending and posterolateral system have mild to moderate diffuse disease.    INTERVENTIONAL DETAILS:   Lesion # 1 : 50 to 70% stenosis of the mid LAD  Lesion # 2 : 50 to 70% stenosis of the proximal and mid left circumflex    The left main coronary artery was engaged using a JL 4 diagnostic catheter that provide adequate coaptation and support. The lesions were traversed with a Comet 2 wire and iFR was performed per standard protocol.         (+) Dyslipidemia hypertension- -  CAD angina (Angina with exertion, undergoing work up for CABG)-with excertion. past MI - stent-   Taking blood thinners        MENG.                        (-) murmur and wheezes   METS/Exercise Tolerance: >4 METS    Hematologic:  - neg hematologic  ROS     Musculoskeletal:  - neg musculoskeletal ROS     GI/Hepatic:  - neg GI/hepatic ROS     Renal/Genitourinary:  - neg Renal ROS     Endo:     (+)  type II DM,        thyroid problem,            Psychiatric/Substance Use:  - neg psychiatric ROS     Infectious Disease:  - neg infectious disease ROS     Malignancy:  - neg malignancy ROS     Other:  - neg other ROS          Physical Exam    Airway        Mallampati: II   TM distance: > 3 FB   Neck ROM: full   Mouth opening: > 3 cm    Respiratory Devices and Support         Dental  no notable dental history     (+) Minor Abnormalities - some fillings, tiny chips      Cardiovascular          Rhythm and rate: regular and normal (-) no murmur    Pulmonary           breath sounds clear to auscultation   (-) no wheezes        OUTSIDE LABS:  CBC:   Lab Results   Component Value Date    WBC 5.1 11/16/2023    WBC 4.8 11/15/2023    HGB 9.7 (L) 11/16/2023    HGB 8.2 (L) 11/15/2023    HCT 31.7 (L) 11/16/2023    HCT 27.1 (L) 11/15/2023     11/16/2023     11/15/2023     BMP:   Lab Results   Component  "Value Date     11/15/2023     11/14/2023    POTASSIUM 4.1 11/16/2023    POTASSIUM 3.7 11/15/2023    CHLORIDE 107 11/15/2023    CHLORIDE 103 11/14/2023    CO2 23 11/15/2023    CO2 26 11/14/2023    BUN 14.7 11/15/2023    BUN 19.3 11/14/2023    CR 1.02 (H) 11/16/2023    CR 0.92 11/15/2023    GLC 81 11/17/2023    GLC 88 11/17/2023     COAGS:   Lab Results   Component Value Date    PTT 29 11/13/2023    INR 1.06 11/13/2023     POC: No results found for: \"BGM\", \"HCG\", \"HCGS\"  HEPATIC:   Lab Results   Component Value Date    ALBUMIN 4.4 11/13/2023    PROTTOTAL 7.0 11/13/2023    ALT 30 11/13/2023    AST 23 11/13/2023    ALKPHOS 76 11/13/2023    BILITOTAL 0.2 11/13/2023     OTHER:   Lab Results   Component Value Date    A1C 6.4 (H) 11/14/2023    AMBRELY 9.3 11/15/2023    MAG 2.1 11/16/2023    LIPASE 33 11/13/2023    TSH 3.45 09/13/2023       Anesthesia Plan    ASA Status:  4    NPO Status:  NPO Appropriate    Anesthesia Type: MAC.   Induction: Intravenous, Propofol.   Maintenance: TIVA.        Consents    Anesthesia Plan(s) and associated risks, benefits, and realistic alternatives discussed. Questions answered and patient/representative(s) expressed understanding.     - Discussed: Risks, Benefits and Alternatives for BOTH SEDATION and the PROCEDURE were discussed     - Discussed with:  Patient      - Extended Intubation/Ventilatory Support Discussed: Yes.      - Patient is DNR/DNI Status: No     Use of blood products discussed: Yes.     - Discussed with: Patient.     - Consented: consented to blood products     Postoperative Care    Pain management: IV analgesics, Oral pain medications, Multi-modal analgesia.   PONV prophylaxis: Ondansetron (or other 5HT-3), Dexamethasone or Solumedrol     Comments:    Other Comments: TIVA with Propofol  Zofran for PONV            Jhonny Akers MD  "

## 2023-11-17 NOTE — H&P
GENERAL PRE-PROCEDURE:   Procedure:  EGD and Colonoscopy  Date/Time:  11/17/2023 12:30 PM    Verbal consent obtained?: Yes    Written consent obtained?: Yes    Risks and benefits: Risks, benefits and alternatives were discussed    Consent given by:  Patient  Patient states understanding of procedure being performed: Yes    Patient's understanding of procedure matches consent: Yes    Procedure consent matches procedure scheduled: Yes    Expected level of sedation:  Moderate  Appropriately NPO:  Yes  ASA Class:  3  Mallampati  :  Grade 2- soft palate, base of uvula, tonsillar pillars, and portion of posterior pharyngeal wall visible  Lungs:  Lungs clear with good breath sounds bilaterally  Heart:  Normal heart sounds and rate  History & Physical reviewed:  History and physical reviewed and no updates needed  Statement of review:  I have reviewed the lab findings, diagnostic data, medications, and the plan for sedation

## 2023-11-17 NOTE — ANESTHESIA POSTPROCEDURE EVALUATION
Patient: Lisseth Qureshi    Procedure: Procedure(s):  ESOPHAGOGASTRODUODENOSCOPY WITH BIOPSIES AND HEMOCLIP APPLICATION  COLONOSCOPY WITH POLYPECTOMY, BIOPSY AND HEMOCLIP APPLICATION       Anesthesia Type:  MAC    Note:  Disposition: Inpatient   Postop Pain Control: Uneventful            Sign Out: Well controlled pain   PONV: No   Neuro/Psych: Uneventful            Sign Out: Acceptable/Baseline neuro status   Airway/Respiratory: Uneventful            Sign Out: Acceptable/Baseline resp. status   CV/Hemodynamics: Uneventful            Sign Out: Acceptable CV status; No obvious hypovolemia; No obvious fluid overload   Other NRE: NONE   DID A NON-ROUTINE EVENT OCCUR? No           Last vitals:  Vitals:    11/17/23 0356 11/17/23 0717 11/17/23 1133   BP: (!) 141/65 (!) 164/71 (!) 176/77   Pulse: 76 67 72   Resp: 19 18 18   Temp: 36.7  C (98.1  F) 36.6  C (97.8  F) 36.8  C (98.2  F)   SpO2: 95% 96% 96%       Electronically Signed By: Jhonny Akers MD  November 17, 2023  2:27 PM

## 2023-11-17 NOTE — PLAN OF CARE
Problem: Gastrointestinal Bleeding  Goal: Optimal Coping with Acute Illness  Outcome: Progressing  Goal: Hemostasis  Outcome: Progressing   Goal Outcome Evaluation:         No bleeding seen, had bowel prep for colonoscopy/ EGD, tolerated well, states stool is watery and color gets lighter each time,

## 2023-11-17 NOTE — PLAN OF CARE
Goal Outcome Evaluation:    Pt Aox4, pleasant affect, calm and cooperative with cares. Ongoing hypertension throughout evening; PRN IV hydralazine given at 1950. BP continued to increase with max of 222/91 at 2100. Scheduled atenolol given at that time. Pt endorsed mild chest pressure that resolved following topical NTG and BP control. Normotensive at 2200; /61. Otherwise vitally stable on RA.    Pt continuing double bowel prep for EGD and colonoscopy tomorrow at 1300. Stool is still liquid brown, no blood noted. Next round of golytely scheduled to start at 0600. Clear liquid diet until NPO at midnight. Pt tolerating prep well. Nursing staff will continue to monitor.

## 2023-11-17 NOTE — UTILIZATION REVIEW
Admission Status; Secondary Review Determination   INSURANCE DENIAL      Under the authority of the Utilization Management Committee, the utilization review process indicated a secondary review on the above patient. The review outcome is based on review of the medical records, discussions with staff, and applying clinical experience noted on the date of the review.     (x) Inpatient Status Appropriate - This patient's medical care is consistent with medical management for inpatient care and reasonable inpatient medical practice.     RATIONALE FOR DETERMINATION     Ms. Loomis is a 67 yo female with a PMH of CAD who presents to the ED with chest pain and troponin elevation.  Cardiology consulted and underwent angiogram 11/14 which revealed severe RCA in-stent stenosis.  CTS consulted and cardiology following; remaining on nitroglycerin paste for unstable angina.  If CABG not an option then PCI limited to RCA before hospital discharge being considered.  Currently undergoing inpatient work-up for iron deficiency anemia (plans for both EGD/colonoscopy) suspected d/t acute GI bleeding.  Required PRBC transfusion 11/6 and serial hgb being monitored closely.      At this time, she is required to continued inpatient evaluation, treatment and clinical monitoring.  She is not yet medically cleared for discharge by primary service or multiple consulting physicians.       At the time of admission with the information available to the attending physician more than 2 nights Hospital complex care was anticipated, based on patient risk of adverse outcome if treated as outpatient and complex care required. Inpatient admission is appropriate based on the Medicare guidelines.     The information on this document is developed by the utilization review team in order for the business office to ensure compliance. This only denotes the appropriateness of proper admission status and does not reflect the quality of care rendered.   The  definitions of Inpatient Status and Observation Status used in making the determination above are those provided in the CMS Coverage Manual, Chapter 1 and Chapter 6, section 70.4.         Sincerely,     Sylvie Goss, DO  Utilization Review  Physician Advisor  [facility].

## 2023-11-17 NOTE — H&P
ENDOSCOPY PRE-PROCEDURE HISTORY & PHYSICAL    CHIEF COMPLAINT / REASON FOR PROCEDURE:  Anemia, likely iron deficiency    PERTINENT HISTORY   History reviewed. No pertinent past medical history.  Past Surgical History:   Procedure Laterality Date    CORONARY STENT PLACEMENT  10/19/2012    glenna 1st obtuse marginal    CV CORONARY ANGIOGRAM N/A 4/18/2023    Procedure: Coronary Angiogram;  Surgeon: Javier Guerra MD;  Location: [facility] CV    CV CORONARY ANGIOGRAM N/A 11/14/2023    Procedure: Coronary Angiogram;  Surgeon: Dilan Archuleta MD;  Location: [facility] CV    CV INSTANTANEOUS WAVE-FREE RATIO N/A 11/14/2023    Procedure: Instantaneous Wave-Free Ratio;  Surgeon: Dilan Archuleta MD;  Location: [facility] CV    CV LEFT HEART CATH N/A 4/18/2023    Procedure: Left Heart Catheterization;  Surgeon: Javier Guerra MD;  Location: [facility] CV    CV PCI N/A 4/18/2023    Procedure: Percutaneous Coronary Intervention;  Surgeon: Javire Guerra MD;  Location: [facility] CV    SHOULDER SURGERY      frozen shoulder     Other:  None  Bleeding tendencies:  None    Relevant Family History:  None    Relevant Social History:  None    A relevant Review of Systems was performed and was negative.    ALLERGIES/SENSITIVITIES:   Allergies   Allergen Reactions    Codeine      Other reaction(s): *Unknown    Guaifenesin Swelling     Felt throat swelling, Guaifenesin with codeine    Penicillins Unknown     Unknown, allergy occurred at 6 months old    Rosuvastatin Calcium [Rosuvastatin] Unknown        CURRENT MEDICATIONS:     Medications Prior to Admission   Medication Sig Dispense Refill Last Dose    amLODIPine (NORVASC) 10 MG tablet Take 10 mg by mouth daily   11/13/2023 at AM    aspirin (ASA) 81 MG EC tablet Take 1 tablet (81 mg) by mouth daily Start tomorrow. 90 tablet 3 11/13/2023 at AM    buPROPion (WELLBUTRIN XL) 300 MG 24 hr tablet Take 300 mg by mouth daily   11/13/2023 at AM     clopidogrel (PLAVIX) 75 MG tablet Take 1 tablet (75 mg) by mouth daily 90 tablet 3 11/13/2023 at AM    coenzyme Q10 (COQ-10) 100 mg capsule 100 mg every evening   11/12/2023 at HS    evolocumab (REPATHA SURECLICK) 140 MG/ML prefilled autoinjector Inject 1 mL (140 mg) Subcutaneous every 14 days 6 mL 3 11/5/2023    exenatide ER (BYDUREON BCISE) 2 MG/0.85ML auto-injector Inject 2 mg Subcutaneous every 7 days Once a week on Sunday 11/12/2023 at Sundays    fish oil-omega-3 fatty acids 1000 MG capsule Take 2 g by mouth every evening   11/12/2023 at hs    levothyroxine (SYNTHROID, LEVOTHROID) 75 MCG tablet [LEVOTHYROXINE (SYNTHROID, LEVOTHROID) 75 MCG TABLET] Take 75 mcg by mouth daily.   11/13/2023 at AM    melatonin 3 mg Tab [MELATONIN 3 MG TAB] Take 3 mg by mouth bedtime as needed.   Past Month    metFORMIN (GLUCOPHAGE XR) 500 MG 24 hr tablet Take 500 mg by mouth daily before breakfast   11/13/2023 at AM    metFORMIN (GLUCOPHAGE XR) 500 MG 24 hr tablet Take 1,000 mg by mouth daily (with dinner)   11/12/2023 at PM    multivitamin therapeutic (THERAGRAN) tablet [MULTIVITAMIN THERAPEUTIC (THERAGRAN) TABLET] Take 1 tablet by mouth daily.   11/13/2023    red yeast rice 600 mg Tab [RED YEAST RICE 600 MG TAB] Take 1 tablet by mouth daily.   11/12/2023 at PM           LABORATORY:   CMP Results:   Recent Labs   Lab 11/17/23  1138 11/17/23  0725 11/16/23  2105 11/16/23  1738 11/16/23  0911 11/16/23  0436 11/15/23  0758 11/15/23  0437 11/14/23  0721 11/14/23  0450 11/13/23  1722 11/13/23  1214   NA  --   --   --   --   --   --   --  141  --  140  --  139   POTASSIUM  --   --   --   --   --  4.1  --  3.7  --  4.2  --  4.1   CHLORIDE  --   --   --   --   --   --   --  107  --  103  --  99   CO2  --   --   --   --   --   --   --  23  --  26  --  24   GLC 81 88 113* 98   < >  --    < > 132*   < > 143*   < > 137*   BUN  --   --   --   --   --   --   --  14.7  --  19.3  --  20.2   CR  --   --   --   --   --  1.02*  --  0.92  --  1.31*  " --  1.09*   BILITOTAL  --   --   --   --   --   --   --   --   --   --   --  0.2   ALKPHOS  --   --   --   --   --   --   --   --   --   --   --  76   ALT  --   --   --   --   --   --   --   --   --   --   --  30   AST  --   --   --   --   --   --   --   --   --   --   --  23    < > = values in this interval not displayed.      CBC  Recent Labs   Lab 11/16/23  0436 11/15/23  0437 11/14/23  0450 11/13/23  1214   WBC 5.1 4.8 4.8 6.5   RBC 4.00 3.44* 3.71* 3.92   HGB 9.7* 8.2* 8.9* 9.5*   HCT 31.7* 27.1* 29.2* 30.8*   MCV 79 79 79 79   RDW 15.2* 15.3* 15.4* 15.4*    258 289 292     INR  Recent Labs   Lab 11/13/23  1214   INR 1.06          I reviewed the patient's pertinent imaging test results. None      RELEVANT EXAM:     BP (!) 176/77 (BP Location: Right arm)   Pulse 72   Temp 98.2  F (36.8  C) (Oral)   Resp 18   Ht 1.499 m (4' 11\")   Wt 74.9 kg (165 lb 1.6 oz)   LMP  (LMP Unknown)   SpO2 96%   BMI 33.35 kg/m     Lung Exam:   Normal  Heart Exam:  Normal  Airway Exam:  Normal  Mental Status:  Patient understands indications, risks and benefits and wishes to proceed with the upper endoscopy and colonoscopy.  Mallampati:II  Previous reaction to anesthesia/sedation:   None  Sedation plan based on assessment:  Moderate  Comment(s):  None    Consent signed by:  The patient.     ASA Classification:  3    IMPRESSION:      Normocytic anemia, likely iron deficiency  Last aspirin 11/16. Last clopidogrel 11/15.   PLAN:      EGD and Colonoscopy.     David Wong DO  [facility]  [phone]    "

## 2023-11-17 NOTE — PROGRESS NOTES
[facility]    Medicine Progress Note - Hospitalist Service    Date of Admission:  11/13/2023    Assessment & Plan      Lisseth Qureshi is a 68 year old female admitted on 11/13/2023. She presented with chest pain of 1 day duration.  Past medical history significant for CAD S/P stent to RCA, type 2 diabetes , mellitus, hypertension, hypothyroidism, vertebral artery occlusion.     #Multivessel coronary disease involving mid LAD, proximal to mid LCx, severe in-stent restenosis of the dominant RCA  #ACS s/p PCI/stents in April 2023  #Unstable angina  -Patient presented with chest discomfort which tends to get better when she lies down and gets worse when she sits up.    -Troponin 20->15  -Had a history of angiogram done in April 2023 with stents placed to her RCA  -Coronary angiogram 11/14, showed severe RCA in-stent restenosis  -Cardiothoracic surgery consultation for evaluation of surgical revascularization, pending if PCI re vascularization fails  -Aspirin, Plavix, Imdur  -Cardiology planning starting low molecular weight heparin on 11/18 with plans for CABG vs PCI to RCA    Essential Hypertension  -Continue dPTA amlodipine, started Imdur 30 mg daily  -Hydralazine as needed    Type 2 diabetes mellitus, A1c 7.4 in September  -Medium intensity insulin sliding scale  -Accu-Cheks  -Hypoglycemia protocol  -Hold home metformin and exenatide given anticipated recurrent coronary angiogram/dye load    Hypothyroidism  -Levothyroxine, TSH 3.45 in September    Microcytic anemia of iron deficiency  Chronic slow transit constipation  History of esophagitis large hiatal hernia  Slow transit constipation  No contraindication from GI standpoint for coronary revascularization  -Hemoglobin dropped from around 12 in June to 9.5 and further to 8.2.  -Checked normal folate and B12, iron low at 17.  Iron saturation 4%.  -No melena or hematochezia  -Iron replacement, checking Hemoccult  -Last colonoscopy near 10  "years ago, with incomplete prep  -Colonoscopy 11/17-transverse colon sessile polyp/removed 6 mm flat polyp in descending colon-removed, few diverticuli, nonbleeding internal hemorrhoids recommended repeat colonoscopy in 3 years, follow pathology report  -1 unit per BC on 11/15, given unstable angina.  Posttransfusion hemoglobin 9.7.  -Daily MiraLAX    Diet: Low Saturated Fat Na <2400 mg    DVT Prophylaxis: Enoxaparin (Lovenox) SQ  [place] Catheter: Not present  Lines: None     Cardiac Monitoring: ACTIVE order. Indication: Post- PCI/Angiogram (24 hours)  Code Status: Full Code      Clinically Significant Risk Factors   # Hypertension: Noted on problem list        # Obesity: Estimated body mass index is 33.12 kg/m  as calculated from the following:    Height as of this encounter: 1.499 m (4' 11\").    Weight as of this encounter: 74.4 kg (164 lb).          Disposition Plan    Expected Discharge Date: 11/16/2023             Jaylene Dhillon MD  Hospitalist Service  [facility]  Securely message with [employer] (more info)  Text page via [employer] Paging/Directory   ______________________________________________________________________    Interval History   No recurrence of chest pain.  S/p 1 unit PRBCs yesterday for hemoglobin of 8.2, hemoglobin up to 9.7.  Tolerated transfusion.  No dyspnea, cough, abdominal pain, melena, hematochezia, fever.  SP colonoscopy today, 2 small polyps were removed.  No new complaints.    Physical Exam   Vital Signs: Temp: 98.1  F (36.7  C) Temp src: Oral BP: (!) 159/91 Pulse: 64   Resp: 20 SpO2: 98 % O2 Device: None (Room air)    Weight: 165 lbs 1.6 oz  General: Alert and oriented x 3. Not in obvious distress.  HEENT: NC, AT. Neck- supple, No JVP elevation, lymphadenopathy or thyromegaly. Trachea-central.  Chest: Clear to auscultation bilaterally.  Heart: S1S2 regular. No M/R/G.  Abdomen: Soft. NT, ND. No organomegaly. Bowel sounds- active.  Back: No spine tenderness. No CVA " tenderness.  Extremities: No leg swelling. Peripheral pulses 2+ bilaterally.  Neuro: Cranial nerves 1-12 grossly normal. No focal neurological deficit  Medical Decision Making     40 MINUTES SPENT BY ME on the date of service doing chart review, history, exam, documentation & further activities per the note.      Data     I have personally reviewed the following data over the past 24 hrs:    4.8  \   8.2 (L)   / 258     141 107 14.7 /  174 (H)   3.7 23 0.92 \     TSH: N/A T4: N/A A1C: N/A       Imaging results reviewed over the past 24 hrs:   No results found for this or any previous visit (from the past 24 hour(s)).

## 2023-11-17 NOTE — PLAN OF CARE
Problem: Adult Inpatient Plan of Care  Goal: Optimal Comfort and Wellbeing  Outcome: Progressing     Pt comfortable overnight, no pain reported. Still having loose brown stools.   VSS overnight. Slept comfortably.   Pt was given Bowel prep this morning at 0600, instructed to drink half the jug over the next hour.

## 2023-11-18 ENCOUNTER — APPOINTMENT (OUTPATIENT)
Dept: CT IMAGING | Facility: HOSPITAL | Age: 68
DRG: 234 | End: 2023-11-18
Attending: INTERNAL MEDICINE
Payer: COMMERCIAL

## 2023-11-18 PROBLEM — T82.855A: Status: ACTIVE | Noted: 2023-11-18

## 2023-11-18 LAB
ERYTHROCYTE [DISTWIDTH] IN BLOOD BY AUTOMATED COUNT: 16.4 % (ref 10–15)
GLUCOSE BLDC GLUCOMTR-MCNC: 103 MG/DL (ref 70–99)
GLUCOSE BLDC GLUCOMTR-MCNC: 120 MG/DL (ref 70–99)
GLUCOSE BLDC GLUCOMTR-MCNC: 157 MG/DL (ref 70–99)
GLUCOSE BLDC GLUCOMTR-MCNC: 89 MG/DL (ref 70–99)
HCT VFR BLD AUTO: 34.7 % (ref 35–47)
HGB BLD-MCNC: 10.8 G/DL (ref 11.7–15.7)
HOLD SPECIMEN: NORMAL
HOLD SPECIMEN: NORMAL
MCH RBC QN AUTO: 25 PG (ref 26.5–33)
MCHC RBC AUTO-ENTMCNC: 31.1 G/DL (ref 31.5–36.5)
MCV RBC AUTO: 80 FL (ref 78–100)
PLATELET # BLD AUTO: 278 10E3/UL (ref 150–450)
RBC # BLD AUTO: 4.32 10E6/UL (ref 3.8–5.2)
UFH PPP CHRO-ACNC: 0.23 IU/ML
UFH PPP CHRO-ACNC: 0.9 IU/ML
WBC # BLD AUTO: 5.2 10E3/UL (ref 4–11)

## 2023-11-18 PROCEDURE — 250N000011 HC RX IP 250 OP 636: Mod: JZ | Performed by: INTERNAL MEDICINE

## 2023-11-18 PROCEDURE — 85027 COMPLETE CBC AUTOMATED: CPT | Performed by: INTERNAL MEDICINE

## 2023-11-18 PROCEDURE — 85520 HEPARIN ASSAY: CPT | Performed by: INTERNAL MEDICINE

## 2023-11-18 PROCEDURE — 250N000013 HC RX MED GY IP 250 OP 250 PS 637: Performed by: INTERNAL MEDICINE

## 2023-11-18 PROCEDURE — 99231 SBSQ HOSP IP/OBS SF/LOW 25: CPT | Performed by: INTERNAL MEDICINE

## 2023-11-18 PROCEDURE — 36415 COLL VENOUS BLD VENIPUNCTURE: CPT | Performed by: INTERNAL MEDICINE

## 2023-11-18 PROCEDURE — 99232 SBSQ HOSP IP/OBS MODERATE 35: CPT | Performed by: INTERNAL MEDICINE

## 2023-11-18 PROCEDURE — 250N000013 HC RX MED GY IP 250 OP 250 PS 637: Performed by: STUDENT IN AN ORGANIZED HEALTH CARE EDUCATION/TRAINING PROGRAM

## 2023-11-18 PROCEDURE — 210N000001 HC R&B IMCU HEART CARE

## 2023-11-18 PROCEDURE — 999N000127 HC STATISTIC PERIPHERAL IV START W US GUIDANCE

## 2023-11-18 PROCEDURE — 71250 CT THORAX DX C-: CPT

## 2023-11-18 PROCEDURE — 250N000013 HC RX MED GY IP 250 OP 250 PS 637: Performed by: NURSE PRACTITIONER

## 2023-11-18 RX ORDER — EZETIMIBE 10 MG/1
10 TABLET ORAL AT BEDTIME
Status: DISCONTINUED | OUTPATIENT
Start: 2023-11-18 | End: 2023-11-18

## 2023-11-18 RX ORDER — FERROUS SULFATE 325(65) MG
325 TABLET ORAL DAILY
Status: DISCONTINUED | OUTPATIENT
Start: 2023-11-18 | End: 2023-11-27 | Stop reason: HOSPADM

## 2023-11-18 RX ORDER — PRAVASTATIN SODIUM 20 MG
20 TABLET ORAL DAILY
Status: DISCONTINUED | OUTPATIENT
Start: 2023-11-18 | End: 2023-11-18

## 2023-11-18 RX ORDER — HEPARIN SODIUM 10000 [USP'U]/100ML
0-5000 INJECTION, SOLUTION INTRAVENOUS CONTINUOUS
Status: DISCONTINUED | OUTPATIENT
Start: 2023-11-18 | End: 2023-11-22

## 2023-11-18 RX ADMIN — AMLODIPINE BESYLATE 5 MG: 5 TABLET ORAL at 09:37

## 2023-11-18 RX ADMIN — BUPROPION HYDROCHLORIDE 300 MG: 150 TABLET, FILM COATED, EXTENDED RELEASE ORAL at 09:37

## 2023-11-18 RX ADMIN — FERROUS SULFATE TAB 325 MG (65 MG ELEMENTAL FE) 325 MG: 325 (65 FE) TAB at 12:00

## 2023-11-18 RX ADMIN — ATENOLOL 25 MG: 25 TABLET ORAL at 20:52

## 2023-11-18 RX ADMIN — Medication 81 MG: at 09:37

## 2023-11-18 RX ADMIN — AMLODIPINE BESYLATE 5 MG: 5 TABLET ORAL at 20:52

## 2023-11-18 RX ADMIN — HEPARIN SODIUM AND DEXTROSE 750 UNITS/HR: 10000; 5 INJECTION INTRAVENOUS at 23:52

## 2023-11-18 RX ADMIN — ATENOLOL 25 MG: 25 TABLET ORAL at 09:36

## 2023-11-18 RX ADMIN — SENNOSIDES AND DOCUSATE SODIUM 2 TABLET: 8.6; 5 TABLET ORAL at 20:52

## 2023-11-18 RX ADMIN — HEPARIN SODIUM AND DEXTROSE 900 UNITS/HR: 10000; 5 INJECTION INTRAVENOUS at 08:56

## 2023-11-18 RX ADMIN — NITROGLYCERIN 15 MG: 20 OINTMENT TOPICAL at 20:59

## 2023-11-18 RX ADMIN — LEVOTHYROXINE SODIUM 75 MCG: 0.03 TABLET ORAL at 09:36

## 2023-11-18 RX ADMIN — POLYETHYLENE GLYCOL 3350 17 G: 17 POWDER, FOR SOLUTION ORAL at 20:51

## 2023-11-18 ASSESSMENT — ACTIVITIES OF DAILY LIVING (ADL)
ADLS_ACUITY_SCORE: 18

## 2023-11-18 NOTE — PROGRESS NOTES
Imp/plan:  CAD multivessel dz - with critical narrowing in RCA, off clopidogrel s/p polyp removal possible source of GI bleed, will add iv heparin and CV surgery to follow re possible CABG next week. Carotid doppler mild plaque, obtain CT chest.  If no bleeding, can consider changing to lovenox. Cont NTG paste, amldoipine, atenolol, asp  CV prevention - pt may take repatha from home    Followed by Dr. Shaffer in Cardiology    Current Facility-Administered Medications   Medication    acetaminophen (TYLENOL) tablet 650 mg    amLODIPine (NORVASC) tablet 5 mg    aspirin EC tablet 81 mg    atenolol (TENORMIN) tablet 25 mg    buPROPion (WELLBUTRIN XL) 24 hr tablet 300 mg    glucose gel 15-30 g    Or    dextrose 50 % injection 25-50 mL    Or    glucagon injection 1 mg    HOLD:  Metformin and metformin containing medications if patient received IV contrast with acute kidney injury or severe chronic kidney disease (stage IV or stage V; i.e., eGFR less than 30)    hydrALAZINE (APRESOLINE) injection 10 mg    insulin aspart (NovoLOG) injection (RAPID ACTING)    insulin aspart (NovoLOG) injection (RAPID ACTING)    insulin aspart (NovoLOG) injection (RAPID ACTING)    levothyroxine (SYNTHROID/LEVOTHROID) tablet 75 mcg    melatonin tablet 1 mg    naloxone (NARCAN) injection 0.2 mg    Or    naloxone (NARCAN) injection 0.4 mg    Or    naloxone (NARCAN) injection 0.2 mg    Or    naloxone (NARCAN) injection 0.4 mg    nitroGLYcerin (NITRO-BID) 2 % ointment 15 mg    oxyCODONE (ROXICODONE) tablet 5 mg    Or    oxyCODONE (ROXICODONE) tablet 10 mg    polyethylene glycol (MIRALAX) Packet 17 g    polyethylene glycol (MIRALAX) Packet 17 g    senna-docusate (SENOKOT-S/PERICOLACE) 8.6-50 MG per tablet 2 tablet    sodium chloride 0.9% BOLUS 1-250 mL     Past Medical History:   Diagnosis Date    Diabetes (H)     Heart disease     History of blood transfusion         Review of Systems: 12 points negative other than above    /63 (BP Location:  "Left arm)   Pulse 56   Temp 98.7  F (37.1  C) (Oral)   Resp 18   Ht 1.499 m (4' 11\")   Wt 73.9 kg (162 lb 14.4 oz)   LMP  (LMP Unknown)   SpO2 100%   BMI 32.90 kg/m    JVP<7cm, carotids normal  Lungs clear  Cor RRR no c,r,m  Abs soft +BS, no mass  Ext no c,c,e    Lab Results   Component Value Date    HGB 9.7 (L) 11/16/2023     Lab Results   Component Value Date     11/16/2023     No results found for: \"CREATININE\"  No components found for: \"K\"          Santos Xavier MD  Interventional Cardiology   [facility]  [phone]    "

## 2023-11-18 NOTE — PLAN OF CARE
Problem: Dysrhythmia  Goal: Normalized Cardiac Rhythm  Outcome: Progressing     Problem: Chest Pain  Goal: Resolution of Chest Pain Symptoms  Outcome: Progressing     Problem: Cardiac Catheterization (Diagnostic/Interventional)  Goal: Absence of Bleeding  Outcome: Progressing  Goal: Absence of Contrast-Induced Injury  Outcome: Progressing  Goal: Stable Heart Rate and Rhythm  Outcome: Progressing  Goal: Absence of Embolism Signs and Symptoms  Outcome: Progressing  Goal: Anesthesia/Sedation Recovery  Outcome: Progressing  Intervention: Optimize Anesthesia Recovery  Recent Flowsheet Documentation  Taken 11/18/2023 0441 by Nadege Bentley RN  Safety Promotion/Fall Prevention:   clutter free environment maintained   lighting adjusted   nonskid shoes/slippers when out of bed   patient and family education   room near nurse's station   room organization consistent   safety round/check completed  Reorientation Measures:   calendar in view   clock in view  Taken 11/18/2023 0045 by Nadege Bentley RN  Safety Promotion/Fall Prevention:   clutter free environment maintained   lighting adjusted   nonskid shoes/slippers when out of bed   patient and family education   room near nurse's station   room organization consistent   safety round/check completed  Reorientation Measures:   calendar in view   clock in view  Goal: Optimal Pain Control and Function  Outcome: Progressing  Goal: Absence of Vascular Access Complication  Outcome: Progressing  Intervention: Prevent and Manage Access Complications  Recent Flowsheet Documentation  Taken 11/18/2023 0441 by Nadege Bentley RN  Activity Management: up ad heather  Head of Bed (HOB) Positioning: HOB at 20-30 degrees  Taken 11/18/2023 0045 by Nadege Bentley RN  Activity Management: up ad heather  Head of Bed (HOB) Positioning: HOB at 20-30 degrees   Goal Outcome Evaluation:    Pt's vital signs were stable. She was sinus neville on telemetry. She denied any pain throughout the night. Pt did not  have any chest pain or shortness of breath overnight. Her angio site to her right radial is WDL. She is up independently in her room. She is able to make needs known. Call light is within reach.

## 2023-11-18 NOTE — PLAN OF CARE
"  Patient is A/O, on room air, and has elevated BP.  Scheduled beta blocker and nitroglycerin paste given.  Patient has anxiety, PRN Atarax given at bedtime (2100 hour).  Patient reported 3/10 chest pain that moved to her back, reported feeling relief after providing emotional support.  Patient had an EGD and colonoscopy during day shift.  It is anticipated she will have a CAB on Wed 11/22.    Problem: Adult Inpatient Plan of Care  Goal: Plan of Care Review  Description: The Plan of Care Review/Shift note should be completed every shift.  The Outcome Evaluation is a brief statement about your assessment that the patient is improving, declining, or no change.  This information will be displayed automatically on your shift  note.  Outcome: Progressing  Goal: Patient-Specific Goal (Individualized)  Description: You can add care plan individualizations to a care plan. Examples of Individualization might be:  \"Parent requests to be called daily at 9am for status\", \"I have a hard time hearing out of my right ear\", or \"Do not touch me to wake me up as it startles  me\".  Outcome: Progressing  Goal: Absence of Hospital-Acquired Illness or Injury  Outcome: Progressing  Intervention: Identify and Manage Fall Risk  Recent Flowsheet Documentation  Taken 11/17/2023 1942 by Elias Conley, RN  Safety Promotion/Fall Prevention:   activity supervised   clutter free environment maintained   increased rounding and observation   lighting adjusted   nonskid shoes/slippers when out of bed   patient and family education   room organization consistent   supervised activity  Intervention: Prevent Infection  Recent Flowsheet Documentation  Taken 11/17/2023 1942 by Elias Conley, RN  Infection Prevention:   rest/sleep promoted   environmental surveillance performed  Goal: Optimal Comfort and Wellbeing  Outcome: Progressing  Intervention: Monitor Pain and Promote Comfort  Recent Flowsheet Documentation  Taken 11/17/2023 1942 by Jarvis" Elias CAICEDO RN  Pain Management Interventions:   pillow support provided   rest   medication (see MAR)    Avinash Conley RN

## 2023-11-18 NOTE — PROGRESS NOTES
Chart review only:    This is a 68-year-old female with multivessel coronary artery disease and unstable angina who is scheduled for a CABG procedure has been consulted by the cardiothoracic surgery team for management of anemia.  Hemoglobin upon admission was 9.5.  Dropped to 8.2.  Microcytosis with MCV less than 80.  Elevated RDW.  Low MCHC and MCH.  Iron studies show classical iron deficiency with low transferrin saturation, low total iron and borderline elevated TIBC.  Ferritin was not done.  Has history 1 unit of blood.  Stool occult blood positive.  Underwent colonoscopy yesterday which showed a few polyps but no obvious source of bleeding.  Currently not on oral iron.  Hemoglobin already has come up above 10.  Stable.  This is classic iron deficiency.  Please replace iron.  If the rapid improvement in hemoglobin is desired then would recommend IV Venofer 300 mg x 3 each 2 days apart.  Even with this it will take a couple weeks for the hemoglobin to come up.  So would not expect hemoglobin to normalize within the next week.  If CABG is urgent then could proceed with it with transfusion support.  No further investigations from hematology is necessary at this point in time.    David Gtz MD  Hematology and Medical Oncology  [facility]

## 2023-11-18 NOTE — PLAN OF CARE
Problem: Cardiac Catheterization (Diagnostic/Interventional)  Goal: Stable Heart Rate and Rhythm  Outcome: Progressing   Goal Outcome Evaluation:    Vitals stable. Ct chest done.  Hep gtt 900 unit(s)/hr started.  Anti xa at 1430.  Ind in room.  LBM 16th.  Possible CABG on Wednesday. On RA 96%.  No pain.  NSR.  New IV placed by PICC.  Blood sugars 89 & 157.  Pt refused all insulin.  Said she will not take it.

## 2023-11-18 NOTE — PROGRESS NOTES
[facility]    Medicine Progress Note - Hospitalist Service    Date of Admission:  11/13/2023    Assessment & Plan     Lisseth Qureshi is a 68 year old female admitted on 11/13/2023 for chest pain.     Multivessel coronary disease with unstable angina:  Patient presented with chest discomfort which tends to get better when she lies down and gets worse when she sits up.  s/p PCI/stents in April 2023  Coronary angiogram 11/14, showed multivessel coronary artery disease involving the mid LAD, the proximal to mid left circumflex, and severe in-stent restenosis of the dominant mid right coronary artery.   - Cardiothoracic surgery consulted for evaluation of surgical revascularization: recommend PCI, plan surgical revascularization if PCI fails.  - Continue heparin drip  - Continue Aspirin, atenolol and nitropaste    Essential Hypertension: BP controlled.  - Continue PTA amlodipine  - Hydralazine as needed    Type 2 diabetes mellitus: A1c 7.4 in September  - Hold home metformin and exenatide   - Medium intensity insulin sliding scale  - Hypoglycemia protocol    Microcytic anemia of iron deficiency:  Hemoglobin dropped from around 12 in June to 9.5 and further to 8.2.  Received 1 unit blood transfusion on 11/15 given unstable angina.  Posttransfusion hemoglobin 9.7  History of esophagitis large hiatal hernia.  Normal folate and B12, iron low at 17.  Iron saturation 4%.  No melena or hematochezia.  GI consulted and Colonoscopy done 11/17: transverse colon sessile polyp- removed, 6 mm flat polyp in descending colon-removed, few diverticuli, nonbleeding internal hemorrhoids.  - No contraindication from GI standpoint for coronary revascularization  - Recommended repeat colonoscopy in 3 years, follow pathology report- Hematology consulted and input appreciated    Hypothyroidism  - Continue Levothyroxine, TSH 3.45 in September    Chronic slow transit constipation  -Daily MiraLAX            Diet: Low  "Saturated Fat Na <2400 mg    DVT Prophylaxis: Heparin drip  [place] Catheter: Not present  Lines: None     Cardiac Monitoring: ACTIVE order. Indication: Post- PCI/Angiogram (24 hours)  Code Status: Full Code      Clinically Significant Risk Factors                  # Hypertension: Noted on problem list        # Obesity: Estimated body mass index is 32.9 kg/m  as calculated from the following:    Height as of this encounter: 1.499 m (4' 11\").    Weight as of this encounter: 73.9 kg (162 lb 14.4 oz).             Disposition Plan     Expected Discharge Date: 11/20/2023    Discharge Delays: Procedure Pending (enter procedure & time in comments)    Discharge Comments: Colonoscopy 11/17.            John Mckinley MD  Hospitalist Service  [facility]  Securely message with [employer] (more info)  Text page via [employer] Paging/Directory   ______________________________________________________________________    Interval History   No chest pain and no SOB.    Physical Exam   Vital Signs: Temp: 99.6  F (37.6  C) Temp src: Oral BP: (!) 165/73 Pulse: 56   Resp: 18 SpO2: 100 % O2 Device: None (Room air)    Weight: 162 lbs 14.4 oz    General appearance: not in acute distress  HEENT: PERRL, EOMI  Lungs: Clear breath sounds in bilateral lung fields  Cardiovascular: Regular rate and rhythm, normal S1-S2  Abdomen: Soft, non tender, no distension  Musculoskeletal: No joint swelling  Skin: No rash and no edema  Neurology: AAO ×3.  Cranial nerves II - XII normal.  Normal muscle strength in all four extremities.     Medical Decision Making       45 MINUTES SPENT BY ME on the date of service doing chart review, history, exam, documentation & further activities per the note.      Data     I have personally reviewed the following data over the past 24 hrs:    5.2  \   10.8 (L)   / 278     N/A N/A N/A /  157 (H)   N/A N/A N/A \       Imaging results reviewed over the past 24 hrs:   Recent Results (from the past 24 hour(s))   US " Carotid Bilateral    Narrative    EXAM: US CAROTID BILATERAL  LOCATION: [facility]  DATE: 11/17/2023    INDICATION: Severe coronary artery disease, presurgical evaluation prior to CABG  COMPARISON: None.  TECHNIQUE: Duplex exam performed utilizing 2D gray-scale imaging, Doppler interrogation with color-flow and spectral waveform analysis. The percent diameter stenosis is determined using NASCET criteria and Society of Radiologists in Ultrasound Consensus   Criteria.    FINDINGS:    RIGHT: Mild plaque at the bifurcation. The peak systolic velocity in the ICA is less than 125 cm/sec, consistent with less than 50% stenosis. Normal velocities in the ECA. Antegrade flow within the vertebral artery.     LEFT: Mild plaque at the bifurcation. The peak systolic velocity in the ICA is less than 125 cm/sec, consistent with less than 50% stenosis. Normal velocities in the ECA. Antegrade flow within the vertebral artery.    VELOCITY CHART:  CCA   Right: 82 cm/s   Left: 93 cm/s  ICA   Right: `100 cm/s   Left: 95 cm/s  ECA   Right: 158 cm/s   Left: 170 cm/s  ICA/CCA PSV Ratio   Right: 1.2   Left: 1.0      Impression    IMPRESSION:  1.  Mild plaque formation, velocities consistent with less than 50% stenosis in the right internal carotid artery.  2.  Mild plaque formation, velocities consistent with less than 50% stenosis in the left internal carotid artery.  3.  Flow within the vertebral arteries is antegrade.   CT Chest w/o Contrast    Narrative    EXAM: CT CHEST W/O CONTRAST  LOCATION: [facility]  DATE: 11/18/2023    INDICATION: eval aorta pre CABG  COMPARISON: None.  TECHNIQUE: CT chest without IV contrast. Multiplanar reformats were obtained. Dose reduction techniques were used.  CONTRAST: None.    FINDINGS:   LUNGS AND PLEURA: Mild mid and upper lung centrilobular emphysema. A few strands of fibrosis and/or linear atelectasis in the lower lungs. Lungs are otherwise clear. No  pleural effusion.    MEDIASTINUM/AXILLAE: Moderate to marked calcified atheromatous plaque throughout the normal caliber thoracic aorta and proximal brachiocephalic arteries, as well as, the visualized abdominal aorta and visceral arteries.     Aortic valve leaflet calcification. Heart size normal. No pericardial effusion. No thoracic lymphadenopathy.     CORONARY ARTERY CALCIFICATION: Severe three-vessel coronary artery calcification.    UPPER ABDOMEN: No significant finding.    MUSCULOSKELETAL: Unremarkable.      Impression    IMPRESSION:   1.  Moderate to marked calcified atheromatous plaque throughout the normal caliber thoracic aorta and proximal brachiocephalic arteries, as well as, the visualized abdominal aorta and visceral arteries.   2.  Mild mid and upper lung centrilobular emphysema.  3.  Aortic valve leaflet calcification and severe three-vessel coronary artery calcification.

## 2023-11-18 NOTE — PLAN OF CARE
Problem: Adult Inpatient Plan of Care  Goal: Plan of Care Review  Description: The Plan of Care Review/Shift note should be completed every shift.  The Outcome Evaluation is a brief statement about your assessment that the patient is improving, declining, or no change.  This information will be displayed automatically on your shift  note.  11/17/2023 1824 by Emily Nye RN  Outcome: Progressing  11/17/2023 1157 by Emily Nye, RN  Outcome: Progressing   Goal Outcome Evaluation:         Had EGD/colonoscopy, tolerated well. Eating and drinking without problems no stool since return from procedure

## 2023-11-19 LAB
ANION GAP SERPL CALCULATED.3IONS-SCNC: 12 MMOL/L (ref 7–15)
BUN SERPL-MCNC: 12.9 MG/DL (ref 8–23)
CALCIUM SERPL-MCNC: 9 MG/DL (ref 8.8–10.2)
CHLORIDE SERPL-SCNC: 103 MMOL/L (ref 98–107)
CREAT SERPL-MCNC: 1.07 MG/DL (ref 0.51–0.95)
CREAT SERPL-MCNC: 1.07 MG/DL (ref 0.51–0.95)
DEPRECATED HCO3 PLAS-SCNC: 25 MMOL/L (ref 22–29)
EGFRCR SERPLBLD CKD-EPI 2021: 57 ML/MIN/1.73M2
EGFRCR SERPLBLD CKD-EPI 2021: 57 ML/MIN/1.73M2
ERYTHROCYTE [DISTWIDTH] IN BLOOD BY AUTOMATED COUNT: 16.5 % (ref 10–15)
GLUCOSE BLDC GLUCOMTR-MCNC: 104 MG/DL (ref 70–99)
GLUCOSE BLDC GLUCOMTR-MCNC: 116 MG/DL (ref 70–99)
GLUCOSE BLDC GLUCOMTR-MCNC: 132 MG/DL (ref 70–99)
GLUCOSE BLDC GLUCOMTR-MCNC: 152 MG/DL (ref 70–99)
GLUCOSE SERPL-MCNC: 109 MG/DL (ref 70–99)
HCT VFR BLD AUTO: 32.2 % (ref 35–47)
HGB BLD-MCNC: 10.1 G/DL (ref 11.7–15.7)
HOLD SPECIMEN: NORMAL
HOLD SPECIMEN: NORMAL
MCH RBC QN AUTO: 24.9 PG (ref 26.5–33)
MCHC RBC AUTO-ENTMCNC: 31.4 G/DL (ref 31.5–36.5)
MCV RBC AUTO: 80 FL (ref 78–100)
PLATELET # BLD AUTO: 257 10E3/UL (ref 150–450)
PLATELET # BLD AUTO: 257 10E3/UL (ref 150–450)
POTASSIUM SERPL-SCNC: 3.9 MMOL/L (ref 3.4–5.3)
RBC # BLD AUTO: 4.05 10E6/UL (ref 3.8–5.2)
SODIUM SERPL-SCNC: 140 MMOL/L (ref 135–145)
UFH PPP CHRO-ACNC: 0.24 IU/ML
UFH PPP CHRO-ACNC: 0.33 IU/ML
UFH PPP CHRO-ACNC: 0.85 IU/ML
WBC # BLD AUTO: 4.9 10E3/UL (ref 4–11)

## 2023-11-19 PROCEDURE — 80048 BASIC METABOLIC PNL TOTAL CA: CPT | Performed by: INTERNAL MEDICINE

## 2023-11-19 PROCEDURE — 85027 COMPLETE CBC AUTOMATED: CPT | Performed by: INTERNAL MEDICINE

## 2023-11-19 PROCEDURE — 250N000013 HC RX MED GY IP 250 OP 250 PS 637: Performed by: INTERNAL MEDICINE

## 2023-11-19 PROCEDURE — 82565 ASSAY OF CREATININE: CPT | Performed by: STUDENT IN AN ORGANIZED HEALTH CARE EDUCATION/TRAINING PROGRAM

## 2023-11-19 PROCEDURE — 250N000013 HC RX MED GY IP 250 OP 250 PS 637: Performed by: NURSE PRACTITIONER

## 2023-11-19 PROCEDURE — 258N000003 HC RX IP 258 OP 636: Performed by: INTERNAL MEDICINE

## 2023-11-19 PROCEDURE — 250N000011 HC RX IP 250 OP 636: Mod: JZ | Performed by: STUDENT IN AN ORGANIZED HEALTH CARE EDUCATION/TRAINING PROGRAM

## 2023-11-19 PROCEDURE — 99221 1ST HOSP IP/OBS SF/LOW 40: CPT | Performed by: INTERNAL MEDICINE

## 2023-11-19 PROCEDURE — 99232 SBSQ HOSP IP/OBS MODERATE 35: CPT | Performed by: INTERNAL MEDICINE

## 2023-11-19 PROCEDURE — 210N000001 HC R&B IMCU HEART CARE

## 2023-11-19 PROCEDURE — 85049 AUTOMATED PLATELET COUNT: CPT | Performed by: STUDENT IN AN ORGANIZED HEALTH CARE EDUCATION/TRAINING PROGRAM

## 2023-11-19 PROCEDURE — 250N000011 HC RX IP 250 OP 636: Mod: JZ | Performed by: INTERNAL MEDICINE

## 2023-11-19 PROCEDURE — 999N000127 HC STATISTIC PERIPHERAL IV START W US GUIDANCE

## 2023-11-19 PROCEDURE — 36415 COLL VENOUS BLD VENIPUNCTURE: CPT | Performed by: INTERNAL MEDICINE

## 2023-11-19 PROCEDURE — 250N000013 HC RX MED GY IP 250 OP 250 PS 637: Performed by: STUDENT IN AN ORGANIZED HEALTH CARE EDUCATION/TRAINING PROGRAM

## 2023-11-19 PROCEDURE — 85520 HEPARIN ASSAY: CPT | Performed by: INTERNAL MEDICINE

## 2023-11-19 PROCEDURE — 99231 SBSQ HOSP IP/OBS SF/LOW 25: CPT | Performed by: INTERNAL MEDICINE

## 2023-11-19 PROCEDURE — 36415 COLL VENOUS BLD VENIPUNCTURE: CPT | Performed by: STUDENT IN AN ORGANIZED HEALTH CARE EDUCATION/TRAINING PROGRAM

## 2023-11-19 RX ADMIN — ATENOLOL 25 MG: 25 TABLET ORAL at 08:36

## 2023-11-19 RX ADMIN — IRON SUCROSE 300 MG: 20 INJECTION, SOLUTION INTRAVENOUS at 13:50

## 2023-11-19 RX ADMIN — POLYETHYLENE GLYCOL 3350 17 G: 17 POWDER, FOR SOLUTION ORAL at 21:03

## 2023-11-19 RX ADMIN — SENNOSIDES AND DOCUSATE SODIUM 2 TABLET: 8.6; 5 TABLET ORAL at 21:08

## 2023-11-19 RX ADMIN — LEVOTHYROXINE SODIUM 75 MCG: 0.03 TABLET ORAL at 06:20

## 2023-11-19 RX ADMIN — Medication 81 MG: at 08:36

## 2023-11-19 RX ADMIN — HEPARIN SODIUM AND DEXTROSE 750 UNITS/HR: 10000; 5 INJECTION INTRAVENOUS at 08:51

## 2023-11-19 RX ADMIN — SENNOSIDES AND DOCUSATE SODIUM 2 TABLET: 8.6; 5 TABLET ORAL at 08:35

## 2023-11-19 RX ADMIN — HYDRALAZINE HYDROCHLORIDE 10 MG: 20 INJECTION INTRAMUSCULAR; INTRAVENOUS at 03:56

## 2023-11-19 RX ADMIN — FERROUS SULFATE TAB 325 MG (65 MG ELEMENTAL FE) 325 MG: 325 (65 FE) TAB at 08:35

## 2023-11-19 RX ADMIN — AMLODIPINE BESYLATE 5 MG: 5 TABLET ORAL at 08:36

## 2023-11-19 RX ADMIN — NITROGLYCERIN 15 MG: 20 OINTMENT TOPICAL at 20:59

## 2023-11-19 RX ADMIN — POLYETHYLENE GLYCOL 3350 17 G: 17 POWDER, FOR SOLUTION ORAL at 08:48

## 2023-11-19 RX ADMIN — ATENOLOL 25 MG: 25 TABLET ORAL at 21:07

## 2023-11-19 RX ADMIN — BUPROPION HYDROCHLORIDE 300 MG: 150 TABLET, FILM COATED, EXTENDED RELEASE ORAL at 08:35

## 2023-11-19 RX ADMIN — AMLODIPINE BESYLATE 5 MG: 5 TABLET ORAL at 21:08

## 2023-11-19 ASSESSMENT — ACTIVITIES OF DAILY LIVING (ADL)
ADLS_ACUITY_SCORE: 18

## 2023-11-19 NOTE — PROGRESS NOTES
[facility]    Medicine Progress Note - Hospitalist Service    Date of Admission:  11/13/2023    Assessment & Plan     Lisseth Qureshi is a 68 year old female admitted on 11/13/2023 for chest pain.     Multivessel coronary disease with unstable angina:  Patient presented with chest discomfort which tends to get better when she lies down and gets worse when she sits up.  s/p PCI/stents in April 2023  Coronary angiogram 11/14, showed multivessel coronary artery disease involving the mid LAD, the proximal to mid left circumflex, and severe in-stent restenosis of the dominant mid right coronary artery.   - Cardiothoracic surgery consulted for evaluation of surgical revascularization: plan CABG 11/22.  - Continue heparin drip  - Continue Aspirin, atenolol and nitropaste    Essential Hypertension: BP controlled.  - Continue PTA amlodipine  - Hydralazine as needed    Type 2 diabetes mellitus: A1c 7.4 in September  - Hold home metformin and exenatide   - Medium intensity insulin sliding scale: patient has been refusing  - Hypoglycemia protocol    Microcytic anemia of iron deficiency:  Hemoglobin dropped from around 12 in June to 9.5 and further to 8.2.  Received 1 unit blood transfusion on 11/15 given unstable angina.  Posttransfusion hemoglobin 9.7  History of esophagitis large hiatal hernia.  Normal folate and B12, iron low at 17.  Iron saturation 4%.  No melena or hematochezia.  GI consulted and Colonoscopy done 11/17: transverse colon sessile polyp- removed, 6 mm flat polyp in descending colon-removed, few diverticuli, nonbleeding internal hemorrhoids.  - No contraindication from GI standpoint for coronary revascularization  - Recommended repeat colonoscopy in 3 years, follow pathology report  - Hematology consulted and input appreciated: plan IV iron for one dose today. Continue oral iron supplement.     Hypothyroidism  - Continue Levothyroxine, TSH 3.45 in September    Chronic slow transit  "constipation  -Daily MiraLAX            Diet: Low Saturated Fat Na <2400 mg    DVT Prophylaxis: Heparin drip  [place] Catheter: Not present  Lines: None     Cardiac Monitoring: ACTIVE order. Indication: Chest pain/ ACS rule out (24 hours)  Code Status: Full Code      Clinically Significant Risk Factors                  # Hypertension: Noted on problem list        # Obesity: Estimated body mass index is 32.84 kg/m  as calculated from the following:    Height as of this encounter: 1.499 m (4' 11\").    Weight as of this encounter: 73.8 kg (162 lb 9.6 oz).             Disposition Plan      Expected Discharge Date: 11/26/2023    Discharge Delays: Procedure Pending (enter procedure & time in comments)    Discharge Comments: CABG tennative 11/22            John Mckinley MD  Hospitalist Service  [facility]  Securely message with [employer] (more info)  Text page via [employer] ([email or web address]) Paging/Directory   ______________________________________________________________________    Interval History   No events. Patient reports feeling well. No chest pain and no SOB.     Physical Exam   Vital Signs: Temp: 98.4  F (36.9  C) Temp src: Oral BP: (!) 157/69 Pulse: 55   Resp: 18 SpO2: 95 % O2 Device: None (Room air)    Weight: 162 lbs 9.6 oz    General appearance: not in acute distress  HEENT: PERRL, EOMI  Lungs: Clear breath sounds in bilateral lung fields  Cardiovascular: Regular rate and rhythm, normal S1-S2  Abdomen: Soft, non tender, no distension  Musculoskeletal: No joint swelling  Skin: No rash and no edema  Neurology: AAO ×3.  Cranial nerves II - XII normal.  Normal muscle strength in all four extremities.     Medical Decision Making       35 MINUTES SPENT BY ME on the date of service doing chart review, history, exam, documentation & further activities per the note.      Data     I have personally reviewed the following data over the past 24 hrs:    4.9  \   10.1 (L)   / 257; 257     140 103 12.9 /  152 (H)   3.9 25 1.07 " (H); 1.07 (H) \       Imaging results reviewed over the past 24 hrs:   No results found for this or any previous visit (from the past 24 hour(s)).

## 2023-11-19 NOTE — PROGRESS NOTES
Imp/plan:  CAD with severe three vessel dz by angiography and dFR, on iv heparin and asp given critical narrowing in RCA focal from ISR, noted mild carotid atheroscleroiss but moderate plaque in aorta on CT chest but on review appears clear in segments of ascending aorta for clamp/graft placement - await CV surgery input tentatively planned for Wed.    CV prevention - repatha today (pt may use her medicaiton from home).      Followed by Dr. Shaffer in Cardiology  Current Facility-Administered Medications   Medication    acetaminophen (TYLENOL) tablet 650 mg    amLODIPine (NORVASC) tablet 5 mg    aspirin EC tablet 81 mg    atenolol (TENORMIN) tablet 25 mg    buPROPion (WELLBUTRIN XL) 24 hr tablet 300 mg    glucose gel 15-30 g    Or    dextrose 50 % injection 25-50 mL    Or    glucagon injection 1 mg    ferrous sulfate (FEROSUL) tablet 325 mg    heparin infusion 25,000 units in D5W 250 mL ANTICOAGULANT    HOLD:  Metformin and metformin containing medications if patient received IV contrast with acute kidney injury or severe chronic kidney disease (stage IV or stage V; i.e., eGFR less than 30)    hydrALAZINE (APRESOLINE) injection 10 mg    insulin aspart (NovoLOG) injection (RAPID ACTING)    insulin aspart (NovoLOG) injection (RAPID ACTING)    insulin aspart (NovoLOG) injection (RAPID ACTING)    levothyroxine (SYNTHROID/LEVOTHROID) tablet 75 mcg    melatonin tablet 1 mg    naloxone (NARCAN) injection 0.2 mg    Or    naloxone (NARCAN) injection 0.4 mg    Or    naloxone (NARCAN) injection 0.2 mg    Or    naloxone (NARCAN) injection 0.4 mg    nitroGLYcerin (NITRO-BID) 2 % ointment 15 mg    oxyCODONE (ROXICODONE) tablet 5 mg    Or    oxyCODONE (ROXICODONE) tablet 10 mg    polyethylene glycol (MIRALAX) Packet 17 g    polyethylene glycol (MIRALAX) Packet 17 g    senna-docusate (SENOKOT-S/PERICOLACE) 8.6-50 MG per tablet 2 tablet    sodium chloride 0.9% BOLUS 1-250 mL     Past Medical History:   Diagnosis Date    Diabetes (H)  "    Heart disease     History of blood transfusion         Review of Systems: 12 points negative other than above    BP (!) 163/68 (BP Location: Right arm)   Pulse 55   Temp 98.1  F (36.7  C) (Oral)   Resp 18   Ht 1.499 m (4' 11\")   Wt 73.8 kg (162 lb 9.6 oz)   LMP  (LMP Unknown)   SpO2 97%   BMI 32.84 kg/m    JVP<7cm, carotids normal  Lungs clear  Cor RRR no c,r,m  Abs soft +BS, no mass  Ext no c,c,e    Lab Results   Component Value Date    HGB 10.8 (L) 11/18/2023     Lab Results   Component Value Date     11/19/2023     No results found for: \"CREATININE\"  No components found for: \"K\"          Santos Xavier MD  Interventional Cardiology   [facility]  [phone]    "

## 2023-11-19 NOTE — CONSULTS
[facility] Hematology and Oncology Inpatient Consult Note    Patient: Lisseth Qureshi  MRN: [id number]  Date of Service: 11/19/2023      Reason for Visit  CAD      Assessment  Iron deficiency anemia probably secondary to chronic GI bleed  On dual antiplatelet therapy    Plan:  Iron deficiency anemia  Secondary to GI bleed.  Colonoscopy and EGD were done.  Colonoscopy showed a few polyps.  I do not see upper GI endoscopy report.  Iron studies and CBC show classical iron deficiency.  Already has received 1 unit of blood (which would have given her some iron ) and 1 dose of IV Venofer 300 mg.  Currently on oral iron.  We will give 1 more dose of Venofer 300 mg.  He will take couple of weeks for her hemoglobin to come up.  So would not expect complete recovery by Wednesday. Transfuse as needed  No further investigations for hematology required.  Continue oral iron.  Continue to monitor hemoglobin and reticulocyte count.  We will sign off.    Staging History    Cancer Staging   No matching staging information was found for the patient.        History  Ms. Lisseth Qureshi is a 68-year-old female with multivessel coronary artery disease and unstable angina who is scheduled for a CABG procedure has been consulted by the cardiothoracic surgery team for management of anemia.  Hemoglobin upon admission was 9.5.  Dropped to 8.2.  Microcytosis with MCV less than 80.  Elevated RDW.  Low MCHC and MCH.  Iron studies show classical iron deficiency with low transferrin saturation, low total iron and borderline elevated TIBC.  Ferritin was not done.  Has history 1 unit of blood.  Stool occult blood positive.  Underwent colonoscopy yesterday which showed a few polyps but no obvious source of bleeding.    Has history of hiatal hernia.  No prior history of any bleeding or iron deficiency anemia.  Hemoglobin in April was normal.  Denies any black stools.  Denies any blood in urine.  Has been on dual antiplatelet therapy after  previous stenting.    Review of systems.    A 14 point review of systems was obtained.  Positive findings noted in the history.  Rest of the review of system is otherwise negative.      Past History  Past Medical History:   Diagnosis Date    Diabetes (H)     Heart disease     History of blood transfusion      Past Surgical History:   Procedure Laterality Date    COLONOSCOPY N/A 11/17/2023    Procedure: COLONOSCOPY WITH POLYPECTOMY, BIOPSY AND HEMOCLIP APPLICATION;  Surgeon: David Wong DO;  Location: [facility] [place], [place] OR    CORONARY STENT PLACEMENT  10/19/2012    glenna 1st obtuse marginal    CV CORONARY ANGIOGRAM N/A 4/18/2023    Procedure: Coronary Angiogram;  Surgeon: Javier Guerra MD;  Location: [facility] CATH LAB CV    CV CORONARY ANGIOGRAM N/A 11/14/2023    Procedure: Coronary Angiogram;  Surgeon: Dilan Archuleta MD;  Location: [facility] CV    CV INSTANTANEOUS WAVE-FREE RATIO N/A 11/14/2023    Procedure: Instantaneous Wave-Free Ratio;  Surgeon: iDlan Archuleta MD;  Location: [facility]    CV LEFT HEART CATH N/A 4/18/2023    Procedure: Left Heart Catheterization;  Surgeon: Javier Guerra MD;  Location: [facility]    CV PCI N/A 4/18/2023    Procedure: Percutaneous Coronary Intervention;  Surgeon: Javier Guerra MD;  Location: [facility] CV    ESOPHAGOSCOPY, GASTROSCOPY, DUODENOSCOPY (EGD), COMBINED N/A 11/17/2023    Procedure: ESOPHAGOGASTRODUODENOSCOPY WITH BIOPSIES AND HEMOCLIP APPLICATION;  Surgeon: David Wong DO;  Location: [facility] [place] [place] OR    SHOULDER SURGERY      frozen shoulder     Family History   Problem Relation Age of Onset    Hypertension Mother     Dementia Mother     Mitral Valve Replacement No family hx of      Social History     Socioeconomic History    Marital status: Single     Spouse name: None    Number of children: None    Years of education: None    Highest education level: None   Tobacco Use    Smoking status: Former     Packs/day: 1.50  "    Years: 35.00     Additional pack years: 0.00     Total pack years: 52.50     Types: Cigarettes     Quit date: 2005     Years since quittin.8     Passive exposure: Past    Tobacco comments:     pt quit 10 years ago (she had smoked x 35 years)   Vaping Use    Vaping Use: Never used   Substance and Sexual Activity    Alcohol use: No     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    Drug use: No   Social History Narrative    Exercises 3-4 days per week       Allergies    Allergies   Allergen Reactions    Codeine      Other reaction(s): *Unknown    Guaifenesin Swelling     Felt throat swelling, Guaifenesin with codeine    Penicillins Unknown     Unknown, allergy occurred at 6 months old    Rosuvastatin Calcium [Rosuvastatin] Unknown          Physical Exam    BP (!) 157/69 (BP Location: Right arm)   Pulse 55   Temp 98.4  F (36.9  C) (Oral)   Resp 18   Ht 1.499 m (4' 11\")   Wt 73.8 kg (162 lb 9.6 oz)   LMP  (LMP Unknown)   SpO2 95%   BMI 32.84 kg/m      GENERAL: Alert and oriented to time place and person. In no distress.    HEAD: Atraumatic and normocephalic.    EYES: ECHO, EOMI. No pallor. No icterus.    Oral cavity: Moist    NECK: supple.     LYMPH NODES: No palpable lymphadenopathy.    CHEST: clear     CVS: RRR    ABDOMEN: Soft. Not tender.     EXTREMITIES: Warm.    NEUROLOGICAL: Preserved orientation.  Neuromuscular system intact.  Cranial nerve appears to be intact.  No cerebellar deficit apparent.    SKIN: no rash, or bruising or purpura.      Lab Results  Recent Results (from the past 24 hour(s))   Heparin Unfractionated Anti Xa Level    Collection Time: 23  2:34 PM   Result Value Ref Range    Anti Xa Unfractionated Heparin 0.23 For Reference Range, See Comment IU/mL   Glucose by meter    Collection Time: 23  4:25 PM   Result Value Ref Range    GLUCOSE BY METER POCT 120 (H) 70 - 99 mg/dL   Glucose by meter    Collection Time: 23  9:51 PM   Result Value Ref " Range    GLUCOSE BY METER POCT 103 (H) 70 - 99 mg/dL   Heparin Unfractionated Anti Xa Level    Collection Time: 11/18/23  9:57 PM   Result Value Ref Range    Anti Xa Unfractionated Heparin 0.90 For Reference Range, See Comment IU/mL   Extra Red Top Tube    Collection Time: 11/18/23 10:01 PM   Result Value Ref Range    Hold Specimen JIC    Platelet count    Collection Time: 11/19/23  6:04 AM   Result Value Ref Range    Platelet Count 257 150 - 450 10e3/uL   Creatinine    Collection Time: 11/19/23  6:04 AM   Result Value Ref Range    Creatinine 1.07 (H) 0.51 - 0.95 mg/dL    GFR Estimate 57 (L) >60 mL/min/1.73m2   CBC with platelets    Collection Time: 11/19/23  6:04 AM   Result Value Ref Range    WBC Count 4.9 4.0 - 11.0 10e3/uL    RBC Count 4.05 3.80 - 5.20 10e6/uL    Hemoglobin 10.1 (L) 11.7 - 15.7 g/dL    Hematocrit 32.2 (L) 35.0 - 47.0 %    MCV 80 78 - 100 fL    MCH 24.9 (L) 26.5 - 33.0 pg    MCHC 31.4 (L) 31.5 - 36.5 g/dL    RDW 16.5 (H) 10.0 - 15.0 %    Platelet Count 257 150 - 450 10e3/uL   Basic metabolic panel    Collection Time: 11/19/23  6:04 AM   Result Value Ref Range    Sodium 140 135 - 145 mmol/L    Potassium 3.9 3.4 - 5.3 mmol/L    Chloride 103 98 - 107 mmol/L    Carbon Dioxide (CO2) 25 22 - 29 mmol/L    Anion Gap 12 7 - 15 mmol/L    Urea Nitrogen 12.9 8.0 - 23.0 mg/dL    Creatinine 1.07 (H) 0.51 - 0.95 mg/dL    GFR Estimate 57 (L) >60 mL/min/1.73m2    Calcium 9.0 8.8 - 10.2 mg/dL    Glucose 109 (H) 70 - 99 mg/dL   Heparin Unfractionated Anti Xa Level    Collection Time: 11/19/23  6:12 AM   Result Value Ref Range    Anti Xa Unfractionated Heparin 0.33 For Reference Range, See Comment IU/mL   Glucose by meter    Collection Time: 11/19/23  8:04 AM   Result Value Ref Range    GLUCOSE BY METER POCT 116 (H) 70 - 99 mg/dL        Imaging Results    CT Chest w/o Contrast    Result Date: 11/18/2023  EXAM: CT CHEST W/O CONTRAST LOCATION: [facility] DATE: 11/18/2023 INDICATION: eval aorta  pre CABG COMPARISON: None. TECHNIQUE: CT chest without IV contrast. Multiplanar reformats were obtained. Dose reduction techniques were used. CONTRAST: None. FINDINGS: LUNGS AND PLEURA: Mild mid and upper lung centrilobular emphysema. A few strands of fibrosis and/or linear atelectasis in the lower lungs. Lungs are otherwise clear. No pleural effusion. MEDIASTINUM/AXILLAE: Moderate to marked calcified atheromatous plaque throughout the normal caliber thoracic aorta and proximal brachiocephalic arteries, as well as, the visualized abdominal aorta and visceral arteries. Aortic valve leaflet calcification. Heart size normal. No pericardial effusion. No thoracic lymphadenopathy. CORONARY ARTERY CALCIFICATION: Severe three-vessel coronary artery calcification. UPPER ABDOMEN: No significant finding. MUSCULOSKELETAL: Unremarkable.     IMPRESSION: 1.  Moderate to marked calcified atheromatous plaque throughout the normal caliber thoracic aorta and proximal brachiocephalic arteries, as well as, the visualized abdominal aorta and visceral arteries. 2.  Mild mid and upper lung centrilobular emphysema. 3.  Aortic valve leaflet calcification and severe three-vessel coronary artery calcification.     US Carotid Bilateral    Result Date: 11/17/2023  EXAM: US CAROTID BILATERAL LOCATION: [facility] DATE: 11/17/2023 INDICATION: Severe coronary artery disease, presurgical evaluation prior to CABG COMPARISON: None. TECHNIQUE: Duplex exam performed utilizing 2D gray-scale imaging, Doppler interrogation with color-flow and spectral waveform analysis. The percent diameter stenosis is determined using NASCET criteria and Society of Radiologists in Ultrasound Consensus Criteria. FINDINGS: RIGHT: Mild plaque at the bifurcation. The peak systolic velocity in the ICA is less than 125 cm/sec, consistent with less than 50% stenosis. Normal velocities in the ECA. Antegrade flow within the vertebral artery. LEFT: Mild  plaque at the bifurcation. The peak systolic velocity in the ICA is less than 125 cm/sec, consistent with less than 50% stenosis. Normal velocities in the ECA. Antegrade flow within the vertebral artery. VELOCITY CHART: CCA   Right: 82 cm/s   Left: 93 cm/s ICA   Right: `100 cm/s   Left: 95 cm/s ECA   Right: 158 cm/s   Left: 170 cm/s ICA/CCA PSV Ratio   Right: 1.2   Left: 1.0     IMPRESSION: 1.  Mild plaque formation, velocities consistent with less than 50% stenosis in the right internal carotid artery. 2.  Mild plaque formation, velocities consistent with less than 50% stenosis in the left internal carotid artery. 3.  Flow within the vertebral arteries is antegrade.    Cardiac Catheterization    Result Date: 2023  CARDIAC CATHETERIZATION AND INTERVENTION REPORT PATIENT NAME:  Lisseth Qureshi        MEDICAL RECORD NUMBER: [id number] : 1955     PROCEDURE DATE: 2023 CONCLUSIONS: Multivessel coronary artery disease involving the mid LAD (iFR 0.85), the proximal to mid left circumflex (iFR 0.67-0.70), and severe in-stent restenosis of the dominant mid right coronary artery. RECOMMENDATIONS: Usual postprocedure cares, please see orders. Recommend cardiothoracic surgery consultation for evaluation of surgical revascularization - potential targets include the left anterior descending artery, the OM 1 and 2 branches, and distal right coronary artery. Aggressive risk factor modification for secondary prevention. Defer evaluation and management of patient's acute anemia to primary service. PROCEDURE PERFORMED: Selective right and left coronary angiography iFR of the left anterior descending artery iFR of the left circumflex artery PRE-OP DIAGNOSIS: Chest pain Coronary artery disease Acute anemia, etiology unclear POST-OP DIAGNOSIS: Same PROCEDURALISTS: Dilan Archuleta MD DIAGNOSTIC PROCEDURAL DETAILS: Signed, informed consent was obtained. The patient was placed on the table and prepped and draped  in the usual fashion. Time out and site verification performed. Access: Right radial artery Catheters: JL 4, JR4 Hemostasis: TR band PROCEDURAL MEDICATIONS: Conscious sedation - midazolam and fentanyl, please see procedure log for dosing. Local anesthesia - 1% lidocaine Procedural anticoagulation - 75 units/kg of heparin CONTRAST VOLUME: 115 mL Visipaque BLOOD LOSS: minimal FLUIDS: None SPECIMENS: None COMPLICATIONS: None FINDINGS: Coronary Angiography: LEFT MAIN: Short vessel with mild diffuse disease. LEFT ANTERIOR DESCENDING: Large vessel that gives rise to a large diagonal branch and multiple septal perforators.  The LAD wraps around the apex distally.  The proximal LAD has mild diffuse disease followed by 50 to 70% stenosis in the midportion.  Distally the LAD has mild diffuse disease.  The large diagonal branch has 60% proximal stenosis followed by mild diffuse disease distally.  iFR of the left anterior descending artery was 0.9 in the midportion and 0.85 distally. LEFT CIRCUMFLEX: Dominant vessel that gives rise to a large OM1 branch, a large OM 2 branch, and terminates into a small vessel distally.  Proximal to mid left circumflex has 50 to 70% stenosis followed by 50 to 70% stenosis in the midportion at the bifurcation of the OM branches.  The OM1 has 50 to 70% proximal stenosis followed by mild diffuse disease distally.  The OM 2 has mild diffuse disease distally.  iFR of the OM 1 was 0.67.  iFR of OM 2 was 0.70. RIGHT CORONARY ARTERY: Large dominant vessel that gives rise to right posterior descending artery posterolateral system.  The proximal right coronary artery has mild in-stent restenosis followed by 95% in-stent restenosis in the midportion.  Distally the right coronary artery has moderate diffuse disease.  The right posterior descending and posterolateral system have mild to moderate diffuse disease. INTERVENTIONAL DETAILS: Lesion # 1 : 50 to 70% stenosis of the mid LAD Lesion # 2 : 50 to 70%  stenosis of the proximal and mid left circumflex The left main coronary artery was engaged using a JL 4 diagnostic catheter that provide adequate coaptation and support.  The lesions were traversed with a Comet 2 wire and iFR was performed per standard protocol. Please do not hesitate to contact me if you have any questions or concerns.  I was present for the procedure in its entirety. Dilan Archuleta MD Interventional Cardiology     XR Chest Port 1 View    Result Date: 11/13/2023  EXAM: XR CHEST PORT 1 VIEW LOCATION: [facility] DATE: 11/13/2023 INDICATION: Chest Pain COMPARISON: 09/19/2023     IMPRESSION: No focal airspace opacity. No pleural effusion or pneumothorax. Cardiac silhouette and mediastinal contours are stable. Coronary stent.     A total of 35 min were spent today on this visit which included face to face conversation with the patient, EMR review, counseling and co-ordination of care and medical documentation.      Signed by: David Gtz MD

## 2023-11-19 NOTE — PLAN OF CARE
Problem: Dysrhythmia  Goal: Normalized Cardiac Rhythm  Outcome: Progressing     Problem: Chest Pain  Goal: Resolution of Chest Pain Symptoms  Outcome: Progressing   Goal Outcome Evaluation:    Vitals stable. On RA 97%. Heparin gtt 750 unit(s)/hr.  Anti xa check at 1322.  Blood sugars 116 & 152. Ind.  Sinus Lito HR 59.  Pt refused insulin.  Anti-xa 0.24.  Gave heparin bolus.  Increased gtt 150 units. Heparin gtt 900 unit(s)/hr.  Recheck anti-xa 2030. Added a second periph line for IV iron.

## 2023-11-19 NOTE — PROGRESS NOTES
Last Anti-xa lab was 0.9.  Ran protocol and we are to pause the drip for an hour, then restart drip subtracting 300u/hr.  Next Anti-Xa should be scheduled for 6 hours after restarting.    Pump turned off at 2243.    Avinash Conley RN

## 2023-11-19 NOTE — PLAN OF CARE
"  Patient is A/O, is IND, and has had elevated BP this shift.  Tele was NSR.  Patient reported feeling better with her anxiety and chest pressure.  She denied SOB this shift.  Anticipated CAB on Wed 11/22.    Heparin drip running 1050 unit(s)/hr.  Next Anti-Xa lab is scheduled for 2158.    Problem: Adult Inpatient Plan of Care  Goal: Plan of Care Review  Description: The Plan of Care Review/Shift note should be completed every shift.  The Outcome Evaluation is a brief statement about your assessment that the patient is improving, declining, or no change.  This information will be displayed automatically on your shift  note.  Outcome: Progressing  Goal: Patient-Specific Goal (Individualized)  Description: You can add care plan individualizations to a care plan. Examples of Individualization might be:  \"Parent requests to be called daily at 9am for status\", \"I have a hard time hearing out of my right ear\", or \"Do not touch me to wake me up as it startles  me\".  Outcome: Progressing  Goal: Absence of Hospital-Acquired Illness or Injury  Outcome: Progressing  Intervention: Identify and Manage Fall Risk  Recent Flowsheet Documentation  Taken 11/18/2023 1957 by Elias Conley, RN  Safety Promotion/Fall Prevention:   clutter free environment maintained   lighting adjusted   nonskid shoes/slippers when out of bed   patient and family education   room near nurse's station   room organization consistent   safety round/check completed  Taken 11/18/2023 1629 by Elias Conley, RN  Safety Promotion/Fall Prevention:   clutter free environment maintained   lighting adjusted   nonskid shoes/slippers when out of bed   patient and family education   room near nurse's station   room organization consistent   safety round/check completed  Intervention: Prevent Infection  Recent Flowsheet Documentation  Taken 11/18/2023 1957 by Elias Conley, RN  Infection Prevention:   hand hygiene promoted   rest/sleep promoted  Taken " 11/18/2023 1629 by Elias Conley RN  Infection Prevention:   hand hygiene promoted   rest/sleep promoted  Goal: Optimal Comfort and Wellbeing  Outcome: Progressing     Avinash Conley RN

## 2023-11-19 NOTE — PLAN OF CARE
Problem: Dysrhythmia  Goal: Normalized Cardiac Rhythm  Outcome: Progressing     Problem: Chest Pain  Goal: Resolution of Chest Pain Symptoms  Outcome: Progressing   Goal Outcome Evaluation:    Pt's vital signs were stable overnight. Her BP at 0351 was 170/72 so she was given her PRN hydralazine x 1 and the recheck BP at 0429 was 125/57. She was sinus neville on telemetry. She denied any shortness of breath or chest pain. Her heparin drip was paused from 2245 to 2345. Her heparin gtt was decreased to 750 units/hr and was restarted at 2352. Her next antixa will be drawn at 0552. She is independent in her room. She is able to make needs known. Call light is within reach.

## 2023-11-20 ENCOUNTER — ANESTHESIA EVENT (OUTPATIENT)
Dept: SURGERY | Facility: HOSPITAL | Age: 68
DRG: 234 | End: 2023-11-20
Payer: COMMERCIAL

## 2023-11-20 LAB
ATRIAL RATE - MUSE: 85 BPM
DIASTOLIC BLOOD PRESSURE - MUSE: NORMAL MMHG
GLUCOSE BLDC GLUCOMTR-MCNC: 122 MG/DL (ref 70–99)
GLUCOSE BLDC GLUCOMTR-MCNC: 145 MG/DL (ref 70–99)
GLUCOSE BLDC GLUCOMTR-MCNC: 97 MG/DL (ref 70–99)
INTERPRETATION ECG - MUSE: NORMAL
P AXIS - MUSE: 103 DEGREES
PR INTERVAL - MUSE: 156 MS
QRS DURATION - MUSE: 84 MS
QT - MUSE: 388 MS
QTC - MUSE: 461 MS
R AXIS - MUSE: 109 DEGREES
SYSTOLIC BLOOD PRESSURE - MUSE: NORMAL MMHG
T AXIS - MUSE: 101 DEGREES
UFH PPP CHRO-ACNC: 0.22 IU/ML
UFH PPP CHRO-ACNC: 0.27 IU/ML
UFH PPP CHRO-ACNC: 0.49 IU/ML
VENTRICULAR RATE- MUSE: 85 BPM

## 2023-11-20 PROCEDURE — 250N000013 HC RX MED GY IP 250 OP 250 PS 637: Performed by: INTERNAL MEDICINE

## 2023-11-20 PROCEDURE — 250N000013 HC RX MED GY IP 250 OP 250 PS 637: Performed by: NURSE PRACTITIONER

## 2023-11-20 PROCEDURE — 210N000001 HC R&B IMCU HEART CARE

## 2023-11-20 PROCEDURE — 85520 HEPARIN ASSAY: CPT | Performed by: INTERNAL MEDICINE

## 2023-11-20 PROCEDURE — 99232 SBSQ HOSP IP/OBS MODERATE 35: CPT | Performed by: INTERNAL MEDICINE

## 2023-11-20 PROCEDURE — 36415 COLL VENOUS BLD VENIPUNCTURE: CPT | Performed by: INTERNAL MEDICINE

## 2023-11-20 PROCEDURE — 99233 SBSQ HOSP IP/OBS HIGH 50: CPT | Performed by: INTERNAL MEDICINE

## 2023-11-20 PROCEDURE — 250N000011 HC RX IP 250 OP 636: Mod: JZ | Performed by: INTERNAL MEDICINE

## 2023-11-20 PROCEDURE — 250N000013 HC RX MED GY IP 250 OP 250 PS 637: Performed by: STUDENT IN AN ORGANIZED HEALTH CARE EDUCATION/TRAINING PROGRAM

## 2023-11-20 PROCEDURE — 99233 SBSQ HOSP IP/OBS HIGH 50: CPT | Performed by: THORACIC SURGERY (CARDIOTHORACIC VASCULAR SURGERY)

## 2023-11-20 RX ORDER — HYDROXYZINE HYDROCHLORIDE 25 MG/1
25 TABLET, FILM COATED ORAL EVERY 6 HOURS PRN
Status: DISCONTINUED | OUTPATIENT
Start: 2023-11-20 | End: 2023-11-22

## 2023-11-20 RX ADMIN — AMLODIPINE BESYLATE 5 MG: 5 TABLET ORAL at 20:08

## 2023-11-20 RX ADMIN — POLYETHYLENE GLYCOL 3350 17 G: 17 POWDER, FOR SOLUTION ORAL at 20:08

## 2023-11-20 RX ADMIN — LEVOTHYROXINE SODIUM 75 MCG: 0.03 TABLET ORAL at 05:53

## 2023-11-20 RX ADMIN — SENNOSIDES AND DOCUSATE SODIUM 2 TABLET: 8.6; 5 TABLET ORAL at 08:23

## 2023-11-20 RX ADMIN — AMLODIPINE BESYLATE 5 MG: 5 TABLET ORAL at 08:23

## 2023-11-20 RX ADMIN — FERROUS SULFATE TAB 325 MG (65 MG ELEMENTAL FE) 325 MG: 325 (65 FE) TAB at 08:23

## 2023-11-20 RX ADMIN — BUPROPION HYDROCHLORIDE 300 MG: 150 TABLET, FILM COATED, EXTENDED RELEASE ORAL at 08:23

## 2023-11-20 RX ADMIN — ATENOLOL 25 MG: 25 TABLET ORAL at 08:23

## 2023-11-20 RX ADMIN — NITROGLYCERIN 15 MG: 20 OINTMENT TOPICAL at 19:43

## 2023-11-20 RX ADMIN — POLYETHYLENE GLYCOL 3350 17 G: 17 POWDER, FOR SOLUTION ORAL at 08:23

## 2023-11-20 RX ADMIN — Medication 81 MG: at 08:23

## 2023-11-20 RX ADMIN — ATENOLOL 25 MG: 25 TABLET ORAL at 20:08

## 2023-11-20 RX ADMIN — SENNOSIDES AND DOCUSATE SODIUM 2 TABLET: 8.6; 5 TABLET ORAL at 20:08

## 2023-11-20 RX ADMIN — HEPARIN SODIUM AND DEXTROSE 750 UNITS/HR: 10000; 5 INJECTION INTRAVENOUS at 13:01

## 2023-11-20 ASSESSMENT — ACTIVITIES OF DAILY LIVING (ADL)
ADLS_ACUITY_SCORE: 18

## 2023-11-20 NOTE — PLAN OF CARE
"  Patient is A/O, denies pain, denies SOB, has elevated BP but otherwise VSS.  Tele was NSR, patient can dip to high 50s HR when sleeping.  Patient is IND and had a shower this shift.  Patient is on a heparin drip, Anti-Xa from 2030 resulted as 0.85.  Protocol ran, the drip was paused at 2100, restarted at 2200.  Heparin drip rate is now 600 unit(s)/hr.  Anticipated CAB on Wed 11/22.    Problem: Adult Inpatient Plan of Care  Goal: Plan of Care Review  Description: The Plan of Care Review/Shift note should be completed every shift.  The Outcome Evaluation is a brief statement about your assessment that the patient is improving, declining, or no change.  This information will be displayed automatically on your shift  note.  Outcome: Progressing  Goal: Patient-Specific Goal (Individualized)  Description: You can add care plan individualizations to a care plan. Examples of Individualization might be:  \"Parent requests to be called daily at 9am for status\", \"I have a hard time hearing out of my right ear\", or \"Do not touch me to wake me up as it startles  me\".  Outcome: Progressing  Goal: Absence of Hospital-Acquired Illness or Injury  Outcome: Progressing  Intervention: Identify and Manage Fall Risk  Recent Flowsheet Documentation  Taken 11/19/2023 1930 by Elias Conley, RN  Safety Promotion/Fall Prevention:   clutter free environment maintained   increased rounding and observation   lighting adjusted   nonskid shoes/slippers when out of bed   patient and family education   room organization consistent   safety round/check completed  Taken 11/19/2023 1538 by Elias Conley, RN  Safety Promotion/Fall Prevention:   clutter free environment maintained   increased rounding and observation   lighting adjusted   nonskid shoes/slippers when out of bed   patient and family education   room organization consistent   safety round/check completed  Intervention: Prevent Infection  Recent Flowsheet Documentation  Taken " 11/19/2023 1930 by Elias Conley, RN  Infection Prevention:   hand hygiene promoted   rest/sleep promoted  Taken 11/19/2023 1538 by Elisa Conley, RN  Infection Prevention:   hand hygiene promoted   rest/sleep promoted  Goal: Optimal Comfort and Wellbeing  Outcome: Progressing     Avinash Conley RN

## 2023-11-20 NOTE — PLAN OF CARE
Problem: Dysrhythmia  Goal: Normalized Cardiac Rhythm  Outcome: Progressing     Problem: Chest Pain  Goal: Resolution of Chest Pain Symptoms  Outcome: Progressing     Problem: Gastrointestinal Bleeding  Goal: Hemostasis  Outcome: Progressing   Goal Outcome Evaluation:    Pt's vital signs were stable overnight. She was sinus neville on telemetry when she was asleep. She denied pain throughout the night. Her heparin gtt was running at 600 units/hr and her antixa at 0419 was 0.27. Her next antixa will be drawn at 1136. She is up independently in her room. She is able to make needs known. Call light is within reach.

## 2023-11-20 NOTE — PROGRESS NOTES
Patient was seen and examined by me.  She has severe symptomatic triple vessel coronary artery disease.  She has an iron deficiency anemia and has received some iron.  She was found to have some colon polyps and no lesions on UGI endoscopy.  We plan to perform a multi-vessel coronary artery bypass grafting procedure and for conduit we will use the left internal mammary artery and reversed saphenous vein graft.  The patient understands that the risks for this procedure include: bleeding, infection, stroke, sternal dehiscence, myocardial infarction, arrhythmias, the need for a pacemaker, prolonged ventilation, pneumonia, liver/renal failure, aortic dissection, and an operative mortality of 2 to 4 percent.  She also understands that she will likely receive transfusions.  She accepts these risks and is agreeable with the plan of proceeding with surgery on November 22, 2023.

## 2023-11-20 NOTE — PROGRESS NOTES
[facility]    Medicine Progress Note - Hospitalist Service    Date of Admission:  11/13/2023    Assessment & Plan     Lisseth Qureshi is a 68 year old female admitted on 11/13/2023 for chest pain.     Multivessel coronary disease with unstable angina:  Patient presented with chest discomfort which tends to get better when she lies down and gets worse when she sits up.  s/p PCI/stents in April 2023  Coronary angiogram 11/14, showed multivessel coronary artery disease involving the mid LAD, the proximal to mid left circumflex, and severe in-stent restenosis of the dominant mid right coronary artery.   - Cardiothoracic surgery consulted for evaluation of surgical revascularization: plan CABG 11/22.  - Continue heparin drip  - Continue Aspirin, atenolol and nitropaste    Essential Hypertension: BP controlled.  - Continue PTA amlodipine  - Hydralazine as needed    Type 2 diabetes mellitus: A1c 7.4 in September  - Hold home metformin and exenatide   - Medium intensity insulin sliding scale: patient has been refusing  - Hypoglycemia protocol    Microcytic anemia of iron deficiency:  Hemoglobin dropped from around 12 in June to 9.5 and further to 8.2.  Received 1 unit blood transfusion on 11/15 given unstable angina.  Posttransfusion hemoglobin 9.7  History of esophagitis large hiatal hernia.  Normal folate and B12, iron low at 17.  Iron saturation 4%.  No melena or hematochezia.  GI consulted and Colonoscopy done 11/17: transverse colon sessile polyp- removed, 6 mm flat polyp in descending colon-removed, few diverticuli, nonbleeding internal hemorrhoids.  - No contraindication from GI standpoint for coronary revascularization  - Recommended repeat colonoscopy in 3 years, follow pathology report  - Hematology consulted and input appreciated: plan IV iron for one dose today. Continue oral iron supplement.     Hypothyroidism  - Continue Levothyroxine, TSH 3.45 in September    Chronic slow transit  "constipation  -Daily MiraLAX            Diet: Low Saturated Fat Na <2400 mg    DVT Prophylaxis: Heparin drip  [place] Catheter: Not present  Lines: None     Cardiac Monitoring: ACTIVE order. Indication: AMI (NSTEMI/ STEMI) (48 hours)  Code Status: Full Code      Clinically Significant Risk Factors                  # Hypertension: Noted on problem list        # Obesity: Estimated body mass index is 32.58 kg/m  as calculated from the following:    Height as of this encounter: 1.499 m (4' 11\").    Weight as of this encounter: 73.2 kg (161 lb 4.8 oz).             Disposition Plan     Expected Discharge Date: 11/26/2023    Discharge Delays: Procedure Pending (enter procedure & time in comments)  IV Medication - consider oral or Home Infusion    Discharge Comments: CABG tennative 11/22            John Mckinley MD  Hospitalist Service  [facility]  Securely message with [employer] (more info)  Text page via [employer] Paging/Directory   ______________________________________________________________________    Interval History   No events. Patient reports feeling well. No chest pain and no SOB. She feels anxious about the upcoming surgery.     Physical Exam   Vital Signs: Temp: 97.6  F (36.4  C) Temp src: Oral BP: (!) 158/72 Pulse: 60   Resp: 18 SpO2: 96 % O2 Device: None (Room air)    Weight: 161 lbs 4.8 oz    General appearance: not in acute distress  HEENT: PERRL, EOMI  Lungs: Clear breath sounds in bilateral lung fields  Cardiovascular: Regular rate and rhythm, normal S1-S2  Abdomen: Soft, non tender, no distension  Musculoskeletal: No joint swelling  Skin: No rash and no edema  Neurology: AAO ×3.  Cranial nerves II - XII normal.  Normal muscle strength in all four extremities.     Medical Decision Making       35 MINUTES SPENT BY ME on the date of service doing chart review, history, exam, documentation & further activities per the note.      Data         Imaging results reviewed over the past 24 hrs:   No " results found for this or any previous visit (from the past 24 hour(s)).

## 2023-11-20 NOTE — PLAN OF CARE
Problem: Chest Pain  Goal: Resolution of Chest Pain Symptoms  Outcome: Progressing     Problem: Dysrhythmia  Goal: Normalized Cardiac Rhythm  Outcome: Progressing   Goal Outcome Evaluation:    No chest pain. Ind in room.  Heparin gtt 600 unit(s)/hr.  Sinus Lito to NSR.  Blood sugars 122 & 145.  Hgb 10.1 yesterday.  Walked pt in hallway SBA.  Pt teary and anxious about surgery.  Offered education about surgery, pt declined.  Gave pt heart pillow.  Doctor ordered atarax prn if needed.  Anti xa 0.22. Gave Heparin bolus 2200 units and increased gtt to 750 unit(s)/hr.  Recheck anti xa tomorrow morning.

## 2023-11-20 NOTE — CONSULTS
Cardiology Progress Note  New to me.  Chart reviewed.  Assessment/Plan:     Severe coronary artery disease with multivessel anatomy best treated with bypass revascularization, plan for 11/22.  History of GI bleeding  Hyperlipidemia on Repatha  Hypertension with reasonable control today     Principal Problem:    Anemia, unspecified type  Active Problems:    Hypothyroidism    Type 2 diabetes mellitus without complication, with long-term current use of insulin (H)    Hypertension    Coronary artery disease involving native coronary artery with unstable angina pectoris (H)    Chest pain, unspecified type    Iron deficiency    Stenosis of coronary stent, initial encounter      LOS: 7 days      Subjective:  Fearful about bypass surgery.  No chest pain.  Denies shortness of breath        Objective:   Vital signs in last 24 hours:  Vitals          Vitals:     11/20/23 0445 11/20/23 0745 11/20/23 1158 11/20/23 1536   BP:   (!) 158/72 139/63 (!) 154/69   BP Location:   Right arm Right arm Right arm   Patient Position:   Semi-Lu's       Cuff Size:   Adult Regular       Pulse:       60   Resp:   18 20 18   Temp:   97.6  F (36.4  C) 98.2  F (36.8  C) 98  F (36.7  C)   TempSrc:   Oral Oral Oral   SpO2:   96% 95% 97%   Weight: 73.2 kg (161 lb 4.8 oz)         Height:                 Weight:       Wt Readings from Last 3 Encounters:   11/20/23 73.2 kg (161 lb 4.8 oz)   09/13/23 78.5 kg (173 lb)   08/15/23 77.6 kg (171 lb)               PHYSICAL EXAM       Respiratory:  Normal breath sounds, No respiratory distress, No wheezing, No chest tenderness.   Cardiovascular:   Normal heart rate, Normal rhythm, No murmurs, No rubs, No gallops.   GI:  Bowel sounds normal, Soft, No tenderness, No masses  Extremities: no edema        Cardiographics:   Telemetry sinus rhythm, 52 to 85 bpm     Echocardiogram 4/2023:  Left ventricular size, wall motion and function are normal. The ejection  fraction is 60-65%.  Normal right ventricle size  "and systolic function.  No hemodynamically significant valvular abnormalities on 2D or color flow  imaging.     Imaging:   Coronary angiography 11/14/2023:  CONCLUSIONS: Multivessel coronary artery disease involving the mid LAD (iFR 0.85), the proximal to mid left circumflex (iFR 0.67-0.70), and severe in-stent restenosis of the dominant mid right coronary artery.      Lab Results:         Lab Results   Component Value Date     WBC 4.9 11/19/2023     HGB 10.1 (L) 11/19/2023     HCT 32.2 (L) 11/19/2023      11/19/2023      11/19/2023     CHOL 186 08/15/2023     TRIG 122 08/15/2023     HDL 56 08/15/2023     ALT 30 11/13/2023     AST 23 11/13/2023      11/19/2023     BUN 12.9 11/19/2023     CO2 25 11/19/2023     TSH 3.45 09/13/2023     INR 1.06 11/13/2023      No results found for: \"CKTOTAL\", \"CKMB\", \"TROPONINI\"        Fernando Flores MD [facility]  11/20/2023              "

## 2023-11-21 LAB
ABO/RH(D): NORMAL
ANTIBODY SCREEN: NEGATIVE
ERYTHROCYTE [DISTWIDTH] IN BLOOD BY AUTOMATED COUNT: 16.9 % (ref 10–15)
GLUCOSE BLDC GLUCOMTR-MCNC: 115 MG/DL (ref 70–99)
GLUCOSE BLDC GLUCOMTR-MCNC: 128 MG/DL (ref 70–99)
GLUCOSE BLDC GLUCOMTR-MCNC: 132 MG/DL (ref 70–99)
GLUCOSE BLDC GLUCOMTR-MCNC: 146 MG/DL (ref 70–99)
HCT VFR BLD AUTO: 31.3 % (ref 35–47)
HGB BLD-MCNC: 9.6 G/DL (ref 11.7–15.7)
MCH RBC QN AUTO: 24.8 PG (ref 26.5–33)
MCHC RBC AUTO-ENTMCNC: 30.7 G/DL (ref 31.5–36.5)
MCV RBC AUTO: 81 FL (ref 78–100)
MRSA DNA SPEC QL NAA+PROBE: NEGATIVE
PATH REPORT.COMMENTS IMP SPEC: NORMAL
PATH REPORT.COMMENTS IMP SPEC: NORMAL
PATH REPORT.FINAL DX SPEC: NORMAL
PATH REPORT.GROSS SPEC: NORMAL
PATH REPORT.MICROSCOPIC SPEC OTHER STN: NORMAL
PATH REPORT.RELEVANT HX SPEC: NORMAL
PHOTO IMAGE: NORMAL
PLATELET # BLD AUTO: 217 10E3/UL (ref 150–450)
RBC # BLD AUTO: 3.87 10E6/UL (ref 3.8–5.2)
SA TARGET DNA: NEGATIVE
SPECIMEN EXPIRATION DATE: NORMAL
UFH PPP CHRO-ACNC: 0.35 IU/ML
WBC # BLD AUTO: 5.8 10E3/UL (ref 4–11)

## 2023-11-21 PROCEDURE — 99231 SBSQ HOSP IP/OBS SF/LOW 25: CPT | Performed by: INTERNAL MEDICINE

## 2023-11-21 PROCEDURE — 86850 RBC ANTIBODY SCREEN: CPT | Performed by: THORACIC SURGERY (CARDIOTHORACIC VASCULAR SURGERY)

## 2023-11-21 PROCEDURE — 86901 BLOOD TYPING SEROLOGIC RH(D): CPT | Performed by: THORACIC SURGERY (CARDIOTHORACIC VASCULAR SURGERY)

## 2023-11-21 PROCEDURE — 88342 IMHCHEM/IMCYTCHM 1ST ANTB: CPT | Mod: 26 | Performed by: PATHOLOGY

## 2023-11-21 PROCEDURE — 86923 COMPATIBILITY TEST ELECTRIC: CPT | Performed by: THORACIC SURGERY (CARDIOTHORACIC VASCULAR SURGERY)

## 2023-11-21 PROCEDURE — 250N000013 HC RX MED GY IP 250 OP 250 PS 637: Performed by: INTERNAL MEDICINE

## 2023-11-21 PROCEDURE — 99232 SBSQ HOSP IP/OBS MODERATE 35: CPT | Performed by: INTERNAL MEDICINE

## 2023-11-21 PROCEDURE — 36415 COLL VENOUS BLD VENIPUNCTURE: CPT | Performed by: THORACIC SURGERY (CARDIOTHORACIC VASCULAR SURGERY)

## 2023-11-21 PROCEDURE — 85520 HEPARIN ASSAY: CPT | Performed by: INTERNAL MEDICINE

## 2023-11-21 PROCEDURE — 87641 MR-STAPH DNA AMP PROBE: CPT | Performed by: THORACIC SURGERY (CARDIOTHORACIC VASCULAR SURGERY)

## 2023-11-21 PROCEDURE — 210N000001 HC R&B IMCU HEART CARE

## 2023-11-21 PROCEDURE — 250N000013 HC RX MED GY IP 250 OP 250 PS 637: Performed by: NURSE PRACTITIONER

## 2023-11-21 PROCEDURE — 88305 TISSUE EXAM BY PATHOLOGIST: CPT | Mod: 26 | Performed by: PATHOLOGY

## 2023-11-21 PROCEDURE — 36415 COLL VENOUS BLD VENIPUNCTURE: CPT | Performed by: INTERNAL MEDICINE

## 2023-11-21 PROCEDURE — 250N000013 HC RX MED GY IP 250 OP 250 PS 637: Performed by: STUDENT IN AN ORGANIZED HEALTH CARE EDUCATION/TRAINING PROGRAM

## 2023-11-21 PROCEDURE — 250N000011 HC RX IP 250 OP 636: Mod: JZ | Performed by: STUDENT IN AN ORGANIZED HEALTH CARE EDUCATION/TRAINING PROGRAM

## 2023-11-21 PROCEDURE — 250N000011 HC RX IP 250 OP 636: Mod: JZ | Performed by: INTERNAL MEDICINE

## 2023-11-21 PROCEDURE — 85027 COMPLETE CBC AUTOMATED: CPT | Performed by: INTERNAL MEDICINE

## 2023-11-21 RX ADMIN — LEVOTHYROXINE SODIUM 75 MCG: 0.03 TABLET ORAL at 06:08

## 2023-11-21 RX ADMIN — FERROUS SULFATE TAB 325 MG (65 MG ELEMENTAL FE) 325 MG: 325 (65 FE) TAB at 08:08

## 2023-11-21 RX ADMIN — AMLODIPINE BESYLATE 5 MG: 5 TABLET ORAL at 08:08

## 2023-11-21 RX ADMIN — HYDRALAZINE HYDROCHLORIDE 10 MG: 20 INJECTION INTRAMUSCULAR; INTRAVENOUS at 00:06

## 2023-11-21 RX ADMIN — ATENOLOL 25 MG: 25 TABLET ORAL at 08:08

## 2023-11-21 RX ADMIN — BUPROPION HYDROCHLORIDE 300 MG: 150 TABLET, FILM COATED, EXTENDED RELEASE ORAL at 08:09

## 2023-11-21 RX ADMIN — Medication 81 MG: at 08:09

## 2023-11-21 RX ADMIN — POLYETHYLENE GLYCOL 3350 17 G: 17 POWDER, FOR SOLUTION ORAL at 20:29

## 2023-11-21 RX ADMIN — NITROGLYCERIN 15 MG: 20 OINTMENT TOPICAL at 20:30

## 2023-11-21 RX ADMIN — SENNOSIDES AND DOCUSATE SODIUM 2 TABLET: 8.6; 5 TABLET ORAL at 20:30

## 2023-11-21 RX ADMIN — Medication 1 LOZENGE: at 20:30

## 2023-11-21 RX ADMIN — ATENOLOL 25 MG: 25 TABLET ORAL at 20:30

## 2023-11-21 RX ADMIN — HEPARIN SODIUM AND DEXTROSE 750 UNITS/HR: 10000; 5 INJECTION INTRAVENOUS at 22:42

## 2023-11-21 RX ADMIN — AMLODIPINE BESYLATE 5 MG: 5 TABLET ORAL at 20:30

## 2023-11-21 RX ADMIN — SENNOSIDES AND DOCUSATE SODIUM 2 TABLET: 8.6; 5 TABLET ORAL at 08:07

## 2023-11-21 RX ADMIN — HYDROXYZINE HYDROCHLORIDE 25 MG: 25 TABLET, FILM COATED ORAL at 01:10

## 2023-11-21 RX ADMIN — POLYETHYLENE GLYCOL 3350 17 G: 17 POWDER, FOR SOLUTION ORAL at 08:06

## 2023-11-21 ASSESSMENT — ACTIVITIES OF DAILY LIVING (ADL)
ADLS_ACUITY_SCORE: 19
DEPENDENT_IADLS:: INDEPENDENT
ADLS_ACUITY_SCORE: 18
ADLS_ACUITY_SCORE: 19
ADLS_ACUITY_SCORE: 18

## 2023-11-21 NOTE — CONSULTS
Care Management Initial Consult    General Information  Assessment completed with: Patient,    Type of CM/SW Visit: Initial Assessment    Primary Care Provider verified and updated as needed: Yes   Readmission within the last 30 days: no previous admission in last 30 days         Advance Care Planning: Advance Care Planning Reviewed:  (forms provided)          Communication Assessment  Patient's communication style: spoken language (English or Bilingual)    Hearing Difficulty or Deaf: no   Wear Glasses or Blind: no    Cognitive  Cognitive/Neuro/Behavioral: .WDL except, mood/behavior           Mood/Behavior: anxious          Living Environment:   People in home:  (roommates)     Current living Arrangements: house      Able to return to prior arrangements: yes       Family/Social Support:  Care provided by: self  Provides care for: no one     Children (room mates, family)          Description of Support System: Supportive, Involved         Current Resources:   Patient receiving home care services: No     Community Resources: None  Equipment currently used at home: none  Supplies currently used at home: None    Employment/Financial:  Employment Status: employed full-time        Financial Concerns:             Does the patient's insurance plan have a 3 day qualifying hospital stay waiver?  No    Lifestyle & Psychosocial Needs:  Social Determinants of Health     Food Insecurity: Not on file   Depression: Not at risk (9/13/2023)    PHQ-2     PHQ-2 Score: 0   Housing Stability: Not on file   Tobacco Use: Medium Risk (11/17/2023)    Patient History     Smoking Tobacco Use: Former     Smokeless Tobacco Use: Unknown     Passive Exposure: Past   Financial Resource Strain: Not on file   Alcohol Use: Not on file   Transportation Needs: Not on file   Physical Activity: Not on file   Interpersonal Safety: Not on file   Stress: Not on file   Social Connections: Not on file       Functional Status:  Prior to admission patient needed  assistance:   Dependent ADLs:: Independent  Dependent IADLs:: Independent           Additional Information:    Assessment completed with patient. Patient reports she lives in her townhouse with roommates. She is independent with ADLs and IADLs, ambulates without devices, drives and works fulltime from home.  Her son Edwin is primary family contact. Family willing to transport at discharge.     Patient has short term disability paperwork from her employer. She is working on filling out her portion and will need either hospital provider or PCP complete MD portion. CM to assist with faxing information.      Final discharge plan pending progression and recommendations post CABG.    Joana Marquez RN

## 2023-11-21 NOTE — PLAN OF CARE
Problem: Chest Pain  Goal: Resolution of Chest Pain Symptoms  Outcome: Progressing     Problem: Dysrhythmia  Goal: Normalized Cardiac Rhythm  Outcome: Progressing         Goal Outcome Evaluation:  Patient is aox4. Patient anxious tonight and prn hydroxyzine helpful. Denied chest pain, SOB, and lightheadedness. BP elevated this shift. Prn IV hydralazine given once for SBP >160s. Continue to monitor. Patient's on heparin gtt. Anti-xa 0.35. This is the second value within goal range. Recheck anti-xa 11/22 am. Patient is independent in the room. Call-light within reach.

## 2023-11-21 NOTE — PLAN OF CARE
Problem: Chest Pain  Goal: Resolution of Chest Pain Symptoms  Outcome: Progressing   The pt denies chest pain or discomfort.  Nitroglycerin ointment applied to L-chest.     Heparin gtt infusing at 750 un/hr.  Anti Xa to be rechecked 11/21 @ 0115.      A&O x :4     Activity:Independent     Cardiac Rhythm: Sinus Lito (high 50's) to SR.      Pain: Reported pain to the L-arm 2/2 PIV.  IV removed; pain has resolved.     Plan:  Review CABG educational video(s) and handouts 11/21 in preparation for procedure 11/22.

## 2023-11-21 NOTE — PLAN OF CARE
"SN came in with shortness of breath. Patient is alert and oriented x4 and moves independently. Patient reports no pain. The patient received education concerning the procedure tomorrow. The patient is anxious and social workers were consulted.   BP (!) 149/66 (BP Location: Right arm)   Pulse 77   Temp 99.2  F (37.3  C) (Oral)   Resp 20   Ht 1.499 m (4' 11\")   Wt 73.4 kg (161 lb 14.4 oz)   LMP  (LMP Unknown)   SpO2 95%   BMI 32.70 kg/m       Dariela Mahan on 11/21/2023 at 2:29 PM     Plan of Care Reviewed With: patient                 "

## 2023-11-21 NOTE — PROGRESS NOTES
[facility]    Medicine Progress Note - Hospitalist Service    Date of Admission:  11/13/2023    Assessment & Plan   Lisseth Qureshi is a 68 year old female admitted on 11/13/2023 for chest pain.     Multivessel coronary disease with unstable angina:  Patient presented with chest discomfort which tends to get better when she lies down and gets worse when she sits up.  s/p PCI/stents in April 2023  Coronary angiogram 11/14, showed multivessel coronary artery disease involving the mid LAD, the proximal to mid left circumflex, and severe in-stent restenosis of the dominant mid right coronary artery.   - Cardiothoracic surgery consulted for evaluation of surgical revascularization: plan CABG 11/22.  - Continue heparin drip  - Continue Aspirin, atenolol and nitropaste    Essential Hypertension: BP controlled.  - Continue PTA amlodipine  - Hydralazine as needed    Type 2 diabetes mellitus: A1c 7.4 in September  - Hold home metformin and exenatide   - Medium intensity insulin sliding scale: patient has been refusing  - Hypoglycemia protocol    Microcytic anemia of iron deficiency:  Hemoglobin dropped from around 12 in June to 9.5 and further to 8.2.  Received 1 unit blood transfusion on 11/15 given unstable angina.  Posttransfusion hemoglobin 9.7  History of esophagitis large hiatal hernia.  Normal folate and B12, iron low at 17.  Iron saturation 4%.  No melena or hematochezia.  GI consulted and Colonoscopy done 11/17: transverse colon sessile polyp- removed, 6 mm flat polyp in descending colon-removed, few diverticuli, nonbleeding internal hemorrhoids.  - No contraindication from GI standpoint for coronary revascularization  - Recommended repeat colonoscopy in 3 years, follow pathology report  - Hematology consulted and input appreciated: plan IV iron for one dose today. Continue oral iron supplement.     Hypothyroidism  - Continue Levothyroxine, TSH 3.45 in September    Chronic slow transit  "constipation  -Daily MiraLAX            Diet: Low Saturated Fat Na <2400 mg    DVT Prophylaxis: Heparin   [place] Catheter: Not present  Lines: None     Cardiac Monitoring: ACTIVE order. Indication: AMI (NSTEMI/ STEMI) (48 hours)  Code Status: Full Code      Clinically Significant Risk Factors                  # Hypertension: Noted on problem list        # Obesity: Estimated body mass index is 32.7 kg/m  as calculated from the following:    Height as of this encounter: 1.499 m (4' 11\").    Weight as of this encounter: 73.4 kg (161 lb 14.4 oz).             Disposition Plan     Expected Discharge Date: 11/26/2023    Discharge Delays: Procedure Pending (enter procedure & time in comments)  IV Medication - consider oral or Home Infusion    Discharge Comments: CABG tennative 11/22            Jovon Yee MD  Hospitalist Service  [facility]  Securely message with [email or web address] (more info)  Text page via [employer] Paging/Directory   ______________________________________________________________________    Interval History   No cp/sob, no n/v, no f/c, no new complaints    Physical Exam   Vital Signs: Temp: 98.4  F (36.9  C) Temp src: Oral BP: (!) 161/72 Pulse: 74   Resp: 20 SpO2: 96 % O2 Device: None (Room air)    Weight: 161 lbs 14.4 oz    General.  Awake alert oriented not in acute distress.  HEENT.  Pupils equal round react to light, anicteric, EOM intact.  Neck supple no JVD.  CVS regular rhythm no murmur gallops.  Lungs.  Clear to auscultation bilateral no wheezing or rales.  Abdomen.  Soft nontender bowel sounds present.  Extremities.  No edema no calf tenderness.  Neurological.  Awake and alert. No focal deficit.  Skin no rash. No pallor.  Psych. Normal mood.      Medical Decision Making       40 MINUTES SPENT BY ME on the date of service doing chart review, history, exam, documentation & further activities per the note.      Data     I have personally reviewed the following data over the past 24 " hrs:    5.8  \   9.6 (L)   / 217     N/A N/A N/A /  115 (H)   N/A N/A N/A \       Imaging results reviewed over the past 24 hrs:   No results found for this or any previous visit (from the past 24 hour(s)).

## 2023-11-21 NOTE — PROGRESS NOTES
CLINICAL NUTRITION SERVICES  -  ASSESSMENT NOTE    Recommendations Ordered by Registered Dietitian (RD):   Ordered a Strawberry Glucerna and allow pt to order prn.  Encouraged po intake, emphasizing the importance of protein to preserve lean muscle mass   Malnutrition: 11/21  % Weight Loss:  Weight loss does not meet criteria for malnutrition  % Intake:  <75% for > 7 days (moderate malnutrition)  Subcutaneous Fat Loss:  None observed  Muscle Loss:  Temporal region mild depletion, Clavicle bone region mild depletion, and Dorsal hand region mild depletion  Fluid Retention:  None noted    Malnutrition Diagnosis: Moderate malnutrition  In Context of:  Acute illness or injury on Chronic illness or disease     REASON FOR ASSESSMENT  Lisseth Qureshi is a 68 year old female seen by Registered Dietitian for LOS    PMH of: CAD S/P stent to RCA, type 2 diabetes , mellitus, hypertension, hypothyroidism, vertebral artery occlusion. Admitted on 11/13/2023, presented with chest pain of 1 day duration.        NUTRITION HISTORY  - Information obtained from chart review and pt at bedside, who is was pleasant while discussing her nutrition history  - Patient on a regular diet at home, but is mindful of her CHO intake and avoids processed foods.  - Allergies: NKFA  - UBW: 170 lb  - Barriers to PO intakes: none  - Typical food/fluid intake is only 2 meals/day. Lisseth likes eggs and toast with fruit for breakfast, and something regular for dinner, rotisserie chicken is common.  - Use of oral supplements: pt would like to order Glucerna prn    CURRENT NUTRITION ORDERS  Diet Order:  NPO @ midnight vs  Low Saturated Fat/Low Cholesterol/2400 mg Sodium     Current Intake/Tolerance: Pt likes the omelets for breakfast and has been enjoying the turkey at night. Pt report a reduced appetite from being sedentary and often follows her hunger cues, so she hasn't been eating as much.     Flowsheets show pt has a good appetite most days, consuming 0 or  "100% of 2 meals per day, or nothing at all.   Radio NEXT (meal ordering system) shows pt ordering a 7-day average of 417 kcal and 29 g protein.    GI: LBM x2 on 11/17 - pt has 3-4 BM/week at baseline and is in communication with providers    NUTRITION FOCUSED PHYSICAL ASSESSMENT FOR DIAGNOSING MALNUTRITION)  Yes         Observed:    Muscle wasting (refer to documentation in Malnutrition section)    ANTHROPOMETRICS  Height: 4' 11\"  Weight: 161 lbs 14.4 oz (73.4 kg)  Body mass index is 32.7 kg/m .  Weight Status:  Obesity Grade I BMI 30-34.9  IBW: 95 lb (43.2 kg)  % IBW: 170%  Weight History:   Wt Readings from Last 10 Encounters:   11/21/23 73.4 kg (161 lb 14.4 oz)   09/13/23 78.5 kg (173 lb)   08/15/23 77.6 kg (171 lb)   05/03/23 80.3 kg (177 lb)   04/25/23 79.8 kg (176 lb)   04/18/23 79.4 kg (175 lb)   04/12/23 79.4 kg (175 lb)   04/06/23 78 kg (172 lb)   05/26/17 79.8 kg (176 lb)   05/31/16 79.8 kg (176 lb)      LABS   (H), Cr 1.07 (H), FGR 57 (L),     MEDICATIONS  Medications reviewed    ASSESSED NUTRITION NEEDS PER APPROVED PRACTICE GUIDELINES:  Dosing Weight 50.8 kg (adjusted)    Estimated Energy Needs: 6898-2493 kcals (25-30 Kcal/Kg)  Justification: maintenance  Estimated Protein Needs: 51-61 grams protein (1-1.2 g pro/Kg)  Justification: preservation of lean body mass  Estimated Fluid Needs: 2566-2472  mL (25-30 mL/kg)  Justification: maintenance    MALNUTRITION:  % Weight Loss:  Weight loss does not meet criteria for malnutrition  % Intake:  <75% for > 7 days (moderate malnutrition)  Subcutaneous Fat Loss:  None observed  Muscle Loss:  Temporal region mild depletion, Clavicle bone region mild depletion, and Dorsal hand region mild depletion  Fluid Retention:  None noted    Malnutrition Diagnosis: Moderate malnutrition  In Context of:  Acute illness or injury on Chronic illness or disease    NUTRITION DIAGNOSIS:  Inadequate oral intake related to poor appetite as evidenced by pt report, <75% po intake " for >7 days, and mild muscle wasting.     NUTRITION INTERVENTIONS  Recommendations / Nutrition Prescription  Ordered a Strawberry Glucerna and allow pt to order prn.  Encouraged po intake, emphasizing the importance of protein to preserve lean muscle mass    Implementation  Nutrition education: Per Provider order if indicated   General/healthful diet and Medical Food Supplement    Nutrition Goals  Patient to consume % of nutritionally adequate meal trays TID, or the equivalent with supplements/snacks.    MONITORING AND EVALUATION:  Progress towards goals will be monitored and evaluated per protocol and Practice Guidelines

## 2023-11-21 NOTE — PLAN OF CARE
Problem: Adult Inpatient Plan of Care  Goal: Optimal Comfort and Wellbeing  Outcome: Progressing     Problem: Chest Pain  Goal: Resolution of Chest Pain Symptoms  Outcome: Progressing   Goal Outcome Evaluation:      Plan of Care Reviewed With: patient    Overall Patient Progress: improvingOverall Patient Progress: improving     Pt is A&Ox4, on RA, NSR and independent in the room. CABG education videos and folder given. Heparin gtt running at 750 units/hr, recheck in the AM. Denies pain/SOB.     B, 132  Julia Pastor RN

## 2023-11-22 ENCOUNTER — APPOINTMENT (OUTPATIENT)
Dept: RADIOLOGY | Facility: HOSPITAL | Age: 68
DRG: 234 | End: 2023-11-22
Attending: PHYSICIAN ASSISTANT
Payer: COMMERCIAL

## 2023-11-22 ENCOUNTER — ANESTHESIA (OUTPATIENT)
Dept: SURGERY | Facility: HOSPITAL | Age: 68
DRG: 234 | End: 2023-11-22
Payer: COMMERCIAL

## 2023-11-22 PROBLEM — I25.10 CORONARY ARTERY DISEASE INVOLVING NATIVE CORONARY ARTERY OF NATIVE HEART, UNSPECIFIED WHETHER ANGINA PRESENT: Status: ACTIVE | Noted: 2023-11-22

## 2023-11-22 LAB
ALBUMIN SERPL BCG-MCNC: 3.5 G/DL (ref 3.5–5.2)
ALP SERPL-CCNC: 49 U/L (ref 40–150)
ALT SERPL W P-5'-P-CCNC: 14 U/L (ref 0–50)
ANION GAP SERPL CALCULATED.3IONS-SCNC: 9 MMOL/L (ref 7–15)
APTT PPP: 30 SECONDS (ref 22–38)
APTT PPP: 30 SECONDS (ref 22–38)
AST SERPL W P-5'-P-CCNC: 24 U/L (ref 0–45)
BASE EXCESS BLDA CALC-SCNC: -2 MMOL/L
BASE EXCESS BLDA CALC-SCNC: -2.2 MMOL/L
BASE EXCESS BLDA CALC-SCNC: -2.3 MMOL/L
BASE EXCESS BLDA CALC-SCNC: -3.4 MMOL/L
BASE EXCESS BLDV CALC-SCNC: -4.3 MMOL/L
BILIRUB SERPL-MCNC: 0.3 MG/DL
BLD PROD TYP BPU: NORMAL
BLD PROD TYP BPU: NORMAL
BLOOD COMPONENT TYPE: NORMAL
BLOOD COMPONENT TYPE: NORMAL
BUN SERPL-MCNC: 10.9 MG/DL (ref 8–23)
CA-I BLD-MCNC: 1.09 MMOL/L (ref 1.11–1.3)
CA-I BLD-MCNC: 1.1 MMOL/L (ref 1.11–1.3)
CA-I BLD-MCNC: 1.16 MMOL/L (ref 1.11–1.3)
CA-I BLD-MCNC: 1.3 MMOL/L (ref 1.11–1.3)
CALCIUM SERPL-MCNC: 10.2 MG/DL (ref 8.8–10.2)
CALCIUM, IONIZED MEASURED: 1.4 MMOL/L (ref 1.11–1.3)
CHLORIDE SERPL-SCNC: 113 MMOL/L (ref 98–107)
CODING SYSTEM: NORMAL
CODING SYSTEM: NORMAL
COHGB MFR BLD: 100 % (ref 95–96)
COHGB MFR BLD: 100 % (ref 95–96)
COHGB MFR BLD: 97.4 % (ref 95–96)
COHGB MFR BLD: 99.7 % (ref 95–96)
CREAT SERPL-MCNC: 0.87 MG/DL (ref 0.51–0.95)
CREAT SERPL-MCNC: 1.03 MG/DL (ref 0.51–0.95)
CROSSMATCH: NORMAL
CROSSMATCH: NORMAL
DEPRECATED HCO3 PLAS-SCNC: 23 MMOL/L (ref 22–29)
EGFRCR SERPLBLD CKD-EPI 2021: 60 ML/MIN/1.73M2
EGFRCR SERPLBLD CKD-EPI 2021: 73 ML/MIN/1.73M2
ERYTHROCYTE [DISTWIDTH] IN BLOOD BY AUTOMATED COUNT: 17.1 % (ref 10–15)
ERYTHROCYTE [DISTWIDTH] IN BLOOD BY AUTOMATED COUNT: 17.5 % (ref 10–15)
FIBRINOGEN PPP-MCNC: 335 MG/DL (ref 170–490)
GLUCOSE BLD-MCNC: 132 MG/DL (ref 70–125)
GLUCOSE BLD-MCNC: 143 MG/DL (ref 70–125)
GLUCOSE BLD-MCNC: 159 MG/DL (ref 70–125)
GLUCOSE BLD-MCNC: 161 MG/DL (ref 70–125)
GLUCOSE BLDC GLUCOMTR-MCNC: 101 MG/DL (ref 70–99)
GLUCOSE BLDC GLUCOMTR-MCNC: 106 MG/DL (ref 70–99)
GLUCOSE BLDC GLUCOMTR-MCNC: 120 MG/DL (ref 70–99)
GLUCOSE BLDC GLUCOMTR-MCNC: 140 MG/DL (ref 70–99)
GLUCOSE BLDC GLUCOMTR-MCNC: 142 MG/DL (ref 70–99)
GLUCOSE BLDC GLUCOMTR-MCNC: 147 MG/DL (ref 70–99)
GLUCOSE BLDC GLUCOMTR-MCNC: 151 MG/DL (ref 70–99)
GLUCOSE BLDC GLUCOMTR-MCNC: 155 MG/DL (ref 70–99)
GLUCOSE BLDC GLUCOMTR-MCNC: 159 MG/DL (ref 70–99)
GLUCOSE BLDC GLUCOMTR-MCNC: 165 MG/DL (ref 70–99)
GLUCOSE BLDC GLUCOMTR-MCNC: 88 MG/DL (ref 70–99)
GLUCOSE SERPL-MCNC: 113 MG/DL (ref 70–99)
HCO3 BLD-SCNC: 23 MMOL/L (ref 23–29)
HCO3 BLDA-SCNC: 23 MMOL/L (ref 23–29)
HCO3 BLDV-SCNC: 21 MMOL/L (ref 24–30)
HCT VFR BLD AUTO: 26.3 % (ref 35–47)
HCT VFR BLD AUTO: 31 % (ref 35–47)
HGB BLD-MCNC: 8.3 G/DL (ref 11.7–15.7)
HGB BLD-MCNC: 8.3 G/DL (ref 11.7–15.7)
HGB BLD-MCNC: 8.4 G/DL (ref 11.7–15.7)
HGB BLD-MCNC: 8.7 G/DL (ref 11.7–15.7)
HGB BLD-MCNC: 9.7 G/DL (ref 11.7–15.7)
HGB BLD-MCNC: 9.9 G/DL (ref 11.7–15.7)
INR PPP: 1.48 (ref 0.85–1.15)
INR PPP: 1.52 (ref 0.85–1.15)
ION CA PH 7.4: 1.31 MMOL/L (ref 1.11–1.3)
ISSUE DATE AND TIME: NORMAL
ISSUE DATE AND TIME: NORMAL
LACTATE BLD-SCNC: 0.5 MMOL/L (ref 0.7–2)
LACTATE BLD-SCNC: 0.7 MMOL/L (ref 0.7–2)
LACTATE BLD-SCNC: 0.7 MMOL/L (ref 0.7–2)
LACTATE BLD-SCNC: 0.9 MMOL/L (ref 0.7–2)
LACTATE SERPL-SCNC: 0.9 MMOL/L (ref 0.7–2)
MAGNESIUM SERPL-MCNC: 3.2 MG/DL (ref 1.7–2.3)
MCH RBC QN AUTO: 26.3 PG (ref 26.5–33)
MCH RBC QN AUTO: 26.7 PG (ref 26.5–33)
MCHC RBC AUTO-ENTMCNC: 31.6 G/DL (ref 31.5–36.5)
MCHC RBC AUTO-ENTMCNC: 31.9 G/DL (ref 31.5–36.5)
MCV RBC AUTO: 83 FL (ref 78–100)
MCV RBC AUTO: 84 FL (ref 78–100)
OXYHGB MFR BLD: 95.7 % (ref 95–96)
PCO2 BLD: 47 MM HG (ref 35–45)
PCO2 BLDA: 34 MM HG (ref 35–45)
PCO2 BLDA: 43 MM HG (ref 35–45)
PCO2 BLDA: 48 MM HG (ref 35–45)
PCO2 BLDV: 47 MM HG (ref 35–50)
PH BLD: 7.3 [PH] (ref 7.37–7.44)
PH BLDA: 7.31 [PH] (ref 7.37–7.44)
PH BLDA: 7.34 [PH] (ref 7.37–7.44)
PH BLDA: 7.42 [PH] (ref 7.37–7.44)
PH BLDV: 7.29 [PH] (ref 7.35–7.45)
PH: 7.28 (ref 7.35–7.45)
PHOSPHATE SERPL-MCNC: 2.9 MG/DL (ref 2.5–4.5)
PLATELET # BLD AUTO: 124 10E3/UL (ref 150–450)
PLATELET # BLD AUTO: 132 10E3/UL (ref 150–450)
PLATELET # BLD AUTO: 233 10E3/UL (ref 150–450)
PO2 BLD: 94 MM HG (ref 75–85)
PO2 BLDA: 128 MM HG (ref 75–85)
PO2 BLDA: 327 MM HG (ref 75–85)
PO2 BLDA: 357 MM HG (ref 75–85)
PO2 BLDV: 54 MM HG (ref 25–47)
POTASSIUM BLD-SCNC: 3.5 MMOL/L (ref 3.5–5)
POTASSIUM BLD-SCNC: 3.8 MMOL/L (ref 3.5–5)
POTASSIUM BLD-SCNC: 4.2 MMOL/L (ref 3.5–5)
POTASSIUM BLD-SCNC: 4.4 MMOL/L (ref 3.5–5)
POTASSIUM SERPL-SCNC: 3.9 MMOL/L (ref 3.4–5.3)
POTASSIUM SERPL-SCNC: 3.9 MMOL/L (ref 3.4–5.3)
PROT SERPL-MCNC: 5.2 G/DL (ref 6.4–8.3)
RBC # BLD AUTO: 3.16 10E6/UL (ref 3.8–5.2)
RBC # BLD AUTO: 3.71 10E6/UL (ref 3.8–5.2)
SATV LHE POCT: 84.5 % (ref 70–75)
SODIUM BLD-SCNC: 140 MMOL/L (ref 135–145)
SODIUM BLD-SCNC: 141 MMOL/L (ref 135–145)
SODIUM BLD-SCNC: 142 MMOL/L (ref 135–145)
SODIUM BLD-SCNC: 143 MMOL/L (ref 135–145)
SODIUM SERPL-SCNC: 145 MMOL/L (ref 135–145)
TEMPERATURE: 37 DEGREES C
UFH PPP CHRO-ACNC: <0.1 IU/ML
UNIT ABO/RH: NORMAL
UNIT ABO/RH: NORMAL
UNIT NUMBER: NORMAL
UNIT NUMBER: NORMAL
UNIT STATUS: NORMAL
UNIT STATUS: NORMAL
UNIT TYPE ISBT: 5100
UNIT TYPE ISBT: 5100
WBC # BLD AUTO: 5 10E3/UL (ref 4–11)
WBC # BLD AUTO: 6 10E3/UL (ref 4–11)

## 2023-11-22 PROCEDURE — 250N000011 HC RX IP 250 OP 636: Performed by: NURSE ANESTHETIST, CERTIFIED REGISTERED

## 2023-11-22 PROCEDURE — 84295 ASSAY OF SERUM SODIUM: CPT | Performed by: PHYSICIAN ASSISTANT

## 2023-11-22 PROCEDURE — 83735 ASSAY OF MAGNESIUM: CPT | Performed by: PHYSICIAN ASSISTANT

## 2023-11-22 PROCEDURE — 200N000001 HC R&B ICU

## 2023-11-22 PROCEDURE — 250N000011 HC RX IP 250 OP 636: Mod: JZ | Performed by: ANESTHESIOLOGY

## 2023-11-22 PROCEDURE — 33533 CABG ARTERIAL SINGLE: CPT | Performed by: THORACIC SURGERY (CARDIOTHORACIC VASCULAR SURGERY)

## 2023-11-22 PROCEDURE — 250N000009 HC RX 250: Performed by: PHYSICIAN ASSISTANT

## 2023-11-22 PROCEDURE — 250N000012 HC RX MED GY IP 250 OP 636 PS 637: Mod: JZ | Performed by: THORACIC SURGERY (CARDIOTHORACIC VASCULAR SURGERY)

## 2023-11-22 PROCEDURE — 93005 ELECTROCARDIOGRAM TRACING: CPT | Performed by: PHYSICIAN ASSISTANT

## 2023-11-22 PROCEDURE — 85610 PROTHROMBIN TIME: CPT | Performed by: PHYSICIAN ASSISTANT

## 2023-11-22 PROCEDURE — 272N000004 HC RX 272: Performed by: THORACIC SURGERY (CARDIOTHORACIC VASCULAR SURGERY)

## 2023-11-22 PROCEDURE — 99207 PR NO CHARGE LOS: CPT | Performed by: INTERNAL MEDICINE

## 2023-11-22 PROCEDURE — 999N000065 XR CHEST PORT 1 VIEW

## 2023-11-22 PROCEDURE — 82330 ASSAY OF CALCIUM: CPT

## 2023-11-22 PROCEDURE — 85049 AUTOMATED PLATELET COUNT: CPT | Performed by: STUDENT IN AN ORGANIZED HEALTH CARE EDUCATION/TRAINING PROGRAM

## 2023-11-22 PROCEDURE — C1898 LEAD, PMKR, OTHER THAN TRANS: HCPCS | Performed by: THORACIC SURGERY (CARDIOTHORACIC VASCULAR SURGERY)

## 2023-11-22 PROCEDURE — 5A1221Z PERFORMANCE OF CARDIAC OUTPUT, CONTINUOUS: ICD-10-PCS | Performed by: THORACIC SURGERY (CARDIOTHORACIC VASCULAR SURGERY)

## 2023-11-22 PROCEDURE — 99291 CRITICAL CARE FIRST HOUR: CPT | Performed by: INTERNAL MEDICINE

## 2023-11-22 PROCEDURE — 250N000013 HC RX MED GY IP 250 OP 250 PS 637: Performed by: PHYSICIAN ASSISTANT

## 2023-11-22 PROCEDURE — 410N000004: Performed by: THORACIC SURGERY (CARDIOTHORACIC VASCULAR SURGERY)

## 2023-11-22 PROCEDURE — 999N000259 HC STATISTIC EXTUBATION

## 2023-11-22 PROCEDURE — 83605 ASSAY OF LACTIC ACID: CPT | Performed by: PHYSICIAN ASSISTANT

## 2023-11-22 PROCEDURE — 5A1223Z PERFORMANCE OF CARDIAC PACING, CONTINUOUS: ICD-10-PCS | Performed by: THORACIC SURGERY (CARDIOTHORACIC VASCULAR SURGERY)

## 2023-11-22 PROCEDURE — 02HN0JZ INSERTION OF PACEMAKER LEAD INTO PERICARDIUM, OPEN APPROACH: ICD-10-PCS | Performed by: THORACIC SURGERY (CARDIOTHORACIC VASCULAR SURGERY)

## 2023-11-22 PROCEDURE — C1713 ANCHOR/SCREW BN/BN,TIS/BN: HCPCS | Performed by: THORACIC SURGERY (CARDIOTHORACIC VASCULAR SURGERY)

## 2023-11-22 PROCEDURE — 250N000011 HC RX IP 250 OP 636: Mod: JZ | Performed by: PHYSICIAN ASSISTANT

## 2023-11-22 PROCEDURE — 94799 UNLISTED PULMONARY SVC/PX: CPT

## 2023-11-22 PROCEDURE — 82330 ASSAY OF CALCIUM: CPT | Performed by: PHYSICIAN ASSISTANT

## 2023-11-22 PROCEDURE — 250N000009 HC RX 250: Performed by: NURSE ANESTHETIST, CERTIFIED REGISTERED

## 2023-11-22 PROCEDURE — 999N000055 HC STATISTIC END TITIAL CO2 MONITORING

## 2023-11-22 PROCEDURE — 250N000011 HC RX IP 250 OP 636: Performed by: THORACIC SURGERY (CARDIOTHORACIC VASCULAR SURGERY)

## 2023-11-22 PROCEDURE — 94002 VENT MGMT INPAT INIT DAY: CPT

## 2023-11-22 PROCEDURE — 85730 THROMBOPLASTIN TIME PARTIAL: CPT | Performed by: NURSE ANESTHETIST, CERTIFIED REGISTERED

## 2023-11-22 PROCEDURE — 021109W BYPASS CORONARY ARTERY, TWO ARTERIES FROM AORTA WITH AUTOLOGOUS VENOUS TISSUE, OPEN APPROACH: ICD-10-PCS | Performed by: THORACIC SURGERY (CARDIOTHORACIC VASCULAR SURGERY)

## 2023-11-22 PROCEDURE — 258N000003 HC RX IP 258 OP 636: Performed by: THORACIC SURGERY (CARDIOTHORACIC VASCULAR SURGERY)

## 2023-11-22 PROCEDURE — 999N000157 HC STATISTIC RCP TIME EA 10 MIN

## 2023-11-22 PROCEDURE — 258N000003 HC RX IP 258 OP 636: Performed by: ANESTHESIOLOGY

## 2023-11-22 PROCEDURE — 410N000003 HC PER-PERFUSION 1ST 30 MIN: Performed by: THORACIC SURGERY (CARDIOTHORACIC VASCULAR SURGERY)

## 2023-11-22 PROCEDURE — 999N000009 HC STATISTIC AIRWAY CARE

## 2023-11-22 PROCEDURE — 02100Z9 BYPASS CORONARY ARTERY, ONE ARTERY FROM LEFT INTERNAL MAMMARY, OPEN APPROACH: ICD-10-PCS | Performed by: THORACIC SURGERY (CARDIOTHORACIC VASCULAR SURGERY)

## 2023-11-22 PROCEDURE — 33518 CABG ARTERY-VEIN TWO: CPT | Performed by: THORACIC SURGERY (CARDIOTHORACIC VASCULAR SURGERY)

## 2023-11-22 PROCEDURE — 80053 COMPREHEN METABOLIC PANEL: CPT | Performed by: STUDENT IN AN ORGANIZED HEALTH CARE EDUCATION/TRAINING PROGRAM

## 2023-11-22 PROCEDURE — P9016 RBC LEUKOCYTES REDUCED: HCPCS | Performed by: THORACIC SURGERY (CARDIOTHORACIC VASCULAR SURGERY)

## 2023-11-22 PROCEDURE — 85049 AUTOMATED PLATELET COUNT: CPT | Performed by: NURSE ANESTHETIST, CERTIFIED REGISTERED

## 2023-11-22 PROCEDURE — 36415 COLL VENOUS BLD VENIPUNCTURE: CPT | Performed by: STUDENT IN AN ORGANIZED HEALTH CARE EDUCATION/TRAINING PROGRAM

## 2023-11-22 PROCEDURE — 250N000009 HC RX 250: Performed by: THORACIC SURGERY (CARDIOTHORACIC VASCULAR SURGERY)

## 2023-11-22 PROCEDURE — 999N000141 HC STATISTIC PRE-PROCEDURE NURSING ASSESSMENT: Performed by: THORACIC SURGERY (CARDIOTHORACIC VASCULAR SURGERY)

## 2023-11-22 PROCEDURE — 250N000013 HC RX MED GY IP 250 OP 250 PS 637: Performed by: NURSE ANESTHETIST, CERTIFIED REGISTERED

## 2023-11-22 PROCEDURE — 370N000017 HC ANESTHESIA TECHNICAL FEE, PER MIN: Performed by: THORACIC SURGERY (CARDIOTHORACIC VASCULAR SURGERY)

## 2023-11-22 PROCEDURE — C1760 CLOSURE DEV, VASC: HCPCS | Performed by: THORACIC SURGERY (CARDIOTHORACIC VASCULAR SURGERY)

## 2023-11-22 PROCEDURE — 999N000156 HC STATISTIC RCP CONSULT EA 30 MIN

## 2023-11-22 PROCEDURE — 06BQ4ZZ EXCISION OF LEFT SAPHENOUS VEIN, PERCUTANEOUS ENDOSCOPIC APPROACH: ICD-10-PCS | Performed by: THORACIC SURGERY (CARDIOTHORACIC VASCULAR SURGERY)

## 2023-11-22 PROCEDURE — 85610 PROTHROMBIN TIME: CPT | Performed by: NURSE ANESTHETIST, CERTIFIED REGISTERED

## 2023-11-22 PROCEDURE — 85049 AUTOMATED PLATELET COUNT: CPT | Performed by: PHYSICIAN ASSISTANT

## 2023-11-22 PROCEDURE — 85384 FIBRINOGEN ACTIVITY: CPT | Performed by: NURSE ANESTHETIST, CERTIFIED REGISTERED

## 2023-11-22 PROCEDURE — 250N000013 HC RX MED GY IP 250 OP 250 PS 637: Performed by: THORACIC SURGERY (CARDIOTHORACIC VASCULAR SURGERY)

## 2023-11-22 PROCEDURE — 84132 ASSAY OF SERUM POTASSIUM: CPT | Performed by: PHYSICIAN ASSISTANT

## 2023-11-22 PROCEDURE — 85520 HEPARIN ASSAY: CPT | Performed by: INTERNAL MEDICINE

## 2023-11-22 PROCEDURE — 84100 ASSAY OF PHOSPHORUS: CPT | Performed by: PHYSICIAN ASSISTANT

## 2023-11-22 PROCEDURE — 82805 BLOOD GASES W/O2 SATURATION: CPT | Performed by: PHYSICIAN ASSISTANT

## 2023-11-22 PROCEDURE — 250N000011 HC RX IP 250 OP 636: Performed by: ANESTHESIOLOGY

## 2023-11-22 PROCEDURE — 272N000001 HC OR GENERAL SUPPLY STERILE: Performed by: THORACIC SURGERY (CARDIOTHORACIC VASCULAR SURGERY)

## 2023-11-22 PROCEDURE — 36415 COLL VENOUS BLD VENIPUNCTURE: CPT | Performed by: NURSE ANESTHETIST, CERTIFIED REGISTERED

## 2023-11-22 PROCEDURE — 360N000079 HC SURGERY LEVEL 6, PER MIN: Performed by: THORACIC SURGERY (CARDIOTHORACIC VASCULAR SURGERY)

## 2023-11-22 PROCEDURE — 250N000025 HC SEVOFLURANE, PER MIN: Performed by: THORACIC SURGERY (CARDIOTHORACIC VASCULAR SURGERY)

## 2023-11-22 PROCEDURE — 258N000003 HC RX IP 258 OP 636: Performed by: NURSE ANESTHETIST, CERTIFIED REGISTERED

## 2023-11-22 PROCEDURE — 272N000202 HC AEROBIKA WITH MANOMETER

## 2023-11-22 PROCEDURE — 85730 THROMBOPLASTIN TIME PARTIAL: CPT | Performed by: PHYSICIAN ASSISTANT

## 2023-11-22 PROCEDURE — 999N000253 HC STATISTIC WEANING TRIALS

## 2023-11-22 DEVICE — SS SUTURE, 3 PER SLEEVE
Type: IMPLANTABLE DEVICE | Site: CHEST | Status: FUNCTIONAL
Brand: MYO/WIRE II

## 2023-11-22 DEVICE — SS SUTURE, 4 PER SLEEVE
Type: IMPLANTABLE DEVICE | Site: CHEST | Status: FUNCTIONAL
Brand: MYO/WIRE II

## 2023-11-22 RX ORDER — SODIUM CHLORIDE, SODIUM LACTATE, POTASSIUM CHLORIDE, CALCIUM CHLORIDE 600; 310; 30; 20 MG/100ML; MG/100ML; MG/100ML; MG/100ML
INJECTION, SOLUTION INTRAVENOUS CONTINUOUS PRN
Status: DISCONTINUED | OUTPATIENT
Start: 2023-11-22 | End: 2023-11-22

## 2023-11-22 RX ORDER — ACETAMINOPHEN 325 MG/1
650 TABLET ORAL EVERY 4 HOURS PRN
Status: DISCONTINUED | OUTPATIENT
Start: 2023-11-25 | End: 2023-11-27 | Stop reason: HOSPADM

## 2023-11-22 RX ORDER — PANTOPRAZOLE SODIUM 40 MG/1
40 TABLET, DELAYED RELEASE ORAL
Status: DISCONTINUED | OUTPATIENT
Start: 2023-11-23 | End: 2023-11-27 | Stop reason: HOSPADM

## 2023-11-22 RX ORDER — DEXMEDETOMIDINE HYDROCHLORIDE 4 UG/ML
INJECTION, SOLUTION INTRAVENOUS
Status: COMPLETED
Start: 2023-11-22 | End: 2023-11-22

## 2023-11-22 RX ORDER — BISACODYL 10 MG
10 SUPPOSITORY, RECTAL RECTAL DAILY PRN
Status: DISCONTINUED | OUTPATIENT
Start: 2023-11-22 | End: 2023-11-27 | Stop reason: HOSPADM

## 2023-11-22 RX ORDER — NALOXONE HYDROCHLORIDE 0.4 MG/ML
0.2 INJECTION, SOLUTION INTRAMUSCULAR; INTRAVENOUS; SUBCUTANEOUS
Status: DISCONTINUED | OUTPATIENT
Start: 2023-11-22 | End: 2023-11-27 | Stop reason: HOSPADM

## 2023-11-22 RX ORDER — ATROPINE SULFATE 0.4 MG/ML
AMPUL (ML) INJECTION PRN
Status: DISCONTINUED | OUTPATIENT
Start: 2023-11-22 | End: 2023-11-22

## 2023-11-22 RX ORDER — HYDROMORPHONE HCL IN WATER/PF 6 MG/30 ML
0.4 PATIENT CONTROLLED ANALGESIA SYRINGE INTRAVENOUS
Status: DISCONTINUED | OUTPATIENT
Start: 2023-11-22 | End: 2023-11-23

## 2023-11-22 RX ORDER — SODIUM CHLORIDE, SODIUM LACTATE, POTASSIUM CHLORIDE, CALCIUM CHLORIDE 600; 310; 30; 20 MG/100ML; MG/100ML; MG/100ML; MG/100ML
INJECTION, SOLUTION INTRAVENOUS CONTINUOUS
Status: DISCONTINUED | OUTPATIENT
Start: 2023-11-22 | End: 2023-11-22 | Stop reason: HOSPADM

## 2023-11-22 RX ORDER — CALCIUM GLUCONATE 20 MG/ML
1 INJECTION, SOLUTION INTRAVENOUS
Status: DISCONTINUED | OUTPATIENT
Start: 2023-11-22 | End: 2023-11-27 | Stop reason: HOSPADM

## 2023-11-22 RX ORDER — PHENYLEPHRINE HCL IN 0.9% NACL 50MG/250ML
.1-6 PLASTIC BAG, INJECTION (ML) INTRAVENOUS CONTINUOUS
Status: DISCONTINUED | OUTPATIENT
Start: 2023-11-22 | End: 2023-11-22 | Stop reason: HOSPADM

## 2023-11-22 RX ORDER — CEFAZOLIN SODIUM/WATER 2 G/20 ML
2 SYRINGE (ML) INTRAVENOUS
Status: DISCONTINUED | OUTPATIENT
Start: 2023-11-22 | End: 2023-11-22 | Stop reason: HOSPADM

## 2023-11-22 RX ORDER — METFORMIN HCL 500 MG
500 TABLET, EXTENDED RELEASE 24 HR ORAL
Status: DISCONTINUED | OUTPATIENT
Start: 2023-11-23 | End: 2023-11-27 | Stop reason: HOSPADM

## 2023-11-22 RX ORDER — ONDANSETRON 4 MG/1
4 TABLET, ORALLY DISINTEGRATING ORAL EVERY 6 HOURS PRN
Status: DISCONTINUED | OUTPATIENT
Start: 2023-11-22 | End: 2023-11-27 | Stop reason: HOSPADM

## 2023-11-22 RX ORDER — MAGNESIUM SULFATE 4 G/50ML
4 INJECTION INTRAVENOUS ONCE
Status: COMPLETED | OUTPATIENT
Start: 2023-11-22 | End: 2023-11-22

## 2023-11-22 RX ORDER — NICOTINE POLACRILEX 4 MG
15-30 LOZENGE BUCCAL
Status: DISCONTINUED | OUTPATIENT
Start: 2023-11-22 | End: 2023-11-23

## 2023-11-22 RX ORDER — CEFAZOLIN SODIUM/WATER 2 G/20 ML
2 SYRINGE (ML) INTRAVENOUS SEE ADMIN INSTRUCTIONS
Status: DISCONTINUED | OUTPATIENT
Start: 2023-11-22 | End: 2023-11-22 | Stop reason: HOSPADM

## 2023-11-22 RX ORDER — CALCIUM GLUCONATE 20 MG/ML
2 INJECTION, SOLUTION INTRAVENOUS
Status: DISCONTINUED | OUTPATIENT
Start: 2023-11-22 | End: 2023-11-27 | Stop reason: HOSPADM

## 2023-11-22 RX ORDER — METFORMIN HCL 500 MG
1000 TABLET, EXTENDED RELEASE 24 HR ORAL
Status: DISCONTINUED | OUTPATIENT
Start: 2023-11-22 | End: 2023-11-27 | Stop reason: HOSPADM

## 2023-11-22 RX ORDER — SODIUM CHLORIDE 9 MG/ML
INJECTION, SOLUTION INTRAVENOUS CONTINUOUS PRN
Status: DISCONTINUED | OUTPATIENT
Start: 2023-11-22 | End: 2023-11-22

## 2023-11-22 RX ORDER — AMOXICILLIN 250 MG
1 CAPSULE ORAL 2 TIMES DAILY
Status: DISCONTINUED | OUTPATIENT
Start: 2023-11-22 | End: 2023-11-27 | Stop reason: HOSPADM

## 2023-11-22 RX ORDER — LIDOCAINE HYDROCHLORIDE 10 MG/ML
INJECTION, SOLUTION INFILTRATION; PERINEURAL PRN
Status: DISCONTINUED | OUTPATIENT
Start: 2023-11-22 | End: 2023-11-22

## 2023-11-22 RX ORDER — ASPIRIN 81 MG/1
162 TABLET, CHEWABLE ORAL
Status: COMPLETED | OUTPATIENT
Start: 2023-11-22 | End: 2023-11-22

## 2023-11-22 RX ORDER — HEPARIN SODIUM 1000 [USP'U]/ML
INJECTION, SOLUTION INTRAVENOUS; SUBCUTANEOUS PRN
Status: DISCONTINUED | OUTPATIENT
Start: 2023-11-22 | End: 2023-11-22

## 2023-11-22 RX ORDER — METHADONE HYDROCHLORIDE 10 MG/ML
20 INJECTION, SOLUTION INTRAMUSCULAR; INTRAVENOUS; SUBCUTANEOUS ONCE
Qty: 2 ML | Refills: 0 | Status: COMPLETED | OUTPATIENT
Start: 2023-11-22 | End: 2023-11-22

## 2023-11-22 RX ORDER — ACETAMINOPHEN 325 MG/1
975 TABLET ORAL EVERY 8 HOURS
Qty: 27 TABLET | Refills: 0 | Status: COMPLETED | OUTPATIENT
Start: 2023-11-22 | End: 2023-11-25

## 2023-11-22 RX ORDER — ALBUTEROL SULFATE 90 UG/1
AEROSOL, METERED RESPIRATORY (INHALATION) PRN
Status: DISCONTINUED | OUTPATIENT
Start: 2023-11-22 | End: 2023-11-22

## 2023-11-22 RX ORDER — FENTANYL CITRATE 50 UG/ML
INJECTION, SOLUTION INTRAMUSCULAR; INTRAVENOUS PRN
Status: DISCONTINUED | OUTPATIENT
Start: 2023-11-22 | End: 2023-11-22

## 2023-11-22 RX ORDER — HEPARIN SODIUM 5000 [USP'U]/.5ML
5000 INJECTION, SOLUTION INTRAVENOUS; SUBCUTANEOUS EVERY 8 HOURS
Status: DISCONTINUED | OUTPATIENT
Start: 2023-11-23 | End: 2023-11-27 | Stop reason: HOSPADM

## 2023-11-22 RX ORDER — NOREPINEPHRINE BITARTRATE 0.02 MG/ML
.01-.1 INJECTION, SOLUTION INTRAVENOUS CONTINUOUS
Status: DISCONTINUED | OUTPATIENT
Start: 2023-11-22 | End: 2023-11-22 | Stop reason: HOSPADM

## 2023-11-22 RX ORDER — SODIUM CHLORIDE, SODIUM GLUCONATE, SODIUM ACETATE, POTASSIUM CHLORIDE AND MAGNESIUM CHLORIDE 526; 502; 368; 37; 30 MG/100ML; MG/100ML; MG/100ML; MG/100ML; MG/100ML
1000 INJECTION, SOLUTION INTRAVENOUS
Qty: 3000 ML | Refills: 0 | Status: DISCONTINUED | OUTPATIENT
Start: 2023-11-22 | End: 2023-11-22

## 2023-11-22 RX ORDER — PROCHLORPERAZINE MALEATE 5 MG
5 TABLET ORAL EVERY 6 HOURS PRN
Status: DISCONTINUED | OUTPATIENT
Start: 2023-11-22 | End: 2023-11-27 | Stop reason: HOSPADM

## 2023-11-22 RX ORDER — PROPOFOL 10 MG/ML
INJECTION, EMULSION INTRAVENOUS PRN
Status: DISCONTINUED | OUTPATIENT
Start: 2023-11-22 | End: 2023-11-22

## 2023-11-22 RX ORDER — VANCOMYCIN HYDROCHLORIDE 1 G/20ML
INJECTION, POWDER, LYOPHILIZED, FOR SOLUTION INTRAVENOUS PRN
Status: DISCONTINUED | OUTPATIENT
Start: 2023-11-22 | End: 2023-11-22 | Stop reason: HOSPADM

## 2023-11-22 RX ORDER — ONDANSETRON 2 MG/ML
4 INJECTION INTRAMUSCULAR; INTRAVENOUS EVERY 6 HOURS PRN
Status: DISCONTINUED | OUTPATIENT
Start: 2023-11-22 | End: 2023-11-27 | Stop reason: HOSPADM

## 2023-11-22 RX ORDER — DEXMEDETOMIDINE HYDROCHLORIDE 4 UG/ML
.2-.7 INJECTION, SOLUTION INTRAVENOUS CONTINUOUS
Status: DISCONTINUED | OUTPATIENT
Start: 2023-11-22 | End: 2023-11-23

## 2023-11-22 RX ORDER — ASPIRIN 81 MG/1
162 TABLET, CHEWABLE ORAL DAILY
Status: DISCONTINUED | OUTPATIENT
Start: 2023-11-22 | End: 2023-11-27 | Stop reason: HOSPADM

## 2023-11-22 RX ORDER — METHADONE HYDROCHLORIDE 10 MG/ML
INJECTION, SOLUTION INTRAMUSCULAR; INTRAVENOUS; SUBCUTANEOUS
Status: COMPLETED
Start: 2023-11-22 | End: 2023-11-22

## 2023-11-22 RX ORDER — LIDOCAINE 4 G/G
1-2 PATCH TOPICAL EVERY 24 HOURS
Status: DISCONTINUED | OUTPATIENT
Start: 2023-11-22 | End: 2023-11-27 | Stop reason: HOSPADM

## 2023-11-22 RX ORDER — CALCIUM CHLORIDE 100 MG/ML
INJECTION INTRAVENOUS; INTRAVENTRICULAR PRN
Status: DISCONTINUED | OUTPATIENT
Start: 2023-11-22 | End: 2023-11-22

## 2023-11-22 RX ORDER — DEXMEDETOMIDINE HYDROCHLORIDE 4 UG/ML
.2-1.2 INJECTION, SOLUTION INTRAVENOUS CONTINUOUS
Status: DISCONTINUED | OUTPATIENT
Start: 2023-11-22 | End: 2023-11-22 | Stop reason: HOSPADM

## 2023-11-22 RX ORDER — EPHEDRINE SULFATE 50 MG/ML
INJECTION, SOLUTION INTRAMUSCULAR; INTRAVENOUS; SUBCUTANEOUS PRN
Status: DISCONTINUED | OUTPATIENT
Start: 2023-11-22 | End: 2023-11-22

## 2023-11-22 RX ORDER — NALOXONE HYDROCHLORIDE 0.4 MG/ML
0.4 INJECTION, SOLUTION INTRAMUSCULAR; INTRAVENOUS; SUBCUTANEOUS
Status: DISCONTINUED | OUTPATIENT
Start: 2023-11-22 | End: 2023-11-27 | Stop reason: HOSPADM

## 2023-11-22 RX ORDER — CEFAZOLIN SODIUM 1 G/3ML
1 INJECTION, POWDER, FOR SOLUTION INTRAMUSCULAR; INTRAVENOUS EVERY 8 HOURS
Qty: 15 ML | Refills: 0 | Status: COMPLETED | OUTPATIENT
Start: 2023-11-22 | End: 2023-11-23

## 2023-11-22 RX ORDER — NITROGLYCERIN 5 MG/ML
VIAL (ML) INTRAVENOUS
Status: DISCONTINUED
Start: 2023-11-22 | End: 2023-11-22 | Stop reason: HOSPADM

## 2023-11-22 RX ORDER — ASPIRIN 300 MG/1
300 SUPPOSITORY RECTAL DAILY
Status: DISCONTINUED | OUTPATIENT
Start: 2023-11-22 | End: 2023-11-23

## 2023-11-22 RX ORDER — DEXTROSE MONOHYDRATE 25 G/50ML
25-50 INJECTION, SOLUTION INTRAVENOUS
Status: DISCONTINUED | OUTPATIENT
Start: 2023-11-22 | End: 2023-11-23

## 2023-11-22 RX ORDER — CHLORHEXIDINE GLUCONATE ORAL RINSE 1.2 MG/ML
10 SOLUTION DENTAL ONCE
Qty: 15 ML | Refills: 0 | Status: COMPLETED | OUTPATIENT
Start: 2023-11-22 | End: 2023-11-22

## 2023-11-22 RX ORDER — ASPIRIN 81 MG/1
81 TABLET, CHEWABLE ORAL
Status: COMPLETED | OUTPATIENT
Start: 2023-11-22 | End: 2023-11-22

## 2023-11-22 RX ORDER — LIDOCAINE 40 MG/G
CREAM TOPICAL
Status: DISCONTINUED | OUTPATIENT
Start: 2023-11-22 | End: 2023-11-22 | Stop reason: HOSPADM

## 2023-11-22 RX ORDER — OXYCODONE HYDROCHLORIDE 5 MG/1
5 TABLET ORAL EVERY 4 HOURS PRN
Status: DISCONTINUED | OUTPATIENT
Start: 2023-11-22 | End: 2023-11-27 | Stop reason: HOSPADM

## 2023-11-22 RX ORDER — DEXMEDETOMIDINE HYDROCHLORIDE 4 UG/ML
.2-1 INJECTION, SOLUTION INTRAVENOUS CONTINUOUS
Status: DISCONTINUED | OUTPATIENT
Start: 2023-11-22 | End: 2023-11-22

## 2023-11-22 RX ORDER — HYDRALAZINE HYDROCHLORIDE 20 MG/ML
10 INJECTION INTRAMUSCULAR; INTRAVENOUS EVERY 30 MIN PRN
Status: DISCONTINUED | OUTPATIENT
Start: 2023-11-22 | End: 2023-11-27 | Stop reason: HOSPADM

## 2023-11-22 RX ORDER — PHENYLEPHRINE HCL IN 0.9% NACL 50MG/250ML
.1-4 PLASTIC BAG, INJECTION (ML) INTRAVENOUS CONTINUOUS
Status: DISCONTINUED | OUTPATIENT
Start: 2023-11-22 | End: 2023-11-23

## 2023-11-22 RX ORDER — HYDROMORPHONE HCL IN WATER/PF 6 MG/30 ML
0.2 PATIENT CONTROLLED ANALGESIA SYRINGE INTRAVENOUS
Status: DISCONTINUED | OUTPATIENT
Start: 2023-11-22 | End: 2023-11-23

## 2023-11-22 RX ORDER — OXYCODONE HYDROCHLORIDE 5 MG/1
10 TABLET ORAL EVERY 4 HOURS PRN
Status: DISCONTINUED | OUTPATIENT
Start: 2023-11-22 | End: 2023-11-27 | Stop reason: HOSPADM

## 2023-11-22 RX ORDER — PROTAMINE SULFATE 10 MG/ML
INJECTION, SOLUTION INTRAVENOUS PRN
Status: DISCONTINUED | OUTPATIENT
Start: 2023-11-22 | End: 2023-11-22

## 2023-11-22 RX ORDER — POLYETHYLENE GLYCOL 3350 17 G/17G
17 POWDER, FOR SOLUTION ORAL DAILY
Status: DISCONTINUED | OUTPATIENT
Start: 2023-11-23 | End: 2023-11-27 | Stop reason: HOSPADM

## 2023-11-22 RX ADMIN — NITROGLYCERIN 20 MCG: 5 INJECTION, SOLUTION INTRAVENOUS at 10:46

## 2023-11-22 RX ADMIN — ROCURONIUM BROMIDE 40 MG: 50 INJECTION, SOLUTION INTRAVENOUS at 08:56

## 2023-11-22 RX ADMIN — SUGAMMADEX 200 MG: 100 INJECTION, SOLUTION INTRAVENOUS at 12:04

## 2023-11-22 RX ADMIN — LIDOCAINE HYDROCHLORIDE 5 ML: 10 INJECTION, SOLUTION INFILTRATION; PERINEURAL at 07:30

## 2023-11-22 RX ADMIN — SODIUM CHLORIDE 7.5 G: 900 INJECTION, SOLUTION INTRAVENOUS at 08:00

## 2023-11-22 RX ADMIN — HYDRALAZINE HYDROCHLORIDE 10 MG: 20 INJECTION INTRAMUSCULAR; INTRAVENOUS at 23:29

## 2023-11-22 RX ADMIN — Medication 10 MG: at 08:44

## 2023-11-22 RX ADMIN — CALCIUM CHLORIDE INJECTION 1 G: 100 INJECTION, SOLUTION INTRAVENOUS at 11:31

## 2023-11-22 RX ADMIN — DESMOPRESSIN ACETATE 22.48 MCG: 4 INJECTION, SOLUTION INTRAVENOUS; SUBCUTANEOUS at 11:24

## 2023-11-22 RX ADMIN — HYDROMORPHONE HYDROCHLORIDE 0.4 MG: 0.2 INJECTION, SOLUTION INTRAMUSCULAR; INTRAVENOUS; SUBCUTANEOUS at 12:18

## 2023-11-22 RX ADMIN — Medication 5 MG: at 07:30

## 2023-11-22 RX ADMIN — SODIUM CHLORIDE: 9 INJECTION, SOLUTION INTRAVENOUS at 07:42

## 2023-11-22 RX ADMIN — ATROPINE SULFATE 0.4 MG: 0.4 INJECTION, SOLUTION ENDOTRACHEAL; INTRAMEDULLARY; INTRAMUSCULAR; INTRAVENOUS; SUBCUTANEOUS at 07:35

## 2023-11-22 RX ADMIN — NITROGLYCERIN 20 MCG: 5 INJECTION, SOLUTION INTRAVENOUS at 11:01

## 2023-11-22 RX ADMIN — NITROGLYCERIN 20 MCG: 5 INJECTION, SOLUTION INTRAVENOUS at 08:27

## 2023-11-22 RX ADMIN — PHENYLEPHRINE HYDROCHLORIDE 100 MCG: 10 INJECTION INTRAVENOUS at 11:28

## 2023-11-22 RX ADMIN — FENTANYL CITRATE 50 MCG: 50 INJECTION INTRAMUSCULAR; INTRAVENOUS at 07:30

## 2023-11-22 RX ADMIN — CEFAZOLIN 1 G: 1 INJECTION, POWDER, FOR SOLUTION INTRAMUSCULAR; INTRAVENOUS at 22:30

## 2023-11-22 RX ADMIN — AMINOCAPROIC ACID 1.25 G/HR: 250 INJECTION, SOLUTION INTRAVENOUS at 09:07

## 2023-11-22 RX ADMIN — NITROGLYCERIN 10 MCG: 5 INJECTION, SOLUTION INTRAVENOUS at 09:16

## 2023-11-22 RX ADMIN — DEXMEDETOMIDINE HYDROCHLORIDE 12 MCG: 100 INJECTION, SOLUTION INTRAVENOUS at 07:12

## 2023-11-22 RX ADMIN — DEXMEDETOMIDINE HYDROCHLORIDE 0.5 MCG/KG/HR: 400 INJECTION INTRAVENOUS at 08:00

## 2023-11-22 RX ADMIN — Medication 20 MG: at 07:30

## 2023-11-22 RX ADMIN — Medication 2 G: at 07:27

## 2023-11-22 RX ADMIN — CEFAZOLIN 1 G: 1 INJECTION, POWDER, FOR SOLUTION INTRAMUSCULAR; INTRAVENOUS at 15:09

## 2023-11-22 RX ADMIN — SODIUM CHLORIDE: 9 INJECTION, SOLUTION INTRAVENOUS at 10:05

## 2023-11-22 RX ADMIN — SODIUM CHLORIDE, POTASSIUM CHLORIDE, SODIUM LACTATE AND CALCIUM CHLORIDE: 600; 310; 30; 20 INJECTION, SOLUTION INTRAVENOUS at 06:50

## 2023-11-22 RX ADMIN — NITROGLYCERIN 20 MCG: 5 INJECTION, SOLUTION INTRAVENOUS at 09:11

## 2023-11-22 RX ADMIN — PHENYLEPHRINE HYDROCHLORIDE 200 MCG: 10 INJECTION INTRAVENOUS at 07:35

## 2023-11-22 RX ADMIN — NITROGLYCERIN 80 MCG: 5 INJECTION, SOLUTION INTRAVENOUS at 10:50

## 2023-11-22 RX ADMIN — NITROGLYCERIN 20 MCG: 5 INJECTION, SOLUTION INTRAVENOUS at 08:34

## 2023-11-22 RX ADMIN — INSULIN HUMAN 3 UNITS/HR: 1 INJECTION, SOLUTION INTRAVENOUS at 09:46

## 2023-11-22 RX ADMIN — PROTAMINE SULFATE 150 MG: 10 INJECTION, SOLUTION INTRAVENOUS at 10:45

## 2023-11-22 RX ADMIN — EPINEPHRINE 10 MCG: 1 INJECTION INTRAMUSCULAR; INTRAVENOUS; SUBCUTANEOUS at 08:00

## 2023-11-22 RX ADMIN — CHLORHEXIDINE GLUCONATE 10 ML: 1.2 SOLUTION ORAL at 05:46

## 2023-11-22 RX ADMIN — EPINEPHRINE 0.03 MCG/KG/MIN: 1 INJECTION INTRAMUSCULAR; INTRAVENOUS; SUBCUTANEOUS at 07:42

## 2023-11-22 RX ADMIN — NITROGLYCERIN 20 MCG: 5 INJECTION, SOLUTION INTRAVENOUS at 08:30

## 2023-11-22 RX ADMIN — ACETAMINOPHEN 975 MG: 325 TABLET ORAL at 22:03

## 2023-11-22 RX ADMIN — ASPIRIN 81 MG CHEWABLE TABLET 162 MG: 81 TABLET CHEWABLE at 05:41

## 2023-11-22 RX ADMIN — HYDROMORPHONE HYDROCHLORIDE 0.2 MG: 0.2 INJECTION, SOLUTION INTRAMUSCULAR; INTRAVENOUS; SUBCUTANEOUS at 20:39

## 2023-11-22 RX ADMIN — HEPARIN SODIUM 20000 UNITS: 1000 INJECTION INTRAVENOUS; SUBCUTANEOUS at 08:59

## 2023-11-22 RX ADMIN — ASPIRIN 81 MG CHEWABLE TABLET 162 MG: 81 TABLET CHEWABLE at 20:09

## 2023-11-22 RX ADMIN — ROCURONIUM BROMIDE 20 MG: 50 INJECTION, SOLUTION INTRAVENOUS at 09:25

## 2023-11-22 RX ADMIN — PROPOFOL 130 MG: 10 INJECTION, EMULSION INTRAVENOUS at 07:36

## 2023-11-22 RX ADMIN — OXYCODONE HYDROCHLORIDE 5 MG: 5 TABLET ORAL at 20:27

## 2023-11-22 RX ADMIN — SENNOSIDES AND DOCUSATE SODIUM 1 TABLET: 8.6; 5 TABLET ORAL at 20:09

## 2023-11-22 RX ADMIN — NITROGLYCERIN 20 MCG: 5 INJECTION, SOLUTION INTRAVENOUS at 10:49

## 2023-11-22 RX ADMIN — Medication 10 MG: at 09:00

## 2023-11-22 RX ADMIN — MAGNESIUM SULFATE HEPTAHYDRATE 4 G: 80 INJECTION, SOLUTION INTRAVENOUS at 06:27

## 2023-11-22 RX ADMIN — ROCURONIUM BROMIDE 60 MG: 50 INJECTION, SOLUTION INTRAVENOUS at 07:30

## 2023-11-22 RX ADMIN — FENTANYL CITRATE 100 MCG: 50 INJECTION INTRAMUSCULAR; INTRAVENOUS at 11:10

## 2023-11-22 RX ADMIN — SODIUM CHLORIDE, POTASSIUM CHLORIDE, SODIUM LACTATE AND CALCIUM CHLORIDE: 600; 310; 30; 20 INJECTION, SOLUTION INTRAVENOUS at 07:12

## 2023-11-22 RX ADMIN — ALBUTEROL SULFATE 4 PUFF: 90 AEROSOL, METERED RESPIRATORY (INHALATION) at 10:55

## 2023-11-22 RX ADMIN — EPINEPHRINE 10 MCG: 1 INJECTION INTRAMUSCULAR; INTRAVENOUS; SUBCUTANEOUS at 07:52

## 2023-11-22 RX ADMIN — LIDOCAINE 2 PATCH: 4 PATCH TOPICAL at 20:09

## 2023-11-22 RX ADMIN — FENTANYL CITRATE 50 MCG: 50 INJECTION INTRAMUSCULAR; INTRAVENOUS at 07:20

## 2023-11-22 RX ADMIN — DEXMEDETOMIDINE HYDROCHLORIDE 0.3 MCG/KG/HR: 400 INJECTION INTRAVENOUS at 13:41

## 2023-11-22 RX ADMIN — ROCURONIUM BROMIDE 20 MG: 50 INJECTION, SOLUTION INTRAVENOUS at 11:02

## 2023-11-22 ASSESSMENT — ACTIVITIES OF DAILY LIVING (ADL)
ADLS_ACUITY_SCORE: 22
ADLS_ACUITY_SCORE: 19
ADLS_ACUITY_SCORE: 22
ADLS_ACUITY_SCORE: 19
ADLS_ACUITY_SCORE: 19
ADLS_ACUITY_SCORE: 22
ADLS_ACUITY_SCORE: 19
ADLS_ACUITY_SCORE: 19

## 2023-11-22 ASSESSMENT — COPD QUESTIONNAIRES
COPD: 1
CAT_SEVERITY: MODERATE

## 2023-11-22 ASSESSMENT — LIFESTYLE VARIABLES: TOBACCO_USE: 1

## 2023-11-22 NOTE — PROGRESS NOTES
Patient underwent CABG and now on ventilator in ICU.  Intensivist is actively managing.  Our team will follow peripherally, and will take over the care when patient is transferred out of ICU.    Please call our team when patient is ready to be out of ICU.    [facility] Medicine Service  Jovon Yee

## 2023-11-22 NOTE — PROGRESS NOTES
Cardiology Progress Note    Assessment/Plan:     Severe coronary artery disease with multivessel anatomy best treated with bypass revascularization, planned for 11/22.  History of GI bleeding  Hyperlipidemia on Repatha  Hypertension with only fair control today     Principal Problem:    Anemia, unspecified type  Active Problems:    Hypothyroidism    Type 2 diabetes mellitus without complication, with long-term current use of insulin (H)    Hypertension    Coronary artery disease involving native coronary artery with unstable angina pectoris (H)    Chest pain, unspecified type    Iron deficiency    Stenosis of coronary stent, initial encounter      LOS: 7 days      Subjective:   Resting comfortably.  Reports that video about surgery was helpful laying her fears..  No chest pain or shortness of breath        Objective:   Vital signs in last 24 hours:  Vitals          Vitals:     11/20/23 0445 11/20/23 0745 11/20/23 1158 11/20/23 1536   BP:   (!) 158/72 139/63 (!) 154/69   BP Location:   Right arm Right arm Right arm   Patient Position:   Semi-Lu's       Cuff Size:   Adult Regular       Pulse:       60   Resp:   18 20 18   Temp:   97.6  F (36.4  C) 98.2  F (36.8  C) 98  F (36.7  C)   TempSrc:   Oral Oral Oral   SpO2:   96% 95% 97%   Weight: 73.2 kg (161 lb 4.8 oz)         Height:                 Weight:       Wt Readings from Last 3 Encounters:   11/20/23 73.2 kg (161 lb 4.8 oz)   09/13/23 78.5 kg (173 lb)   08/15/23 77.6 kg (171 lb)               PHYSICAL EXAM       Respiratory:  Normal breath sounds, No respiratory distress, No wheezing, No chest tenderness.   Cardiovascular:  Normal heart rate, Normal rhythm, No murmurs, No rubs, No gallops.   GI:  Bowel sounds normal, Soft, No tenderness, No masses  Extremities: no edema        Cardiographics:   Telemetry sinus rhythm, 60-70 bpm     Echocardiogram 4/2023:  Left ventricular size, wall motion and function are normal. The ejection  fraction is 60-65%.  Normal  "right ventricle size and systolic function.  No hemodynamically significant valvular abnormalities on 2D or color flow  imaging.     Imaging:   Coronary angiography 11/14/2023:  CONCLUSIONS: Multivessel coronary artery disease involving the mid LAD (iFR 0.85), the proximal to mid left circumflex (iFR 0.67-0.70), and severe in-stent restenosis of the dominant mid right coronary artery.      Lab Results:         Lab Results   Component Value Date     WBC 4.9 11/19/2023     HGB 10.1 (L) 11/19/2023     HCT 32.2 (L) 11/19/2023      11/19/2023      11/19/2023     CHOL 186 08/15/2023     TRIG 122 08/15/2023     HDL 56 08/15/2023     ALT 30 11/13/2023     AST 23 11/13/2023      11/19/2023     BUN 12.9 11/19/2023     CO2 25 11/19/2023     TSH 3.45 09/13/2023     INR 1.06 11/13/2023      No results found for: \"CKTOTAL\", \"CKMB\", \"TROPONINI\"        Fernando Flores MD [facility]  11/20/2023              "

## 2023-11-22 NOTE — PLAN OF CARE
"Patient denied pain and was able to sleep minimally tonight with usual anticipatory anxiety. Tele NSR/SB HR 50's. Heparin drip was turned off at 0100 per orders. Patient showered at 0330 (2nd shower) at which time nitroglycerin patch was removed and not replaced. Patient had earrings in but they were removed prior to shower; placed in a cup in personal belongings.  Patient kept NPO since midnight except for sips with medications. Patient brushed her teeth this morning. ALL linen was changed during shower. Sacral mepilex in place. Patient educated and instructed on IS and was able to demonstrate baseline of 500-750ml volume with proper technique. Labs were drawn and from the right arm (per orders do not draw from left; restricted arm band applied). Patient had simple questions about belongings during procedure and pain medications for after procedure stating \"I am a whimp when it comes to pain; do I need to ask for pain medicine?\"   Just before leaving the unit at 0545 she took 162 aspirin with sips of water; held PO metoprolol due to HR 64. Completed peridex mouthwash and nasal antiseptic swabs. Patient transported to [facility] via wheelchair. Patient had a purple vase of flowers which was placed at P3 nurses station until she returns from ICU; belongings brought to ICU charge desk in bin; no assigned room yet.       Vitals:    11/21/23 1936 11/21/23 2030 11/21/23 2330 11/22/23 0325   BP: 131/60 138/67 117/58 (!) 155/67   BP Location: Right arm  Right arm Right arm   Pulse:  71 62 60   Resp: 18  18 18   Temp: 98.2  F (36.8  C)  98.5  F (36.9  C) 98.2  F (36.8  C)   TempSrc: Oral  Oral Oral   SpO2: 96%  94% 95%   Weight:    75.9 kg (167 lb 4.8 oz)   Height:            Problem: Adult Inpatient Plan of Care  Goal: Optimal Comfort and Wellbeing  Outcome: Progressing     Problem: Dysrhythmia  Goal: Normalized Cardiac Rhythm  Outcome: Progressing     Problem: Chest Pain  Goal: Resolution of Chest Pain Symptoms  Outcome: " Progressing     Problem: Cardiac Catheterization (Diagnostic/Interventional)  Goal: Absence of Vascular Access Complication  Outcome: Progressing   Goal Outcome Evaluation:

## 2023-11-22 NOTE — BRIEF OP NOTE
[facility]    Brief Operative Note    Pre-operative diagnosis: CAD (coronary artery disease) [I25.10]  Post-operative diagnosis Same    Procedure: CORONARY ARTERY BYPASS GRAFT X 3, LEFT INTERNAL MAMMARY ARTERY HARVEST, LEFT LEG ENDOSCOPIC VESSEL PROCUREMENT, ANESTHESIA TRANSESOPHAGEAL ECHOCARDIOGRAM, EPI-AORTIC ULTRASOUND, N/A - Chest    Surgeon: Surgeon(s) and Role:     * Yanet Gabriel MD - Primary     * Marybel Lawson STSAC - First Assistant     * Kaitlyn Pedro PA-C   Anesthesia: General   Estimated Blood Loss: 400 mL from 11/22/2023  7:10 AM to 11/22/2023 11:55 AM  Drains:  One 32 Fr mediastinal and one 24 Fr Mainor left chest  Specimens: None  Findings:  Good target and conduit, hyperexpanded lungs .  Complications:  None .  Implants:   Implant Name Type Inv. Item Serial No.  Lot No. LRB No. Used Action   [employer] STERNAL WIRE SINGLE #6 046-032 - [id number] Wire [employer] STERNAL WIRE SINGLE #6 046-032  [employer] [id number] N/A 2 Implanted   [employer] MYOSTERNAL [employer] 046-267 - [id number] Wire [employer] 046-267  [employer] [id number] N/A 1 Implanted

## 2023-11-22 NOTE — ANESTHESIA PREPROCEDURE EVALUATION
Anesthesia Pre-Procedure Evaluation    Patient: Lisseth Qureshi   MRN: [id number] : 1955        Procedure : Procedure(s):  CORONARY ARTERY BYPASS GRAFT  INTERNAL MAMMARY ARTERY HARVEST, ENDOSCOPIC VESSEL PROCUREMENT,  ANESTHESIA TRANSESOPHAGEAL ECHOCARDIOGRAM WITH POSSIBLE LEFT RADIAL ARTERY HARVEST          Past Medical History:   Diagnosis Date     Diabetes (H)      Heart disease      History of blood transfusion       Past Surgical History:   Procedure Laterality Date     COLONOSCOPY N/A 2023    Procedure: COLONOSCOPY WITH POLYPECTOMY, BIOPSY AND HEMOCLIP APPLICATION;  Surgeon: David Wong DO;  Location: [facility] OR     CORONARY STENT PLACEMENT  10/19/2012    glenna 1st obtuse marginal     CV CORONARY ANGIOGRAM N/A 2023    Procedure: Coronary Angiogram;  Surgeon: Javier Guerra MD;  Location: [facility] CATH LAB CV     CV CORONARY ANGIOGRAM N/A 2023    Procedure: Coronary Angiogram;  Surgeon: Dilan Archuleta MD;  Location: [facility]     CV INSTANTANEOUS WAVE-FREE RATIO N/A 2023    Procedure: Instantaneous Wave-Free Ratio;  Surgeon: Dilan Archuleta MD;  Location: [facility]     CV LEFT HEART CATH N/A 2023    Procedure: Left Heart Catheterization;  Surgeon: Javier Guerra MD;  Location: [facility] LAB CV     CV PCI N/A 2023    Procedure: Percutaneous Coronary Intervention;  Surgeon: Javier Guerra MD;  Location: [facility] CV     ESOPHAGOSCOPY, GASTROSCOPY, DUODENOSCOPY (EGD), COMBINED N/A 2023    Procedure: ESOPHAGOGASTRODUODENOSCOPY WITH BIOPSIES AND HEMOCLIP APPLICATION;  Surgeon: David Wong DO;  Location: [facility] OR     SHOULDER SURGERY      frozen shoulder      Allergies   Allergen Reactions     Codeine      Other reaction(s): *Unknown     Guaifenesin Swelling     Felt throat swelling, Guaifenesin with codeine     Penicillins Unknown     Unknown, allergy occurred at 6 months old     Rosuvastatin Calcium  [Rosuvastatin] Unknown      Social History     Tobacco Use     Smoking status: Former     Packs/day: 1.50     Years: 35.00     Additional pack years: 0.00     Total pack years: 52.50     Types: Cigarettes     Quit date: 2005     Years since quittin.8     Passive exposure: Past     Smokeless tobacco: Not on file     Tobacco comments:     pt quit 10 years ago (she had smoked x 35 years)   Substance Use Topics     Alcohol use: No     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 past MI - stent-                                      METS/Exercise Tolerance:     Hematologic: Comments: Moderate anemia.    (+)      anemia,          Musculoskeletal:  - neg musculoskeletal ROS     GI/Hepatic:  - neg GI/hepatic ROS     Renal/Genitourinary:     (+) renal disease, type: CRI,            Endo:     (+)  type II DM,        thyroid problem, hypothyroidism,    Obesity,       Psychiatric/Substance Use:  - neg psychiatric ROS     Infectious Disease:  - neg infectious disease ROS     Malignancy:  - neg malignancy ROS     Other:  - neg other ROS        Physical Exam    Airway        Mallampati: III   TM distance: > 3 FB   Neck ROM: full   Mouth opening: > 3 cm    Respiratory Devices and Support         Dental       (+) Modest Abnormalities - crowns, retainers, 1 or 2 missing teeth      Cardiovascular   cardiovascular exam normal       Rhythm and rate: regular and normal     Pulmonary           (+) decreased breath sounds       OUTSIDE LABS:  CBC:   Lab Results   Component Value Date    WBC 5.8 2023    WBC 4.9 2023    HGB 9.6 (L) 2023    HGB 10.1 (L) 2023  "   HCT 31.3 (L) 11/21/2023    HCT 32.2 (L) 11/19/2023     11/22/2023     11/21/2023     BMP:   Lab Results   Component Value Date     11/19/2023     11/15/2023    POTASSIUM 3.9 11/19/2023    POTASSIUM 4.1 11/16/2023    CHLORIDE 103 11/19/2023    CHLORIDE 107 11/15/2023    CO2 25 11/19/2023    CO2 23 11/15/2023    BUN 12.9 11/19/2023    BUN 14.7 11/15/2023    CR 1.03 (H) 11/22/2023    CR 1.07 (H) 11/19/2023    CR 1.07 (H) 11/19/2023     (H) 11/22/2023     (H) 11/21/2023     COAGS:   Lab Results   Component Value Date    PTT 29 11/13/2023    INR 1.06 11/13/2023     POC: No results found for: \"BGM\", \"HCG\", \"HCGS\"  HEPATIC:   Lab Results   Component Value Date    ALBUMIN 4.4 11/13/2023    PROTTOTAL 7.0 11/13/2023    ALT 30 11/13/2023    AST 23 11/13/2023    ALKPHOS 76 11/13/2023    BILITOTAL 0.2 11/13/2023     OTHER:   Lab Results   Component Value Date    A1C 6.4 (H) 11/14/2023    AMBERLY 9.0 11/19/2023    MAG 2.1 11/16/2023    LIPASE 33 11/13/2023    TSH 3.45 09/13/2023       Anesthesia Plan    ASA Status:  4    NPO Status:  NPO Appropriate    Anesthesia Type: General.     - Airway: ETT   Induction: Intravenous.   Maintenance: Balanced.   Techniques and Equipment:     - Lines/Monitors: 2nd IV, Arterial Line, Central Line, CVP, NIRS, CAMRON            CAMRON Absolute Contra-indication: NONE            CAMRON Relative Contra-indication: NONE     Consents    Anesthesia Plan(s) and associated risks, benefits, and realistic alternatives discussed. Questions answered and patient/representative(s) expressed understanding.     - Discussed:     - Discussed with:  Patient      - Extended Intubation/Ventilatory Support Discussed: Yes.      - Patient is DNR/DNI Status: No     Use of blood products discussed: Yes.     - Discussed with: Patient.     - Consented: consented to blood products     Postoperative Care    Pain management: IV analgesics, Oral pain medications, Multi-modal analgesia.   PONV " prophylaxis: Ondansetron (or other 5HT-3), Dexamethasone or Solumedrol     Comments:    Other Comments: 2 PIV, Shannan, CVC, SGC.  Dexmedetomidine, Amicar; Epi, Norepi, Nicardipine, PE infusions.  Ephedrine, Calcium, Magnesium PRN.  Fast track anesthesia.  UF on CPB.  CAMRON.          Pablito Villalobos MD

## 2023-11-22 NOTE — ANESTHESIA PROCEDURE NOTES
Central Line/PA Catheter Placement    Pre-Procedure   Staff -        Anesthesiologist:  Pablito Villalobos MD       Performed By: anesthesiologist       Location: OR       Pre-Anesthestic Checklist: patient identified, IV checked, site marked, risks and benefits discussed, informed consent, monitors and equipment checked and pre-op evaluation  Timeout:       Correct Patient: Yes        Correct Procedure: Yes        Correct Site: Yes        Correct Position: Yes        Correct Laterality: Yes   Line Placement:   This line was placed Post Induction starting at 11/22/2023 7:39 AM and ending at 11/22/2023 7:50 AM    Procedure   Procedure: central line       Laterality: right       Insertion Site: internal jugular.       Patient Position: Trendelenburg  Sterile Prep        All elements of maximal sterile barrier technique followed       Patient Prep/Sterile Barriers: draped, hand hygiene, gloves , hat , mask , draped, gown, sterile gel and probe cover       Skin prep: Chloraprep  Insertion/Injection        Technique: ultrasound guided and Seldinger Technique        1. Ultrasound was used to evaluate the access site.       2. Vein evaluated via ultrasound for patency/adequacy.       3. Using real-time ultrasound the needle/catheter was observed entering the artery/vein.       4. Permanent image was captured and entered into the patient's record.       5. The visualized structures were anatomically normal.       6. There were no apparent abnormal pathologic findings.       Introducer Type: 9 Fr, 2-lumen MAC        Type: Introducer  Narrative         Secured by: suture       Tegaderm and Biopatch dressing used.       Complications: None apparent,        blood aspirated from all lumens,        All lumens flushed: Yes       Verification method: Ultrasound, Placement to be verified post-op and CAMRON

## 2023-11-22 NOTE — PLAN OF CARE
Assumed cares: [id number]  Vitals: VSS on RA  Pain: Pt denies any pain  Neuro: A&Ox4  Cardiac: NSR  Respiratory: WDL; denies any SOB  GI/: 1 small BM during shift  Skin: No new skin changes  IV/Drains: R PIV- running heparin gtt at 750 units/hr, recheck in AM ,to be stopped at 0100 11/22 for procedure tomorrow.   Activity: Independent  Behavior: Pt is calm and cooperative with cares, anxious at times for procedure tomorrow    Plan of Care: Pt to have CABG tomorrow at 0710. MRSA swab collected, pt had CHG shower in evening.       Goal Outcome Evaluation:      Plan of Care Reviewed With: patient    Overall Patient Progress: no changeOverall Patient Progress: no change    Outcome Evaluation: Follow patient plan of care

## 2023-11-22 NOTE — ANESTHESIA CARE TRANSFER NOTE
Patient: Lisseth Qureshi    Procedure: Procedure(s):  CORONARY ARTERY BYPASS GRAFT X 3, LEFT INTERNAL MAMMARY ARTERY HARVEST, LEFT LEG ENDOSCOPIC VESSEL PROCUREMENT, ANESTHESIA TRANSESOPHAGEAL ECHOCARDIOGRAM, EPI-AORTIC ULTRASOUND       Diagnosis: CAD (coronary artery disease) [I25.10]  Diagnosis Additional Information: No value filed.    Anesthesia Type:   General     Note:    Oropharynx: endotracheal tube in place and ventilatory support  Level of Consciousness: unresponsive and iatrogenic sedation    Level of Supplemental Oxygen (L/min / FiO2): 8  Independent Airway: airway patency not satisfactory and stable  Dentition: dentition unchanged  Vital Signs Stable: post-procedure vital signs reviewed and stable  Report to RN Given: handoff report given  Patient transferred to: ICU    ICU Handoff: Call for PAUSE to initiate/utilize ICU HANDOFF, Identified Patient, Identified Responsible Provider, Reviewed the Pertinent Medical History, Discussed Surgical Course, Reviewed Intra-OP Anesthesia Management and Issues during Anesthesia, Set Expectations for Post Procedure Period and Allowed Opportunity for Questions and Acknowledgement of Understanding      Vitals:  Vitals Value Taken Time   BP     Temp     Pulse 80 11/22/23 1206   Resp     SpO2 98 % 11/22/23 1206   Vitals shown include unfiled device data.    Electronically Signed By: MAVERICK Gomez CRNA  November 22, 2023  12:08 PM

## 2023-11-22 NOTE — ANESTHESIA PROCEDURE NOTES
Perioperative CAMRON Procedure Note    Staff -        Anesthesiologist:  Pablito Villalobos MD       Performed By: anesthesiologist  Preanesthesia Checklist:  Patient identified, IV assessed, risks and benefits discussed, monitors and equipment assessed, procedure being performed at surgeon's request and anesthesia consent obtained.    CAMRON Probe Insertion    Probe Status PRE Insertion: NO obvious damage  Probe type:  Adult 2D  Bite block used:   Soft  Insertion Technique: Easy, no oropharyngeal manipulation  Insertion complications: None obvious  Billing Report:A CAMRON report is NOT being generated.  Probe Status POST Removal: NO obvious damage

## 2023-11-22 NOTE — PLAN OF CARE
Goal Outcome Evaluation:       Pt Alert/oriented, son a bedside. C/o pain with deep breat, extubated to 4L nc. Pt resting quietly between cares.

## 2023-11-22 NOTE — OP NOTE
OPERATIVE REPORT     OPERATING ROOM:  Room 8    November 22, 2023    PROCEDURES PERFORMED:   Median sternotomy   Take down of the left internal mammary artery    Endoscopic greater saphenous vein procurement from the left lower extremity    Epiaortic ultrasound of the ascending aorta    Placement on central cardiopulmonary bypass    Triple vessel coronary artery bypass grafting procedure:   -  Left internal mammary artery to the left anterior descending coronary artery  -  Separate reversed saphenous vein grafts to the obtuse marginal one and the right posterior descending coronary arteries.    Placement of temporary atrial and ventricular pacing wires     SURGEONS:    Attending Surgeon:  Yanet Gabriel MD  First Assistant: Marybel Lawson. STSAC  Physician Assistant: Kaitlyn Pedro PA-C    ANESTHESIA:  General endotracheal    SKIN PREP:  Betadine and Duraprep    INCISIONS:  Median sternotomy, skip incision left upper leg  DRAINS: One 32 Fr mediastinal and one 24 Fr Mainor drain left pleural spaces   CULTURES: None  SPECIMENS: None  CLOSURE: Routine     PREOPERATIVE DIAGNOSES:   Angina   S/P PCI/Stents to the RCA in April 2023   Preserved left ventricular function   Iron-deficiency anemia    POSTOPERATIVE DIAGNOSES  Same     BRIEF HISTORY:    Lisseth Qureshi is a 68 year old year old woman who presented with angina and was found to have severe triple vessel coronary artery disease.  She had undergone stenting of her RCA in April of 2023.  The above procedures were planned.     FINDINGS AT OPERATION:  Her ascending aorta was free of palpable plaque and her pulmonary artery pressures were normal to palpation.  Her lungs were hyperexpanded.  Her left ventricular function was preserved. The left internal mammary artery was a 2 mm in diameter conduit of excellent quality.  The reversed saphenous vein graft was 4 to 5 mm in diameter and of good quality.  The left anterior descending coronary artery was a 2.5 mm target  vessel, an excellent vessel for bypass grafting.  The obtuse marginal one was a 2 mm in diameter target vessel, an excellent vessel for bypass grafting.  The right posterior descending coronary artery was a 2 mm in diameter target vessel, an excellent vessel for bypass grafting.  She had mild left ventricular hypertrophy.      PROCEDURES:  The patient arrived in the operating room and was positioned supine.  An arterial line was placed.  Satisfactory general anesthesia was induced.  A transesophageal probe, central line and [place] catheter were inserted.  The patient's neck, chest, abdomen, both groins and lower extremities were prepped and draped in a standard sterile fashion.      A complete median sternotomy was made.  With the aid of the Rultract retractor, the left internal mammary artery was taken down from the subclavian vein to the xiphoid process.  At the same time Kaitlyn Pedro made an incision in the left upper leg and 5 cm of the greater saphenous vein was exposed. The endoscope was then passed proximally and distally and the greater saphenous vein was dissected out circumferentially.  Its branches were clipped or cauterized.  It was clipped proximally and distally and extracted. It was distended and its branches were controlled with Ligaclips.  The leg wound was rendered hemostatic and closed in layers using running 2-0 and 3-0 Vicryl.  Dermabond was applied to the skin.     Heparin was administered.  The left internal mammary artery was prepared in the usual fashion.  An Octobase retractor was inserted.  The pericardium was opened and tented up on pericardial sutures.  The aorta was  from the pulmonary artery.  Epiaortic ultrasound of the ascending aorta was carried out.  Pursestring sutures were placed in the ascending aorta and right atrium for cannulation.  When the ACT was appropriate cannulation was performed and central cardiopulmonary bypass was established.  The patient's temperature  was allowed to drift.  The aorta was cross-clamped and 1200 mL of cold blood cardioplegia was administered antegrade.  A good arrest was achieved.  Cold topical saline and slush was applied to the heart intermittently during the period of aortic cross-clamp.      Attention was first turned to the right posterior descending coronary artery. It was dissected out for a distance of 1 cm and then opened for a distance of 8 mm.  It was bypassed in an end to side fashion using reversed saphenous vein graft and running 7-0 prolene.  The vein graft was flushed with cold blood cardioplegia and sized to the ascending aorta. The patient received 400 mL of cold blood cardioplegia in an antegrade fashion. Next attention was turned to the obtuse marginal one coronary artery.  It was dissected out for a distance of 1 cm and then opened for a distance of 8 mm.  It was bypassed in an end to side fashion using reversed saphenous vein graft and running 7-0 prolene.  The vein graft was flushed with cold blood cardioplegia and sized to the ascending aorta and the patient received another 400 mL of cold blood cardioplegia in an antegrade fashion.  Finally attention was turned to the left anterior descending coronary artery in the interventricular groove.  It was dissected out in its apical third for a distance of 1 cm and then opened for a distance of 8 mm.  A pleural rent was created for passage of the left internal mammary artery and then the final distal anastomosis was performed between the left internal mammary artery and the left anterior descending coronary artery in an end to side fashion using running 7-0 prolene. As this last distal anastomosis was being performed gentle warming was begun.  The gray occluder was briefly released from the left internal mammary artery and good flow was seen down the left anterior descending coronary artery.  The gray occluder was once more applied to the left internal mammary artery and the  patient received a final dose of cold blood cardioplegia in an antegrade fashion.  It had been decided to perform the 2 proximal anastomoses with the aortic cross-clamp in place.  Therefore 2, 4 mm punch aortotomies were created.  The proximal aortosaphenous anastomoses were performed in an end to side fashion using running 6-0 prolene.     The gray occluder was released from the left internal mammary artery and a hot shot was delivered in an antegrade fashion.  The aortic cross-clamp was released.  The aortic cross-clamp time was 1 hour and 8 minutes.  The proximal and distal anastomoses were examined and found to be hemostatic.  Ventricular and atrial pacing wires were applied and the patient was A-V paced at a rate of 90.  The left pleural space was drained of any accumulated blood and gentle ventilation resumed. The root vent was removed and that site repaired with plegetted 4-0 prolene.      The patient was placed on low dose epinephrine..  When she was warm the heart was allowed to fill and eject and the patient  from cardiopulmonary bypass without difficulty. Total time on cardiopulmonary bypass was  1 hour and 23 minutes.  Protamine was administered to reverse the heparin.  The patient was decannulated and the cannulation sites were repaired with 4-0 prolene.  Hemostasis was satisfactory.    The drains were placed and secured to the skin. The sternum was approximated with a combination of single and double stainless steel sternal wires.   The sternal wound was irrigated with warm antibiotic-containing solution.  It was closed in 4 layers using 0 Stratafix for 2 layers, 2-0 Stratafix for the next layer and a subcuticular stitch of 4-0 Monocryl. Dermabond was applied to the skin.     The sponge, needle and instrument counts were reported as correct.      The estimated blood loss was 500 mL.      Lisseth Qureshi was brought to the Intensive Care Unit in critical but stable condition.    Complications:  None     Yanet Gabriel MD on 11/22/2023 at 12:27 PM

## 2023-11-22 NOTE — ANESTHESIA POSTPROCEDURE EVALUATION
Patient: Lisseth Qureshi    Procedure: Procedure(s):  CORONARY ARTERY BYPASS GRAFT X 3, LEFT INTERNAL MAMMARY ARTERY HARVEST, LEFT LEG ENDOSCOPIC VESSEL PROCUREMENT, ANESTHESIA TRANSESOPHAGEAL ECHOCARDIOGRAM, EPI-AORTIC ULTRASOUND       Anesthesia Type:  General    Note:  Disposition: Inpatient   Postop Pain Control: Uneventful            Sign Out: Well controlled pain   PONV: No   Neuro/Psych:             Sign Out: PLANNED postop sedation   Airway/Respiratory:             Sign Out: AIRWAY IN SITU/Resp. Support               Airway in situ/Resp. Support: ETT                 Reason: Planned Pre-op   CV/Hemodynamics: Uneventful            Sign Out: Acceptable CV status; No obvious hypovolemia; No obvious fluid overload   Other NRE: NONE   DID A NON-ROUTINE EVENT OCCUR? No    Event details/Postop Comments:  CT output 100 ml. Stable VS, off drips. 2 U PRBC (1 CPB, 1 prior to LIN per Dr Gabriel). Hgb likely 9.5-10.5 after CS and second U of PRBC.    ABGs good, no acidosis, minimal base deficit. AV pacing at 80, sinus neville 50-60 beneath. R hand warm, dry with <2 s cap refill. Full signout to ICU staff.    PO CXR IMPRESSION: Poststernotomy changes. Endotracheal tube is in the mid trachea, 2.3 cm from the jackson. Mediastinal and left pleural chest tubes. Right IJ cordis with tip overlying the region of the mid SVC.     No pneumothorax. Left lower lobe opacities silhouetting the left hemidiaphragm reflecting atelectasis. Trace effusion. Heart is of normal size.             Last vitals:  Vitals:    11/22/23 0325 11/22/23 0540 11/22/23 0555   BP: (!) 155/67  (!) 169/70   Pulse: 60 64 64   Resp: 18  13   Temp: 36.8  C (98.2  F)     SpO2: 95%  95%       Electronically Signed By: Pablito Villalobos MD  November 22, 2023  12:09 PM

## 2023-11-22 NOTE — PROGRESS NOTES
RESPIRATORY CARE NOTE     Patient Name: Lisseth Qureshi  Today's Date: 11/22/2023     Patient extubated at 16:30 pm and placed on 4LNC. Pt is able to maintain adequate saturations of 93-95%. BS clear to auscultations and decreased @ basses. Patient is able to verbalize upon extubation fairly. No complications has been noted at this time. RT will continue to monitor closely and assess as needed.     Benson Farrar, RRT

## 2023-11-22 NOTE — ANESTHESIA PROCEDURE NOTES
Airway       Patient location during procedure: OR       Procedure Start/Stop Times: 11/22/2023 7:34 AM  Staff -        CRNA: David Samuel APRN CRNA       Performed By: CRNA  Consent for Airway        Urgency: elective  Indications and Patient Condition       Indications for airway management: bryson-procedural       Induction type:intravenous       Mask difficulty assessment: 1 - vent by mask    Final Airway Details       Final airway type: endotracheal airway       Successful airway: ETT - single  Endotracheal Airway Details        ETT size (mm): 7.0       Cuffed: yes       Successful intubation technique: direct laryngoscopy       DL Blade Type: Tirado 2       Grade View of Cords: 1       Adjucts: stylet       Position: Right       Measured from: lips       Secured at (cm): 21       Bite block used: None    Post intubation assessment        Placement verified by: capnometry, equal breath sounds and chest rise        Number of attempts at approach: 1       Number of other approaches attempted: 0       Secured with: silk tape       Ease of procedure: easy       Dentition: Intact    Medication(s) Administered   Medication Administration Time: 11/22/2023 7:34 AM

## 2023-11-22 NOTE — PROGRESS NOTES
12:02 Arrived from from OR and placed on full ventilator support. 16 360 +5 60%.     Equal bilateral breath sounds. Plan is fast track extubation.    Miriam Gaines, RT

## 2023-11-22 NOTE — CONSULTS
"Critical Care consult note      11/22/2023    Name: Lisseth Qureshi MRN#: [id number]   Age: 68 year old YOB: 1955                    Problem List:   Principal Problem:    Anemia, unspecified type  Active Problems:    Hypothyroidism    Type 2 diabetes mellitus without complication, with long-term current use of insulin (H)    Hypertension    Coronary artery disease involving native coronary artery with unstable angina pectoris (H)    Chest pain, unspecified type    Iron deficiency    Stenosis of coronary stent, initial encounter    Clinically Significant Risk Factors          # Hypocalcemia: Lowest iCa = 1.09 mg/dL in last 2 days, will monitor and replace as appropriate      # Coagulation Defect: INR = 1.48 (Ref range: 0.85 - 1.15) and/or PTT = 30 Seconds (Ref range: 22 - 38 Seconds), will monitor for bleeding  # Thrombocytopenia: Lowest platelets = 124 in last 2 days, will monitor for bleeding   # Hypertension: Noted on problem list        # Obesity: Estimated body mass index is 33.79 kg/m  as calculated from the following:    Height as of this encounter: 1.499 m (4' 11\").    Weight as of this encounter: 75.9 kg (167 lb 4.8 oz).   # Moderate Malnutrition: based on nutrition assessment                      HPI:     68-year-old female with a history of hypothyroidism, CAD status post stent in the RCA, diabetes mellitus type 2 and hypertension.  Presented with unstable angina.  Coronary angiogram showed multivessel coronary artery disease and she underwent CABG x 3 today.  Pre and postop LV function was normal.  Patient does have iron deficiency anemia and underwent EGD and colonoscopy preop.  Hematology saw the patient as well and supplemented the patient with IV iron.      Assessment and plan :       I have personally reviewed the labs, imaging studies, cultures and discussed the case with referring physician and consulting physicians.     My assessment and plan by system for this patient is as " follows:    Neurology/Psychiatry:   Sedated with Precedex.  Methadone given perioperatively      Cardiovascular:   Multivessel coronary artery disease in a patient with diabetes mellitus  Prior history of JI in RCA.  CABG x 3 today  Normal LV function.  Not on any pressors      Pulmonary/Ventilator Management:   Arrives to the ICU intubated.  Will use 80 mL/kg which for her is going to be 350 mL, wean FiO2 as tolerated  PEEP is at 5  ABG and chest x-ray  Plan for fast-track extubation with goal extubation time before 17:55      GI and Nutrition :   Currently NPO.  Underwent extensive GI evaluation for iron deficiency anemia    Renal/Fluids/Electrolytes:     - monitor function and electrolytes as needed with replacement per ICU protocols. - generally avoid nephrotoxic agents such as NSAID, IV contrast unless specifically required  - adjust medications as needed for renal clearance  - follow I/O's as appropriate.    Endocrine:   Hypothyroidism on levothyroxine    Insulin drip to maintain glucose less than 180  Plan  - ICU insulin protocol, goal sugar <180    Hematology/Oncology:   Iron deficiency anemia.  Status post evaluation by GI and hematology  Underwent EGD/colonoscopy with a polyp removal.  Received iron      ICU Prophylaxis:   1. DVT: Hep Subq  2. VAP: HOB 30 degrees, chlorhexidine rinse  3. Stress Ulcer: PPI/    Category: Non-violent   Type of Restraint: Soft limb x2.   Behavior: Pulling at IVand marie catheter tubings.   Root cause of behavior: Critical illness.   Less-restrictive methods that have failed: Redirection, reorientation. 1:1 NA at bedside.   Response to restraint: Not actively pulling atcurrent restraints.   Criteria for release from restraint: Responds to redirection. Leaves medical devices in place.                Medical History:     Past Medical History:   Diagnosis Date    Diabetes (H)     Heart disease     History of blood transfusion      Past Surgical History:   Procedure Laterality  Date    COLONOSCOPY N/A 2023    Procedure: COLONOSCOPY WITH POLYPECTOMY, BIOPSY AND HEMOCLIP APPLICATION;  Surgeon: David Wong DO;  Location: [facility] OR    CORONARY STENT PLACEMENT  10/19/2012    glenna 1st obtuse marginal    CV CORONARY ANGIOGRAM N/A 2023    Procedure: Coronary Angiogram;  Surgeon: Javier Guerra MD;  Location: [facility] CATH LAB CV    CV CORONARY ANGIOGRAM N/A 2023    Procedure: Coronary Angiogram;  Surgeon: Dilan Archuleta MD;  Location: [facility] LAB CV    CV INSTANTANEOUS WAVE-FREE RATIO N/A 2023    Procedure: Instantaneous Wave-Free Ratio;  Surgeon: Dilan Archuleta MD;  Location: [facility] LAB CV    CV LEFT HEART CATH N/A 2023    Procedure: Left Heart Catheterization;  Surgeon: Javier Guerra MD;  Location: [facility] CV    CV PCI N/A 2023    Procedure: Percutaneous Coronary Intervention;  Surgeon: Javier Guerra MD;  Location: [facility] CV    ESOPHAGOSCOPY, GASTROSCOPY, DUODENOSCOPY (EGD), COMBINED N/A 2023    Procedure: ESOPHAGOGASTRODUODENOSCOPY WITH BIOPSIES AND HEMOCLIP APPLICATION;  Surgeon: David Wong DO;  Location: [facility] OR    SHOULDER SURGERY      frozen shoulder     Social History     Socioeconomic History    Marital status: Single     Spouse name: Not on file    Number of children: Not on file    Years of education: Not on file    Highest education level: Not on file   Occupational History    Not on file   Tobacco Use    Smoking status: Former     Packs/day: 1.50     Years: 35.00     Additional pack years: 0.00     Total pack years: 52.50     Types: Cigarettes     Quit date: 2005     Years since quittin.8     Passive exposure: Past    Smokeless tobacco: Not on file    Tobacco comments:     pt quit 10 years ago (she had smoked x 35 years)   Vaping Use    Vaping Use: Never used   Substance and Sexual Activity    Alcohol use: No     Comment: 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    Drug use: No    Sexual activity: Not on file   Other Topics Concern    Not on file   Social History Narrative    Exercises 3-4 days per week     Social Determinants of Health     Financial Resource Strain: Not on file   Food Insecurity: Not on file   Transportation Needs: Not on file   Physical Activity: Not on file   Stress: Not on file   Social Connections: Not on file   Interpersonal Safety: Not on file   Housing Stability: Not on file        Allergies   Allergen Reactions    Codeine      Other reaction(s): *Unknown    Guaifenesin Swelling     Felt throat swelling, Guaifenesin with codeine    Penicillins Unknown     Unknown, allergy occurred at 6 months old    Rosuvastatin Calcium [Rosuvastatin] Unknown              Key Medications:      amLODIPine  5 mg Oral BID    aspirin  81 mg Oral Daily    atenolol  25 mg Oral BID    buPROPion  300 mg Oral Daily    ferrous sulfate  325 mg Oral Daily    insulin aspart  1-7 Units Subcutaneous TID AC    insulin aspart  1-5 Units Subcutaneous At Bedtime    insulin aspart   Subcutaneous TID w/meals    levothyroxine  75 mcg Oral Daily    nitroGLYcerin  1 inch Transdermal Q24h    polyethylene glycol  17 g Oral BID    senna-docusate  2 tablet Oral BID      heparin Stopped (11/22/23 0100)        Home Meds  No current facility-administered medications on file prior to encounter.  amLODIPine (NORVASC) 10 MG tablet, Take 10 mg by mouth daily  aspirin (ASA) 81 MG EC tablet, Take 1 tablet (81 mg) by mouth daily Start tomorrow.  buPROPion (WELLBUTRIN XL) 300 MG 24 hr tablet, Take 300 mg by mouth daily  clopidogrel (PLAVIX) 75 MG tablet, Take 1 tablet (75 mg) by mouth daily  coenzyme Q10 (COQ-10) 100 mg capsule, 100 mg every evening  evolocumab (REPATHA SURECLICK) 140 MG/ML prefilled autoinjector, Inject 1 mL (140 mg) Subcutaneous every 14 days  exenatide ER (BYDUREON BCISE) 2 MG/0.85ML auto-injector, Inject 2 mg Subcutaneous every 7 days Once a week on Sunday  fish oil-omega-3  fatty acids 1000 MG capsule, Take 2 g by mouth every evening  levothyroxine (SYNTHROID, LEVOTHROID) 75 MCG tablet, [LEVOTHYROXINE (SYNTHROID, LEVOTHROID) 75 MCG TABLET] Take 75 mcg by mouth daily.  melatonin 3 mg Tab, [MELATONIN 3 MG TAB] Take 3 mg by mouth bedtime as needed.  metFORMIN (GLUCOPHAGE XR) 500 MG 24 hr tablet, Take 500 mg by mouth daily before breakfast  metFORMIN (GLUCOPHAGE XR) 500 MG 24 hr tablet, Take 1,000 mg by mouth daily (with dinner)  multivitamin therapeutic (THERAGRAN) tablet, [MULTIVITAMIN THERAPEUTIC (THERAGRAN) TABLET] Take 1 tablet by mouth daily.  red yeast rice 600 mg Tab, [RED YEAST RICE 600 MG TAB] Take 1 tablet by mouth daily.               Physical Examination:   Temp:  [98.2  F (36.8  C)-99.1  F (37.3  C)] 98.2  F (36.8  C)  Pulse:  [60-77] 64  Resp:  [13-18] 13  BP: (117-169)/(58-70) 169/70  SpO2:  [94 %-96 %] 95 %    Intake/Output Summary (Last 24 hours) at 11/22/2023 1208  Last data filed at 11/22/2023 1134  Gross per 24 hour   Intake 1554 ml   Output 2885 ml   Net -1331 ml     Wt Readings from Last 4 Encounters:   11/22/23 75.9 kg (167 lb 4.8 oz)   09/13/23 78.5 kg (173 lb)   08/15/23 77.6 kg (171 lb)   05/03/23 80.3 kg (177 lb)     BP - Mean:  [101] 101  Resp: 13    Recent Labs   Lab 11/22/23  1101 11/22/23  1007 11/22/23  0823   PH 7.34* 7.31* 7.42   PCO2 43 48* 34*   PO2 357* 327* 128*   HCO3 23 23 23       GEN: no acute distress   HEENT: head ncat, sclera anicteric, OP patent, trachea midline   PULM: unlabored synchronous with vent, clear anteriorly    CV/COR: RRR S1S2 no gallop,  No rub, no murmur  ABD: soft nontender, hypoactive bowel sounds, no mass  EXT: No edema  NEURO: Sedated  SKIN: no obvious rash  LINES: clean, dry intact         Data:   All data and imaging reviewed     ROUTINE ICU LABS (Last four results)  CMP  Recent Labs   Lab 11/22/23  1101 11/22/23  1007 11/22/23  0936 11/22/23  0823 11/22/23  0554 11/22/23  0413 11/19/23  0804 11/19/23  0604 11/16/23  0911  "11/16/23  0436    143 140 141  --   --   --  140  --   --    POTASSIUM 3.8 4.2 4.4 3.5  --   --   --  3.9  --  4.1   CHLORIDE  --   --   --   --   --   --   --  103  --   --    CO2  --   --   --   --   --   --   --  25  --   --    ANIONGAP  --   --   --   --   --   --   --  12  --   --    * 159* 161* 143*   < >  --    < > 109*   < >  --    BUN  --   --   --   --   --   --   --  12.9  --   --    CR  --   --   --   --   --  1.03*  --  1.07*  1.07*  --  1.02*   GFRESTIMATED  --   --   --   --   --  60*  --  57*  57*  --  60*   AMBERLY  --   --   --   --   --   --   --  9.0  --   --    MAG  --   --   --   --   --   --   --   --   --  2.1    < > = values in this interval not displayed.     CBC  Recent Labs   Lab 11/22/23  1101 11/22/23  1007 11/22/23  0936 11/22/23  0823 11/22/23  0722 11/22/23  0413 11/21/23  0438 11/19/23  0604 11/18/23  0908   WBC  --   --   --   --  5.0  --  5.8 4.9 5.2   RBC  --   --   --   --  3.16*  --  3.87 4.05 4.32   HGB 8.7* 8.4* 8.3* 9.7* 8.3*  --  9.6* 10.1* 10.8*   HCT  --   --   --   --  26.3*  --  31.3* 32.2* 34.7*   MCV  --   --   --   --  83  --  81 80 80   MCH  --   --   --   --  26.3*  --  24.8* 24.9* 25.0*   MCHC  --   --   --   --  31.6  --  30.7* 31.4* 31.1*   RDW  --   --   --   --  17.5*  --  16.9* 16.5* 16.4*   PLT  --   --   --   --  124* 233 217 257  257 278     INR  Recent Labs   Lab 11/22/23  0722   INR 1.48*     Arterial Blood Gas  Recent Labs   Lab 11/22/23  1101 11/22/23  1007 11/22/23  0823   PH 7.34* 7.31* 7.42   PCO2 43 48* 34*   PO2 357* 327* 128*   HCO3 23 23 23       All cultures:  No results for input(s): \"CULT\" in the last 168 hours.  Recent Results (from the past 24 hour(s))   POC US Guided Vascular Access    Narrative    Ultrasound was performed as guidance to an anesthesia procedure.  Click   \"PACS images\" hyperlink below to view any stored images.  For specific   procedure details, view procedure note authored by anesthesia.   POC US Guidance " "Needle Placement    Narrative    Ultrasound was performed as guidance to an anesthesia procedure.  Click   \"PACS images\" hyperlink below to view any stored images.  For specific   procedure details, view procedure note authored by anesthesia.         Billing: This patient is critically ill: Yes. Total critical care time today 33 min exclusive of procedures or teaching.  Managing mechanical ventilation following cardiac surgery      "

## 2023-11-22 NOTE — ANESTHESIA PROCEDURE NOTES
Arterial Line Procedure Note    Pre-Procedure   Staff -        Anesthesiologist:  Pablito Villalobos MD       Performed By: anesthesiologist       Location: OR       Pre-Anesthestic Checklist: patient identified, IV checked, risks and benefits discussed, informed consent, monitors and equipment checked and pre-op evaluation  Timeout:       Correct Patient: Yes        Correct Procedure: Yes        Correct Site: Yes        Correct Position: Yes   Line Placement:   This line was placed Pre Induction starting at 11/22/2023 7:18 AM and ending at 11/22/2023 7:25 AM  Procedure   Procedure: arterial line       Laterality: right       Insertion Site: brachial.  Sterile Prep        Standard elements of sterile barrier followed       Skin prep: Chloraprep  Insertion/Injection        Technique: ultrasound guided and Seldinger Technique        1. Ultrasound was used to evaluate the access site.       2. Artery evaluated via ultrasound for patency/adequacy.       3. Using real-time ultrasound the needle/catheter was observed entering the artery/vein.       4. Permanent image was captured and entered into the patient's record.       5. The visualized structures were anatomically normal.       6. There were no apparent abnormal pathologic findings.       Catheter Type/Size: 20 G, 12 cm  Narrative         Secured by: suture       Tegaderm and Biopatch dressing used.       Complications: None apparent,        Arterial waveform: Yes        IBP within 10% of NIBP: Yes

## 2023-11-22 NOTE — TREATMENT PLAN
RCAT Treatment Plan    Patient Score: 9  Patient Acuity: 4    Clinical Indication for Therapy: prevent atelectasis    Therapy Ordered: Flutter Valve QID an IS     Assessment Summary: Presented with unstable angina.  Coronary angiogram showed multivessel coronary artery disease and she underwent CABG x 3 today. Pt has history of COPD and she is a former smoker. Pt has been extubated around 16:30 pm and placed on 4LNC. Pt is able to maintain adequate saturations of 94-96%. BS: clear to auscultations and decreased @ bases. Flutter Valve and IS will be provided and instructed accordingly. Will continue current plan of care and re-evaluate in 24 hrs.     Benson Farrar, RRT  11/22/2023

## 2023-11-23 ENCOUNTER — APPOINTMENT (OUTPATIENT)
Dept: OCCUPATIONAL THERAPY | Facility: HOSPITAL | Age: 68
DRG: 234 | End: 2023-11-23
Payer: COMMERCIAL

## 2023-11-23 ENCOUNTER — APPOINTMENT (OUTPATIENT)
Dept: RADIOLOGY | Facility: HOSPITAL | Age: 68
DRG: 234 | End: 2023-11-23
Attending: PHYSICIAN ASSISTANT
Payer: COMMERCIAL

## 2023-11-23 LAB
ALBUMIN SERPL BCG-MCNC: 3.7 G/DL (ref 3.5–5.2)
ALP SERPL-CCNC: 53 U/L (ref 40–150)
ALT SERPL W P-5'-P-CCNC: 17 U/L (ref 0–50)
ANION GAP SERPL CALCULATED.3IONS-SCNC: 12 MMOL/L (ref 7–15)
AST SERPL W P-5'-P-CCNC: 31 U/L (ref 0–45)
ATRIAL RATE - MUSE: 74 BPM
BILIRUB SERPL-MCNC: 0.2 MG/DL
BUN SERPL-MCNC: 15.1 MG/DL (ref 8–23)
CALCIUM SERPL-MCNC: 8.3 MG/DL (ref 8.8–10.2)
CALCIUM, IONIZED MEASURED: 1.23 MMOL/L (ref 1.11–1.3)
CHLORIDE SERPL-SCNC: 106 MMOL/L (ref 98–107)
CREAT SERPL-MCNC: 0.96 MG/DL (ref 0.51–0.95)
DEPRECATED HCO3 PLAS-SCNC: 21 MMOL/L (ref 22–29)
DIASTOLIC BLOOD PRESSURE - MUSE: NORMAL MMHG
EGFRCR SERPLBLD CKD-EPI 2021: 65 ML/MIN/1.73M2
ERYTHROCYTE [DISTWIDTH] IN BLOOD BY AUTOMATED COUNT: 17.7 % (ref 10–15)
GLUCOSE BLDC GLUCOMTR-MCNC: 132 MG/DL (ref 70–99)
GLUCOSE BLDC GLUCOMTR-MCNC: 132 MG/DL (ref 70–99)
GLUCOSE BLDC GLUCOMTR-MCNC: 148 MG/DL (ref 70–99)
GLUCOSE BLDC GLUCOMTR-MCNC: 159 MG/DL (ref 70–99)
GLUCOSE BLDC GLUCOMTR-MCNC: 172 MG/DL (ref 70–99)
GLUCOSE BLDC GLUCOMTR-MCNC: 173 MG/DL (ref 70–99)
GLUCOSE BLDC GLUCOMTR-MCNC: 177 MG/DL (ref 70–99)
GLUCOSE BLDC GLUCOMTR-MCNC: 193 MG/DL (ref 70–99)
GLUCOSE BLDC GLUCOMTR-MCNC: 199 MG/DL (ref 70–99)
GLUCOSE SERPL-MCNC: 156 MG/DL (ref 70–99)
HCT VFR BLD AUTO: 32.8 % (ref 35–47)
HGB BLD-MCNC: 10.2 G/DL (ref 11.7–15.7)
INTERPRETATION ECG - MUSE: NORMAL
ION CA PH 7.4: 1.16 MMOL/L (ref 1.11–1.3)
MAGNESIUM SERPL-MCNC: 2.1 MG/DL (ref 1.7–2.3)
MCH RBC QN AUTO: 26.3 PG (ref 26.5–33)
MCHC RBC AUTO-ENTMCNC: 31.1 G/DL (ref 31.5–36.5)
MCV RBC AUTO: 85 FL (ref 78–100)
P AXIS - MUSE: 31 DEGREES
PH: 7.29 (ref 7.35–7.45)
PHOSPHATE SERPL-MCNC: 4 MG/DL (ref 2.5–4.5)
PLATELET # BLD AUTO: 161 10E3/UL (ref 150–450)
POTASSIUM SERPL-SCNC: 4.5 MMOL/L (ref 3.4–5.3)
PR INTERVAL - MUSE: 160 MS
PROT SERPL-MCNC: 5.7 G/DL (ref 6.4–8.3)
QRS DURATION - MUSE: 82 MS
QT - MUSE: 394 MS
QTC - MUSE: 437 MS
R AXIS - MUSE: 2 DEGREES
RBC # BLD AUTO: 3.88 10E6/UL (ref 3.8–5.2)
SODIUM SERPL-SCNC: 139 MMOL/L (ref 135–145)
SYSTOLIC BLOOD PRESSURE - MUSE: NORMAL MMHG
T AXIS - MUSE: -50 DEGREES
VENTRICULAR RATE- MUSE: 74 BPM
WBC # BLD AUTO: 7.7 10E3/UL (ref 4–11)

## 2023-11-23 PROCEDURE — 83735 ASSAY OF MAGNESIUM: CPT | Performed by: PHYSICIAN ASSISTANT

## 2023-11-23 PROCEDURE — 999N000157 HC STATISTIC RCP TIME EA 10 MIN

## 2023-11-23 PROCEDURE — 97166 OT EVAL MOD COMPLEX 45 MIN: CPT | Mod: GO

## 2023-11-23 PROCEDURE — 84100 ASSAY OF PHOSPHORUS: CPT | Performed by: PHYSICIAN ASSISTANT

## 2023-11-23 PROCEDURE — 250N000011 HC RX IP 250 OP 636: Mod: JZ | Performed by: PHYSICIAN ASSISTANT

## 2023-11-23 PROCEDURE — 250N000013 HC RX MED GY IP 250 OP 250 PS 637: Performed by: PHYSICIAN ASSISTANT

## 2023-11-23 PROCEDURE — 210N000001 HC R&B IMCU HEART CARE

## 2023-11-23 PROCEDURE — 93005 ELECTROCARDIOGRAM TRACING: CPT | Performed by: PHYSICIAN ASSISTANT

## 2023-11-23 PROCEDURE — 250N000011 HC RX IP 250 OP 636: Performed by: PHYSICIAN ASSISTANT

## 2023-11-23 PROCEDURE — 94799 UNLISTED PULMONARY SVC/PX: CPT

## 2023-11-23 PROCEDURE — 93010 ELECTROCARDIOGRAM REPORT: CPT | Mod: 77 | Performed by: INTERNAL MEDICINE

## 2023-11-23 PROCEDURE — 999N000065 XR CHEST PORT 1 VIEW

## 2023-11-23 PROCEDURE — 82330 ASSAY OF CALCIUM: CPT | Performed by: PHYSICIAN ASSISTANT

## 2023-11-23 PROCEDURE — 250N000013 HC RX MED GY IP 250 OP 250 PS 637: Performed by: INTERNAL MEDICINE

## 2023-11-23 PROCEDURE — 93005 ELECTROCARDIOGRAM TRACING: CPT

## 2023-11-23 PROCEDURE — 99233 SBSQ HOSP IP/OBS HIGH 50: CPT | Performed by: INTERNAL MEDICINE

## 2023-11-23 PROCEDURE — 93010 ELECTROCARDIOGRAM REPORT: CPT | Performed by: INTERNAL MEDICINE

## 2023-11-23 PROCEDURE — 84450 TRANSFERASE (AST) (SGOT): CPT | Performed by: THORACIC SURGERY (CARDIOTHORACIC VASCULAR SURGERY)

## 2023-11-23 PROCEDURE — 250N000012 HC RX MED GY IP 250 OP 636 PS 637: Performed by: PHYSICIAN ASSISTANT

## 2023-11-23 PROCEDURE — 85018 HEMOGLOBIN: CPT | Performed by: PHYSICIAN ASSISTANT

## 2023-11-23 PROCEDURE — 999N000287 HC ICU ADULT ROUNDING, EACH 10 MINS

## 2023-11-23 PROCEDURE — 97535 SELF CARE MNGMENT TRAINING: CPT | Mod: GO

## 2023-11-23 PROCEDURE — 97110 THERAPEUTIC EXERCISES: CPT | Mod: GO

## 2023-11-23 RX ORDER — NICOTINE POLACRILEX 4 MG
15-30 LOZENGE BUCCAL
Status: DISCONTINUED | OUTPATIENT
Start: 2023-11-23 | End: 2023-11-27 | Stop reason: HOSPADM

## 2023-11-23 RX ORDER — METOPROLOL TARTRATE 25 MG/1
25 TABLET, FILM COATED ORAL 2 TIMES DAILY
Status: DISCONTINUED | OUTPATIENT
Start: 2023-11-23 | End: 2023-11-24

## 2023-11-23 RX ORDER — DEXTROSE MONOHYDRATE 25 G/50ML
25-50 INJECTION, SOLUTION INTRAVENOUS
Status: DISCONTINUED | OUTPATIENT
Start: 2023-11-23 | End: 2023-11-27 | Stop reason: HOSPADM

## 2023-11-23 RX ORDER — CALCIUM CARBONATE 500 MG/1
500 TABLET, CHEWABLE ORAL DAILY PRN
Status: DISCONTINUED | OUTPATIENT
Start: 2023-11-23 | End: 2023-11-27 | Stop reason: HOSPADM

## 2023-11-23 RX ORDER — FUROSEMIDE 10 MG/ML
40 INJECTION INTRAMUSCULAR; INTRAVENOUS
Status: DISCONTINUED | OUTPATIENT
Start: 2023-11-23 | End: 2023-11-26

## 2023-11-23 RX ADMIN — ASPIRIN 81 MG CHEWABLE TABLET 162 MG: 81 TABLET CHEWABLE at 09:38

## 2023-11-23 RX ADMIN — ACETAMINOPHEN 975 MG: 325 TABLET ORAL at 13:42

## 2023-11-23 RX ADMIN — METOPROLOL TARTRATE 25 MG: 25 TABLET, FILM COATED ORAL at 08:46

## 2023-11-23 RX ADMIN — OXYCODONE HYDROCHLORIDE 10 MG: 5 TABLET ORAL at 00:30

## 2023-11-23 RX ADMIN — ANTACID TABLETS 500 MG: 500 TABLET, CHEWABLE ORAL at 16:56

## 2023-11-23 RX ADMIN — ONDANSETRON 4 MG: 4 TABLET, ORALLY DISINTEGRATING ORAL at 06:07

## 2023-11-23 RX ADMIN — LIDOCAINE 2 PATCH: 4 PATCH TOPICAL at 20:31

## 2023-11-23 RX ADMIN — SENNOSIDES AND DOCUSATE SODIUM 1 TABLET: 8.6; 5 TABLET ORAL at 20:34

## 2023-11-23 RX ADMIN — ACETAMINOPHEN 975 MG: 325 TABLET ORAL at 21:50

## 2023-11-23 RX ADMIN — LEVOTHYROXINE SODIUM 75 MCG: 0.03 TABLET ORAL at 06:47

## 2023-11-23 RX ADMIN — BUPROPION HYDROCHLORIDE 300 MG: 150 TABLET, FILM COATED, EXTENDED RELEASE ORAL at 08:45

## 2023-11-23 RX ADMIN — FUROSEMIDE 40 MG: 10 INJECTION, SOLUTION INTRAMUSCULAR; INTRAVENOUS at 17:16

## 2023-11-23 RX ADMIN — OXYCODONE HYDROCHLORIDE 10 MG: 5 TABLET ORAL at 10:33

## 2023-11-23 RX ADMIN — CEFAZOLIN 1 G: 1 INJECTION, POWDER, FOR SOLUTION INTRAMUSCULAR; INTRAVENOUS at 06:57

## 2023-11-23 RX ADMIN — HEPARIN SODIUM 5000 UNITS: 10000 INJECTION, SOLUTION INTRAVENOUS; SUBCUTANEOUS at 13:43

## 2023-11-23 RX ADMIN — FERROUS SULFATE TAB 325 MG (65 MG ELEMENTAL FE) 325 MG: 325 (65 FE) TAB at 08:45

## 2023-11-23 RX ADMIN — OXYCODONE HYDROCHLORIDE 10 MG: 5 TABLET ORAL at 04:24

## 2023-11-23 RX ADMIN — OXYCODONE HYDROCHLORIDE 5 MG: 5 TABLET ORAL at 22:11

## 2023-11-23 RX ADMIN — PANTOPRAZOLE SODIUM 40 MG: 40 TABLET, DELAYED RELEASE ORAL at 06:57

## 2023-11-23 RX ADMIN — OXYCODONE HYDROCHLORIDE 5 MG: 5 TABLET ORAL at 15:27

## 2023-11-23 RX ADMIN — SENNOSIDES AND DOCUSATE SODIUM 1 TABLET: 8.6; 5 TABLET ORAL at 08:46

## 2023-11-23 RX ADMIN — HYDROMORPHONE HYDROCHLORIDE 0.2 MG: 0.2 INJECTION, SOLUTION INTRAMUSCULAR; INTRAVENOUS; SUBCUTANEOUS at 04:30

## 2023-11-23 RX ADMIN — ACETAMINOPHEN 975 MG: 325 TABLET ORAL at 06:47

## 2023-11-23 RX ADMIN — POLYETHYLENE GLYCOL 3350 17 G: 17 POWDER, FOR SOLUTION ORAL at 08:46

## 2023-11-23 RX ADMIN — INSULIN ASPART 2 UNITS: 100 INJECTION, SOLUTION INTRAVENOUS; SUBCUTANEOUS at 17:17

## 2023-11-23 RX ADMIN — FUROSEMIDE 40 MG: 10 INJECTION, SOLUTION INTRAMUSCULAR; INTRAVENOUS at 08:45

## 2023-11-23 RX ADMIN — INSULIN ASPART 1 UNITS: 100 INJECTION, SOLUTION INTRAVENOUS; SUBCUTANEOUS at 13:30

## 2023-11-23 RX ADMIN — HEPARIN SODIUM 5000 UNITS: 10000 INJECTION, SOLUTION INTRAVENOUS; SUBCUTANEOUS at 21:50

## 2023-11-23 RX ADMIN — HYDRALAZINE HYDROCHLORIDE 10 MG: 20 INJECTION INTRAMUSCULAR; INTRAVENOUS at 03:04

## 2023-11-23 RX ADMIN — ONDANSETRON 4 MG: 2 INJECTION INTRAMUSCULAR; INTRAVENOUS at 00:40

## 2023-11-23 RX ADMIN — INSULIN GLARGINE 5 UNITS: 100 INJECTION, SOLUTION SUBCUTANEOUS at 08:43

## 2023-11-23 ASSESSMENT — ACTIVITIES OF DAILY LIVING (ADL)
ADLS_ACUITY_SCORE: 22
ADLS_ACUITY_SCORE: 22
ADLS_ACUITY_SCORE: 23
ADLS_ACUITY_SCORE: 22
ADLS_ACUITY_SCORE: 25
ADLS_ACUITY_SCORE: 23
ADLS_ACUITY_SCORE: 23
ADLS_ACUITY_SCORE: 22
ADLS_ACUITY_SCORE: 22
ADLS_ACUITY_SCORE: 25

## 2023-11-23 ASSESSMENT — ENCOUNTER SYMPTOMS
PAIN SEVERITY NOW: MODERATE
ACTIVITY IMPAIRMENT: IMPAIRED DUE TO PAIN
SUBJECTIVE PATIENT PAIN CONTROL: PARTIALLY CONTROLLED
SUBJECTIVE PAIN PROGRESSION: IMPROVING
DIETARY ISSUES: ADEQUATE INTAKE

## 2023-11-23 NOTE — PLAN OF CARE
[facility] - ICU    RN Progress Note:            Pertinent Assessments:      Please refer to flowsheet rows for full assessment     Pain managed with PRN oxycodone and dilaudid.  Patient able to achieve 500mL on IS.          Key Events - This Shift:     Required hydralazine x2 to maintain SBP <140.            Barriers to Discharge / Downgrade:     Needs to have lines pulled when OK by [facility].         Point of Contact Update YES-OR-NO: No  If No, reason: Can be updated during wake hours.        Anu Stevens RN 11/23/2023 5:55 AM

## 2023-11-23 NOTE — CONSULTS
"NUTRITION ASSESSMENT    REASON FOR ASSESSMENT:  Cardiac Surgery Nutrition Consult    CURRENT DIET / INTAKE:  Clear Liquid diet    ANTHROPOMETRICS:   Ht: 4'11\"  Wt: 78.5 kg  BMI: 34.96 kg/m2  IBW: 43.2 kg  Weight Status: Obesity Grade II BMI 35-39.9  %IBW: 181%    MALNUTRITION:  Patient does not meet two of the following criteria necessary for diagnosing malnutrition:  significant weight loss, reduced intake, subcutaneous fat loss, muscle loss or fluid retention. Nutrition Focused Physical Assessment (NFPA) not appropriate at this time.    NUTRITION DIAGNOSIS:   Food and nutrition knowledge deficit r/t no previous post-op education in the past AEB pt report    INTERVENTIONS:  Nutrition Prescription:  Follow a high protein post op diet.     Implementation:  Nutrition Education (Content):  Discussed the importance of adequate nutrition post-op for healing and energy, emphasizing protein foods, and encouraged patient to order a protein food at each meal.    Goals:  Patient to consume ~75% at meals in the next 3 - 5 days    Follow Up/Monitoring (InPatient):  Food and Fluid intake -  Monitor for adequacy    Follow Up/Monitoring (OutPatient):  Patient will participate in out-patient cardiac rehab and attend nutrition classes during the program   "

## 2023-11-23 NOTE — PROGRESS NOTES
11/23/23 0745   Appointment Info   Signing Clinician's Name / Credentials (OT) Denita Parkinson OTR/L OTD   Living Environment   People in Home sibling(s);other (see comments)  (roommate)   [facility]   Home Accessibility stairs to enter home;stairs within home   Number of Stairs, Main Entrance 2   Number of Stairs, Within Home, Primary greater than 10 stairs   Living Environment Comments Bedroom and bathrom on second floor, stnadard toilets, tub/shower, tall height bed frame   Self-Care   Regular Exercise No   Equipment Currently Used at Home none   Fall history within last six months no   Activity/Exercise/Self-Care Comment ind with all ADLs and splits IADLs - works from home   General Information   Onset of Illness/Injury or Date of Surgery 11/22/23   Referring Physician Yanet Gabriel MD   Patient/Family Therapy Goal Statement (OT) To get stronger   Additional Occupational Profile Info/Pertinent History of Current Problem Lisseth Qureshi is a 68 year old female admitted on 11/13/2023 for chest pain. S/p CABG x3 11/22   Existing Precautions/Restrictions cardiac;sternal   Left Upper Extremity (Weight-bearing Status)   (Sternal)   Right Upper Extremity (Weight-bearing Status)   (Sternal)   Cognitive Status Examination   Orientation Status orientation to person, place and time   Affect/Mental Status (Cognitive) WFL   Follows Commands WFL   Visual Perception   Visual Impairment/Limitations WFL   Posture   Posture not impaired   Range of Motion Comprehensive   General Range of Motion bilateral upper extremity ROM WNL   Comment, General Range of Motion Not formally assessed d/t sternal precautions   Strength Comprehensive (MMT)   Comment, General Manual Muscle Testing (MMT) Assessment Not formally assessed d/t sternal precautions   Bed Mobility   Bed Mobility supine-sit   Supine-Sit [place] (Bed Mobility) moderate assist (50% patient effort)   Transfers   Transfers sit-stand  transfer;toilet transfer   Sit-Stand Transfer   Sit-Stand [facility] (Transfers) minimum assist (75% patient effort);2 person assist   Toilet Transfer   [facility] Level (Toilet Transfer) minimum assist (75% patient effort);2 person assist   Activities of Daily Living   BADL Assessment/Intervention toileting;lower body dressing   Lower Body Dressing Assessment/Training   [facility] Level (Lower Body Dressing) minimum assist (75% patient effort)   Toileting   [facility] Level (Toileting) minimum assist (75% patient effort)   Clinical Impression   Criteria for Skilled Therapeutic Interventions Met (OT) Yes, treatment indicated   OT Diagnosis Decreased ind with ADLs and activity tolerance   Influenced by the following impairments S/p CABG x 3   OT Problem List-Impairments impacting ADL activity tolerance impaired;post-surgical precautions;pain;mobility   Assessment of Occupational Performance 3-5 Performance Deficits   Identified Performance Deficits activity tolerance, dressing, bed mobility, health mgmt   Planned Therapy Interventions (OT) ADL retraining;home program guidelines;progressive activity/exercise;risk factor education;strengthening;bed mobility training   Clinical Decision Making Complexity (OT) detailed assessment/moderate complexity   Risk & Benefits of therapy have been explained evaluation/treatment results reviewed;care plan/treatment goals reviewed;risks/benefits reviewed;current/potential barriers reviewed;patient   OT Total Evaluation Time   OT Eval, Moderate Complexity Minutes (38744) 10   OT Goals   Therapy Frequency (OT) 2 times/day   OT Predicted Duration/Target Date for Goal Attainment 11/30/23   OT Goals Cardiac Phase 1   OT: Understanding of cardiac education to maximize quality of life, condition management, and health outcomes Patient;Verbalize   OT: Perform aerobic activity with stable cardiovascular response intermittent;10 minutes;NuStep;ambulation   OT: Functional/aerobic  ambulation tolerance with stable cardiovascular response in order to return to home and community environment Independent;Greater than 300 feet   OT: Navigation of stairs simulating home set up with stable cardiovascular response in order to return to home and community environment Modified independent;Greater than 10 stairs;Rail on right   Interventions   Interventions Quick Adds Therapeutic Procedures/Exercise   Self-Care/Home Management   Self-Care/Home Mgmt/ADL, Compensatory, Meal Prep Minutes (48806) 10   Symptoms Noted During/After Treatment (Meal Preparation/Planning Training) fatigue   Treatment Detail/Skilled Intervention See cardiac education for specifics - min A x2 STS, transfer to bed CGA x 2, mod A x 2 to return to supine. Reminders for sternal precautions   Therapeutic Procedures/Exercise   Therapeutic Procedure: strength, endurance, ROM, flexibillity minutes (29768) 12   Symptoms Noted During/After Treatment fatigue   Treatment Detail/Skilled Intervention See ambulation   Treatment Time Includes (CR Only) See specific exercise details intervention group(s);Monitoring of vital signs (see vital signs flowsheet for details);Extra time managing multiple lines/tubes   Ambulation   Workload Standing tolerance   Duration (minutes) 5 minutes   Symptoms Fatigue   Cardiovascular Response Normal   Exercise Details Standing tolerance - weight shifting and marching in place, CGA with intermittent min A for posterior lean   Vital Signs Details 155/66 BP HR 75   Calisthenics   Type Knee Flexion;Knee Bends;Hip Flexor: kicks  (Toe taps)   Workload 10 reps   Duration (minutes) 5 minutes   Symptoms Fatigue   Cardiovascular Response Normal   Exercise Details Completed seated in recliner   Cardiac Education   Education Provided Precautions;Resuming home activities;Risk factors;Outpatient Cardiac Rehab   Education Packet Given to Patient No   All Patient Education Handouts Reviewed with Patient and/or Family No   Cardiac  Rehab Phase II Plan   Phase II Order Received Yes   Phase II Appointment Status Scheduled   Date/Time 12/5   Location [facility]   OT Discharge Planning   OT Plan Progress ambulation once lines out, stairs as able   OT Discharge Recommendation (DC Rec) home with outpatient cardiac rehab   OT Rationale for DC Rec Has good assist from sister and friend at home, recommend OP cardiac rehab to progress activity tolerance   OT Brief overview of current status CGA x 2 STS and transfers   Total Session Time   Timed Code Treatment Minutes 22   Total Session Time (sum of timed and untimed services) 32

## 2023-11-23 NOTE — PLAN OF CARE
Goal Outcome Evaluation:       Pt amb with [facility], sitting in chair, ate lunch, visiting with family; 3-5 minute runs with -150, discussed with NP and gave 12.5 Metoprolol. Pt asymptomatic

## 2023-11-23 NOTE — PROGRESS NOTES
"O2 4 lpm/NC, SpO2 99 %, decreased to 2 lpm. BS clear bilat, flutter valve performed PEP 5 x 5 minutes, good technique. RT to monitor.    /55   Pulse 61   Temp 98.6  F (37  C) (Oral)   Resp (!) 9   Ht 1.499 m (4' 11\")   Wt 78.5 kg (173 lb 1.6 oz)   LMP  (LMP Unknown)   SpO2 96%   BMI 34.96 kg/m        "

## 2023-11-23 NOTE — PROGRESS NOTES
"CVTS Daily Progress Note   POD#1 s/p CAB x 3  Attending: Dr Gabriel  LOS: 10    SUBJECTIVE/INTERVAL EVENTS:    Patient arrived to ICU from OR yesterday afternoon. She was subsequently extubated and weaned from pressors. No acute events overnight. Patient progressing well. Maintaining oxygen saturations on 4L NC. Normotensive. Up to chair this am. Pain well controlled. -BM / flatus. Tolerating clear liquid diet. UOP adequate. Chest tube output appropriate. Hgb 10.2. Patient denies new chest pain, shortness of breath, abdominal pain, calf pain, nausea. Patient has no questions today.     OBJECTIVE:  Temp:  [97.9  F (36.6  C)-99.6  F (37.6  C)] 98  F (36.7  C)  Pulse:  [68-81] 68  Resp:  [16-20] 18  BP: (142-150)/(55-64) 150/55  MAP:  [64 mmHg-88 mmHg] 74 mmHg  Arterial Line BP: ()/(43-59) 131/45  FiO2 (%):  [40 %-60 %] 40 %  SpO2:  [90 %-100 %] 94 %  Vitals:    11/19/23 0301 11/20/23 0445 11/21/23 0345 11/22/23 0325   Weight: 73.8 kg (162 lb 9.6 oz) 73.2 kg (161 lb 4.8 oz) 73.4 kg (161 lb 14.4 oz) 75.9 kg (167 lb 4.8 oz)    11/23/23 0647   Weight: 78.5 kg (173 lb 1.6 oz)       Clinically Significant Risk Factors          # Hypocalcemia: Lowest Ca = 8.3 mg/dL in last 2 days, will monitor and replace as appropriate  # Hypercalcemia: Highest Ca = 10.2 mg/dL in last 2 days, will monitor as appropriate     # Coagulation Defect: INR = 1.52 (Ref range: 0.85 - 1.15) and/or PTT = 30 Seconds (Ref range: 22 - 38 Seconds), will monitor for bleeding  # Thrombocytopenia: Lowest platelets = 124 in last 2 days, will monitor for bleeding   # Hypertension: Noted on problem list        # Obesity: Estimated body mass index is 34.96 kg/m  as calculated from the following:    Height as of this encounter: 1.499 m (4' 11\").    Weight as of this encounter: 78.5 kg (173 lb 1.6 oz).   # Moderate Malnutrition: based on nutrition assessment     # History of CABG: noted on surgical history              Current Medications:    Scheduled " Meds:   acetaminophen  975 mg Oral Q8H    aspirin  162 mg Oral or NG Tube Daily    Or    aspirin  300 mg Rectal Daily    buPROPion  300 mg Oral Daily    [START ON 11/24/2023] evolocumab  140 mg Subcutaneous Q14 Days    ferrous sulfate  325 mg Oral Daily    furosemide  40 mg Intravenous BID    heparin ANTICOAGULANT  5,000 Units Subcutaneous Q8H    insulin aspart  1-7 Units Subcutaneous TID AC    insulin aspart  1-5 Units Subcutaneous At Bedtime    insulin glargine  5 Units Subcutaneous Once    levothyroxine  75 mcg Oral Daily    lidocaine  1-2 patch Transdermal Q24H    [Held by provider] metFORMIN  1,000 mg Oral Daily with supper    [Held by provider] metFORMIN  500 mg Oral QAM AC    metoprolol tartrate  12.5 mg Oral TID    metoprolol tartrate  25 mg Oral BID    pantoprazole  40 mg Oral or NG Tube QAM AC    Or    pantoprazole  40 mg Oral QAM AC    polyethylene glycol  17 g Oral or Feeding Tube Daily    senna-docusate  1 tablet Oral or Feeding Tube BID     Continuous Infusions:   dexmedeTOMIDine Stopped (11/22/23 1420)    EPINEPHrine Stopped (11/22/23 1227)    niCARdipine      phenylephrine       PRN Meds:.[START ON 11/25/2023] acetaminophen, bisacodyl, calcium gluconate, calcium gluconate, calcium gluconate, glucose **OR** dextrose **OR** glucagon, glucose **OR** dextrose **OR** glucagon, hydrALAZINE, HYDROmorphone **OR** HYDROmorphone, lactated ringers, magnesium hydroxide, naloxone **OR** naloxone **OR** naloxone **OR** naloxone, ondansetron **OR** ondansetron, oxyCODONE **OR** oxyCODONE, prochlorperazine **OR** prochlorperazine    Cardiographics:    Telemetry monitoring demonstrates NSR with rates in the 70's per my personal review.    Imaging:  Results for orders placed or performed during the hospital encounter of 11/13/23   XR Chest Port 1 View    Impression    IMPRESSION: No focal airspace opacity. No pleural effusion or pneumothorax. Cardiac silhouette and mediastinal contours are stable. Coronary stent.   US  Carotid Bilateral    Impression    IMPRESSION:  1.  Mild plaque formation, velocities consistent with less than 50% stenosis in the right internal carotid artery.  2.  Mild plaque formation, velocities consistent with less than 50% stenosis in the left internal carotid artery.  3.  Flow within the vertebral arteries is antegrade.   CT Chest w/o Contrast    Impression    IMPRESSION:   1.  Moderate to marked calcified atheromatous plaque throughout the normal caliber thoracic aorta and proximal brachiocephalic arteries, as well as, the visualized abdominal aorta and visceral arteries.   2.  Mild mid and upper lung centrilobular emphysema.  3.  Aortic valve leaflet calcification and severe three-vessel coronary artery calcification.     XR Chest Port 1 View    Impression    IMPRESSION: Poststernotomy changes. Endotracheal tube is in the mid trachea, 2.3 cm from the jackson. Mediastinal and left pleural chest tubes. Right IJ cordis with tip overlying the region of the mid SVC.    No pneumothorax. Left lower lobe opacities silhouetting the left hemidiaphragm reflecting atelectasis. Trace effusion. Heart is of normal size.   XR Chest Port 1 View    Impression    IMPRESSION: Poststernotomy. Mediastinal and left pleural drains remain in position. Right internal jugular venous catheter tip near the superior cavoatrial junction. Stable cardiomegaly. Normal pulmonary vascularity. Left basilar atelectasis. Right lung   clear. No visible pneumothorax.       Labs, personally reviewed.  Hemoglobin   Date Value Ref Range Status   11/23/2023 10.2 (L) 11.7 - 15.7 g/dL Final   11/22/2023 9.9 (L) 11.7 - 15.7 g/dL Final   11/22/2023 8.3 (L) 11.7 - 15.7 g/dL Final     Hemoglobin POCT   Date Value Ref Range Status   11/22/2023 8.7 (L) 11.7 - 15.7 g/dL Final   11/22/2023 8.4 (L) 11.7 - 15.7 g/dL Final   11/22/2023 8.3 (L) 11.7 - 15.7 g/dL Final     WBC Count   Date Value Ref Range Status   11/23/2023 7.7 4.0 - 11.0 10e3/uL Final    11/22/2023 6.0 4.0 - 11.0 10e3/uL Final   11/22/2023 5.0 4.0 - 11.0 10e3/uL Final     Platelet Count   Date Value Ref Range Status   11/23/2023 161 150 - 450 10e3/uL Final   11/22/2023 132 (L) 150 - 450 10e3/uL Final   11/22/2023 124 (L) 150 - 450 10e3/uL Final     Creatinine   Date Value Ref Range Status   11/23/2023 0.96 (H) 0.51 - 0.95 mg/dL Final   11/22/2023 0.87 0.51 - 0.95 mg/dL Final   11/22/2023 1.03 (H) 0.51 - 0.95 mg/dL Final     Potassium   Date Value Ref Range Status   11/23/2023 4.5 3.4 - 5.3 mmol/L Final   11/22/2023 3.9 3.4 - 5.3 mmol/L Final   11/22/2023 3.9 3.4 - 5.3 mmol/L Final   04/12/2023 3.9 3.5 - 5.0 mmol/L Final   04/10/2019 3.9 3.5 - 5.0 mmol/L Final     Potassium POCT   Date Value Ref Range Status   11/22/2023 3.8 3.5 - 5.0 mmol/L Final   11/22/2023 4.2 3.5 - 5.0 mmol/L Final   11/22/2023 4.4 3.5 - 5.0 mmol/L Final     Magnesium   Date Value Ref Range Status   11/23/2023 2.1 1.7 - 2.3 mg/dL Final   11/22/2023 3.2 (H) 1.7 - 2.3 mg/dL Final   11/16/2023 2.1 1.7 - 2.3 mg/dL Final          I/O:  I/O last 3 completed shifts:  In: 3674.78 [P.O.:920; I.V.:2324.78; Other:130]  Out: 2480 [Urine:1610; Blood:400; Chest Tube:470]       Physical Exam:    General: Patient seen  up in chair. NAD. Conversant. Pleasant.   HEENT: ECOH, no sclera icterus, moist mucosa  CV: RRR on monitor. 2+ peripheral pulses in all extremities. Mild edema.   Pulm: Non-labored effort on 4L NC. Chest tubes in place, serosanguinous output, no air leak.  Incision C/D/I.  Abd: Soft, NT, ND  : [place] with chon urine  Ext: Mild pedal edema, SCDs in place, warm, distal pulses intact  Neuro: CNs grossly intact.       ASSESSMENT/PLAN:    Lisseth Qureshi is a 68 year old female with a history of CAD, DM II, HTN  who is s/p CAB x 3.    Principal Problem:    Anemia, unspecified type  Active Problems:    Hypothyroidism    Type 2 diabetes mellitus without complication, with long-term current use of insulin (H)    Hypertension     Coronary artery disease involving native coronary artery with unstable angina pectoris (H)    Chest pain, unspecified type    Iron deficiency    Stenosis of coronary stent, initial encounter    Coronary artery disease involving native coronary artery of native heart, unspecified whether angina present        NEURO:   - Scheduled Tylenol/lidocaine patches and PRN Tylenol/oxycodone/dilaudid for pain    CV:   - Pre-op EF 60-65%  - Normotensive/HTN  - Metoprolol 25 mg two times a day with parameters and extra doses available  - ASA 162mg  - PTA Repatha  - Chest tubes to remain today    PULM:   - Maintaining oxygen saturations on 4L NC  - Encourage pulmonary toilet    FEN/GI:  - Continue electrolyte replacement protocol  - Diet: Cardiac, ADAT   - Bowel regimen    RENAL:  - Adequate UOP/hr. Continue to monitor closely.  - Cr 0.96  - [place] to remain in for close monitoring of I/O and during period of diuresis/relative immobility.  - Diuresis with 40mg IV Lasix two times a day     HEME:  - Acute blood loss anemia post-op.   - Hgb 10.2, no bleeding concerns. Hep SQ, ASA  - Received 2u PRBC's intra-op  - PTA Ferrous sulfate 325 mg daily    ID:  - Bing op ppx complete, afebrile . No concerns for infection    ENDO:   - Transition to sliding scale insulin  -PTA metformin-restart at discharge  -PTA  Levothyroxine    PPx:   - DVT: SCDs, SQ heparin TID, ambulation   - GI: Protonix 40mg PO daily    DISPO:   - Transfer to general telemetry status      Patient discussed with Dr. Gabriel.    Yuliana Sifuentes PA-C  [facility] Cardiothoracic Surgery  Vocera or pager [phone]

## 2023-11-23 NOTE — PROGRESS NOTES
[facility]  Critical Care Progress Note    Lisseth Qureshi 67 yo F PMH CAD, T2DM, HTN, HLD, history of GI bleeding, hypothyroidism     Presented 11/13/23 for one day duration chest pain. Found to have severe multivessel CAD. 11/22/23 s/p 3 vessel CABG with preserved pre & post biventricular function. She was extubated yesterday evening to a few liters nasal cannula & titrated off all vasopressors/inotropes; moderately hypertensive, anticipate starting nicardipine. Improved acute on chronic anemia with normal renal indices & electrolytes. She is resting comfortably this morning with mild nausea.     Critical care service will sign off     ILIR Angulo MD  Critical Care Medicine

## 2023-11-23 NOTE — PROGRESS NOTES
[facility]    Medicine Progress Note - Hospitalist Service    Date of Admission:  11/13/2023    Assessment & Plan   Lisseth Qureshi is a 68 year old female admitted on 11/13/2023 for chest pain.      Multivessel coronary disease with unstable angina:  Patient presented with chest discomfort which tends to get better when she lies down and gets worse when she sits up.  s/p PCI/stents in April 2023  Coronary angiogram 11/14, showed multivessel coronary artery disease involving the mid LAD, the proximal to mid left circumflex, and severe in-stent restenosis of the dominant mid right coronary artery.   - Cardiothoracic surgery: CABG done on 11/22.  - Continue Aspirin, metoprolol   --11/22 pt to ICU after CABG; now extubated     Essential Hypertension: BP controlled.  - metoprolol  - Hydralazine as needed     Type 2 diabetes mellitus: A1c 7.4 in September  - Hold home metformin and exenatide   - Medium intensity insulin sliding scale: patient has been refusing  - Hypoglycemia protocol     Microcytic anemia of iron deficiency:  Hemoglobin dropped from around 12 in June to 9.5 and further to 8.2.  Received 1 unit blood transfusion on 11/15 given unstable angina.  Posttransfusion hemoglobin 9.7  History of esophagitis large hiatal hernia.  Normal folate and B12, iron low at 17.  Iron saturation 4%.  No melena or hematochezia.  GI consulted and Colonoscopy done 11/17: transverse colon sessile polyp- removed, 6 mm flat polyp in descending colon-removed, few diverticuli, nonbleeding internal hemorrhoids.  - No contraindication from GI standpoint for coronary revascularization  - Recommended repeat colonoscopy in 3 years, follow pathology report  - Hematology consulted and input appreciated: IV iron for one dose. Continue oral iron supplement.      Hypothyroidism  - Continue Levothyroxine, TSH 3.45 in September     Chronic slow transit constipation  -Daily MiraLAX       Diet: Clear Liquid Diet    DVT  "Prophylaxis: Heparin SQ  [place] Catheter: PRESENT, indication: Strict 1-2 Hour I&O  Lines: PRESENT      CVC Right Internal jugular-Site Assessment: WDL  Arterial Line 11/22/23 Brachial-Site Assessment: WDL      Cardiac Monitoring: ACTIVE order. Indication: Open heart surgery (72 hours)  Code Status: Full Code      Clinically Significant Risk Factors          # Hypocalcemia: Lowest Ca = 8.3 mg/dL in last 2 days, will monitor and replace as appropriate  # Hypercalcemia: Highest Ca = 10.2 mg/dL in last 2 days, will monitor as appropriate     # Coagulation Defect: INR = 1.52 (Ref range: 0.85 - 1.15) and/or PTT = 30 Seconds (Ref range: 22 - 38 Seconds), will monitor for bleeding  # Thrombocytopenia: Lowest platelets = 124 in last 2 days, will monitor for bleeding   # Hypertension: Noted on problem list        # Obesity: Estimated body mass index is 34.96 kg/m  as calculated from the following:    Height as of this encounter: 1.499 m (4' 11\").    Weight as of this encounter: 78.5 kg (173 lb 1.6 oz).   # Moderate Malnutrition: based on nutrition assessment     # History of CABG: noted on surgical history       Disposition Plan     Expected Discharge Date: 11/26/2023    Discharge Delays: Procedure Pending (enter procedure & time in comments)  IV Medication - consider oral or Home Infusion  Destination: home with family  Discharge Comments: CABG tennative 11/22            Jovon Yee MD  Hospitalist Service  [facility]  Securely message with [employer] (more info)  Text page via [employer] Paging/Directory   ______________________________________________________________________    Interval History   Chest pain from CABG, tolerable, no sob, no f/c, no n/v    Physical Exam   Vital Signs: Temp: 98  F (36.7  C) Temp src: Oral BP: (!) 150/55 Pulse: 76   Resp: 18 SpO2: 93 % O2 Device: Nasal cannula Oxygen Delivery: 4 LPM  Weight: 173 lbs 1.6 oz  General.  Awake alert oriented not in acute distress.  HEENT.  " Pupils equal round react to light, anicteric, EOM intact.  Neck supple no JVD.  CVS regular rhythm no murmur gallops.  S/p CABG  Lungs.  Clear to auscultation bilateral no wheezing or rales.  Abdomen.  Soft nontender bowel sounds present.  Extremities.  No edema no calf tenderness.  Neurological.  Awake and alert. No focal deficit.  Skin no rash. No pallor.  Psych. Normal mood.      Medical Decision Making       52 MINUTES SPENT BY ME on the date of service doing chart review, history, exam, documentation & further activities per the note.      Data     I have personally reviewed the following data over the past 24 hrs:    7.7  \   10.2 (L)   / 161     139 106 15.1 /  173 (H)   4.5 21 (L) 0.96 (H) \     ALT: 17 AST: 31 AP: 53 TBILI: 0.2   ALB: 3.7 TOT PROTEIN: 5.7 (L) LIPASE: N/A     Procal: N/A CRP: N/A Lactic Acid: 0.9       INR:  1.52 (H) PTT:  30   D-dimer:  N/A Fibrinogen:  N/A       Imaging results reviewed over the past 24 hrs:   Recent Results (from the past 24 hour(s))   XR Chest Port 1 View    Narrative    EXAM: XR CHEST PORT 1 VIEW  LOCATION: [facility]  DATE: 11/22/2023    INDICATION: Post Op CVTS Surgery  COMPARISON: 11/13/2023      Impression    IMPRESSION: Poststernotomy changes. Endotracheal tube is in the mid trachea, 2.3 cm from the jackson. Mediastinal and left pleural chest tubes. Right IJ cordis with tip overlying the region of the mid SVC.    No pneumothorax. Left lower lobe opacities silhouetting the left hemidiaphragm reflecting atelectasis. Trace effusion. Heart is of normal size.   XR Chest Port 1 View    Narrative    EXAM: XR CHEST PORT 1 VIEW  LOCATION: [facility]  DATE: 11/23/2023    INDICATION: Post Op CVTS Surgery  COMPARISON: 11/22/2023.      Impression    IMPRESSION: Poststernotomy. Mediastinal and left pleural drains remain in position. Right internal jugular venous catheter tip near the superior cavoatrial junction. Stable  cardiomegaly. Normal pulmonary vascularity. Left basilar atelectasis. Right lung   clear. No visible pneumothorax.

## 2023-11-23 NOTE — PROGRESS NOTES
"Lisseth Qureshi is a 68 year old female patient.  1. S/P CABG (coronary artery bypass graft)    2. Chest pain, unspecified type    3. Anemia, unspecified type    4. Iron deficiency    5. Coronary artery disease involving native coronary artery of native heart, unspecified whether angina present      Past Medical History:   Diagnosis Date    Diabetes (H)     Heart disease     History of blood transfusion      No current outpatient medications on file.     Allergies   Allergen Reactions    Codeine      Other reaction(s): *Unknown    Guaifenesin Swelling     Felt throat swelling, Guaifenesin with codeine    Penicillins Unknown     Unknown, allergy occurred at 6 months old  11/22/23 tolerated cefazolin with open heart surgery     Rosuvastatin Calcium [Rosuvastatin] Unknown     Principal Problem:    Anemia, unspecified type  Active Problems:    Hypothyroidism    Type 2 diabetes mellitus without complication, with long-term current use of insulin (H)    Hypertension    Coronary artery disease involving native coronary artery with unstable angina pectoris (H)    Chest pain, unspecified type    Iron deficiency    Stenosis of coronary stent, initial encounter    Coronary artery disease involving native coronary artery of native heart, unspecified whether angina present    Blood pressure 122/60, pulse 59, temperature 98.6  F (37  C), temperature source Oral, resp. rate 26, height 1.499 m (4' 11\"), weight 78.5 kg (173 lb 1.6 oz), SpO2 98%.    Subjective:  Symptoms:  Stable.    Diet:  Adequate intake.    Activity level: Impaired due to pain.    Pain:  She complains of pain that is moderate.  She reports pain is improving.  Pain is partially controlled.      Objective  Assessment & Plan    Aure Singh RN  11/23/2023      "

## 2023-11-24 ENCOUNTER — APPOINTMENT (OUTPATIENT)
Dept: OCCUPATIONAL THERAPY | Facility: HOSPITAL | Age: 68
DRG: 234 | End: 2023-11-24
Payer: COMMERCIAL

## 2023-11-24 LAB
ATRIAL RATE - MUSE: 70 BPM
CALCIUM, IONIZED MEASURED: 1.21 MMOL/L (ref 1.11–1.3)
DIASTOLIC BLOOD PRESSURE - MUSE: NORMAL MMHG
ERYTHROCYTE [DISTWIDTH] IN BLOOD BY AUTOMATED COUNT: 18.1 % (ref 10–15)
GLUCOSE BLDC GLUCOMTR-MCNC: 150 MG/DL (ref 70–99)
GLUCOSE BLDC GLUCOMTR-MCNC: 200 MG/DL (ref 70–99)
GLUCOSE BLDC GLUCOMTR-MCNC: 210 MG/DL (ref 70–99)
GLUCOSE BLDC GLUCOMTR-MCNC: 232 MG/DL (ref 70–99)
HCT VFR BLD AUTO: 34 % (ref 35–47)
HGB BLD-MCNC: 10.5 G/DL (ref 11.7–15.7)
INTERPRETATION ECG - MUSE: NORMAL
ION CA PH 7.4: 1.15 MMOL/L (ref 1.11–1.3)
MAGNESIUM SERPL-MCNC: 2.1 MG/DL (ref 1.7–2.3)
MCH RBC QN AUTO: 26.4 PG (ref 26.5–33)
MCHC RBC AUTO-ENTMCNC: 30.9 G/DL (ref 31.5–36.5)
MCV RBC AUTO: 86 FL (ref 78–100)
P AXIS - MUSE: -10 DEGREES
PH: 7.3 (ref 7.35–7.45)
PHOSPHATE SERPL-MCNC: 3.1 MG/DL (ref 2.5–4.5)
PLATELET # BLD AUTO: 177 10E3/UL (ref 150–450)
POTASSIUM SERPL-SCNC: 4.1 MMOL/L (ref 3.4–5.3)
PR INTERVAL - MUSE: 140 MS
QRS DURATION - MUSE: 86 MS
QT - MUSE: 408 MS
QTC - MUSE: 440 MS
R AXIS - MUSE: -8 DEGREES
RBC # BLD AUTO: 3.97 10E6/UL (ref 3.8–5.2)
SYSTOLIC BLOOD PRESSURE - MUSE: NORMAL MMHG
T AXIS - MUSE: -55 DEGREES
VENTRICULAR RATE- MUSE: 70 BPM
WBC # BLD AUTO: 8.3 10E3/UL (ref 4–11)

## 2023-11-24 PROCEDURE — 83735 ASSAY OF MAGNESIUM: CPT | Performed by: PHYSICIAN ASSISTANT

## 2023-11-24 PROCEDURE — 84132 ASSAY OF SERUM POTASSIUM: CPT | Performed by: PHYSICIAN ASSISTANT

## 2023-11-24 PROCEDURE — 999N000156 HC STATISTIC RCP CONSULT EA 30 MIN

## 2023-11-24 PROCEDURE — 250N000011 HC RX IP 250 OP 636: Mod: JZ | Performed by: PHYSICIAN ASSISTANT

## 2023-11-24 PROCEDURE — 85027 COMPLETE CBC AUTOMATED: CPT | Performed by: PHYSICIAN ASSISTANT

## 2023-11-24 PROCEDURE — 250N000013 HC RX MED GY IP 250 OP 250 PS 637: Performed by: PHYSICIAN ASSISTANT

## 2023-11-24 PROCEDURE — 97110 THERAPEUTIC EXERCISES: CPT | Mod: GO

## 2023-11-24 PROCEDURE — 94799 UNLISTED PULMONARY SVC/PX: CPT

## 2023-11-24 PROCEDURE — 97535 SELF CARE MNGMENT TRAINING: CPT | Mod: GO

## 2023-11-24 PROCEDURE — 82330 ASSAY OF CALCIUM: CPT | Performed by: PHYSICIAN ASSISTANT

## 2023-11-24 PROCEDURE — 99232 SBSQ HOSP IP/OBS MODERATE 35: CPT | Performed by: INTERNAL MEDICINE

## 2023-11-24 PROCEDURE — 84100 ASSAY OF PHOSPHORUS: CPT | Performed by: PHYSICIAN ASSISTANT

## 2023-11-24 PROCEDURE — 999N000157 HC STATISTIC RCP TIME EA 10 MIN

## 2023-11-24 PROCEDURE — 36415 COLL VENOUS BLD VENIPUNCTURE: CPT | Performed by: PHYSICIAN ASSISTANT

## 2023-11-24 PROCEDURE — 210N000001 HC R&B IMCU HEART CARE

## 2023-11-24 RX ORDER — METOPROLOL TARTRATE 25 MG/1
50 TABLET, FILM COATED ORAL 2 TIMES DAILY
Status: DISCONTINUED | OUTPATIENT
Start: 2023-11-24 | End: 2023-11-25

## 2023-11-24 RX ADMIN — ACETAMINOPHEN 975 MG: 325 TABLET ORAL at 14:50

## 2023-11-24 RX ADMIN — POLYETHYLENE GLYCOL 3350 17 G: 17 POWDER, FOR SOLUTION ORAL at 08:36

## 2023-11-24 RX ADMIN — BUPROPION HYDROCHLORIDE 300 MG: 150 TABLET, FILM COATED, EXTENDED RELEASE ORAL at 08:35

## 2023-11-24 RX ADMIN — FUROSEMIDE 40 MG: 10 INJECTION, SOLUTION INTRAMUSCULAR; INTRAVENOUS at 18:15

## 2023-11-24 RX ADMIN — SENNOSIDES AND DOCUSATE SODIUM 1 TABLET: 8.6; 5 TABLET ORAL at 08:35

## 2023-11-24 RX ADMIN — INSULIN ASPART 2 UNITS: 100 INJECTION, SOLUTION INTRAVENOUS; SUBCUTANEOUS at 12:23

## 2023-11-24 RX ADMIN — INSULIN ASPART 1 UNITS: 100 INJECTION, SOLUTION INTRAVENOUS; SUBCUTANEOUS at 16:41

## 2023-11-24 RX ADMIN — FERROUS SULFATE TAB 325 MG (65 MG ELEMENTAL FE) 325 MG: 325 (65 FE) TAB at 08:35

## 2023-11-24 RX ADMIN — HEPARIN SODIUM 5000 UNITS: 10000 INJECTION, SOLUTION INTRAVENOUS; SUBCUTANEOUS at 05:20

## 2023-11-24 RX ADMIN — HYDRALAZINE HYDROCHLORIDE 10 MG: 20 INJECTION INTRAMUSCULAR; INTRAVENOUS at 05:28

## 2023-11-24 RX ADMIN — METOPROLOL TARTRATE 12.5 MG: 25 TABLET, FILM COATED ORAL at 03:32

## 2023-11-24 RX ADMIN — FUROSEMIDE 40 MG: 10 INJECTION, SOLUTION INTRAMUSCULAR; INTRAVENOUS at 08:35

## 2023-11-24 RX ADMIN — HEPARIN SODIUM 5000 UNITS: 10000 INJECTION, SOLUTION INTRAVENOUS; SUBCUTANEOUS at 14:50

## 2023-11-24 RX ADMIN — ACETAMINOPHEN 975 MG: 325 TABLET ORAL at 05:20

## 2023-11-24 RX ADMIN — HYDRALAZINE HYDROCHLORIDE 10 MG: 20 INJECTION INTRAMUSCULAR; INTRAVENOUS at 04:20

## 2023-11-24 RX ADMIN — METOPROLOL TARTRATE 50 MG: 25 TABLET, FILM COATED ORAL at 08:35

## 2023-11-24 RX ADMIN — OXYCODONE HYDROCHLORIDE 5 MG: 5 TABLET ORAL at 04:20

## 2023-11-24 RX ADMIN — PANTOPRAZOLE SODIUM 40 MG: 40 TABLET, DELAYED RELEASE ORAL at 06:52

## 2023-11-24 RX ADMIN — INSULIN ASPART 2 UNITS: 100 INJECTION, SOLUTION INTRAVENOUS; SUBCUTANEOUS at 08:36

## 2023-11-24 RX ADMIN — HYDRALAZINE HYDROCHLORIDE 10 MG: 20 INJECTION INTRAMUSCULAR; INTRAVENOUS at 06:58

## 2023-11-24 RX ADMIN — LIDOCAINE 2 PATCH: 4 PATCH TOPICAL at 20:19

## 2023-11-24 RX ADMIN — ASPIRIN 81 MG CHEWABLE TABLET 162 MG: 81 TABLET CHEWABLE at 08:36

## 2023-11-24 RX ADMIN — ACETAMINOPHEN 975 MG: 325 TABLET ORAL at 21:21

## 2023-11-24 RX ADMIN — SENNOSIDES AND DOCUSATE SODIUM 1 TABLET: 8.6; 5 TABLET ORAL at 20:20

## 2023-11-24 RX ADMIN — OXYCODONE HYDROCHLORIDE 5 MG: 5 TABLET ORAL at 11:27

## 2023-11-24 RX ADMIN — OXYCODONE HYDROCHLORIDE 5 MG: 5 TABLET ORAL at 19:21

## 2023-11-24 RX ADMIN — METOPROLOL TARTRATE 50 MG: 25 TABLET, FILM COATED ORAL at 20:20

## 2023-11-24 RX ADMIN — LEVOTHYROXINE SODIUM 75 MCG: 0.03 TABLET ORAL at 05:20

## 2023-11-24 RX ADMIN — HEPARIN SODIUM 5000 UNITS: 10000 INJECTION, SOLUTION INTRAVENOUS; SUBCUTANEOUS at 21:21

## 2023-11-24 ASSESSMENT — ACTIVITIES OF DAILY LIVING (ADL)
ADLS_ACUITY_SCORE: 24
ADLS_ACUITY_SCORE: 27
ADLS_ACUITY_SCORE: 24
ADLS_ACUITY_SCORE: 24
ADLS_ACUITY_SCORE: 25
ADLS_ACUITY_SCORE: 24
ADLS_ACUITY_SCORE: 27
ADLS_ACUITY_SCORE: 24

## 2023-11-24 NOTE — PLAN OF CARE
sc  Patient Name: Lisseth Qureshi   MRN: [id number]   Date of Admission: 11/13/2023    Procedure: Procedure(s):  CORONARY ARTERY BYPASS GRAFT TIMES THREE, LEFT INTERNAL MAMMARY ARTERY HARVEST, LEFT LEG ENDOSCOPIC VESSEL PROCUREMENT, ANESTHESIA TRANSESOPHAGEAL ECHOCARDIOGRAM, EPI-AORTIC ULTRASOUND    Post Op day #:2    Subjective (Patient focus/Primary Problem for shift): Rest           Pain Goal 0 Pain Rating 0           Pain Medication/ Regime effective to reduce patient pain yes, scheduled Tylenol and PRN oxycodone.    Objective (Physical assessment):           Rhythm: sinus bradycardia            Bowel Activity: no if Yes indicate when:           Bowel Medications: yes            Incision: healing well          Incentive Spirometry Q 1-2 hour when awake:  yes Volume:           Epicardial Pacing Wires:  yes            Patient Activity:           Up to chair for meals: not applicable          Ambulation with RN x2 (Not including CR): not applicable            Is patient in home clothes:not applicable             Chest Tubes   Pleural: yes Draining: yes               Suction: yes              Mediastinal: yes Draining: yes               Suction: yes   Dressing Change Daily:yes If No, why?                     Urinary Catheter: no, removed this morning            Preventative WOC consult (need MD order): no       Assessment (Nursing primary shift focus):Reporting sharp pain when breathing and pain at chest tube insertion site. Moving well with assistance and reassurance to the chair. BP elevated.     Plan (Patient Care Plan/focus): Educated on sternal precautions and pain control. Scheduled tylenol and PRN oxycodone given. Promote rest. Removed marie this morning. Elevated BP treated with scheduled and PRNs.       Juancarlos Hayes RN   11/24/2023   4:01 AM

## 2023-11-24 NOTE — PROGRESS NOTES
[facility]    Medicine Progress Note - Hospitalist Service    Date of Admission:  11/13/2023    Assessment & Plan   Lisseth Qureshi is a 68 year old female admitted on 11/13/2023 for chest pain. S/p CABG on 11/22.     Multivessel coronary disease with unstable angina:  Patient presented with chest discomfort which tends to get better when she lies down and gets worse when she sits up.  s/p PCI/stents in April 2023  Coronary angiogram 11/14, showed multivessel coronary artery disease involving the mid LAD, the proximal to mid left circumflex, and severe in-stent restenosis of the dominant mid right coronary artery.   - Cardiothoracic surgery: CABG done on 11/22.  - Continue Aspirin, metoprolol   --11/22 pt to ICU after CABG; now extubated  -postop pain in control; no new complaints     Essential Hypertension: BP controlled.  - metoprolol  - Hydralazine as needed     Type 2 diabetes mellitus: A1c 7.4 in September  - Hold home metformin and exenatide   - Medium intensity insulin sliding scale: patient has been refusing  - Hypoglycemia protocol     Microcytic anemia of iron deficiency:  Hemoglobin dropped from around 12 in June to 9.5 and further to 8.2.  Received 1 unit blood transfusion on 11/15 given unstable angina.  Posttransfusion hemoglobin 9.7  History of esophagitis large hiatal hernia.  Normal folate and B12, iron low at 17.  Iron saturation 4%.  No melena or hematochezia.  GI consulted and Colonoscopy done 11/17: transverse colon sessile polyp- removed, 6 mm flat polyp in descending colon-removed, few diverticuli, nonbleeding internal hemorrhoids.  - No contraindication from GI standpoint for coronary revascularization  - Recommended repeat colonoscopy in 3 years, follow pathology report  - Hematology consulted and input appreciated: IV iron for one dose. Continue oral iron supplement.      Hypothyroidism  - Continue Levothyroxine, TSH 3.45 in September     Chronic slow transit  "constipation  -Daily MiraLAX            Diet: Clear Liquid Diet    DVT Prophylaxis: Heparin SQ  [place] Catheter: Not present  Lines: None     Cardiac Monitoring: ACTIVE order. Indication: Open heart surgery (72 hours)  Code Status: Full Code      Clinically Significant Risk Factors          # Hypocalcemia: Lowest Ca = 8.3 mg/dL in last 2 days, will monitor and replace as appropriate  # Hypercalcemia: Highest Ca = 10.2 mg/dL in last 2 days, will monitor as appropriate     # Coagulation Defect: INR = 1.52 (Ref range: 0.85 - 1.15) and/or PTT = 30 Seconds (Ref range: 22 - 38 Seconds), will monitor for bleeding    # Hypertension: Noted on problem list        # Obesity: Estimated body mass index is 34.96 kg/m  as calculated from the following:    Height as of this encounter: 1.499 m (4' 11\").    Weight as of this encounter: 78.5 kg (173 lb 1.6 oz).   # Moderate Malnutrition: based on nutrition assessment     # History of CABG: noted on surgical history       Disposition Plan     Expected Discharge Date: 11/26/2023      Destination: home with family  Discharge Comments: CABG tennative 11/22        Defer to [facility]    Jovon Yee MD  Hospitalist Service  [facility]  Securely message with [employer] (more info)  Text page via [employer] Paging/Directory   ______________________________________________________________________    Interval History   Feeling better with less pain, no new complaints, no f/c, no n/v    Physical Exam   Vital Signs: Temp: 97.3  F (36.3  C) Temp src: Oral BP: 123/53 Pulse: 74   Resp: 20 SpO2: 91 % O2 Device: None (Room air) Oxygen Delivery: 1 LPM  Weight: 173 lbs 1.6 oz    General.  Awake alert oriented not in acute distress.  HEENT.  Pupils equal round react to light, anicteric, EOM intact.  Neck supple no JVD.  CVS regular rhythm no murmur gallops. S/p CABG  Lungs.  Clear to auscultation bilateral no wheezing or rales.  Abdomen.  Soft nontender bowel sounds present.  Extremities.  No " edema no calf tenderness.  Neurological.  Awake and alert. No focal deficit.  Skin no rash. No pallor.  Psych. Normal mood.      Medical Decision Making       40 MINUTES SPENT BY ME on the date of service doing chart review, history, exam, documentation & further activities per the note.      Data     I have personally reviewed the following data over the past 24 hrs:    8.3  \   10.5 (L)   / 177     N/A N/A N/A /  210 (H)   4.1 N/A N/A \       Imaging results reviewed over the past 24 hrs:   No results found for this or any previous visit (from the past 24 hour(s)).

## 2023-11-24 NOTE — PLAN OF CARE
Goal Outcome Evaluation:      Plan of Care Reviewed With: patient                 Problem: Adult Inpatient Plan of Care  Goal: Plan of Care Review  Description: The Plan of Care Review/Shift note should be completed every shift.  The Outcome Evaluation is a brief statement about your assessment that the patient is improving, declining, or no change.  This information will be displayed automatically on your shift  note.  Outcome: Progressing  Flowsheets (Taken 11/24/2023 6049)  Plan of Care Reviewed With: patient     Pt. is A&O x 4 and able to make needs known. Pain is well control with current management. Chest tubes and wires removed this morning by PA. Pt up to chair for meal. Pt tolerating diet with no complain of nausea or vomiting, no BM at this time. Pt is voiding adequate amount. Ambulating in the [place] with SBA with walker. Tele is NSR.

## 2023-11-24 NOTE — PLAN OF CARE
sc  Patient Name: Lisseth Qureshi   MRN: [id number]   Date of Admission: 11/13/2023    Procedure: Procedure(s):  CORONARY ARTERY BYPASS GRAFT TIMES THREE, LEFT INTERNAL MAMMARY ARTERY HARVEST, LEFT LEG ENDOSCOPIC VESSEL PROCUREMENT, ANESTHESIA TRANSESOPHAGEAL ECHOCARDIOGRAM, EPI-AORTIC ULTRASOUND    Post Op day #:1    Subjective (Patient focus/Primary Problem for shift): Pain controll          Pain Goalno pain Pain Rating4-6           Pain Medication/ Regime effective to reduce patient painLidocaine patch, scheduled Tylenol, PRN Oxycodone    Objective (Physical assessment):           Rhythm: normal sinus rhythm            Bowel Activity: no if Yes indicate when: n/a          Bowel Medications: yes            Incision: healing well          Incentive Spirometry Q 1-2 hour when awake:  yes Volume: 500          Epicardial Pacing Wires:  yes            Patient Activity:           Up to chair for meals: no          Ambulation with RN x2 (Not including CR): no            Is patient in home clothes:no             Chest Tubes   Pleural: yes Draining: yes               Suction: yes              Mediastinal: yes Draining: yes               Suction: yes   Dressing Change Daily:no If No, why?doen on days                     Urinary Catheter: yes           Preventative WOC consult (need MD order): no       Assessment (Nursing primary shift focus): A/O x3, wean down to 1L Nc 02, Sp02 mid 90's. NSR. HS BG- 199. PRN Oxycodone for chest tube site pain, helpful. Repositioned in bed. Denied chest discomfort. Lidocaine patches placed.    Plan (Patient Care Plan/focus): encourage activity. Wean 02.       nEio Riley RN   11/23/2023   11:03 PM       Goal Outcome Evaluation:

## 2023-11-24 NOTE — PROGRESS NOTES
Care Management Follow Up    Length of Stay (days): 11    Expected Discharge Date: 11/26/2023     Concerns to be Addressed: discharge planning      Patient plan of care discussed at interdisciplinary rounds: Yes    Anticipated Discharge Disposition: home      Anticipated Discharge Services: none   Anticipated Discharge DME: per treatment team     Patient/family educated on Medicare website which has current facility and service quality ratings: no   Education Provided on the Discharge Plan: TBD   Patient/Family in Agreement with the Plan: TBD     Referrals Placed by CM/SW: none at this time   Private pay costs discussed: Not applicable    Additional Information:  3:26 PM  Chart reviewed. Pt is independent at baseline and lives with roommates. Pt's son Edwin is supportive and involved. Therapy recommending outpatient cardiac rehab. Family to transport. Anticipate home at discharge.     CM continuing to follow Pt's medical progression for Care Team recommendations and discharge planning.      TERRA Wise

## 2023-11-24 NOTE — PROGRESS NOTES
[facility] Treatment Plan    Patient Score: 8  Patient Acuity: 4    Clinical Indication for Therapy: history of bronchospasm    Therapy Ordered: flutter valve Q4H    Assessment Summary: Oxygen titrated to 1 lpm O2; sats 95%. Shallow breathing noted. Pt achieved 600 ml. Deep breath and coughing encouraged.     Hussein Rodriguez, RT  11/23/2023

## 2023-11-24 NOTE — TREATMENT PLAN
RCAT Treatment Plan    Patient Score: 7  Patient Acuity: 4    Clinical Indication for Therapy: Thoracic Surgery     Therapy Ordered : Flutter valve QID, IS     Assessment Summary: Pt is a 68 year old female admitted on 11/13/2023 for chest pain. S/p CABG on 11/22. Pt is currently on RA and saturating adequately 93-95%, RR 18 bpm. BS: clear to auscultations and decreased @ bases. Pt is using her Flutter Valve with good techniques, on  mls. RT will continue on current plan of care, and re-evaluate in 24 hrs per respiratory protocol.     Benson Farrar, RRT  11/24/2023

## 2023-11-24 NOTE — PROGRESS NOTES
"CVTS Daily Progress Note   POD#2 s/p CAB x 3  Attending: Dr Gabriel  LOS: 11    SUBJECTIVE/INTERVAL EVENTS:     No acute events overnight. Patient progressing well. Maintaining oxygen saturations on 1L NC. Normotensive/HTN. Up to chair this am. Pain well controlled. -BM / +flatus. Tolerating  diet. UOP adequate. Chest tube output appropriate. Hgb 10.2. Patient denies new chest pain, shortness of breath, abdominal pain, calf pain, nausea. Patient has no questions today.     OBJECTIVE:  Temp:  [97.5  F (36.4  C)-98.6  F (37  C)] 98.2  F (36.8  C)  Pulse:  [59-76] 76  Resp:  [9-36] 18  BP: (112-189)/() 155/67  SpO2:  [90 %-98 %] 91 %  Vitals:    11/19/23 0301 11/20/23 0445 11/21/23 0345 11/22/23 0325   Weight: 73.8 kg (162 lb 9.6 oz) 73.2 kg (161 lb 4.8 oz) 73.4 kg (161 lb 14.4 oz) 75.9 kg (167 lb 4.8 oz)    11/23/23 0647   Weight: 78.5 kg (173 lb 1.6 oz)       Clinically Significant Risk Factors          # Hypocalcemia: Lowest Ca = 8.3 mg/dL in last 2 days, will monitor and replace as appropriate  # Hypercalcemia: Highest Ca = 10.2 mg/dL in last 2 days, will monitor as appropriate       # Coagulation Defect: INR = 1.52 (Ref range: 0.85 - 1.15) and/or PTT = 30 Seconds (Ref range: 22 - 38 Seconds), will monitor for bleeding    # Hypertension: Noted on problem list        # Obesity: Estimated body mass index is 34.96 kg/m  as calculated from the following:    Height as of this encounter: 1.499 m (4' 11\").    Weight as of this encounter: 78.5 kg (173 lb 1.6 oz).   # Moderate Malnutrition: based on nutrition assessment     # History of CABG: noted on surgical history                Current Medications:    Scheduled Meds:   acetaminophen  975 mg Oral Q8H    aspirin  162 mg Oral or NG Tube Daily    buPROPion  300 mg Oral Daily    evolocumab  140 mg Subcutaneous Q14 Days    ferrous sulfate  325 mg Oral Daily    furosemide  40 mg Intravenous BID    heparin ANTICOAGULANT  5,000 Units Subcutaneous Q8H    insulin aspart  " 1-7 Units Subcutaneous TID AC    insulin aspart  1-5 Units Subcutaneous At Bedtime    levothyroxine  75 mcg Oral Daily    lidocaine  1-2 patch Transdermal Q24H    [Held by provider] metFORMIN  1,000 mg Oral Daily with supper    [Held by provider] metFORMIN  500 mg Oral QAM AC    metoprolol tartrate  12.5 mg Oral TID    metoprolol tartrate  50 mg Oral BID    pantoprazole  40 mg Oral or NG Tube QAM AC    Or    pantoprazole  40 mg Oral QAM AC    polyethylene glycol  17 g Oral or Feeding Tube Daily    senna-docusate  1 tablet Oral or Feeding Tube BID     Continuous Infusions:      PRN Meds:.[START ON 11/25/2023] acetaminophen, bisacodyl, calcium carbonate, calcium gluconate, calcium gluconate, calcium gluconate, glucose **OR** dextrose **OR** glucagon, hydrALAZINE, lactated ringers, magnesium hydroxide, naloxone **OR** naloxone **OR** naloxone **OR** naloxone, ondansetron **OR** ondansetron, oxyCODONE **OR** oxyCODONE, prochlorperazine **OR** prochlorperazine    Cardiographics:    Telemetry monitoring demonstrates NSR with rates in the 70's per my personal review.    Imaging:  Results for orders placed or performed during the hospital encounter of 11/13/23   XR Chest Port 1 View    Impression    IMPRESSION: No focal airspace opacity. No pleural effusion or pneumothorax. Cardiac silhouette and mediastinal contours are stable. Coronary stent.   US Carotid Bilateral    Impression    IMPRESSION:  1.  Mild plaque formation, velocities consistent with less than 50% stenosis in the right internal carotid artery.  2.  Mild plaque formation, velocities consistent with less than 50% stenosis in the left internal carotid artery.  3.  Flow within the vertebral arteries is antegrade.   CT Chest w/o Contrast    Impression    IMPRESSION:   1.  Moderate to marked calcified atheromatous plaque throughout the normal caliber thoracic aorta and proximal brachiocephalic arteries, as well as, the visualized abdominal aorta and visceral  arteries.   2.  Mild mid and upper lung centrilobular emphysema.  3.  Aortic valve leaflet calcification and severe three-vessel coronary artery calcification.     XR Chest Port 1 View    Impression    IMPRESSION: Poststernotomy changes. Endotracheal tube is in the mid trachea, 2.3 cm from the jackson. Mediastinal and left pleural chest tubes. Right IJ cordis with tip overlying the region of the mid SVC.    No pneumothorax. Left lower lobe opacities silhouetting the left hemidiaphragm reflecting atelectasis. Trace effusion. Heart is of normal size.   XR Chest Port 1 View    Impression    IMPRESSION: Poststernotomy. Mediastinal and left pleural drains remain in position. Right internal jugular venous catheter tip near the superior cavoatrial junction. Stable cardiomegaly. Normal pulmonary vascularity. Left basilar atelectasis. Right lung   clear. No visible pneumothorax.       Labs, personally reviewed.  Hemoglobin   Date Value Ref Range Status   11/23/2023 10.2 (L) 11.7 - 15.7 g/dL Final   11/22/2023 9.9 (L) 11.7 - 15.7 g/dL Final   11/22/2023 8.3 (L) 11.7 - 15.7 g/dL Final     Hemoglobin POCT   Date Value Ref Range Status   11/22/2023 8.7 (L) 11.7 - 15.7 g/dL Final   11/22/2023 8.4 (L) 11.7 - 15.7 g/dL Final   11/22/2023 8.3 (L) 11.7 - 15.7 g/dL Final     WBC Count   Date Value Ref Range Status   11/23/2023 7.7 4.0 - 11.0 10e3/uL Final   11/22/2023 6.0 4.0 - 11.0 10e3/uL Final   11/22/2023 5.0 4.0 - 11.0 10e3/uL Final     Platelet Count   Date Value Ref Range Status   11/23/2023 161 150 - 450 10e3/uL Final   11/22/2023 132 (L) 150 - 450 10e3/uL Final   11/22/2023 124 (L) 150 - 450 10e3/uL Final     Creatinine   Date Value Ref Range Status   11/23/2023 0.96 (H) 0.51 - 0.95 mg/dL Final   11/22/2023 0.87 0.51 - 0.95 mg/dL Final   11/22/2023 1.03 (H) 0.51 - 0.95 mg/dL Final     Potassium   Date Value Ref Range Status   11/24/2023 4.1 3.4 - 5.3 mmol/L Final   11/23/2023 4.5 3.4 - 5.3 mmol/L Final   11/22/2023 3.9 3.4 -  5.3 mmol/L Final   11/22/2023 3.9 3.4 - 5.3 mmol/L Final   04/12/2023 3.9 3.5 - 5.0 mmol/L Final   04/10/2019 3.9 3.5 - 5.0 mmol/L Final     Potassium POCT   Date Value Ref Range Status   11/22/2023 3.8 3.5 - 5.0 mmol/L Final   11/22/2023 4.2 3.5 - 5.0 mmol/L Final   11/22/2023 4.4 3.5 - 5.0 mmol/L Final     Magnesium   Date Value Ref Range Status   11/24/2023 2.1 1.7 - 2.3 mg/dL Final   11/23/2023 2.1 1.7 - 2.3 mg/dL Final   11/22/2023 3.2 (H) 1.7 - 2.3 mg/dL Final          I/O:  I/O last 3 completed shifts:  In: 1261.83 [P.O.:1260; I.V.:1.83]  Out: 1380 [Urine:1150; Chest Tube:230]       Physical Exam:    General: Patient seen  up in chair. NAD. Conversant. Pleasant.   HEENT: ECHO, no sclera icterus, moist mucosa  CV: RRR on monitor. 2+ peripheral pulses in all extremities. Mild edema.   Pulm: Non-labored effort on 1L NC. Chest tubes in place, serosanguinous output, no air leak.  Incision C/D/I.  Abd: Soft, NT, ND  : Voiding  Ext: Mild pedal edema, SCDs in place, warm, distal pulses intact  Neuro: CNs grossly intact.       ASSESSMENT/PLAN:    Lisseth Qureshi is a 68 year old female with a history of CAD, DM II, HTN  who is s/p CAB x 3.    Principal Problem:    Anemia, unspecified type  Active Problems:    Hypothyroidism    Type 2 diabetes mellitus without complication, with long-term current use of insulin (H)    Hypertension    Coronary artery disease involving native coronary artery with unstable angina pectoris (H)    Chest pain, unspecified type    Iron deficiency    Stenosis of coronary stent, initial encounter    Coronary artery disease involving native coronary artery of native heart, unspecified whether angina present        NEURO:   - Scheduled Tylenol/lidocaine patches and PRN Tylenol/oxycodone for pain    CV:   - Pre-op EF 60-65%  - Normotensive/HTN  - Metoprolol 25 mg two times a day with parameters-increased to 50 mg today and extra doses available  - ASA 162mg  - PTA Repatha  - Chest tubes and  TPW's removed without immediate complications.    PULM:   - Maintaining oxygen saturations on 1L NC  - Encourage pulmonary toilet    FEN/GI:  - Continue electrolyte replacement protocol  - Diet: Cardiac, ADAT   - Bowel regimen    RENAL:  - Adequate UOP/hr. Continue to monitor closely.  - Cr 0.96  - [place] removed POD#2  - Diuresis with 40mg IV Lasix two times a day     HEME:  - Acute blood loss anemia post-op.   - Hgb 10.2, no bleeding concerns. Hep SQ, ASA  - Received 2u PRBC's intra-op  - PTA Ferrous sulfate 325 mg daily  - CBC pending    ID:  - Bing op ppx complete, afebrile . No concerns for infection    ENDO:   - Transition to sliding scale insulin  -PTA metformin-restart at discharge  -PTA  Levothyroxine    PPx:   - DVT: SCDs, SQ heparin TID, ambulation   - GI: Protonix 40mg PO daily    DISPO:   - Transfer to general telemetry status      Patient discussed with Dr. Gabriel.    Yuliana Sifuentes PA-C  [facility] Cardiothoracic Surgery  Vocera or pager [phone]

## 2023-11-25 ENCOUNTER — APPOINTMENT (OUTPATIENT)
Dept: RADIOLOGY | Facility: HOSPITAL | Age: 68
DRG: 234 | End: 2023-11-25
Attending: PHYSICIAN ASSISTANT
Payer: COMMERCIAL

## 2023-11-25 ENCOUNTER — APPOINTMENT (OUTPATIENT)
Dept: OCCUPATIONAL THERAPY | Facility: HOSPITAL | Age: 68
DRG: 234 | End: 2023-11-25
Payer: COMMERCIAL

## 2023-11-25 LAB
CALCIUM, IONIZED MEASURED: 1.2 MMOL/L (ref 1.11–1.3)
GLUCOSE BLDC GLUCOMTR-MCNC: 163 MG/DL (ref 70–99)
GLUCOSE BLDC GLUCOMTR-MCNC: 193 MG/DL (ref 70–99)
GLUCOSE BLDC GLUCOMTR-MCNC: 207 MG/DL (ref 70–99)
GLUCOSE BLDC GLUCOMTR-MCNC: 210 MG/DL (ref 70–99)
HOLD SPECIMEN: NORMAL
ION CA PH 7.4: 1.2 MMOL/L (ref 1.11–1.3)
MAGNESIUM SERPL-MCNC: 1.8 MG/DL (ref 1.7–2.3)
PH: 7.39 (ref 7.35–7.45)
PHOSPHATE SERPL-MCNC: 2 MG/DL (ref 2.5–4.5)
POTASSIUM SERPL-SCNC: 3.7 MMOL/L (ref 3.4–5.3)

## 2023-11-25 PROCEDURE — 250N000011 HC RX IP 250 OP 636: Mod: JZ | Performed by: PHYSICIAN ASSISTANT

## 2023-11-25 PROCEDURE — 83735 ASSAY OF MAGNESIUM: CPT | Performed by: THORACIC SURGERY (CARDIOTHORACIC VASCULAR SURGERY)

## 2023-11-25 PROCEDURE — 84100 ASSAY OF PHOSPHORUS: CPT | Performed by: THORACIC SURGERY (CARDIOTHORACIC VASCULAR SURGERY)

## 2023-11-25 PROCEDURE — 99232 SBSQ HOSP IP/OBS MODERATE 35: CPT | Performed by: HOSPITALIST

## 2023-11-25 PROCEDURE — 71046 X-RAY EXAM CHEST 2 VIEWS: CPT

## 2023-11-25 PROCEDURE — 82330 ASSAY OF CALCIUM: CPT | Performed by: PHYSICIAN ASSISTANT

## 2023-11-25 PROCEDURE — 250N000013 HC RX MED GY IP 250 OP 250 PS 637: Performed by: PHYSICIAN ASSISTANT

## 2023-11-25 PROCEDURE — 97110 THERAPEUTIC EXERCISES: CPT | Mod: GO

## 2023-11-25 PROCEDURE — 84132 ASSAY OF SERUM POTASSIUM: CPT | Performed by: THORACIC SURGERY (CARDIOTHORACIC VASCULAR SURGERY)

## 2023-11-25 PROCEDURE — 250N000013 HC RX MED GY IP 250 OP 250 PS 637: Performed by: THORACIC SURGERY (CARDIOTHORACIC VASCULAR SURGERY)

## 2023-11-25 PROCEDURE — 36415 COLL VENOUS BLD VENIPUNCTURE: CPT | Performed by: PHYSICIAN ASSISTANT

## 2023-11-25 PROCEDURE — 210N000001 HC R&B IMCU HEART CARE

## 2023-11-25 RX ORDER — MAGNESIUM OXIDE 400 MG/1
400 TABLET ORAL EVERY 4 HOURS
Status: COMPLETED | OUTPATIENT
Start: 2023-11-25 | End: 2023-11-25

## 2023-11-25 RX ORDER — AMLODIPINE BESYLATE 5 MG/1
5 TABLET ORAL DAILY
Status: DISCONTINUED | OUTPATIENT
Start: 2023-11-25 | End: 2023-11-26

## 2023-11-25 RX ORDER — METOPROLOL TARTRATE 25 MG/1
100 TABLET, FILM COATED ORAL 2 TIMES DAILY
Status: DISCONTINUED | OUTPATIENT
Start: 2023-11-25 | End: 2023-11-26

## 2023-11-25 RX ORDER — POTASSIUM CHLORIDE 1500 MG/1
20 TABLET, EXTENDED RELEASE ORAL ONCE
Status: COMPLETED | OUTPATIENT
Start: 2023-11-25 | End: 2023-11-25

## 2023-11-25 RX ADMIN — LEVOTHYROXINE SODIUM 75 MCG: 0.03 TABLET ORAL at 06:37

## 2023-11-25 RX ADMIN — POTASSIUM & SODIUM PHOSPHATES POWDER PACK 280-160-250 MG 1 PACKET: 280-160-250 PACK at 08:25

## 2023-11-25 RX ADMIN — BUPROPION HYDROCHLORIDE 300 MG: 150 TABLET, FILM COATED, EXTENDED RELEASE ORAL at 08:25

## 2023-11-25 RX ADMIN — METOPROLOL TARTRATE 100 MG: 25 TABLET, FILM COATED ORAL at 08:25

## 2023-11-25 RX ADMIN — HYDRALAZINE HYDROCHLORIDE 10 MG: 20 INJECTION INTRAMUSCULAR; INTRAVENOUS at 23:40

## 2023-11-25 RX ADMIN — HEPARIN SODIUM 5000 UNITS: 10000 INJECTION, SOLUTION INTRAVENOUS; SUBCUTANEOUS at 20:37

## 2023-11-25 RX ADMIN — PANTOPRAZOLE SODIUM 40 MG: 40 TABLET, DELAYED RELEASE ORAL at 06:37

## 2023-11-25 RX ADMIN — INSULIN ASPART 2 UNITS: 100 INJECTION, SOLUTION INTRAVENOUS; SUBCUTANEOUS at 08:30

## 2023-11-25 RX ADMIN — METFORMIN ER 500 MG 1000 MG: 500 TABLET ORAL at 16:12

## 2023-11-25 RX ADMIN — ACETAMINOPHEN 975 MG: 325 TABLET ORAL at 06:37

## 2023-11-25 RX ADMIN — LIDOCAINE 1 PATCH: 4 PATCH TOPICAL at 20:31

## 2023-11-25 RX ADMIN — HEPARIN SODIUM 5000 UNITS: 10000 INJECTION, SOLUTION INTRAVENOUS; SUBCUTANEOUS at 06:42

## 2023-11-25 RX ADMIN — INSULIN ASPART 1 UNITS: 100 INJECTION, SOLUTION INTRAVENOUS; SUBCUTANEOUS at 17:23

## 2023-11-25 RX ADMIN — METOPROLOL TARTRATE 12.5 MG: 25 TABLET, FILM COATED ORAL at 12:01

## 2023-11-25 RX ADMIN — POLYETHYLENE GLYCOL 3350 17 G: 17 POWDER, FOR SOLUTION ORAL at 08:26

## 2023-11-25 RX ADMIN — FUROSEMIDE 40 MG: 10 INJECTION, SOLUTION INTRAMUSCULAR; INTRAVENOUS at 18:36

## 2023-11-25 RX ADMIN — FUROSEMIDE 40 MG: 10 INJECTION, SOLUTION INTRAMUSCULAR; INTRAVENOUS at 09:23

## 2023-11-25 RX ADMIN — ASPIRIN 81 MG CHEWABLE TABLET 162 MG: 81 TABLET CHEWABLE at 08:25

## 2023-11-25 RX ADMIN — POTASSIUM CHLORIDE 20 MEQ: 1500 TABLET, EXTENDED RELEASE ORAL at 08:25

## 2023-11-25 RX ADMIN — MAGNESIUM OXIDE TAB 400 MG (241.3 MG ELEMENTAL MG) 400 MG: 400 (241.3 MG) TAB at 08:25

## 2023-11-25 RX ADMIN — METFORMIN ER 500 MG 500 MG: 500 TABLET ORAL at 08:25

## 2023-11-25 RX ADMIN — FERROUS SULFATE TAB 325 MG (65 MG ELEMENTAL FE) 325 MG: 325 (65 FE) TAB at 08:25

## 2023-11-25 RX ADMIN — METOPROLOL TARTRATE 100 MG: 25 TABLET, FILM COATED ORAL at 20:30

## 2023-11-25 RX ADMIN — MAGNESIUM OXIDE TAB 400 MG (241.3 MG ELEMENTAL MG) 400 MG: 400 (241.3 MG) TAB at 12:01

## 2023-11-25 RX ADMIN — HYDRALAZINE HYDROCHLORIDE 10 MG: 20 INJECTION INTRAMUSCULAR; INTRAVENOUS at 03:55

## 2023-11-25 RX ADMIN — AMLODIPINE BESYLATE 5 MG: 5 TABLET ORAL at 16:16

## 2023-11-25 RX ADMIN — POTASSIUM & SODIUM PHOSPHATES POWDER PACK 280-160-250 MG 1 PACKET: 280-160-250 PACK at 16:12

## 2023-11-25 RX ADMIN — POTASSIUM & SODIUM PHOSPHATES POWDER PACK 280-160-250 MG 1 PACKET: 280-160-250 PACK at 12:01

## 2023-11-25 RX ADMIN — INSULIN ASPART 2 UNITS: 100 INJECTION, SOLUTION INTRAVENOUS; SUBCUTANEOUS at 12:02

## 2023-11-25 RX ADMIN — HEPARIN SODIUM 5000 UNITS: 10000 INJECTION, SOLUTION INTRAVENOUS; SUBCUTANEOUS at 14:06

## 2023-11-25 RX ADMIN — ACETAMINOPHEN 650 MG: 325 TABLET ORAL at 16:24

## 2023-11-25 RX ADMIN — METOPROLOL TARTRATE 12.5 MG: 25 TABLET, FILM COATED ORAL at 16:12

## 2023-11-25 RX ADMIN — SENNOSIDES AND DOCUSATE SODIUM 1 TABLET: 8.6; 5 TABLET ORAL at 08:25

## 2023-11-25 ASSESSMENT — ACTIVITIES OF DAILY LIVING (ADL)
ADLS_ACUITY_SCORE: 27
ADLS_ACUITY_SCORE: 25
ADLS_ACUITY_SCORE: 25
ADLS_ACUITY_SCORE: 27

## 2023-11-25 NOTE — PROGRESS NOTES
"CVTS Daily Progress Note   POD#3 s/p CAB x 3  Attending: Dr Gabriel  LOS: 12    SUBJECTIVE/INTERVAL EVENTS:    No acute events overnight. Slept poorly. Patient progressing well. Maintaining oxygen saturations on RA. Normotensive/HTN. Up to chair this am. Pain well controlled. +BM / +flatus. Tolerating  diet. UOP adequate.  Hgb 10.5. Patient denies new chest pain, shortness of breath, abdominal pain, calf pain, nausea. Patient has no questions today.     OBJECTIVE:  Temp:  [97.5  F (36.4  C)-98.5  F (36.9  C)] 97.5  F (36.4  C)  Pulse:  [63-80] 80  Resp:  [18-24] 20  BP: (132-197)/(58-93) 193/86  FiO2 (%):  [21 %] 21 %  SpO2:  [90 %-94 %] 94 %  Vitals:    11/20/23 0445 11/21/23 0345 11/22/23 0325 11/23/23 0647   Weight: 73.2 kg (161 lb 4.8 oz) 73.4 kg (161 lb 14.4 oz) 75.9 kg (167 lb 4.8 oz) 78.5 kg (173 lb 1.6 oz)    11/25/23 0334   Weight: 77.3 kg (170 lb 8 oz)       Clinically Significant Risk Factors                    # Hypertension: Noted on problem list        # Obesity: Estimated body mass index is 34.44 kg/m  as calculated from the following:    Height as of this encounter: 1.499 m (4' 11\").    Weight as of this encounter: 77.3 kg (170 lb 8 oz).   # Moderate Malnutrition: based on nutrition assessment     # History of CABG: noted on surgical history                  Current Medications:    Scheduled Meds:   aspirin  162 mg Oral or NG Tube Daily    buPROPion  300 mg Oral Daily    evolocumab  140 mg Subcutaneous Q14 Days    ferrous sulfate  325 mg Oral Daily    furosemide  40 mg Intravenous BID    heparin ANTICOAGULANT  5,000 Units Subcutaneous Q8H    insulin aspart  1-7 Units Subcutaneous TID AC    insulin aspart  1-5 Units Subcutaneous At Bedtime    levothyroxine  75 mcg Oral Daily    lidocaine  1-2 patch Transdermal Q24H    magnesium oxide  400 mg Oral Q4H    metFORMIN  1,000 mg Oral Daily with supper    metFORMIN  500 mg Oral QAM AC    metoprolol tartrate  12.5 mg Oral TID    metoprolol tartrate  100 mg " Oral BID    pantoprazole  40 mg Oral or NG Tube QAM AC    Or    pantoprazole  40 mg Oral QAM AC    polyethylene glycol  17 g Oral or Feeding Tube Daily    potassium & sodium phosphates  1 packet Oral or Feeding Tube Q4H    potassium chloride  20 mEq Oral Once    senna-docusate  1 tablet Oral or Feeding Tube BID     Continuous Infusions:      PRN Meds:.acetaminophen, bisacodyl, calcium carbonate, calcium gluconate, calcium gluconate, calcium gluconate, glucose **OR** dextrose **OR** glucagon, hydrALAZINE, lactated ringers, magnesium hydroxide, naloxone **OR** naloxone **OR** naloxone **OR** naloxone, ondansetron **OR** ondansetron, oxyCODONE **OR** oxyCODONE, prochlorperazine **OR** prochlorperazine    Cardiographics:    Telemetry monitoring demonstrates NSR with rates in the 70's per my personal review.    Imaging:  Results for orders placed or performed during the hospital encounter of 11/13/23   XR Chest Port 1 View    Impression    IMPRESSION: No focal airspace opacity. No pleural effusion or pneumothorax. Cardiac silhouette and mediastinal contours are stable. Coronary stent.   US Carotid Bilateral    Impression    IMPRESSION:  1.  Mild plaque formation, velocities consistent with less than 50% stenosis in the right internal carotid artery.  2.  Mild plaque formation, velocities consistent with less than 50% stenosis in the left internal carotid artery.  3.  Flow within the vertebral arteries is antegrade.   CT Chest w/o Contrast    Impression    IMPRESSION:   1.  Moderate to marked calcified atheromatous plaque throughout the normal caliber thoracic aorta and proximal brachiocephalic arteries, as well as, the visualized abdominal aorta and visceral arteries.   2.  Mild mid and upper lung centrilobular emphysema.  3.  Aortic valve leaflet calcification and severe three-vessel coronary artery calcification.     XR Chest Port 1 View    Impression    IMPRESSION: Poststernotomy changes. Endotracheal tube is in the  mid trachea, 2.3 cm from the jackson. Mediastinal and left pleural chest tubes. Right IJ cordis with tip overlying the region of the mid SVC.    No pneumothorax. Left lower lobe opacities silhouetting the left hemidiaphragm reflecting atelectasis. Trace effusion. Heart is of normal size.   XR Chest Port 1 View    Impression    IMPRESSION: Poststernotomy. Mediastinal and left pleural drains remain in position. Right internal jugular venous catheter tip near the superior cavoatrial junction. Stable cardiomegaly. Normal pulmonary vascularity. Left basilar atelectasis. Right lung   clear. No visible pneumothorax.       Labs, personally reviewed.  Hemoglobin   Date Value Ref Range Status   11/24/2023 10.5 (L) 11.7 - 15.7 g/dL Final   11/23/2023 10.2 (L) 11.7 - 15.7 g/dL Final   11/22/2023 9.9 (L) 11.7 - 15.7 g/dL Final     Hemoglobin POCT   Date Value Ref Range Status   11/22/2023 8.7 (L) 11.7 - 15.7 g/dL Final   11/22/2023 8.4 (L) 11.7 - 15.7 g/dL Final   11/22/2023 8.3 (L) 11.7 - 15.7 g/dL Final     WBC Count   Date Value Ref Range Status   11/24/2023 8.3 4.0 - 11.0 10e3/uL Final   11/23/2023 7.7 4.0 - 11.0 10e3/uL Final   11/22/2023 6.0 4.0 - 11.0 10e3/uL Final     Platelet Count   Date Value Ref Range Status   11/24/2023 177 150 - 450 10e3/uL Final   11/23/2023 161 150 - 450 10e3/uL Final   11/22/2023 132 (L) 150 - 450 10e3/uL Final     Creatinine   Date Value Ref Range Status   11/23/2023 0.96 (H) 0.51 - 0.95 mg/dL Final   11/22/2023 0.87 0.51 - 0.95 mg/dL Final   11/22/2023 1.03 (H) 0.51 - 0.95 mg/dL Final     Potassium   Date Value Ref Range Status   11/25/2023 3.7 3.4 - 5.3 mmol/L Final   11/24/2023 4.1 3.4 - 5.3 mmol/L Final   11/23/2023 4.5 3.4 - 5.3 mmol/L Final   04/12/2023 3.9 3.5 - 5.0 mmol/L Final   04/10/2019 3.9 3.5 - 5.0 mmol/L Final     Potassium POCT   Date Value Ref Range Status   11/22/2023 3.8 3.5 - 5.0 mmol/L Final   11/22/2023 4.2 3.5 - 5.0 mmol/L Final   11/22/2023 4.4 3.5 - 5.0 mmol/L Final      Magnesium   Date Value Ref Range Status   11/25/2023 1.8 1.7 - 2.3 mg/dL Final   11/24/2023 2.1 1.7 - 2.3 mg/dL Final   11/23/2023 2.1 1.7 - 2.3 mg/dL Final          I/O:  I/O last 3 completed shifts:  In: 1440 [P.O.:1440]  Out: 1400 [Urine:1400]       Physical Exam:    General: Patient seen  up in chair. NAD. Conversant. Pleasant.   HEENT: ECHO, no sclera icterus, moist mucosa  CV: RRR on monitor. 2+ peripheral pulses in all extremities. Mild edema.   Pulm: Non-labored effort on RA. Incision C/D/I.  Abd: Soft, NT, ND  : Voiding  Ext: Mild pedal edema, SCDs in place, warm, distal pulses intact  Neuro: CNs grossly intact.       ASSESSMENT/PLAN:    Lisseth Qureshi is a 68 year old female with a history of CAD, DM II, HTN  who is s/p CAB x 3.    Principal Problem:    Anemia, unspecified type  Active Problems:    Hypothyroidism    Type 2 diabetes mellitus without complication, with long-term current use of insulin (H)    Hypertension    Coronary artery disease involving native coronary artery with unstable angina pectoris (H)    Chest pain, unspecified type    Iron deficiency    Stenosis of coronary stent, initial encounter    Coronary artery disease involving native coronary artery of native heart, unspecified whether angina present        NEURO:   - Scheduled Tylenol/lidocaine patches and PRN Tylenol/oxycodone for pain    CV:   - Pre-op EF 60-65%  - Normotensive/HTN  - Metoprolol 50 mg, increased to 100 mg PO BID and extra doses available  - ASA 162mg  - PTA Repatha  - Chest tubes and TPW's removed without immediate complications.    PULM:   - Maintaining oxygen saturations on RA  - Encourage pulmonary toilet    FEN/GI:  - Continue electrolyte replacement protocol  - Diet: Cardiac, ADAT   - Bowel regimen    RENAL:  - Adequate UOP/hr. Continue to monitor closely.  - Cr 0.96  - [place] removed POD#2  - Diuresis with 40mg IV Lasix two times a day     HEME:  - Acute blood loss anemia post-op.   - Hgb 10.5, no  bleeding concerns. Hep SQ, ASA  - Received 2u PRBC's intra-op  - PTA Ferrous sulfate 325 mg daily    ID:  - Bing op ppx complete, afebrile . No concerns for infection    ENDO:   - Transition to sliding scale insulin  -PTA metformin  -PTA  Levothyroxine    PPx:   - DVT: SCDs, SQ heparin TID, ambulation   - GI: Protonix 40mg PO daily    DISPO:   - Transfer to general telemetry status  - Home 24-48 hrs      Patient discussed with Dr. Eve Sifuentes PA-C  [facility] Cardiothoracic Surgery  Vocera or pager [phone]

## 2023-11-25 NOTE — PROGRESS NOTES
"[facility]    Medicine Progress Note - Hospitalist Service    Date of Admission:  11/13/2023    Assessment & Plan   68-year-old female admitted for angina and now status post CABG.    #Multivessel coronary artery disease  #Unstable angina  s/p CABG 11/22/23  ASA, BB    #Hypertensive urgency  #Hypertension  SBP > 190 this AM, s/p IV hydralazine with improvement  Metoprolol, Lasix  Monitor and add additional agents if needed    #Diabetes type 2  A1c 6.4  Metformin resumed  Sliding scale insulin    #Iron deficiency anemia  #Acute blood loss anemia  s/p colonoscopy 11/17/23: non bleeding internal hemorrhoids, diverticula, polyps removed - no diagnostic abnormalities on path  Iron supplement    #Moderate malnutrition  Patient has poor appetite  Nutrition consult      VTE ppx: sc heparin        Diet: Moderate Consistent Carb (60 g CHO per Meal) Diet    [place] Catheter: Not present  Lines: None     Cardiac Monitoring: ACTIVE order. Indication: Open heart surgery (72 hours)  Code Status: Full Code      Clinically Significant Risk Factors                  # Hypertension: Noted on problem list        # Obesity: Estimated body mass index is 34.44 kg/m  as calculated from the following:    Height as of this encounter: 1.499 m (4' 11\").    Weight as of this encounter: 77.3 kg (170 lb 8 oz).   # Moderate Malnutrition: based on nutrition assessment     # History of CABG: noted on surgical history       Disposition Plan     Expected Discharge Date: 11/26/2023      Destination: home with family  Discharge Comments: CABG 11/22            Scar Kelly DO  Hospitalist Service  [facility]  Securely message with [employer] (more info)  Text page via [employer] Paging/Directory   ______________________________________________________________________    Interval History   Not much appetite. Worried about high blood sugar. No chest pain or shortness of breath.    Physical Exam   Vital Signs: Temp: " 97.5  F (36.4  C) Temp src: Oral BP: (!) 152/66 Pulse: 80   Resp: 20 SpO2: 94 % O2 Device: None (Room air)    Weight: 170 lbs 8 oz    General Appearance:  No acute distress  Respiratory: Clear to auscultation bilaterally  Cardiovascular: Regular rate and rhythm  Chest wound to the midline with no drainage or surrounding edema  Extremities: No peripheral edema or cyanosis    Medical Decision Making             Data

## 2023-11-25 NOTE — PLAN OF CARE
Problem: Adult Inpatient Plan of Care  Goal: Readiness for Transition of Care  Outcome: Progressing   Goal Outcome Evaluation:    Participating in therapy, has refused PRN pain medication. Chest x-ray this morning. Calm and pleasant, up in chair most of the year. Getting intermittent IV Lasix. Incisions clean, dry, and intact.

## 2023-11-25 NOTE — CONSULTS
"CLINICAL NUTRITION SERVICES - ASSESSMENT NOTE     Nutrition Prescription    RECOMMENDATIONS FOR MDs/PROVIDERS TO ORDER:  Consider adjusting insulin for hyperglycemia    Malnutrition Status:    Does not meet criteria for malnutrition    Recommendations already ordered by Registered Dietitian (RD):  Van/kalen glucerna twice per day for 10 am snack and 2 pm snack     Future/Additional Recommendations:  Monitor po intake, weight, labs, poc     REASON FOR ASSESSMENT  Lisseth Qureshi is a/an 68 year old female assessed by the dietitian for Provider Order - for poor appetite    NUTRITION HISTORY  Pt states she does not like most of the meal selections here: She has found that she likes the omelettes for breakfast and the roast turkey with vegetables for supper. She would like to try the glucerna ( vanilla/chocolate) between meals    CURRENT NUTRITION ORDERS  Diet: Consistent CHO ( 60 g CHO with each meal)  Intake/Tolerance: pt states she has just been eating twice per day ( breakfast and supper)    LABS  Labs reviewed: K 3.7, Mg 1.8, phos 2.0 (L),: Getting replaced,  Glu 207 (H)    MEDICATIONS  Medications reviewed: repatha, ferrous sulfate, lasix, novolog, levothyroxine, Mag-Ox, metformin, lopressor, pantoprazole, miralax, neutra phos, senna-docusate    ANTHROPOMETRICS  Height: 149.9 cm (4' 11\")  Most Recent Weight: 77.3 kg (170 lb 8 oz)    IBW: 45.45 kg  BMI: Obesity Grade I BMI 30-34.9  Weight History:   Wt Readings from Last 10 Encounters:   11/25/23 77.3 kg (170 lb 8 oz)   09/13/23 78.5 kg (173 lb)   08/15/23 77.6 kg (171 lb)   05/03/23 80.3 kg (177 lb)   04/25/23 79.8 kg (176 lb)   04/18/23 79.4 kg (175 lb)   04/12/23 79.4 kg (175 lb)   04/06/23 78 kg (172 lb)   05/26/17 79.8 kg (176 lb)   05/31/16 79.8 kg (176 lb)     Dosing Weight: 53.4 kg/ adjusted weight    ASSESSED NUTRITION NEEDS  Estimated Energy Needs: 5322-1989 kcals/day (25 - 30 kcals/kg)  Justification: Maintenance  Estimated Protein Needs: 64-80 grams " protein/day (1.2 - 1.5 grams of pro/kg)  Justification: Post-op  Estimated Fluid Needs: 1602 +/-  mL/day (30 ml/Kg +/-)   Justification: Maintenance    PHYSICAL FINDINGS  See malnutrition section below.  Skin: Bruised forearm and thigh  Orion score=19, nutrition noted as adequate  GI: Abdomen rounded  Passing flatus  Last BM 11/21/2023    MALNUTRITION:  % Weight Loss:  Weight loss does not meet criteria for malnutrition   % Intake:  Decreased intake does not meet criteria for malnutrition   Subcutaneous Fat Loss:  None observed  Muscle Loss:  None observed  Fluid Retention:  Mild 2+ ( left and right foot)    Malnutrition Diagnosis: Patient does not meet two of the above criteria necessary for diagnosing malnutrition    NUTRITION DIAGNOSIS  Inadequate oral intake related to disliking most food options here as evidenced by eating only 2 times per day      INTERVENTIONS  Implementation  Medical food supplement therapy : Van/kalen glucerna daily for am and pm snack    Goals  Patient to consume % of nutritionally adequate meals three times per day, or the equivalent with supplements/snacks.     Monitoring/Evaluation  Progress toward goals will be monitored and evaluated per protocol.

## 2023-11-25 NOTE — PLAN OF CARE
Goal Outcome Evaluation:      Plan of Care Reviewed With: patient    Overall Patient Progress: improvingOverall Patient Progress: improving         sc  Patient Name: Lisseth Qureshi   MRN: [id number]   Date of Admission: 11/13/2023    Procedure: Procedure(s):  CORONARY ARTERY BYPASS GRAFT TIMES THREE, LEFT INTERNAL MAMMARY ARTERY HARVEST, LEFT LEG ENDOSCOPIC VESSEL PROCUREMENT, ANESTHESIA TRANSESOPHAGEAL ECHOCARDIOGRAM, EPI-AORTIC ULTRASOUND    Post Op day #:pod 3    Subjective (Patient focus/Primary Problem for shift): pain           Pain Goal    0  Pain Rating0              Pain Medication/ Regime effective to reduce patient pain tolerated no prn meds given     Objective (Physical assessment):           Rhythm: normal sinus rhythm            Bowel Activity: no if Yes indicate when: few days          Bowel Medications: yes            Incision: healing well          Incentive Spirometry Q 1-2 hour when awake:  yes Volume: 600          Epicardial Pacing Wires:  no            Patient Activity:           Up to chair for meals: not applicable          Ambulation with RN x2 (Not including CR): not applicable            Is patient in home clothes:not applicable             Chest Tubes   Pleural: no Draining: no               Suction: no              Mediastinal: no Draining: no               Suction: no   Dressing Change Daily:no If No, why?open to air                     Urinary Catheter: no           Preventative WOC consult (need MD order): no       Assessment (Nursing primary shift focus): sleep  and pain     Plan (Patient Care Plan/focus): sleep      Marlys Ramírez RN   11/25/2023   4:32 AM

## 2023-11-25 NOTE — PLAN OF CARE
Goal Outcome Evaluation:      Plan of Care Reviewed With: patient    Overall Patient Progress: improvingOverall Patient Progress: improving       sc  Patient Name: Lisseth Qureshi   MRN: [id number]   Date of Admission: 11/13/2023    Procedure: Procedure(s):  CORONARY ARTERY BYPASS GRAFT TIMES THREE, LEFT INTERNAL MAMMARY ARTERY HARVEST, LEFT LEG ENDOSCOPIC VESSEL PROCUREMENT, ANESTHESIA TRANSESOPHAGEAL ECHOCARDIOGRAM, EPI-AORTIC ULTRASOUND    Post Op day #:2    Subjective (Patient focus/Primary Problem for shift): pain and cough           Pain Goal 0 Pain Rating 6           Pain Medication/ Regime effective to reduce patient pain tylenol and oxy    Objective (Physical assessment):           Rhythm: normal sinus rhythm            Bowel Activity: no if Yes indicate when: a few days          Bowel Medications: yes            Incision: opened          Incentive Spirometry Q 1-2 hour when awake:  yes Volume: 600          Epicardial Pacing Wires:  no            Patient Activity:           Up to chair for meals: no          Ambulation with RN x2 (Not including CR): yes            Is patient in home clothes:no             Chest Tubes   Pleural: no Draining: no               Suction: no              Mediastinal: no Draining: no               Suction: no   Dressing Change Daily:yes If No, why?yes                     Urinary Catheter: no           Preventative WOC consult (need MD order): no       Assessment (Nursing primary shift focus): pain and cough    Plan (Patient Care Plan/focus): pain       Marlys Ramírez RN   11/24/2023   8:42 PM

## 2023-11-26 ENCOUNTER — APPOINTMENT (OUTPATIENT)
Dept: OCCUPATIONAL THERAPY | Facility: HOSPITAL | Age: 68
DRG: 234 | End: 2023-11-26
Payer: COMMERCIAL

## 2023-11-26 LAB
ANION GAP SERPL CALCULATED.3IONS-SCNC: 12 MMOL/L (ref 7–15)
BUN SERPL-MCNC: 21.7 MG/DL (ref 8–23)
CALCIUM SERPL-MCNC: 8.5 MG/DL (ref 8.8–10.2)
CALCIUM, IONIZED MEASURED: 1.12 MMOL/L (ref 1.11–1.3)
CHLORIDE SERPL-SCNC: 99 MMOL/L (ref 98–107)
CREAT SERPL-MCNC: 0.87 MG/DL (ref 0.51–0.95)
DEPRECATED HCO3 PLAS-SCNC: 25 MMOL/L (ref 22–29)
EGFRCR SERPLBLD CKD-EPI 2021: 73 ML/MIN/1.73M2
ERYTHROCYTE [DISTWIDTH] IN BLOOD BY AUTOMATED COUNT: 17.9 % (ref 10–15)
GLUCOSE BLDC GLUCOMTR-MCNC: 143 MG/DL (ref 70–99)
GLUCOSE BLDC GLUCOMTR-MCNC: 179 MG/DL (ref 70–99)
GLUCOSE BLDC GLUCOMTR-MCNC: 220 MG/DL (ref 70–99)
GLUCOSE BLDC GLUCOMTR-MCNC: 232 MG/DL (ref 70–99)
GLUCOSE SERPL-MCNC: 185 MG/DL (ref 70–99)
HCT VFR BLD AUTO: 28.7 % (ref 35–47)
HGB BLD-MCNC: 9.2 G/DL (ref 11.7–15.7)
ION CA PH 7.4: 1.17 MMOL/L (ref 1.11–1.3)
MAGNESIUM SERPL-MCNC: 1.7 MG/DL (ref 1.7–2.3)
MCH RBC QN AUTO: 26.5 PG (ref 26.5–33)
MCHC RBC AUTO-ENTMCNC: 32.1 G/DL (ref 31.5–36.5)
MCV RBC AUTO: 83 FL (ref 78–100)
PH: 7.48 (ref 7.35–7.45)
PHOSPHATE SERPL-MCNC: 2.2 MG/DL (ref 2.5–4.5)
PLATELET # BLD AUTO: 175 10E3/UL (ref 150–450)
POTASSIUM SERPL-SCNC: 3.3 MMOL/L (ref 3.4–5.3)
POTASSIUM SERPL-SCNC: 3.5 MMOL/L (ref 3.4–5.3)
RBC # BLD AUTO: 3.47 10E6/UL (ref 3.8–5.2)
SODIUM SERPL-SCNC: 136 MMOL/L (ref 135–145)
WBC # BLD AUTO: 3.8 10E3/UL (ref 4–11)

## 2023-11-26 PROCEDURE — 210N000001 HC R&B IMCU HEART CARE

## 2023-11-26 PROCEDURE — 85027 COMPLETE CBC AUTOMATED: CPT | Performed by: PHYSICIAN ASSISTANT

## 2023-11-26 PROCEDURE — 80048 BASIC METABOLIC PNL TOTAL CA: CPT | Performed by: PHYSICIAN ASSISTANT

## 2023-11-26 PROCEDURE — 84132 ASSAY OF SERUM POTASSIUM: CPT | Performed by: THORACIC SURGERY (CARDIOTHORACIC VASCULAR SURGERY)

## 2023-11-26 PROCEDURE — 83735 ASSAY OF MAGNESIUM: CPT | Performed by: THORACIC SURGERY (CARDIOTHORACIC VASCULAR SURGERY)

## 2023-11-26 PROCEDURE — 250N000013 HC RX MED GY IP 250 OP 250 PS 637: Performed by: HOSPITALIST

## 2023-11-26 PROCEDURE — 84100 ASSAY OF PHOSPHORUS: CPT | Performed by: THORACIC SURGERY (CARDIOTHORACIC VASCULAR SURGERY)

## 2023-11-26 PROCEDURE — 250N000011 HC RX IP 250 OP 636: Mod: JZ | Performed by: PHYSICIAN ASSISTANT

## 2023-11-26 PROCEDURE — 36415 COLL VENOUS BLD VENIPUNCTURE: CPT | Performed by: PHYSICIAN ASSISTANT

## 2023-11-26 PROCEDURE — 99232 SBSQ HOSP IP/OBS MODERATE 35: CPT | Performed by: HOSPITALIST

## 2023-11-26 PROCEDURE — 97535 SELF CARE MNGMENT TRAINING: CPT | Mod: GO

## 2023-11-26 PROCEDURE — 250N000013 HC RX MED GY IP 250 OP 250 PS 637: Performed by: PHYSICIAN ASSISTANT

## 2023-11-26 PROCEDURE — 82330 ASSAY OF CALCIUM: CPT | Performed by: PHYSICIAN ASSISTANT

## 2023-11-26 PROCEDURE — 250N000013 HC RX MED GY IP 250 OP 250 PS 637: Performed by: THORACIC SURGERY (CARDIOTHORACIC VASCULAR SURGERY)

## 2023-11-26 PROCEDURE — 97110 THERAPEUTIC EXERCISES: CPT | Mod: GO

## 2023-11-26 PROCEDURE — 36415 COLL VENOUS BLD VENIPUNCTURE: CPT | Performed by: THORACIC SURGERY (CARDIOTHORACIC VASCULAR SURGERY)

## 2023-11-26 RX ORDER — POTASSIUM CHLORIDE 1500 MG/1
20 TABLET, EXTENDED RELEASE ORAL ONCE
Status: COMPLETED | OUTPATIENT
Start: 2023-11-26 | End: 2023-11-26

## 2023-11-26 RX ORDER — FUROSEMIDE 20 MG
40 TABLET ORAL
Status: DISCONTINUED | OUTPATIENT
Start: 2023-11-26 | End: 2023-11-27

## 2023-11-26 RX ORDER — MAGNESIUM OXIDE 400 MG/1
400 TABLET ORAL EVERY 4 HOURS
Status: COMPLETED | OUTPATIENT
Start: 2023-11-26 | End: 2023-11-26

## 2023-11-26 RX ORDER — AMLODIPINE BESYLATE 5 MG/1
10 TABLET ORAL DAILY
Status: DISCONTINUED | OUTPATIENT
Start: 2023-11-26 | End: 2023-11-27 | Stop reason: HOSPADM

## 2023-11-26 RX ORDER — POTASSIUM CHLORIDE 1500 MG/1
40 TABLET, EXTENDED RELEASE ORAL ONCE
Status: COMPLETED | OUTPATIENT
Start: 2023-11-26 | End: 2023-11-26

## 2023-11-26 RX ADMIN — METOPROLOL TARTRATE 150 MG: 100 TABLET, FILM COATED ORAL at 08:12

## 2023-11-26 RX ADMIN — LIDOCAINE 1 PATCH: 4 PATCH TOPICAL at 20:01

## 2023-11-26 RX ADMIN — LEVOTHYROXINE SODIUM 75 MCG: 0.03 TABLET ORAL at 06:35

## 2023-11-26 RX ADMIN — INSULIN ASPART 2 UNITS: 100 INJECTION, SOLUTION INTRAVENOUS; SUBCUTANEOUS at 12:07

## 2023-11-26 RX ADMIN — HEPARIN SODIUM 5000 UNITS: 10000 INJECTION, SOLUTION INTRAVENOUS; SUBCUTANEOUS at 06:27

## 2023-11-26 RX ADMIN — AMLODIPINE BESYLATE 10 MG: 5 TABLET ORAL at 08:13

## 2023-11-26 RX ADMIN — PANTOPRAZOLE SODIUM 40 MG: 40 TABLET, DELAYED RELEASE ORAL at 06:35

## 2023-11-26 RX ADMIN — METFORMIN ER 500 MG 500 MG: 500 TABLET ORAL at 06:35

## 2023-11-26 RX ADMIN — HEPARIN SODIUM 5000 UNITS: 10000 INJECTION, SOLUTION INTRAVENOUS; SUBCUTANEOUS at 13:16

## 2023-11-26 RX ADMIN — POTASSIUM & SODIUM PHOSPHATES POWDER PACK 280-160-250 MG 1 PACKET: 280-160-250 PACK at 06:35

## 2023-11-26 RX ADMIN — INSULIN ASPART 2 UNITS: 100 INJECTION, SOLUTION INTRAVENOUS; SUBCUTANEOUS at 08:13

## 2023-11-26 RX ADMIN — FERROUS SULFATE TAB 325 MG (65 MG ELEMENTAL FE) 325 MG: 325 (65 FE) TAB at 08:13

## 2023-11-26 RX ADMIN — HEPARIN SODIUM 5000 UNITS: 10000 INJECTION, SOLUTION INTRAVENOUS; SUBCUTANEOUS at 23:09

## 2023-11-26 RX ADMIN — POTASSIUM CHLORIDE 40 MEQ: 1500 TABLET, EXTENDED RELEASE ORAL at 06:35

## 2023-11-26 RX ADMIN — METOPROLOL TARTRATE 12.5 MG: 25 TABLET, FILM COATED ORAL at 03:45

## 2023-11-26 RX ADMIN — MAGNESIUM OXIDE TAB 400 MG (241.3 MG ELEMENTAL MG) 400 MG: 400 (241.3 MG) TAB at 06:35

## 2023-11-26 RX ADMIN — POTASSIUM CHLORIDE 20 MEQ: 1500 TABLET, EXTENDED RELEASE ORAL at 16:35

## 2023-11-26 RX ADMIN — MAGNESIUM OXIDE TAB 400 MG (241.3 MG ELEMENTAL MG) 400 MG: 400 (241.3 MG) TAB at 10:59

## 2023-11-26 RX ADMIN — POTASSIUM & SODIUM PHOSPHATES POWDER PACK 280-160-250 MG 1 PACKET: 280-160-250 PACK at 13:25

## 2023-11-26 RX ADMIN — METFORMIN ER 500 MG 1000 MG: 500 TABLET ORAL at 16:35

## 2023-11-26 RX ADMIN — POTASSIUM & SODIUM PHOSPHATES POWDER PACK 280-160-250 MG 1 PACKET: 280-160-250 PACK at 10:59

## 2023-11-26 RX ADMIN — ASPIRIN 81 MG CHEWABLE TABLET 162 MG: 81 TABLET CHEWABLE at 08:11

## 2023-11-26 RX ADMIN — METOPROLOL TARTRATE 150 MG: 100 TABLET, FILM COATED ORAL at 20:01

## 2023-11-26 RX ADMIN — BUPROPION HYDROCHLORIDE 300 MG: 150 TABLET, FILM COATED, EXTENDED RELEASE ORAL at 08:13

## 2023-11-26 RX ADMIN — FUROSEMIDE 40 MG: 10 INJECTION, SOLUTION INTRAMUSCULAR; INTRAVENOUS at 08:13

## 2023-11-26 RX ADMIN — FUROSEMIDE 40 MG: 20 TABLET ORAL at 16:34

## 2023-11-26 RX ADMIN — SENNOSIDES AND DOCUSATE SODIUM 1 TABLET: 8.6; 5 TABLET ORAL at 20:01

## 2023-11-26 RX ADMIN — ACETAMINOPHEN 650 MG: 325 TABLET ORAL at 08:12

## 2023-11-26 RX ADMIN — SENNOSIDES AND DOCUSATE SODIUM 1 TABLET: 8.6; 5 TABLET ORAL at 08:11

## 2023-11-26 RX ADMIN — INSULIN ASPART 1 UNITS: 100 INJECTION, SOLUTION INTRAVENOUS; SUBCUTANEOUS at 16:35

## 2023-11-26 ASSESSMENT — ACTIVITIES OF DAILY LIVING (ADL)
ADLS_ACUITY_SCORE: 24
ADLS_ACUITY_SCORE: 27
ADLS_ACUITY_SCORE: 24

## 2023-11-26 NOTE — PLAN OF CARE
Problem: Adult Inpatient Plan of Care  Goal: Readiness for Transition of Care  Outcome: Progressing   Goal Outcome Evaluation:    Patient participating with therapy. Calm and pleasant. Denies pain, ambulating well, using heart pillow appropriately. Up to chair most of the day. Had 1,150mL urine output this AM.

## 2023-11-26 NOTE — PLAN OF CARE
Patient denied pain and was able to sleep for most of the night; patient felt much better this morning compared to previous morning upon sitting in the recliner. Tele NSR long QT. Blood pressure elevated; gave one dose PRN hydralazine; later was 141 systolic at the time of PO metoprolol. Patient getting stronger and steady on her feet; more stand by assistance with heart pillow and belt. Patient appreciated being able to sleep tonight and clustering of cares.       Vitals:    11/25/23 2346 11/26/23 0002 11/26/23 0331 11/26/23 0343   BP: (!) 144/88 126/75 (!) 164/71 (!) 141/65   BP Location:  Right arm Right arm Right arm   Pulse:   76 71   Resp:   20    Temp:   98.3  F (36.8  C)    TempSrc:   Oral    SpO2:   93%    Weight:   76.8 kg (169 lb 4.8 oz)    Height:          sc  Patient Name: Lisseth Qureshi   MRN: [id number]   Date of Admission: 11/13/2023    Procedure: Procedure(s):  CORONARY ARTERY BYPASS GRAFT TIMES THREE, LEFT INTERNAL MAMMARY ARTERY HARVEST, LEFT LEG ENDOSCOPIC VESSEL PROCUREMENT, ANESTHESIA TRANSESOPHAGEAL ECHOCARDIOGRAM, EPI-AORTIC ULTRASOUND    Post Op day #:4    Subjective (Patient focus/Primary Problem for shift): Sleep          Pain Goal None Pain Rating Denied           Pain Medication/ Regime effective to reduce patient pain Yes    Objective (Physical assessment):           Rhythm: normal sinus rhythm Long QT = 0.65 R-R = 0.88            Bowel Activity: yes if Yes indicate when: 11/26 at 0630 small soft green to black          Bowel Medications: not applicable            Incision: healing well          Incentive Spirometry Q 1-2 hour when awake:  yes Volume: 500ml          Epicardial Pacing Wires:  no            Patient Activity:           Up to chair for meals: yes          Ambulation with RN x2 (Not including CR): no            Is patient in home clothes:no             Chest Tubes   Pleural: not applicable Draining: not applicable               Suction: not applicable               Mediastinal: not applicable Draining: not applicable               Suction: not applicable   Dressing Change Daily:not applicable If No, why? Open to air                       Urinary Catheter: no           Preventative WOC consult (need MD order): not applicable       Assessment (Nursing primary shift focus): Sleep    Plan (Patient Care Plan/focus): sleep      Gay Garcia RN   11/26/2023   7:23 AM           Problem: Adult Inpatient Plan of Care  Goal: Optimal Comfort and Wellbeing  Outcome: Progressing     Problem: Dysrhythmia  Goal: Normalized Cardiac Rhythm  Outcome: Progressing     Problem: Cardiac Catheterization (Diagnostic/Interventional)  Goal: Stable Heart Rate and Rhythm  Outcome: Progressing  Goal: Optimal Pain Control and Function  Outcome: Progressing     Problem: Cardiovascular Surgery  Goal: Acceptable Pain Control  Outcome: Progressing  Goal: Effective Oxygenation and Ventilation  Outcome: Progressing  Intervention: Promote Airway Secretion Clearance  Recent Flowsheet Documentation  Taken 11/26/2023 0343 by Gay Garcia RN  Cough And Deep Breathing: done independently per patient  Taken 11/25/2023 2329 by Gay Garcia RN  Cough And Deep Breathing: done independently per patient   Goal Outcome Evaluation:

## 2023-11-26 NOTE — PROGRESS NOTES
"[facility]    Medicine Progress Note - Hospitalist Service    Date of Admission:  11/13/2023    Assessment & Plan   68-year-old female admitted for angina and now status post CABG.    #Multivessel coronary artery disease  #Unstable angina  s/p CABG 11/22/23  ASA, BB    #Volume overload, mild  Switch IV lasix to oral     #Hypertensive urgency  #Hypertension  SBP improved, 170 this AM  Metoprolol, Lasix  Started amlodipine    #Diabetes type 2  A1c 6.4  Metformin resumed  Sliding scale insulin and add low dose Lantus while inpatient   Plan to go back on home Bydureon at [place]    #Iron deficiency anemia  #Acute blood loss anemia  s/p colonoscopy 11/17/23: non bleeding internal hemorrhoids, diverticula, polyps removed - no diagnostic abnormalities on path  Iron supplement    #Poor appetite  Nutrition consulted - does not meet criteria for malnutrition  Glucerna added      VTE ppx: sc heparin          Diet: Moderate Consistent Carb (60 g CHO per Meal) Diet  Snacks/Supplements Adult: Glucerna; Between Meals    [place] Catheter: Not present  Lines: None     Cardiac Monitoring: ACTIVE order. Indication: Open heart surgery (72 hours)  Code Status: Full Code      Clinically Significant Risk Factors        # Hypokalemia: Lowest K = 3.3 mmol/L in last 2 days, will replace as needed           # Hypertension: Noted on problem list        # Obesity: Estimated body mass index is 34.19 kg/m  as calculated from the following:    Height as of this encounter: 1.499 m (4' 11\").    Weight as of this encounter: 76.8 kg (169 lb 4.8 oz).      # History of CABG: noted on surgical history       Disposition Plan     Expected Discharge Date: 11/27/2023      Destination: home with family  Discharge Comments: CABG 11/22            Scar Kelly DO  Hospitalist Service  [facility]  Securely message with [employer] (more info)  Text page via [employer] Paging/Directory "   ______________________________________________________________________    Interval History   No acute events overnight    Physical Exam   Vital Signs: Temp: 98.3  F (36.8  C) Temp src: Oral BP: (!) 170/71 Pulse: 71   Resp: 18 SpO2: 94 % O2 Device: None (Room air)    Weight: 169 lbs 4.8 oz    General Appearance:  No acute distress  Respiratory: Clear to auscultation bilaterally  Cardiovascular: Regular rate and rhythm  Midline chest incision with minimal swelling of the superior aspect, no surrounding redness and no drainage    Medical Decision Making             Data

## 2023-11-26 NOTE — PROGRESS NOTES
"CVTS Daily Progress Note   POD#4 s/p CAB x 3  Attending: Dr Gabriel  LOS: 13    SUBJECTIVE/INTERVAL EVENTS:    No acute events overnight. Slept much better last night. Patient progressing well. Maintaining oxygen saturations on RA. Normotensive/HTN. Up to chair this am. Pain well controlled. +BM / +flatus. Tolerating  diet. UOP adequate.  Hgb 9.2. Patient denies new chest pain, shortness of breath, abdominal pain, calf pain, nausea. Patient has no questions today.     OBJECTIVE:  Temp:  [98.3  F (36.8  C)-98.8  F (37.1  C)] 98.3  F (36.8  C)  Pulse:  [67-76] 71  Resp:  [18-20] 18  BP: (126-170)/(62-88) 170/71  SpO2:  [92 %-95 %] 94 %  Vitals:    11/21/23 0345 11/22/23 0325 11/23/23 0647 11/25/23 0334   Weight: 73.4 kg (161 lb 14.4 oz) 75.9 kg (167 lb 4.8 oz) 78.5 kg (173 lb 1.6 oz) 77.3 kg (170 lb 8 oz)    11/26/23 0331   Weight: 76.8 kg (169 lb 4.8 oz)       Clinically Significant Risk Factors        # Hypokalemia: Lowest K = 3.3 mmol/L in last 2 days, will replace as needed             # Hypertension: Noted on problem list        # Obesity: Estimated body mass index is 34.19 kg/m  as calculated from the following:    Height as of this encounter: 1.499 m (4' 11\").    Weight as of this encounter: 76.8 kg (169 lb 4.8 oz).   # Moderate Malnutrition: based on nutrition assessment     # History of CABG: noted on surgical history                    Current Medications:    Scheduled Meds:   amLODIPine  10 mg Oral Daily    aspirin  162 mg Oral or NG Tube Daily    buPROPion  300 mg Oral Daily    evolocumab  140 mg Subcutaneous Q14 Days    ferrous sulfate  325 mg Oral Daily    furosemide  40 mg Intravenous BID    heparin ANTICOAGULANT  5,000 Units Subcutaneous Q8H    insulin aspart  1-7 Units Subcutaneous TID AC    insulin aspart  1-5 Units Subcutaneous At Bedtime    insulin glargine  5 Units Subcutaneous QAM AC    levothyroxine  75 mcg Oral Daily    lidocaine  1-2 patch Transdermal Q24H    magnesium oxide  400 mg Oral Q4H "    metFORMIN  1,000 mg Oral Daily with supper    metFORMIN  500 mg Oral QAM AC    metoprolol tartrate  12.5 mg Oral TID    metoprolol tartrate  150 mg Oral BID    pantoprazole  40 mg Oral or NG Tube QAM AC    Or    pantoprazole  40 mg Oral QAM AC    polyethylene glycol  17 g Oral or Feeding Tube Daily    potassium & sodium phosphates  1 packet Oral or Feeding Tube Q4H    senna-docusate  1 tablet Oral or Feeding Tube BID     Continuous Infusions:      PRN Meds:.acetaminophen, bisacodyl, calcium carbonate, calcium gluconate, calcium gluconate, calcium gluconate, glucose **OR** dextrose **OR** glucagon, hydrALAZINE, lactated ringers, magnesium hydroxide, naloxone **OR** naloxone **OR** naloxone **OR** naloxone, ondansetron **OR** ondansetron, oxyCODONE **OR** oxyCODONE, prochlorperazine **OR** prochlorperazine    Cardiographics:    Telemetry monitoring demonstrates NSR with rates in the 70's per my personal review.    Imaging:  Results for orders placed or performed during the hospital encounter of 11/13/23   XR Chest Port 1 View    Impression    IMPRESSION: No focal airspace opacity. No pleural effusion or pneumothorax. Cardiac silhouette and mediastinal contours are stable. Coronary stent.   US Carotid Bilateral    Impression    IMPRESSION:  1.  Mild plaque formation, velocities consistent with less than 50% stenosis in the right internal carotid artery.  2.  Mild plaque formation, velocities consistent with less than 50% stenosis in the left internal carotid artery.  3.  Flow within the vertebral arteries is antegrade.   CT Chest w/o Contrast    Impression    IMPRESSION:   1.  Moderate to marked calcified atheromatous plaque throughout the normal caliber thoracic aorta and proximal brachiocephalic arteries, as well as, the visualized abdominal aorta and visceral arteries.   2.  Mild mid and upper lung centrilobular emphysema.  3.  Aortic valve leaflet calcification and severe three-vessel coronary artery  calcification.     XR Chest Port 1 View    Impression    IMPRESSION: Poststernotomy changes. Endotracheal tube is in the mid trachea, 2.3 cm from the jackson. Mediastinal and left pleural chest tubes. Right IJ cordis with tip overlying the region of the mid SVC.    No pneumothorax. Left lower lobe opacities silhouetting the left hemidiaphragm reflecting atelectasis. Trace effusion. Heart is of normal size.   XR Chest Port 1 View    Impression    IMPRESSION: Poststernotomy. Mediastinal and left pleural drains remain in position. Right internal jugular venous catheter tip near the superior cavoatrial junction. Stable cardiomegaly. Normal pulmonary vascularity. Left basilar atelectasis. Right lung   clear. No visible pneumothorax.       Labs, personally reviewed.  Hemoglobin   Date Value Ref Range Status   11/26/2023 9.2 (L) 11.7 - 15.7 g/dL Final   11/24/2023 10.5 (L) 11.7 - 15.7 g/dL Final   11/23/2023 10.2 (L) 11.7 - 15.7 g/dL Final     WBC Count   Date Value Ref Range Status   11/26/2023 3.8 (L) 4.0 - 11.0 10e3/uL Final   11/24/2023 8.3 4.0 - 11.0 10e3/uL Final   11/23/2023 7.7 4.0 - 11.0 10e3/uL Final     Platelet Count   Date Value Ref Range Status   11/26/2023 175 150 - 450 10e3/uL Final   11/24/2023 177 150 - 450 10e3/uL Final   11/23/2023 161 150 - 450 10e3/uL Final     Creatinine   Date Value Ref Range Status   11/26/2023 0.87 0.51 - 0.95 mg/dL Final   11/23/2023 0.96 (H) 0.51 - 0.95 mg/dL Final   11/22/2023 0.87 0.51 - 0.95 mg/dL Final     Potassium   Date Value Ref Range Status   11/26/2023 3.3 (L) 3.4 - 5.3 mmol/L Final   11/25/2023 3.7 3.4 - 5.3 mmol/L Final   11/24/2023 4.1 3.4 - 5.3 mmol/L Final   04/12/2023 3.9 3.5 - 5.0 mmol/L Final   04/10/2019 3.9 3.5 - 5.0 mmol/L Final     Potassium POCT   Date Value Ref Range Status   11/22/2023 3.8 3.5 - 5.0 mmol/L Final   11/22/2023 4.2 3.5 - 5.0 mmol/L Final   11/22/2023 4.4 3.5 - 5.0 mmol/L Final     Magnesium   Date Value Ref Range Status   11/26/2023 1.7 1.7  - 2.3 mg/dL Final   11/25/2023 1.8 1.7 - 2.3 mg/dL Final   11/24/2023 2.1 1.7 - 2.3 mg/dL Final          I/O:  I/O last 3 completed shifts:  In: 1590 [P.O.:1590]  Out: 1925 [Urine:1925]       Physical Exam:    General: Patient seen  up in chair. NAD. Conversant. Pleasant.   HEENT: ECHO, no sclera icterus, moist mucosa  CV: RRR on monitor. 2+ peripheral pulses in all extremities. Mild edema.   Pulm: Non-labored effort on RA. Incision C/D/I.  Abd: Soft, NT, ND  : Voiding  Ext: Mild pedal edema, SCDs in place, warm, distal pulses intact  Neuro: CNs grossly intact.       ASSESSMENT/PLAN:    Lisseth Qureshi is a 68 year old female with a history of CAD, DM II, HTN  who is s/p CAB x 3.    Principal Problem:    Coronary artery disease involving native coronary artery with unstable angina pectoris (H)  Active Problems:    Hypothyroidism    Type 2 diabetes mellitus without complication, with long-term current use of insulin (H)    Hypertension    Anemia, unspecified type    Chest pain, unspecified type    Iron deficiency    Stenosis of coronary stent, initial encounter    Coronary artery disease involving native coronary artery of native heart, unspecified whether angina present        NEURO:   - Scheduled Tylenol/lidocaine patches and PRN Tylenol/oxycodone for pain    CV:   - Pre-op EF 60-65%  - Normotensive/HTN  - Metoprolol 100 mg PO BID -increased to 150 mg and extra doses available  - PTA Amlodipine 10 mg daily  - ASA 162mg  - PTA Repatha  - Chest tubes and TPW's removed without immediate complications.    PULM:   - Maintaining oxygen saturations on RA  - Encourage pulmonary toilet    FEN/GI:  - Continue electrolyte replacement protocol  - Diet: Cardiac, ADAT   - Bowel regimen    RENAL:  - Adequate UOP/hr. Continue to monitor closely.  - Cr 0.96  - [place] removed POD#2  - Diuresis with 40mg IV Lasix two times a day     HEME:  - Acute blood loss anemia post-op.   - Hgb 9.2, no bleeding concerns. Hep SQ, ASA  - Received  2u PRBC's intra-op  - PTA Ferrous sulfate 325 mg daily    ID:  - Bing op ppx complete, afebrile . No concerns for infection    ENDO:   - Transition to sliding scale insulin  -PTA metformin  -PTA  Levothyroxine    PPx:   - DVT: SCDs, SQ heparin TID, ambulation   - GI: Protonix 40mg PO daily    DISPO:   - Transfer to general telemetry status  - Home in am if BP under control      Patient discussed with Dr. Inderjit SifuentesPABhanuC  [facility] Cardiothoracic Surgery  Vocera or pager [phone]

## 2023-11-26 NOTE — PLAN OF CARE
sc  Patient Name: Lisseth Qureshi   MRN: [id number]   Date of Admission: 11/13/2023    Procedure: Procedure(s):  CORONARY ARTERY BYPASS GRAFT TIMES THREE, LEFT INTERNAL MAMMARY ARTERY HARVEST, LEFT LEG ENDOSCOPIC VESSEL PROCUREMENT, ANESTHESIA TRANSESOPHAGEAL ECHOCARDIOGRAM, EPI-AORTIC ULTRASOUND    Post Op day #:3    Subjective (Patient focus/Primary Problem for shift): walk, sleep/rest          Pain Goal0 Pain Rating2           Pain Medication/ Regime effective to reduce patient pain yes    Objective (Physical assessment):           Rhythm: normal sinus rhythm            Bowel Activity: yes if Yes indicate when: this morning          Bowel Medications: yes            Incision: healing well          Incentive Spirometry Q 1-2 hour when awake:  yes Volume: 500          Epicardial Pacing Wires:  no            Patient Activity:           Up to chair for meals: yes          Ambulation with RN x1(Not including CR): no- pt refused            Is patient in home clothes:no             Chest Tubes   Pleural: no Draining: no               Suction: no              Mediastinal: no Draining: no               Suction: no   Dressing Change Daily:no If No, why? Showered and open to air                     Urinary Catheter: no           Preventative WOC consult (need MD order): no       Assessment (Nursing primary shift focus): shower, walk, pain control    Plan (Patient Care Plan/focus): rest/sleep    A&O x4. She c/o 2/10 incisional pain- PRN Tylenol given, this was effective in pain management. The pt showered and had her linens changed. However, the pt refused to walk with staff in the hallway, because she was too tired.     Tele- NSR      Mirella Britt RN   11/25/2023   6:12 PM

## 2023-11-27 ENCOUNTER — APPOINTMENT (OUTPATIENT)
Dept: OCCUPATIONAL THERAPY | Facility: HOSPITAL | Age: 68
DRG: 234 | End: 2023-11-27
Payer: COMMERCIAL

## 2023-11-27 VITALS
SYSTOLIC BLOOD PRESSURE: 112 MMHG | TEMPERATURE: 97.9 F | BODY MASS INDEX: 32.84 KG/M2 | RESPIRATION RATE: 18 BRPM | HEIGHT: 59 IN | WEIGHT: 162.9 LBS | DIASTOLIC BLOOD PRESSURE: 55 MMHG | OXYGEN SATURATION: 95 % | HEART RATE: 60 BPM

## 2023-11-27 LAB
ANION GAP SERPL CALCULATED.3IONS-SCNC: 10 MMOL/L (ref 7–15)
BUN SERPL-MCNC: 16.1 MG/DL (ref 8–23)
CALCIUM SERPL-MCNC: 8.5 MG/DL (ref 8.8–10.2)
CALCIUM, IONIZED MEASURED: 1.15 MMOL/L (ref 1.11–1.3)
CHLORIDE SERPL-SCNC: 101 MMOL/L (ref 98–107)
CREAT SERPL-MCNC: 0.91 MG/DL (ref 0.51–0.95)
DEPRECATED HCO3 PLAS-SCNC: 27 MMOL/L (ref 22–29)
EGFRCR SERPLBLD CKD-EPI 2021: 69 ML/MIN/1.73M2
ERYTHROCYTE [DISTWIDTH] IN BLOOD BY AUTOMATED COUNT: 18.1 % (ref 10–15)
GLUCOSE BLDC GLUCOMTR-MCNC: 151 MG/DL (ref 70–99)
GLUCOSE SERPL-MCNC: 177 MG/DL (ref 70–99)
HCT VFR BLD AUTO: 29.2 % (ref 35–47)
HGB BLD-MCNC: 9.2 G/DL (ref 11.7–15.7)
ION CA PH 7.4: 1.17 MMOL/L (ref 1.11–1.3)
MAGNESIUM SERPL-MCNC: 1.7 MG/DL (ref 1.7–2.3)
MCH RBC QN AUTO: 26.2 PG (ref 26.5–33)
MCHC RBC AUTO-ENTMCNC: 31.5 G/DL (ref 31.5–36.5)
MCV RBC AUTO: 83 FL (ref 78–100)
PH: 7.43 (ref 7.35–7.45)
PHOSPHATE SERPL-MCNC: 2.9 MG/DL (ref 2.5–4.5)
PLATELET # BLD AUTO: 188 10E3/UL (ref 150–450)
POTASSIUM SERPL-SCNC: 3.9 MMOL/L (ref 3.4–5.3)
RBC # BLD AUTO: 3.51 10E6/UL (ref 3.8–5.2)
SODIUM SERPL-SCNC: 138 MMOL/L (ref 135–145)
WBC # BLD AUTO: 4.4 10E3/UL (ref 4–11)

## 2023-11-27 PROCEDURE — 250N000013 HC RX MED GY IP 250 OP 250 PS 637: Performed by: HOSPITALIST

## 2023-11-27 PROCEDURE — 97535 SELF CARE MNGMENT TRAINING: CPT | Mod: GO

## 2023-11-27 PROCEDURE — 250N000011 HC RX IP 250 OP 636: Mod: JZ | Performed by: PHYSICIAN ASSISTANT

## 2023-11-27 PROCEDURE — 250N000013 HC RX MED GY IP 250 OP 250 PS 637: Performed by: PHYSICIAN ASSISTANT

## 2023-11-27 PROCEDURE — 250N000013 HC RX MED GY IP 250 OP 250 PS 637: Performed by: INTERNAL MEDICINE

## 2023-11-27 PROCEDURE — 80048 BASIC METABOLIC PNL TOTAL CA: CPT | Performed by: PHYSICIAN ASSISTANT

## 2023-11-27 PROCEDURE — 85027 COMPLETE CBC AUTOMATED: CPT | Performed by: PHYSICIAN ASSISTANT

## 2023-11-27 PROCEDURE — 97110 THERAPEUTIC EXERCISES: CPT | Mod: GO

## 2023-11-27 PROCEDURE — 82330 ASSAY OF CALCIUM: CPT | Performed by: PHYSICIAN ASSISTANT

## 2023-11-27 PROCEDURE — 99232 SBSQ HOSP IP/OBS MODERATE 35: CPT | Performed by: HOSPITALIST

## 2023-11-27 PROCEDURE — 36415 COLL VENOUS BLD VENIPUNCTURE: CPT | Performed by: PHYSICIAN ASSISTANT

## 2023-11-27 PROCEDURE — 83735 ASSAY OF MAGNESIUM: CPT | Performed by: THORACIC SURGERY (CARDIOTHORACIC VASCULAR SURGERY)

## 2023-11-27 PROCEDURE — 84100 ASSAY OF PHOSPHORUS: CPT | Performed by: THORACIC SURGERY (CARDIOTHORACIC VASCULAR SURGERY)

## 2023-11-27 RX ORDER — AMOXICILLIN 250 MG
1 CAPSULE ORAL 2 TIMES DAILY
Qty: 14 TABLET | Refills: 0 | Status: SHIPPED | OUTPATIENT
Start: 2023-11-27

## 2023-11-27 RX ORDER — ACETAMINOPHEN 500 MG
500-1000 TABLET ORAL EVERY 6 HOURS PRN
COMMUNITY
Start: 2023-11-27

## 2023-11-27 RX ORDER — FERROUS SULFATE 325(65) MG
325 TABLET ORAL DAILY
Qty: 30 TABLET | Refills: 3 | Status: SHIPPED | OUTPATIENT
Start: 2023-11-27 | End: 2024-02-03

## 2023-11-27 RX ORDER — MAGNESIUM OXIDE 400 MG/1
400 TABLET ORAL EVERY 4 HOURS
Qty: 2 TABLET | Refills: 0 | Status: DISCONTINUED | OUTPATIENT
Start: 2023-11-27 | End: 2023-11-27 | Stop reason: HOSPADM

## 2023-11-27 RX ORDER — ASPIRIN 81 MG/1
162 TABLET, CHEWABLE ORAL DAILY
COMMUNITY
Start: 2023-11-27 | End: 2023-12-05

## 2023-11-27 RX ORDER — METOPROLOL TARTRATE 75 MG/1
150 TABLET, FILM COATED ORAL 2 TIMES DAILY
Qty: 120 TABLET | Refills: 3 | Status: SHIPPED | OUTPATIENT
Start: 2023-11-27 | End: 2023-12-05

## 2023-11-27 RX ORDER — PANTOPRAZOLE SODIUM 40 MG/1
40 TABLET, DELAYED RELEASE ORAL
Qty: 30 TABLET | Refills: 0 | Status: SHIPPED | OUTPATIENT
Start: 2023-11-28 | End: 2023-12-05

## 2023-11-27 RX ADMIN — PANTOPRAZOLE SODIUM 40 MG: 40 TABLET, DELAYED RELEASE ORAL at 06:37

## 2023-11-27 RX ADMIN — ACETAMINOPHEN 650 MG: 325 TABLET ORAL at 08:17

## 2023-11-27 RX ADMIN — FERROUS SULFATE TAB 325 MG (65 MG ELEMENTAL FE) 325 MG: 325 (65 FE) TAB at 08:24

## 2023-11-27 RX ADMIN — METFORMIN ER 500 MG 500 MG: 500 TABLET ORAL at 08:24

## 2023-11-27 RX ADMIN — FUROSEMIDE 40 MG: 20 TABLET ORAL at 06:37

## 2023-11-27 RX ADMIN — BUPROPION HYDROCHLORIDE 300 MG: 150 TABLET, FILM COATED, EXTENDED RELEASE ORAL at 08:24

## 2023-11-27 RX ADMIN — AMLODIPINE BESYLATE 10 MG: 5 TABLET ORAL at 08:24

## 2023-11-27 RX ADMIN — LEVOTHYROXINE SODIUM 75 MCG: 0.03 TABLET ORAL at 06:37

## 2023-11-27 RX ADMIN — MAGNESIUM OXIDE TAB 400 MG (241.3 MG ELEMENTAL MG) 400 MG: 400 (241.3 MG) TAB at 08:25

## 2023-11-27 RX ADMIN — ASPIRIN 81 MG CHEWABLE TABLET 162 MG: 81 TABLET CHEWABLE at 08:25

## 2023-11-27 RX ADMIN — HEPARIN SODIUM 5000 UNITS: 10000 INJECTION, SOLUTION INTRAVENOUS; SUBCUTANEOUS at 06:37

## 2023-11-27 RX ADMIN — INSULIN ASPART 1 UNITS: 100 INJECTION, SOLUTION INTRAVENOUS; SUBCUTANEOUS at 08:33

## 2023-11-27 RX ADMIN — METOPROLOL TARTRATE 150 MG: 100 TABLET, FILM COATED ORAL at 08:29

## 2023-11-27 ASSESSMENT — ACTIVITIES OF DAILY LIVING (ADL)
ADLS_ACUITY_SCORE: 27

## 2023-11-27 NOTE — PLAN OF CARE
Goal Outcome Evaluation:       sc  Patient Name: Lisseth Qureshi   MRN: [id number]   Date of Admission: 11/13/2023    Procedure: Procedure(s):  CORONARY ARTERY BYPASS GRAFT TIMES THREE, LEFT INTERNAL MAMMARY ARTERY HARVEST, LEFT LEG ENDOSCOPIC VESSEL PROCUREMENT, ANESTHESIA TRANSESOPHAGEAL ECHOCARDIOGRAM, EPI-AORTIC ULTRASOUND    Post Op day #:5    Subjective (Patient focus/Primary Problem for shift): pain          Pain Goal1 Pain Rating1           Pain Medication/ Regime effective to reduce patient painyes    Objective (Physical assessment):           Rhythm: normal sinus rhythm            Bowel Activity: yes if Yes indicate when: today          Bowel Medications: yes            Incision: healing well          Incentive Spirometry Q 1-2 hour when awake:  yes Volume: 1000          Epicardial Pacing Wires:  no            Patient Activity:           Up to chair for meals: yes          Ambulation with RN x2 (Not including CR): yes            Is patient in home clothes:yes             Chest Tubes   Pleural: no Draining: not applicable               Suction: not applicable              Mediastinal: not applicable Draining: not applicable               Suction: not applicable   Dressing Change Daily:not applicable If No, why?open to air                     Urinary Catheter: no           Preventative WOC consult (need MD order): not applicable       Assessment (Nursing primary shift focus): Discharge to home with family, patient verbalizes understanding of discontinue instructions, symptoms to report and follow up needed.    Plan (Patient Care Plan/focus): discharge to home      Snow Horn RN   11/27/2023   1:09 PM

## 2023-11-27 NOTE — PROGRESS NOTES
"[facility]    Medicine Progress Note - Hospitalist Service    Date of Admission:  11/13/2023    Assessment & Plan   68-year-old female admitted for angina and now status post CABG.  Patient is now medically stable for discharge.     #Multivessel coronary artery disease  #Unstable angina  s/p CABG 11/22/23  ASA, BB  Plavix was discontinued    #Volume overload, mild  s/p IV lasix  Now euvolemic, no further Lasix    #Hypertensive urgency  #Hypertension  SBP much  improved, 146 this AM  Continue amlodipine, metoprolol    #Diabetes type 2  A1c 6.4  Metformin resumed  Resume Bydureon at home  No further insulin after discharge    #Iron deficiency anemia  #Acute blood loss anemia  s/p colonoscopy 11/17/23: non bleeding internal hemorrhoids, diverticula, polyps removed - no diagnostic abnormalities on path  Continue Iron supplement    #Poor appetite  Nutrition consulted - does not meet criteria for malnutrition          Diet: Moderate Consistent Carb (60 g CHO per Meal) Diet  Snacks/Supplements Adult: Glucerna; Between Meals  Diet    [place] Catheter: Not present  Lines: None     Cardiac Monitoring: ACTIVE order. Indication: Open heart surgery (72 hours)  Code Status: Full Code      Clinically Significant Risk Factors        # Hypokalemia: Lowest K = 3.3 mmol/L in last 2 days, will replace as needed           # Hypertension: Noted on problem list        # Obesity: Estimated body mass index is 32.9 kg/m  as calculated from the following:    Height as of this encounter: 1.499 m (4' 11\").    Weight as of this encounter: 73.9 kg (162 lb 14.4 oz).      # History of CABG: noted on surgical history       Disposition Plan      Expected Discharge Date: 11/27/2023  Destination: home with family  Discharge Comments: CABG 11/22            Scar Kelly DO  Hospitalist Service  [facility]  Securely message with [employer] (more info)  Text page via [email or web address] Paging/Directory "   ______________________________________________________________________    Interval History   No acute events overnight    Physical Exam   Vital Signs: Temp: 98.2  F (36.8  C) Temp src: Oral BP: (!) 146/65 Pulse: 69   Resp: 18 SpO2: 95 % O2 Device: None (Room air)    Weight: 162 lbs 14.4 oz    General Appearance:  No acute distress  Respiratory: Clear to auscultation bilaterally  Cardiovascular: Regular rate and rhythm  Extremities: No peripheral edema or cyanosis    Medical Decision Making             Data

## 2023-11-27 NOTE — CONSULTS
DIABETES CARE    Situation:  Consulted by Provider for Diabetes Education.  68-year-old female admitted for angina and now status post CAB     Background:  Related comorbidities include: CAD, DM2, anemia  PCP: Shivani Caal APRN CNP  Social: Pt is independent at baseline and lives with roommates.     Diabetes History:   30 years    Meds for BG Management PTA:  Bcise 2 mg weekly  Metformin 500 mg in am, 1000 mg with dinner    Current Inpatient Meds for BG Management:  Medium Novolog correction scale: 1/50 over 140 at meals, over 200 at hs  Metformin as above  5 units of Lantus every am    Labs:  Hemoglobin A1C: 6.4%  (estimated average  mg/dL) with Hgb 8.9   GFR: 69 mL/min/1.73m^2    Blood Glucose POC:    Latest Reference Range & Units 11/25/23 08:00 11/25/23 11:47 11/25/23 16:53 11/25/23 20:43 11/26/23 07:22 11/26/23 11:47 11/26/23 16:29 11/26/23 20:07 11/27/23 08:19   GLUCOSE BY METER POCT 70 - 99 mg/dL 207 (H) 210 (H) 163 (H) 193 (H) 220 (H) 232 (H) 143 (H) 179 (H) 151 (H)   (H): Data is abnormally high    Diet Order: 60 grams CHO   Intake: 50-75%   Weight: 73.9 kg    BMI: 32.9 kg/m^2    DM EDUCATION/COUNSELING:  Barriers to Learning and/or DM Self-Management: None  Current education   Educated/reviewed diabetes basics: pathophysiology, hyperglycemia, long term complications, treatment.  Educated/reviewed hypoglycemia: symptoms, causes, treatment. Typically not a concern with Metformin and GLP  Explained normal/goals of blood sugar control, A1C, when to call provider.  Educated/reviewed use of home glucose meter and patient able to demonstrate its use. Suggested she check 2-3 x per day either before meal or 2 hours after start of meal. Also, educated about the CGMS devices. She can check with her insurance for coverage, talk with PCP as would need a rx  Educated on normal pancreas function, how oral medications work, GLP1 meds   Cardiac rehab will also help with her BG control    Carbohydrates were briefly  "discussed and their effect on BG. Reinforced healthy eating.     Assessment:  BG should improve with increased activity and she is aware of what meal choices help with BG control in addition to her medication use  A1C may be inaccurately low with lower Hgb    Recommendations:  1. Follow-up with PCP as another medication could be used if BG control need more after all these years  2. Could consider an appt with OP CDE if more help with DM control desired      Refer to \"Guidelines for Insulin Initiation and Care in Hospitalized Adults\" link in Diabetes Management Order set for dosing guidelines.    Hospital goals for blood glucose levels are < 180 mg/dL for improved health outcomes.      Thank you,     Saadia Mar RN, Certified [place] [address]  [email or web address]   Office: [phone]  Pager: [phone]                                    "

## 2023-11-27 NOTE — PLAN OF CARE
Occupational Therapy Discharge Summary    Reason for therapy discharge:    Discharged to home with outpatient therapy.    Progress towards therapy goal(s). See goals on Care Plan in [facility] electronic health record for goal details.  Goals met    Therapy recommendation(s):    Continued therapy is recommended.  Rationale/Recommendations:  OP Cardiac Rehab pt is scheduled for.

## 2023-11-27 NOTE — PROGRESS NOTES
Care Management Discharge Note    Discharge Date: 11/27/2023       Discharge Disposition: Home, Outpatient Rehab (Cardiac)    Discharge Services: Home, Outpatient Rehab (Cardiac)    Discharge DME: None    Discharge Transportation: family or friend will provide    Private pay costs discussed: Not applicable    Does the patient's insurance plan have a 3 day qualifying hospital stay waiver?  No    PAS Confirmation Code:  NA  Patient/family educated on Medicare website which has current facility and service quality ratings: no    Education Provided on the Discharge Plan: Yes per team  Persons Notified of Discharge Plans: Patient per team  Patient/Family in Agreement with the Plan: yes    Handoff Referral Completed: Yes    Additional Information:  Patient discharge Home with Outpatient Rehab (Cardiac). Family will transport.    Amaris Escobar RN

## 2023-11-27 NOTE — DISCHARGE SUMMARY
Cardiovascular Surgery Discharge Summary    Primary Care Physician:  Shivani Caal  Discharge Provider: Yuliana Sifuentes PA-C  Admission Date: 11/13/2023  Admission Diagnoses: Anemia, unspecified type [D64.9]  Chest pain, unspecified type [R07.9]  Coronary artery disease involving native coronary artery of native heart, unspecified whether angina present [I25.10]  Discharge Date: 11/27/2023  Disposition: Home   Condition at Discharge: Good  Code Status: Full Code     Principal Diagnosis:   Coronary artery disease involving native coronary artery with unstable angina pectoris (H) s/p coronary artery bypass grafting x 3    Discharge Diagnoses:    Active Problems:      Patient Active Problem List   Diagnosis    Hypothyroidism    Type 2 diabetes mellitus without complication, with long-term current use of insulin (H)    Hypertension    Coronary artery disease involving native coronary artery with unstable angina pectoris (H)    Chest pain    Vertebral artery occlusion, left    Statin intolerance    Anemia, unspecified type    Chest pain, unspecified type    Iron deficiency    Stenosis of coronary stent, initial encounter    Coronary artery disease involving native coronary artery of native heart, unspecified whether angina present         Consult/s: Dietary, critical care medicine, cardiology, hematology, GI    Surgery: 11/22/23  CAB x 3 with Dr. Gabriel   Median sternotomy   Take down of the left internal mammary artery    Endoscopic greater saphenous vein procurement from the left lower extremity    Epiaortic ultrasound of the ascending aorta    Placement on central cardiopulmonary bypass    Triple vessel coronary artery bypass grafting procedure:   -  Left internal mammary artery to the left anterior descending coronary artery  -  Separate reversed saphenous vein grafts to the obtuse marginal one and the right posterior descending coronary arteries.    Placement of temporary atrial and ventricular pacing  wires      Discharge Medications:      Review of your medicines        START taking        Dose / Directions   acetaminophen 500 MG tablet  Commonly known as: TYLENOL  Used for: S/P CABG (coronary artery bypass graft)      Dose: 500-1,000 mg  Take 1-2 tablets (500-1,000 mg) by mouth every 6 hours as needed for mild pain  Refills: 0     aspirin 81 MG chewable tablet  Commonly known as: ASA  Used for: S/P CABG (coronary artery bypass graft)  Replaces: aspirin 81 MG EC tablet      Dose: 162 mg  2 tablets (162 mg) by Oral or NG Tube route daily  Refills: 0     ferrous sulfate 325 (65 Fe) MG tablet  Commonly known as: FEROSUL  Used for: S/P CABG (coronary artery bypass graft)      Dose: 325 mg  Take 1 tablet (325 mg) by mouth daily  Quantity: 30 tablet  Refills: 3     Metoprolol Tartrate 75 MG Tabs  Used for: S/P CABG (coronary artery bypass graft)      Dose: 150 mg  Take 150 mg by mouth 2 times daily  Quantity: 120 tablet  Refills: 3     pantoprazole 40 MG EC tablet  Commonly known as: PROTONIX  Used for: S/P CABG (coronary artery bypass graft)      Dose: 40 mg  Start taking on: November 28, 2023  Take 1 tablet (40 mg) by mouth daily before breakfast  Quantity: 30 tablet  Refills: 0     senna-docusate 8.6-50 MG tablet  Commonly known as: SENOKOT-S/PERICOLACE  Used for: S/P CABG (coronary artery bypass graft)      Dose: 1 tablet  1 tablet by Oral or Feeding Tube route 2 times daily  Quantity: 14 tablet  Refills: 0            CONTINUE these medicines which have NOT CHANGED        Dose / Directions   amLODIPine 10 MG tablet  Commonly known as: NORVASC      Dose: 10 mg  Take 10 mg by mouth daily  Refills: 0     buPROPion 300 MG 24 hr tablet  Commonly known as: WELLBUTRIN XL      Dose: 300 mg  Take 300 mg by mouth daily  Refills: 0     Bydureon BCise 2 MG/0.85ML auto-injector  Generic drug: exenatide ER      Dose: 2 mg  Inject 2 mg Subcutaneous every 7 days Once a week on Sunday  Refills: 0     co-enzyme Q-10 100 MG Caps  capsule      Dose: 100 mg  100 mg every evening  Refills: 0     fish oil-omega-3 fatty acids 1000 MG capsule      Dose: 2 g  Take 2 g by mouth every evening  Refills: 0     levothyroxine 75 MCG tablet  Commonly known as: SYNTHROID/LEVOTHROID      Dose: 75 mcg  [LEVOTHYROXINE (SYNTHROID, LEVOTHROID) 75 MCG TABLET] Take 75 mcg by mouth daily.  Refills: 0     melatonin 3 MG tablet      Dose: 3 mg  [MELATONIN 3 MG TAB] Take 3 mg by mouth bedtime as needed.  Refills: 0     * metFORMIN 500 MG 24 hr tablet  Commonly known as: GLUCOPHAGE XR      Dose: 500 mg  Take 500 mg by mouth daily before breakfast  Refills: 0     * metFORMIN 500 MG 24 hr tablet  Commonly known as: GLUCOPHAGE XR      Dose: 1,000 mg  Take 1,000 mg by mouth daily (with dinner)  Refills: 0     Red Yeast Rice Extract 600 MG Tabs      Dose: 1 tablet  [RED YEAST RICE 600 MG TAB] Take 1 tablet by mouth daily.  Refills: 0     Repatha SureClick 140 MG/ML prefilled autoinjector  Used for: Mixed hyperlipidemia, Statin intolerance, Coronary artery disease involving native coronary artery of native heart with unstable angina pectoris (H), Dyspnea on exertion  Generic drug: evolocumab      Dose: 140 mg  Inject 1 mL (140 mg) Subcutaneous every 14 days  Quantity: 6 mL  Refills: 3     THERAPEUTIC MULTIVITAMIN PO      Dose: 1 tablet  [MULTIVITAMIN THERAPEUTIC (THERAGRAN) TABLET] Take 1 tablet by mouth daily.  Refills: 0           * This list has 2 medication(s) that are the same as other medications prescribed for you. Read the directions carefully, and ask your doctor or other care provider to review them with you.                STOP taking      aspirin 81 MG EC tablet  Commonly known as: ASA  Replaced by: aspirin 81 MG chewable tablet        clopidogrel 75 MG tablet  Commonly known as: PLAVIX                  Where to get your medicines        These medications were sent to [place], [place] - [address] [place] [address]  "[facility] [address]      Phone: [phone]   ferrous sulfate 325 (65 Fe) MG tablet  Metoprolol Tartrate 75 MG Tabs  pantoprazole 40 MG EC tablet  senna-docusate 8.6-50 MG tablet       Some of these will need a paper prescription and others can be bought over the counter. Ask your nurse if you have questions.    You don't need a prescription for these medications  acetaminophen 500 MG tablet  aspirin 81 MG chewable tablet         Discharge Instructions:    Follow up appointment with Primary Care Physician: Shivani Caal within 7 days of discharge.  Follow up appointment with Specialist:    Follow with CV Surgery as scheduled.12/19/23 at 12:00 pm   Follow-up with cardiology as scheduled wit Dr Shaffer 2/21/24 at 4:20 pm    Diet: Cardiac    Activity/Restrictions: As tolerated with sternal precautions in mind (see below). No driving for 4 weeks or while on pain medication.     - Shower and wash your incisions daily with soap and water. No tub baths/hot tubs for 4 weeks. An antibacterial soap such as Dial or Safeguard is recommended.    - Check your incisions every day. If you notice any redness, drainage, or anything unusual, please call the surgeons office.    - No driving for 4 weeks after surgery or while on pain medication     - Do not lift anything more than 10 pounds for 6 weeks after surgery. After 6 weeks, advance lifting is tolerated.    - You may have watery drainage from your chest tube site for 2-3 weeks after surgery. Your may cover with a Band-Aid to protect your clothing. Remove the Band-Aid every day and wash the site.    - If you have a leg lesion, you may have swelling for 2-3 months. Elevate your leg any time you are not walking.    - If you feel any \"popping\" or \"clicking\" sensations in your chest, your arms are out too far or you are putting too much weight into arm movements. Do not reach over your head or out to the side to pull something. Do not do any arm exercises or use any " exercise equipment that involves arm movement. If you feel your sternum moving, call the surgeon's office.    - Increase your daily activity as explained by Cardiac Rehab. You are encouraged to enroll in an Outpatient Cardiac Rehab Program.    - No active sports using your upper arms for 3 months. This includes fishing, hunting, bowling, swimming, tennis or golf.    - No physical activity such as cutting the grass, raking, vacuuming, changing sheets on your bed, snow shoveling, or using a  for 3 months.    - Use incentive spirometer 6-8 times per day for 2 weeks.       Hospital Summary:   Lisseth Qureshi is a 68 year old female who was admitted to [facility] on 11/13/2023 following an abnormal stress test and coronary angiogram demonstrating severe triple vessel coronary artery disease. She had undergone stenting of her RCA in April of 2023. She was referred to CV surgery for evaluation for possible coronary artery revascularization.     Patient was deemed a candidate for coronary artery bypass surgery, and was taken to the operating room on 11/22/23 where patient underwent 3 vessel coronary artery bypass and endoscopic vein harvest from the left leg. Surgery was uneventful and patient was brought to the ICU post-operatively. She was extubated on POD#0 and weaned from pressors. Patient was awake and alert, afebrile, and with stable vitals. Insulin drip was discontinued and she was transitioned to a sliding scale. She was transferred to general telemetry status on POD#1 where patient has had return of bowel function, is maintaining oxygen saturations on room air, had her chest tubes removed, and has no complaints of chest pain or shortness of breath. On 11/27/23, patient was stable enough to be discharged to home.    Of note, she is below preop weight therefore no additional diuresis needed. She has an iron deficiency anemia and has received some iron.  She was found to have some colon polyps and  "no lesions on UGI endoscopy.     Vital signs:  Temp: 98.3  F (36.8  C) Temp src: Oral BP: 136/67 Pulse: 59   Resp: 18 SpO2: 93 % O2 Device: None (Room air) Oxygen Delivery: 1 LPM Height: 149.9 cm (4' 11\") Weight: 73.9 kg (162 lb 14.4 oz)  Estimated body mass index is 32.9 kg/m  as calculated from the following:    Height as of this encounter: 1.499 m (4' 11\").    Weight as of this encounter: 73.9 kg (162 lb 14.4 oz).      Physical Exam:    Pertinent exam findings on day of discharge include:  Gen: Seen up in chair. NAD. Pleasant and conversant.   CV: RRR on monitor. No edema.  Pulm: Non-labored breathing on room air.  Abd: Soft, non-tender, non-distended  Neuro: CNs grossly intact  Inc: C/D/I    Yuliana Sifuentes PA-C  [facility] Cardiothoracic Surgery  Vocera or pager [phone]      "

## 2023-11-27 NOTE — PLAN OF CARE
Problem: Adult Inpatient Plan of Care  Goal: Optimal Comfort and Wellbeing  Outcome: Progressing     Pt comfortable overnight. VSS, moving independently. No pain reported.   A/O 4x, makes needs known.

## 2023-11-29 ENCOUNTER — TELEPHONE (OUTPATIENT)
Dept: CARDIOLOGY | Facility: CLINIC | Age: 68
End: 2023-11-29
Payer: COMMERCIAL

## 2023-11-29 NOTE — TELEPHONE ENCOUNTER
Cardiovascular Surgery  [facility]    DISCHARGE FOLLOW UP PHONE CALL      POST OP MONITORING  How is your pain on a 0-10 scale, how are you managing your pain? Pain is being managed with tylenol and pt says her pain has been minimal.    ACTIVITY  How is your activity tolerance? Up and walking well, has a persistent dry cough advised her to use cough drops to manage.  Are you still doing sternal precautions? Yes.  Do you hear any clicking when you are moving or taking a deep breath? No    Are you weighing yourself daily? Yes pt is currently 161 lbs.    SIGNS AND SYMPTOMS OF INFECTION  INCREASE IN PAIN - No  FEVER - No  DRAINAGE - No If so, color: NA  REDNESS - No  SWELLING - NO    ASSISTANCE  Do you have someone at home to assist you with your daily activities? Pt's roommate has been helping her.    MEDICATIONS  Is someone helping you to set up your medications? Pt is managing.  Do you have any questions about your medications? Questions about plavix sent to Dr. Gabriel to address.    Are you on a blood thinner? No.  Who is managing your INRs? NA    FOLLOW UP  You are scheduled to see your primary care physician on 12/5.  You are scheduled to see our surgery advanced practive provider for post operative follow up on 12/19.    You are scheduled for cardiac rehab on 12/5.  You are scheduled to see your cardiologist on 2/21.    CONTACT INFORMATION  Please feel free to call us with any questions or symptoms that are concerning for you at [phone]. If it is after 4:00 in the afternoon, or a weekend please call [phone] and ask for the on call specialist. We want to do everything we can to help prevent you needing to return to the ED, so please do not hesitate to call us.    Kamilah St, RNCC  Cardiovascular Surgery  [phone]  November 29, 2023 10:56 AM

## 2023-11-29 NOTE — TELEPHONE ENCOUNTER
[facility] Call Center    Phone Message    May a detailed message be left on voicemail: yes     Reason for Call: Other: Pt called in wanting to speak with care team regarding short term disability paperwork. Pt sys paperwork was fazed on 11/16/23, and wants to kow who is going to complete the paperwork, please call pt back for further discussion at [phone]     Action Taken: Other: cardiology    Travel Screening: Not Applicable    Thank you!  [facility]

## 2023-11-30 ENCOUNTER — TELEPHONE (OUTPATIENT)
Dept: CARDIOLOGY | Facility: CLINIC | Age: 68
End: 2023-11-30
Payer: COMMERCIAL

## 2023-11-30 NOTE — TELEPHONE ENCOUNTER
Called pt and advised her she no longer has to be taking plavix, all questions/concerns addressed.    ----- Message from Yanet Gabriel MD sent at 11/29/2023  3:21 PM CST -----    We grafted her RPDA and those stents are no good now anyway.  She can come off the plavix.    ----- Message -----  From: Kamilah St RN  Sent: 11/29/2023  11:11 AM CST  To: Yanet Gabriel MD    Pt asked me today if she needed to continue taking her plavix since she had stents in April. It was discontinued at the time of discharge.    I couldn't say for sure if we bypassed those stented areas but told her I would double check with you.    Let me know, thank you.  Connie

## 2023-12-01 ENCOUNTER — TELEPHONE (OUTPATIENT)
Dept: FAMILY MEDICINE | Facility: CLINIC | Age: 68
End: 2023-12-01
Payer: COMMERCIAL

## 2023-12-01 NOTE — TELEPHONE ENCOUNTER
MTM referral from: Transitions of Care (recent hospital discharge or ED visit)    MTM referral outreach attempt #2 on December 1, 2023 at 1:48 PM      Outcome: Patient not reachable after several attempts, will route to [facility] Pharmacist/Provider as an FYI.  [facility] scheduling number is .  Thank you for the referral.    Use candido for the carrier/Plan on the flowsheet      DNA Guide Message Sent    JON Johansen

## 2023-12-05 ENCOUNTER — HOSPITAL ENCOUNTER (OUTPATIENT)
Dept: CARDIAC REHAB | Facility: HOSPITAL | Age: 68
Discharge: HOME | End: 2023-12-05
Attending: PHYSICIAN ASSISTANT
Payer: COMMERCIAL

## 2023-12-05 ENCOUNTER — OFFICE VISIT (OUTPATIENT)
Dept: FAMILY MEDICINE | Facility: CLINIC | Age: 68
End: 2023-12-05
Payer: COMMERCIAL

## 2023-12-05 VITALS
OXYGEN SATURATION: 96 % | BODY MASS INDEX: 32.11 KG/M2 | HEART RATE: 70 BPM | SYSTOLIC BLOOD PRESSURE: 112 MMHG | DIASTOLIC BLOOD PRESSURE: 54 MMHG | WEIGHT: 159 LBS | TEMPERATURE: 97 F | RESPIRATION RATE: 16 BRPM

## 2023-12-05 DIAGNOSIS — I25.810 CORONARY ARTERY DISEASE INVOLVING AUTOLOGOUS ARTERY CORONARY BYPASS GRAFT WITHOUT ANGINA PECTORIS: Primary | ICD-10-CM

## 2023-12-05 DIAGNOSIS — D64.9 ANEMIA, UNSPECIFIED TYPE: ICD-10-CM

## 2023-12-05 DIAGNOSIS — N18.30 TYPE 2 DIABETES MELLITUS WITH STAGE 3 CHRONIC KIDNEY DISEASE, WITHOUT LONG-TERM CURRENT USE OF INSULIN, UNSPECIFIED WHETHER STAGE 3A OR 3B CKD (H): ICD-10-CM

## 2023-12-05 DIAGNOSIS — J06.9 UPPER RESPIRATORY TRACT INFECTION, UNSPECIFIED TYPE: ICD-10-CM

## 2023-12-05 DIAGNOSIS — Z95.1 S/P CABG (CORONARY ARTERY BYPASS GRAFT): ICD-10-CM

## 2023-12-05 DIAGNOSIS — I10 ESSENTIAL HYPERTENSION: ICD-10-CM

## 2023-12-05 DIAGNOSIS — E11.22 TYPE 2 DIABETES MELLITUS WITH STAGE 3 CHRONIC KIDNEY DISEASE, WITHOUT LONG-TERM CURRENT USE OF INSULIN, UNSPECIFIED WHETHER STAGE 3A OR 3B CKD (H): ICD-10-CM

## 2023-12-05 DIAGNOSIS — Z23 NEED FOR VACCINATION: ICD-10-CM

## 2023-12-05 DIAGNOSIS — T82.855A: ICD-10-CM

## 2023-12-05 LAB
ANION GAP SERPL CALCULATED.3IONS-SCNC: 13 MMOL/L (ref 7–15)
BUN SERPL-MCNC: 21.8 MG/DL (ref 8–23)
CALCIUM SERPL-MCNC: 9.7 MG/DL (ref 8.8–10.2)
CHLORIDE SERPL-SCNC: 102 MMOL/L (ref 98–107)
CREAT SERPL-MCNC: 1.29 MG/DL (ref 0.51–0.95)
DEPRECATED HCO3 PLAS-SCNC: 25 MMOL/L (ref 22–29)
EGFRCR SERPLBLD CKD-EPI 2021: 46 ML/MIN/1.73M2
ERYTHROCYTE [DISTWIDTH] IN BLOOD BY AUTOMATED COUNT: 18.4 % (ref 10–15)
GLUCOSE SERPL-MCNC: 136 MG/DL (ref 70–99)
HCT VFR BLD AUTO: 36.6 % (ref 35–47)
HGB BLD-MCNC: 11.5 G/DL (ref 11.7–15.7)
MCH RBC QN AUTO: 26.3 PG (ref 26.5–33)
MCHC RBC AUTO-ENTMCNC: 31.4 G/DL (ref 31.5–36.5)
MCV RBC AUTO: 84 FL (ref 78–100)
PLATELET # BLD AUTO: 362 10E3/UL (ref 150–450)
POTASSIUM SERPL-SCNC: 5.3 MMOL/L (ref 3.4–5.3)
RBC # BLD AUTO: 4.38 10E6/UL (ref 3.8–5.2)
SODIUM SERPL-SCNC: 140 MMOL/L (ref 135–145)
WBC # BLD AUTO: 9 10E3/UL (ref 4–11)

## 2023-12-05 PROCEDURE — 85027 COMPLETE CBC AUTOMATED: CPT | Performed by: FAMILY MEDICINE

## 2023-12-05 PROCEDURE — 80048 BASIC METABOLIC PNL TOTAL CA: CPT | Performed by: FAMILY MEDICINE

## 2023-12-05 PROCEDURE — 36415 COLL VENOUS BLD VENIPUNCTURE: CPT | Performed by: FAMILY MEDICINE

## 2023-12-05 PROCEDURE — 90480 ADMN SARSCOV2 VAC 1/ONLY CMP: CPT | Performed by: FAMILY MEDICINE

## 2023-12-05 PROCEDURE — 93798 PHYS/QHP OP CAR RHAB W/ECG: CPT

## 2023-12-05 PROCEDURE — 99495 TRANSJ CARE MGMT MOD F2F 14D: CPT | Mod: 25 | Performed by: FAMILY MEDICINE

## 2023-12-05 PROCEDURE — 93797 PHYS/QHP OP CAR RHAB WO ECG: CPT

## 2023-12-05 PROCEDURE — 91320 SARSCV2 VAC 30MCG TRS-SUC IM: CPT | Performed by: FAMILY MEDICINE

## 2023-12-05 RX ORDER — ALBUTEROL SULFATE 90 UG/1
2 AEROSOL, METERED RESPIRATORY (INHALATION) EVERY 6 HOURS PRN
Qty: 18 G | Refills: 0 | Status: SHIPPED | OUTPATIENT
Start: 2023-12-05 | End: 2023-12-28

## 2023-12-05 RX ORDER — ASPIRIN 81 MG/1
162 TABLET, CHEWABLE ORAL DAILY
Qty: 180 TABLET | Refills: 1 | Status: SHIPPED | OUTPATIENT
Start: 2023-12-05 | End: 2023-12-22

## 2023-12-05 RX ORDER — METOPROLOL TARTRATE 75 MG/1
150 TABLET, FILM COATED ORAL 2 TIMES DAILY
Qty: 360 TABLET | Refills: 1 | Status: SHIPPED | OUTPATIENT
Start: 2023-12-05 | End: 2024-02-21

## 2023-12-05 RX ORDER — NITROGLYCERIN 0.4 MG/1
TABLET SUBLINGUAL
Qty: 20 TABLET | Refills: 0 | Status: SHIPPED | OUTPATIENT
Start: 2023-12-05 | End: 2024-02-03

## 2023-12-05 RX ORDER — FLUTICASONE PROPIONATE 50 MCG
1 SPRAY, SUSPENSION (ML) NASAL DAILY
Qty: 9 ML | Refills: 0 | Status: SHIPPED | OUTPATIENT
Start: 2023-12-05 | End: 2024-04-24

## 2023-12-05 RX ORDER — PANTOPRAZOLE SODIUM 40 MG/1
40 TABLET, DELAYED RELEASE ORAL
Qty: 30 TABLET | Refills: 0 | Status: SHIPPED | OUTPATIENT
Start: 2023-12-05 | End: 2024-01-10

## 2023-12-05 NOTE — PROGRESS NOTES
Assessment & Plan     (I25.810) Coronary artery disease involving autologous artery coronary bypass graft without angina pectoris  (primary encounter diagnosis)  Comment: doing well. Starting cardiac rehab. We discussed cough and will monitor. Encouraged to use IS. nitroglycerin given at her request. Meds refilled. The patient indicates understanding of these issues and agrees with the plan.   Plan: nitroGLYcerin (NITROSTAT) 0.4 MG sublingual         tablet            (E11.22,  N18.30) Type 2 diabetes mellitus with stage 3 chronic kidney disease, without long-term current use of insulin, unspecified whether stage 3a or 3b CKD (H)  Comment: continue current meds. Not due for A1c today.   Plan:     (D64.9) Anemia, unspecified type  Comment: new. Egd revealed something that might have been bleeding and was dealt with. Colonoscopy was ok. Will monitor hgb and recheck in about a month.stay on the iron. The patient indicates understanding of these issues and agrees with the plan.   Plan: CBC with platelets            (I10) Hypertension  Comment: doing ok. Check labs   Plan: Basic metabolic panel  (Ca, Cl, CO2, Creat,         Gluc, K, Na, BUN)            (T82.855A) Stenosis of coronary stent, initial encounter  Comment: had stents in April. Bypass in nov   Plan:     (J06.9) Upper respiratory tract infection, unspecified type  Comment: symptoms are mild. Upper airway congestion noted - recommended flonase daily for a month. Ok to use inhaler if it helps. Use IS at home as directed. The patient indicates understanding of these issues and agrees with the plan.   Plan: fluticasone (FLONASE) 50 MCG/ACT nasal spray,         albuterol (PROAIR HFA/PROVENTIL HFA/VENTOLIN         HFA) 108 (90 Base) MCG/ACT inhaler            (Z23) Need for vaccination  Comment: will do one shot today - covid discussed   Plan: COVID-19 12+ (2023-24) (PFIZER)            (Z95.1) S/P CABG (coronary artery bypass graft)  Comment: has follow-up with  cardiology.   Plan: pantoprazole (PROTONIX) 40 MG EC tablet,         aspirin (ASA) 81 MG chewable tablet, Metoprolol        Tartrate 75 MG TABS             MED REC REQUIRED  Post Medication Reconciliation Status: discharge medications reconciled and changed, per note/orders      Krystal Pollock MD  [facility] STEPHANIE Montanez is a 68 year old, presenting for the following health issues:  Hospital F/U        12/5/2023     1:35 PM   Additional Questions   Roomed by ELIDIA Casey   [facility] Visit:    Hospital/Nursing Home/IP Rehab Facility: [facility]   Date of Admission: 11/13/23  Date of Discharge: 11/27/23  Reason(s) for Admission:   Anemia, unspecified type [D64.9]  Chest pain, unspecified type [R07.9]  Coronary artery disease involving native coronary artery of native heart, unspecified whether angina present [I25.10]    Was your hospitalization related to COVID-19? No   Problems taking medications regularly:  None  Medication changes since discharge: med list UTD  Problems adhering to non-medication therapy:  None    Summary of hospitalization:  [facility] discharge summary reviewed  Diagnostic Tests/Treatments reviewed.  Follow up needed: labs, cardiac rehab   Other Healthcare Providers Involved in Patient s Care:         Specialist appointment - cardio follow-up and cardiac rehab   Update since discharge: improved.       Plan of care communicated with patient and friend            Principal Diagnosis:   Coronary artery disease involving native coronary artery with unstable angina pectoris (H) s/p coronary artery bypass grafting x 3        Story :   Went to the hospital with chest pain  Angiogram was done   Recommendation was for three vessel bypass on 11/22/23     Stents in April - cards said she can stop plavix   Is taking 162 aspirin  Cardiac rehab - restarted today   Coughing  - asking  for inhaler due to moderate cough   Had atelectasis on cxr on the 25th.     Asking for nitroglycerin in case she gets chest pain     Has incentive spirometry at home but unclear on why she should use it     Wondering if she can sleep on her side     12/19/23 - has follow-up appointment with cardiology     New meds :   Metoprolol 150  Pantoprazole 40   Aspirin 162   Iron     Anemia :   Egd - possible microbleed found per her report. I can't find the report.   Colonoscopy - polyps   - 3 year recommend   Hgb 9.2 on 11/27/23    A1c 6.4 : 3 weeks ago   Metformin 1500 daily    bydureon 2mg    Here with friend/roommate, Diana, who is taking care of her    At home:   Napping daily   Some exercise intolerance  Mild cough       Having some urinary leakage   Urgency - some hesitancy  No odor or blood  No back pain or nausea    Work comp  - off work now. Very stressful     Review of Systems   Constitutional, HEENT,GI, , musculoskeletal, neuro, skin, endocrine and psych systems are negative, except as otherwise noted.      Objective    Pulse 70   Temp 97  F (36.1  C) (Tympanic)   Resp 16   Wt 72.1 kg (159 lb)   LMP  (LMP Unknown)   SpO2 96%   BMI 32.11 kg/m    Body mass index is 32.11 kg/m .  Physical Exam   GENERAL - Pt is alert and oriented in no acute distress.  Affect is appropriate. Good eye contact.  HEET - Head is normocephalic, atraumatic.    PERRLA,EEMI. Conjunctiva are free of icterus or erythema.   Oropharynx free of masses and lesions, no tonsillar exudate or petechiae.    NECK - Neck is supple w/o LA or thyromegaly  RESPIRATORY - Clear to auscultation bilaterally.  No wheezing noted  CV - RRR, no murmurs, rubs, gallops.   EXTREM - No edema.  SKIN - incision is pink and healing well. No redness/swelling

## 2023-12-06 ENCOUNTER — TELEPHONE (OUTPATIENT)
Dept: FAMILY MEDICINE | Facility: CLINIC | Age: 68
End: 2023-12-06
Payer: COMMERCIAL

## 2023-12-06 LAB — UPPER GI ENDOSCOPY: NORMAL

## 2023-12-06 NOTE — TELEPHONE ENCOUNTER
Can we please call patient and let her know that the cardiac rehab team messaged me about increasing depressive symptoms since her procedure. I would be happy to meet with her if she is having increased symptoms at this time and wanted to check in to see if she wanted to visit on this.     If she does, please schedule her with me. Override is okay.

## 2023-12-07 ENCOUNTER — MYC MEDICAL ADVICE (OUTPATIENT)
Dept: FAMILY MEDICINE | Facility: CLINIC | Age: 68
End: 2023-12-07
Payer: COMMERCIAL

## 2023-12-07 NOTE — TELEPHONE ENCOUNTER
Component      Latest Ref Rng 11/26/2023  4:25 AM 11/27/2023  4:34 AM 12/5/2023  2:54 PM   Sodium      135 - 145 mmol/L 136  138  140    Potassium      3.4 - 5.3 mmol/L 3.3 (L)  3.9  5.3    Chloride      98 - 107 mmol/L 99  101  102    Carbon Dioxide (CO2)      22 - 29 mmol/L 25  27  25    Anion Gap      7 - 15 mmol/L 12  10  13    Urea Nitrogen      8.0 - 23.0 mg/dL 21.7  16.1  21.8    Creatinine      0.51 - 0.95 mg/dL 0.87  0.91  1.29 (H)    GFR Estimate      >60 mL/min/1.73m2 73  69  46 (L)    Calcium      8.8 - 10.2 mg/dL 8.5 (L)  8.5 (L)  9.7    Glucose      70 - 99 mg/dL 185 (H)  177 (H)  136 (H)       Legend:  (L) Low  (H) High

## 2023-12-07 NOTE — TELEPHONE ENCOUNTER
Spoke with patient regarding depression sx. She would like to wait a little bit to see if maybe this gets better since she just started cardiac rehab. She is hoping this will get better but if this us not then she will reach out for an appointment

## 2023-12-08 ENCOUNTER — HOSPITAL ENCOUNTER (OUTPATIENT)
Dept: CARDIAC REHAB | Facility: HOSPITAL | Age: 68
Discharge: HOME | End: 2023-12-08
Attending: INTERNAL MEDICINE
Payer: COMMERCIAL

## 2023-12-08 PROCEDURE — 93798 PHYS/QHP OP CAR RHAB W/ECG: CPT

## 2023-12-11 ENCOUNTER — HOSPITAL ENCOUNTER (OUTPATIENT)
Dept: CARDIAC REHAB | Facility: HOSPITAL | Age: 68
Discharge: HOME | End: 2023-12-11
Attending: INTERNAL MEDICINE
Payer: COMMERCIAL

## 2023-12-11 PROCEDURE — 93798 PHYS/QHP OP CAR RHAB W/ECG: CPT

## 2023-12-13 ENCOUNTER — HOSPITAL ENCOUNTER (OUTPATIENT)
Dept: CARDIAC REHAB | Facility: HOSPITAL | Age: 68
Discharge: HOME | End: 2023-12-13
Attending: INTERNAL MEDICINE
Payer: COMMERCIAL

## 2023-12-13 PROCEDURE — 93798 PHYS/QHP OP CAR RHAB W/ECG: CPT

## 2023-12-15 ENCOUNTER — HOSPITAL ENCOUNTER (OUTPATIENT)
Dept: CARDIAC REHAB | Facility: HOSPITAL | Age: 68
Discharge: HOME | End: 2023-12-15
Attending: INTERNAL MEDICINE
Payer: COMMERCIAL

## 2023-12-15 PROCEDURE — 93798 PHYS/QHP OP CAR RHAB W/ECG: CPT

## 2023-12-18 ENCOUNTER — HOSPITAL ENCOUNTER (OUTPATIENT)
Dept: CARDIAC REHAB | Facility: HOSPITAL | Age: 68
Discharge: HOME | End: 2023-12-18
Attending: INTERNAL MEDICINE
Payer: COMMERCIAL

## 2023-12-18 PROCEDURE — 93798 PHYS/QHP OP CAR RHAB W/ECG: CPT

## 2023-12-19 ENCOUNTER — OFFICE VISIT (OUTPATIENT)
Dept: CARDIOLOGY | Facility: CLINIC | Age: 68
End: 2023-12-19
Payer: COMMERCIAL

## 2023-12-19 VITALS
OXYGEN SATURATION: 97 % | RESPIRATION RATE: 18 BRPM | BODY MASS INDEX: 31.1 KG/M2 | HEART RATE: 59 BPM | DIASTOLIC BLOOD PRESSURE: 50 MMHG | WEIGHT: 154 LBS | SYSTOLIC BLOOD PRESSURE: 100 MMHG

## 2023-12-19 DIAGNOSIS — Z95.1 S/P CABG X 3: Primary | ICD-10-CM

## 2023-12-19 PROCEDURE — 99024 POSTOP FOLLOW-UP VISIT: CPT | Performed by: PHYSICIAN ASSISTANT

## 2023-12-19 NOTE — LETTER
12/19/2023    Gaby Santos, APRN CNP  [address] [address]    RE: Lisseth Qureshi       Dear Colleague,     I had the pleasure of seeing Lisseth Qureshi in the [facility].  CARDIOTHORACIC SURGERY FOLLOW-UP VISIT     Lisseth Qureshi   1955   [id number]      Reason for visit: Post operative clinic visit. Patient underwent coronary artery bypass grafting x 3 with Dr. Gabriel on 11/22/23.    HPI: Lisseth Qureshi is a 68 year old year old female seen in clinic for a routine follow-up appointment after surgery. Patient has past medical history as below. Hospital course was uneventful. Patient was discharged to home.    Patient has been doing well since discharge. Patient did follow-up with primary care since leaving the hospital. Reports that incisions are healing well. Denies fevers, peripheral edema. Appetite is improving and patient is voiding without difficulty. Weight has been stable. Pain management at this point with tylenol. Patient has been attending cardiac rehab 3 x week and this is going well. Patient is not on anti-coagulation.     PAST MEDICAL HISTORY:  Past Medical History:   Diagnosis Date    Diabetes (H)     Heart disease     History of blood transfusion        PAST SURGICAL HISTORY:  Past Surgical History:   Procedure Laterality Date    COLONOSCOPY N/A 11/17/2023    Procedure: COLONOSCOPY WITH POLYPECTOMY, BIOPSY AND HEMOCLIP APPLICATION;  Surgeon: David oWng DO;  Location: [facility] OR    CORONARY ARTERY BYPASS GRAFT, WITH ENDOSCOPIC VESSEL PROCUREMENT N/A 11/22/2023    Procedure: CORONARY ARTERY BYPASS GRAFT TIMES THREE, LEFT INTERNAL MAMMARY ARTERY HARVEST, LEFT LEG ENDOSCOPIC VESSEL PROCUREMENT, ANESTHESIA TRANSESOPHAGEAL ECHOCARDIOGRAM, EPI-AORTIC ULTRASOUND;  Surgeon: Yanet Gabriel MD;  Location: [facility] OR    CORONARY STENT PLACEMENT  10/19/2012    glenna 1st obtuse marginal    CV CORONARY ANGIOGRAM N/A 4/18/2023    Procedure:  Coronary Angiogram;  Surgeon: Javier Guerra MD;  Location: [facility] LAB CV    CV CORONARY ANGIOGRAM N/A 11/14/2023    Procedure: Coronary Angiogram;  Surgeon: Dilan Archuleta MD;  Location: [facility] LAB CV    CV INSTANTANEOUS WAVE-FREE RATIO N/A 11/14/2023    Procedure: Instantaneous Wave-Free Ratio;  Surgeon: Dilan Archuleta MD;  Location: [facility] LAB CV    CV LEFT HEART CATH N/A 4/18/2023    Procedure: Left Heart Catheterization;  Surgeon: Javier Guerra MD;  Location: [facility] LAB CV    CV PCI N/A 4/18/2023    Procedure: Percutaneous Coronary Intervention;  Surgeon: Javier Guerra MD;  Location: [facility] CV    ESOPHAGOSCOPY, GASTROSCOPY, DUODENOSCOPY (EGD), COMBINED N/A 11/17/2023    Procedure: ESOPHAGOGASTRODUODENOSCOPY WITH BIOPSIES AND HEMOCLIP APPLICATION;  Surgeon: David Wong DO;  Location: [facility] Main OR    SHOULDER SURGERY      frozen shoulder       CURRENT MEDICATIONS:     Current Outpatient Medications:     acetaminophen (TYLENOL) 500 MG tablet, Take 1-2 tablets (500-1,000 mg) by mouth every 6 hours as needed for mild pain, Disp: , Rfl:     albuterol (PROAIR HFA/PROVENTIL HFA/VENTOLIN HFA) 108 (90 Base) MCG/ACT inhaler, Inhale 2 puffs into the lungs every 6 hours as needed for shortness of breath, wheezing or cough, Disp: 18 g, Rfl: 0    amLODIPine (NORVASC) 10 MG tablet, Take 10 mg by mouth daily, Disp: , Rfl:     aspirin (ASA) 81 MG chewable tablet, 2 tablets (162 mg) by Oral or NG Tube route daily, Disp: 180 tablet, Rfl: 1    buPROPion (WELLBUTRIN XL) 300 MG 24 hr tablet, Take 300 mg by mouth daily, Disp: , Rfl:     evolocumab (REPATHA SURECLICK) 140 MG/ML prefilled autoinjector, Inject 1 mL (140 mg) Subcutaneous every 14 days, Disp: 6 mL, Rfl: 3    exenatide ER (BYDUREON BCISE) 2 MG/0.85ML auto-injector, Inject 2 mg Subcutaneous every 7 days Once a week on Sunday, Disp: , Rfl:     ferrous sulfate (FEROSUL) 325 (65 Fe) MG tablet, Take 1 tablet (325  mg) by mouth daily, Disp: 30 tablet, Rfl: 3    levothyroxine (SYNTHROID, LEVOTHROID) 75 MCG tablet, [LEVOTHYROXINE (SYNTHROID, LEVOTHROID) 75 MCG TABLET] Take 75 mcg by mouth daily., Disp: , Rfl:     metFORMIN (GLUCOPHAGE XR) 500 MG 24 hr tablet, Take 500 mg by mouth daily before breakfast, Disp: , Rfl:     metFORMIN (GLUCOPHAGE XR) 500 MG 24 hr tablet, Take 1,000 mg by mouth daily (with dinner), Disp: , Rfl:     Metoprolol Tartrate 75 MG TABS, Take 150 mg by mouth 2 times daily, Disp: 360 tablet, Rfl: 1    multivitamin therapeutic (THERAGRAN) tablet, [MULTIVITAMIN THERAPEUTIC (THERAGRAN) TABLET] Take 1 tablet by mouth daily., Disp: , Rfl:     pantoprazole (PROTONIX) 40 MG EC tablet, Take 1 tablet (40 mg) by mouth daily before breakfast, Disp: 30 tablet, Rfl: 0    senna-docusate (SENOKOT-S/PERICOLACE) 8.6-50 MG tablet, 1 tablet by Oral or Feeding Tube route 2 times daily, Disp: 14 tablet, Rfl: 0    coenzyme Q10 (COQ-10) 100 mg capsule, 100 mg every evening (Patient not taking: Reported on 12/19/2023), Disp: , Rfl:     fish oil-omega-3 fatty acids 1000 MG capsule, Take 2 g by mouth every evening (Patient not taking: Reported on 12/19/2023), Disp: , Rfl:     fluticasone (FLONASE) 50 MCG/ACT nasal spray, Spray 1 spray into both nostrils daily (Patient not taking: Reported on 12/19/2023), Disp: 9 mL, Rfl: 0    melatonin 3 mg Tab, [MELATONIN 3 MG TAB] Take 3 mg by mouth bedtime as needed. (Patient not taking: Reported on 12/19/2023), Disp: , Rfl:     nitroGLYcerin (NITROSTAT) 0.4 MG sublingual tablet, For chest pain place 1 tablet under the tongue every 5 minutes for 3 doses. If symptoms persist 5 minutes after 1st dose call 911. (Patient not taking: Reported on 12/19/2023), Disp: 20 tablet, Rfl: 0    red yeast rice 600 mg Tab, [RED YEAST RICE 600 MG TAB] Take 1 tablet by mouth daily. (Patient not taking: Reported on 12/19/2023), Disp: , Rfl:     ALLERGIES:      Allergies   Allergen Reactions    Codeine      Other  reaction(s): *Unknown    Guaifenesin Swelling     Felt throat swelling, Guaifenesin with codeine    Penicillins Unknown     Unknown, allergy occurred at 6 months old  11/22/23 tolerated cefazolin with open heart surgery     Rosuvastatin Calcium [Rosuvastatin] Unknown       ROS:  Gen: No fevers, weight loss/gain  CV: Denies chest pain, peripheral edema  Pulm: Denies SOB  GI/: Voiding without problems, appetite improving.     LABS:  None    IMAGING:  None    PHYSICAL EXAM:   /50 (BP Location: Left arm, Patient Position: Sitting, Cuff Size: Adult Regular)   Pulse 59   Resp 18   Wt 69.9 kg (154 lb)   LMP  (LMP Unknown)   SpO2 97%   BMI 31.10 kg/m    General: Alert and oriented, pleasant, no acute distress.  CV:  No peripheral edema.  Pulm: Easy work of breathing on room air.   GI: Soft, non-tender, and non-distended  Incision: Chest and left leg incisions clean dry and intact without erythema, swelling or drainage  Neuro: CNs grossly intact.      ASSESSMENT/PLAN:  Lisseth Qureshi is a 68 year old year old female status post CAB x 3 who returns to clinic for postop visit.     - Surgically doing well.  Incisions are healing well with no signs of infection. Sternum is stable.  - Hemodynamics are stable. No medication changes were needed today.  - Follow up with your cardiologist as scheduled with Dr Shaffer 2/21/24 at 4:20 pm  - Continue Cardiac Rehab until completed.   - May start driving  (4 weeks post-op) if not using narcotic pain medications.  - Continue strict sternal precautions until 1/22/24. No lifting >10bs; may gradually increase at this point (6 weeks post-op).   - No need for further follow-up with CV surgery unless concerns. Feel free to call our office with questions. [phone]  - May return to work 1/16/24 per her request. She works from home.         Yuliana Sifuentes PA-C  [facility] Cardiothoracic Surgery  Vocera or pager [phone]        Thank you for allowing me to participate in the care of  your patient.      Sincerely,     TALITA Watkins [facility]  cc:   No referring provider defined for this encounter.

## 2023-12-19 NOTE — PROGRESS NOTES
CARDIOTHORACIC SURGERY FOLLOW-UP VISIT     Lisseth Qureshi   1955   [id number]      Reason for visit: Post operative clinic visit. Patient underwent coronary artery bypass grafting x 3 with Dr. Gabriel on 11/22/23.    HPI: Lisseth Qureshi is a 68 year old year old female seen in clinic for a routine follow-up appointment after surgery. Patient has past medical history as below. Hospital course was uneventful. Patient was discharged to home.    Patient has been doing well since discharge. Patient did follow-up with primary care since leaving the hospital. Reports that incisions are healing well. Denies fevers, peripheral edema. Appetite is improving and patient is voiding without difficulty. Weight has been stable. Pain management at this point with tylenol. Patient has been attending cardiac rehab 3 x week and this is going well. Patient is not on anti-coagulation.     PAST MEDICAL HISTORY:  Past Medical History:   Diagnosis Date    Diabetes (H)     Heart disease     History of blood transfusion        PAST SURGICAL HISTORY:  Past Surgical History:   Procedure Laterality Date    COLONOSCOPY N/A 11/17/2023    Procedure: COLONOSCOPY WITH POLYPECTOMY, BIOPSY AND HEMOCLIP APPLICATION;  Surgeon: David Wong DO;  Location: [facility] OR    CORONARY ARTERY BYPASS GRAFT, WITH ENDOSCOPIC VESSEL PROCUREMENT N/A 11/22/2023    Procedure: CORONARY ARTERY BYPASS GRAFT TIMES THREE, LEFT INTERNAL MAMMARY ARTERY HARVEST, LEFT LEG ENDOSCOPIC VESSEL PROCUREMENT, ANESTHESIA TRANSESOPHAGEAL ECHOCARDIOGRAM, EPI-AORTIC ULTRASOUND;  Surgeon: Yanet Gabriel MD;  Location: [facility] Main OR    CORONARY STENT PLACEMENT  10/19/2012    glenna 1st obtuse marginal    CV CORONARY ANGIOGRAM N/A 4/18/2023    Procedure: Coronary Angiogram;  Surgeon: Javier Guerra MD;  Location: [facility] CATH LAB CV    CV CORONARY ANGIOGRAM N/A 11/14/2023    Procedure: Coronary Angiogram;  Surgeon: Dilan Archuleta MD;  Location: [facility] CATH LAB  CV    CV INSTANTANEOUS WAVE-FREE RATIO N/A 11/14/2023    Procedure: Instantaneous Wave-Free Ratio;  Surgeon: Dilan Archuleta MD;  Location: [facility] LAB CV    CV LEFT HEART CATH N/A 4/18/2023    Procedure: Left Heart Catheterization;  Surgeon: Javier Guerra MD;  Location: [facility] LAB CV    CV PCI N/A 4/18/2023    Procedure: Percutaneous Coronary Intervention;  Surgeon: Javier Guerra MD;  Location: [facility] LAB CV    ESOPHAGOSCOPY, GASTROSCOPY, DUODENOSCOPY (EGD), COMBINED N/A 11/17/2023    Procedure: ESOPHAGOGASTRODUODENOSCOPY WITH BIOPSIES AND HEMOCLIP APPLICATION;  Surgeon: David Wong DO;  Location: [facility] OR    SHOULDER SURGERY      frozen shoulder       CURRENT MEDICATIONS:     Current Outpatient Medications:     acetaminophen (TYLENOL) 500 MG tablet, Take 1-2 tablets (500-1,000 mg) by mouth every 6 hours as needed for mild pain, Disp: , Rfl:     albuterol (PROAIR HFA/PROVENTIL HFA/VENTOLIN HFA) 108 (90 Base) MCG/ACT inhaler, Inhale 2 puffs into the lungs every 6 hours as needed for shortness of breath, wheezing or cough, Disp: 18 g, Rfl: 0    amLODIPine (NORVASC) 10 MG tablet, Take 10 mg by mouth daily, Disp: , Rfl:     aspirin (ASA) 81 MG chewable tablet, 2 tablets (162 mg) by Oral or NG Tube route daily, Disp: 180 tablet, Rfl: 1    buPROPion (WELLBUTRIN XL) 300 MG 24 hr tablet, Take 300 mg by mouth daily, Disp: , Rfl:     evolocumab (REPATHA SURECLICK) 140 MG/ML prefilled autoinjector, Inject 1 mL (140 mg) Subcutaneous every 14 days, Disp: 6 mL, Rfl: 3    exenatide ER (BYDUREON BCISE) 2 MG/0.85ML auto-injector, Inject 2 mg Subcutaneous every 7 days Once a week on Sunday, Disp: , Rfl:     ferrous sulfate (FEROSUL) 325 (65 Fe) MG tablet, Take 1 tablet (325 mg) by mouth daily, Disp: 30 tablet, Rfl: 3    levothyroxine (SYNTHROID, LEVOTHROID) 75 MCG tablet, [LEVOTHYROXINE (SYNTHROID, LEVOTHROID) 75 MCG TABLET] Take 75 mcg by mouth daily., Disp: , Rfl:     metFORMIN  (GLUCOPHAGE XR) 500 MG 24 hr tablet, Take 500 mg by mouth daily before breakfast, Disp: , Rfl:     metFORMIN (GLUCOPHAGE XR) 500 MG 24 hr tablet, Take 1,000 mg by mouth daily (with dinner), Disp: , Rfl:     Metoprolol Tartrate 75 MG TABS, Take 150 mg by mouth 2 times daily, Disp: 360 tablet, Rfl: 1    multivitamin therapeutic (THERAGRAN) tablet, [MULTIVITAMIN THERAPEUTIC (THERAGRAN) TABLET] Take 1 tablet by mouth daily., Disp: , Rfl:     pantoprazole (PROTONIX) 40 MG EC tablet, Take 1 tablet (40 mg) by mouth daily before breakfast, Disp: 30 tablet, Rfl: 0    senna-docusate (SENOKOT-S/PERICOLACE) 8.6-50 MG tablet, 1 tablet by Oral or Feeding Tube route 2 times daily, Disp: 14 tablet, Rfl: 0    coenzyme Q10 (COQ-10) 100 mg capsule, 100 mg every evening (Patient not taking: Reported on 12/19/2023), Disp: , Rfl:     fish oil-omega-3 fatty acids 1000 MG capsule, Take 2 g by mouth every evening (Patient not taking: Reported on 12/19/2023), Disp: , Rfl:     fluticasone (FLONASE) 50 MCG/ACT nasal spray, Spray 1 spray into both nostrils daily (Patient not taking: Reported on 12/19/2023), Disp: 9 mL, Rfl: 0    melatonin 3 mg Tab, [MELATONIN 3 MG TAB] Take 3 mg by mouth bedtime as needed. (Patient not taking: Reported on 12/19/2023), Disp: , Rfl:     nitroGLYcerin (NITROSTAT) 0.4 MG sublingual tablet, For chest pain place 1 tablet under the tongue every 5 minutes for 3 doses. If symptoms persist 5 minutes after 1st dose call 911. (Patient not taking: Reported on 12/19/2023), Disp: 20 tablet, Rfl: 0    red yeast rice 600 mg Tab, [RED YEAST RICE 600 MG TAB] Take 1 tablet by mouth daily. (Patient not taking: Reported on 12/19/2023), Disp: , Rfl:     ALLERGIES:      Allergies   Allergen Reactions    Codeine      Other reaction(s): *Unknown    Guaifenesin Swelling     Felt throat swelling, Guaifenesin with codeine    Penicillins Unknown     Unknown, allergy occurred at 6 months old  11/22/23 tolerated cefazolin with open heart  surgery     Rosuvastatin Calcium [Rosuvastatin] Unknown       ROS:  Gen: No fevers, weight loss/gain  CV: Denies chest pain, peripheral edema  Pulm: Denies SOB  GI/: Voiding without problems, appetite improving.     LABS:  None    IMAGING:  None    PHYSICAL EXAM:   /50 (BP Location: Left arm, Patient Position: Sitting, Cuff Size: Adult Regular)   Pulse 59   Resp 18   Wt 69.9 kg (154 lb)   LMP  (LMP Unknown)   SpO2 97%   BMI 31.10 kg/m    General: Alert and oriented, pleasant, no acute distress.  CV:  No peripheral edema.  Pulm: Easy work of breathing on room air.   GI: Soft, non-tender, and non-distended  Incision: Chest and left leg incisions clean dry and intact without erythema, swelling or drainage  Neuro: CNs grossly intact.      ASSESSMENT/PLAN:  Lisseth Qureshi is a 68 year old year old female status post CAB x 3 who returns to clinic for postop visit.     - Surgically doing well.  Incisions are healing well with no signs of infection. Sternum is stable.  - Hemodynamics are stable. No medication changes were needed today.  - Follow up with your cardiologist as scheduled with Dr Shaffer 2/21/24 at 4:20 pm  - Continue Cardiac Rehab until completed.   - May start driving  (4 weeks post-op) if not using narcotic pain medications.  - Continue strict sternal precautions until 1/22/24. No lifting >10bs; may gradually increase at this point (6 weeks post-op).   - No need for further follow-up with CV surgery unless concerns. Feel free to call our office with questions. [phone]  - May return to work 1/16/24 per her request. She works from home.         Yuliana Sifuentes PA-C  [facility] Cardiothoracic Surgery  Vocera or pager [phone]

## 2023-12-20 ENCOUNTER — HOSPITAL ENCOUNTER (OUTPATIENT)
Dept: CARDIAC REHAB | Facility: HOSPITAL | Age: 68
Discharge: HOME | End: 2023-12-20
Attending: INTERNAL MEDICINE
Payer: COMMERCIAL

## 2023-12-20 PROCEDURE — 93797 PHYS/QHP OP CAR RHAB WO ECG: CPT | Mod: XU

## 2023-12-20 PROCEDURE — 93798 PHYS/QHP OP CAR RHAB W/ECG: CPT

## 2023-12-22 DIAGNOSIS — Z95.1 S/P CABG (CORONARY ARTERY BYPASS GRAFT): ICD-10-CM

## 2023-12-27 ENCOUNTER — HOSPITAL ENCOUNTER (OUTPATIENT)
Dept: CARDIAC REHAB | Facility: HOSPITAL | Age: 68
Discharge: HOME | End: 2023-12-27
Attending: INTERNAL MEDICINE
Payer: COMMERCIAL

## 2023-12-27 ENCOUNTER — LAB (OUTPATIENT)
Dept: LAB | Facility: CLINIC | Age: 68
End: 2023-12-27
Payer: COMMERCIAL

## 2023-12-27 DIAGNOSIS — N18.30 TYPE 2 DIABETES MELLITUS WITH STAGE 3 CHRONIC KIDNEY DISEASE, WITHOUT LONG-TERM CURRENT USE OF INSULIN, UNSPECIFIED WHETHER STAGE 3A OR 3B CKD (H): ICD-10-CM

## 2023-12-27 DIAGNOSIS — D64.9 ANEMIA, UNSPECIFIED TYPE: ICD-10-CM

## 2023-12-27 DIAGNOSIS — E11.22 TYPE 2 DIABETES MELLITUS WITH STAGE 3 CHRONIC KIDNEY DISEASE, WITHOUT LONG-TERM CURRENT USE OF INSULIN, UNSPECIFIED WHETHER STAGE 3A OR 3B CKD (H): ICD-10-CM

## 2023-12-27 DIAGNOSIS — I10 ESSENTIAL HYPERTENSION: ICD-10-CM

## 2023-12-27 LAB
ANION GAP SERPL CALCULATED.3IONS-SCNC: 12 MMOL/L (ref 7–15)
BUN SERPL-MCNC: 24.7 MG/DL (ref 8–23)
CALCIUM SERPL-MCNC: 9.4 MG/DL (ref 8.8–10.2)
CHLORIDE SERPL-SCNC: 104 MMOL/L (ref 98–107)
CREAT SERPL-MCNC: 0.92 MG/DL (ref 0.51–0.95)
DEPRECATED HCO3 PLAS-SCNC: 24 MMOL/L (ref 22–29)
EGFRCR SERPLBLD CKD-EPI 2021: 68 ML/MIN/1.73M2
GLUCOSE SERPL-MCNC: 153 MG/DL (ref 70–99)
POTASSIUM SERPL-SCNC: 4.3 MMOL/L (ref 3.4–5.3)
SODIUM SERPL-SCNC: 140 MMOL/L (ref 135–145)

## 2023-12-27 PROCEDURE — 80048 BASIC METABOLIC PNL TOTAL CA: CPT

## 2023-12-27 PROCEDURE — 93797 PHYS/QHP OP CAR RHAB WO ECG: CPT | Mod: XU

## 2023-12-27 PROCEDURE — 36415 COLL VENOUS BLD VENIPUNCTURE: CPT

## 2023-12-27 PROCEDURE — 93798 PHYS/QHP OP CAR RHAB W/ECG: CPT

## 2023-12-27 RX ORDER — ASPIRIN 81 MG/1
162 TABLET, CHEWABLE ORAL DAILY
Qty: 180 TABLET | Refills: 1 | Status: SHIPPED | OUTPATIENT
Start: 2023-12-27 | End: 2024-07-23

## 2023-12-28 DIAGNOSIS — J06.9 UPPER RESPIRATORY TRACT INFECTION, UNSPECIFIED TYPE: ICD-10-CM

## 2023-12-28 RX ORDER — ALBUTEROL SULFATE 90 UG/1
2 AEROSOL, METERED RESPIRATORY (INHALATION) EVERY 6 HOURS PRN
Qty: 8.5 G | Refills: 0 | Status: SHIPPED | OUTPATIENT
Start: 2023-12-28

## 2023-12-29 ENCOUNTER — HOSPITAL ENCOUNTER (OUTPATIENT)
Dept: CARDIAC REHAB | Facility: HOSPITAL | Age: 68
Discharge: HOME | End: 2023-12-29
Attending: INTERNAL MEDICINE
Payer: COMMERCIAL

## 2023-12-29 PROCEDURE — 93798 PHYS/QHP OP CAR RHAB W/ECG: CPT

## 2024-01-03 ENCOUNTER — HOSPITAL ENCOUNTER (OUTPATIENT)
Dept: CARDIAC REHAB | Facility: HOSPITAL | Age: 69
Discharge: HOME | End: 2024-01-03
Attending: INTERNAL MEDICINE
Payer: COMMERCIAL

## 2024-01-03 PROCEDURE — 93798 PHYS/QHP OP CAR RHAB W/ECG: CPT

## 2024-01-03 PROCEDURE — 93797 PHYS/QHP OP CAR RHAB WO ECG: CPT | Mod: 59

## 2024-01-05 ENCOUNTER — HOSPITAL ENCOUNTER (OUTPATIENT)
Dept: CARDIAC REHAB | Facility: HOSPITAL | Age: 69
Discharge: HOME | End: 2024-01-05
Attending: INTERNAL MEDICINE
Payer: COMMERCIAL

## 2024-01-05 PROCEDURE — 93798 PHYS/QHP OP CAR RHAB W/ECG: CPT

## 2024-01-07 DIAGNOSIS — Z95.1 S/P CABG (CORONARY ARTERY BYPASS GRAFT): ICD-10-CM

## 2024-01-08 ENCOUNTER — HOSPITAL ENCOUNTER (OUTPATIENT)
Dept: CARDIAC REHAB | Facility: HOSPITAL | Age: 69
Discharge: HOME | End: 2024-01-08
Attending: INTERNAL MEDICINE
Payer: COMMERCIAL

## 2024-01-08 PROCEDURE — 93798 PHYS/QHP OP CAR RHAB W/ECG: CPT

## 2024-01-10 ENCOUNTER — TRANSFERRED RECORDS (OUTPATIENT)
Dept: MULTI SPECIALTY CLINIC | Facility: CLINIC | Age: 69
End: 2024-01-10

## 2024-01-10 ENCOUNTER — HOSPITAL ENCOUNTER (OUTPATIENT)
Dept: CARDIAC REHAB | Facility: HOSPITAL | Age: 69
Discharge: HOME | End: 2024-01-10
Attending: INTERNAL MEDICINE
Payer: COMMERCIAL

## 2024-01-10 LAB
CHOLESTEROL (EXTERNAL): 125 MG/DL (ref 0–199)
HBA1C MFR BLD: 6.3 %
HDLC SERPL-MCNC: 33 MG/DL
LDL CHOLESTEROL CALCULATED (EXTERNAL): 58 MG/DL
NON HDL CHOLESTEROL (EXTERNAL): 92 MG/DL
TRIGLYCERIDES (EXTERNAL): 169 MG/DL
TSH SERPL-ACNC: 1.17 UIU/ML (ref 0.3–4.5)

## 2024-01-10 PROCEDURE — 93797 PHYS/QHP OP CAR RHAB WO ECG: CPT

## 2024-01-10 RX ORDER — PANTOPRAZOLE SODIUM 40 MG/1
40 TABLET, DELAYED RELEASE ORAL
Qty: 30 TABLET | Refills: 0 | Status: SHIPPED | OUTPATIENT
Start: 2024-01-10 | End: 2024-02-03

## 2024-01-10 NOTE — TELEPHONE ENCOUNTER
Typically this is continued for one month following surgery, if wanting for long-term acid reflux management would want to visit.

## 2024-01-12 ENCOUNTER — HOSPITAL ENCOUNTER (OUTPATIENT)
Dept: CARDIAC REHAB | Facility: HOSPITAL | Age: 69
Discharge: HOME | End: 2024-01-12
Attending: INTERNAL MEDICINE
Payer: COMMERCIAL

## 2024-01-12 PROCEDURE — 93798 PHYS/QHP OP CAR RHAB W/ECG: CPT

## 2024-01-15 ENCOUNTER — HOSPITAL ENCOUNTER (OUTPATIENT)
Dept: CARDIAC REHAB | Facility: HOSPITAL | Age: 69
Discharge: HOME | End: 2024-01-15
Attending: INTERNAL MEDICINE
Payer: COMMERCIAL

## 2024-01-15 PROCEDURE — 93798 PHYS/QHP OP CAR RHAB W/ECG: CPT | Performed by: OCCUPATIONAL THERAPIST

## 2024-01-17 ENCOUNTER — HOSPITAL ENCOUNTER (OUTPATIENT)
Dept: CARDIAC REHAB | Facility: HOSPITAL | Age: 69
Discharge: HOME | End: 2024-01-17
Attending: INTERNAL MEDICINE
Payer: COMMERCIAL

## 2024-01-17 PROCEDURE — 93798 PHYS/QHP OP CAR RHAB W/ECG: CPT

## 2024-01-19 ENCOUNTER — HOSPITAL ENCOUNTER (OUTPATIENT)
Dept: CARDIAC REHAB | Facility: HOSPITAL | Age: 69
Discharge: HOME | End: 2024-01-19
Attending: INTERNAL MEDICINE
Payer: COMMERCIAL

## 2024-01-19 PROCEDURE — 93798 PHYS/QHP OP CAR RHAB W/ECG: CPT

## 2024-01-22 ENCOUNTER — HOSPITAL ENCOUNTER (OUTPATIENT)
Dept: CARDIAC REHAB | Facility: HOSPITAL | Age: 69
Discharge: HOME | End: 2024-01-22
Attending: INTERNAL MEDICINE
Payer: COMMERCIAL

## 2024-01-22 PROCEDURE — 93798 PHYS/QHP OP CAR RHAB W/ECG: CPT

## 2024-01-24 ENCOUNTER — HOSPITAL ENCOUNTER (OUTPATIENT)
Dept: CARDIAC REHAB | Facility: HOSPITAL | Age: 69
Discharge: HOME | End: 2024-01-24
Attending: INTERNAL MEDICINE
Payer: COMMERCIAL

## 2024-01-24 PROCEDURE — 93798 PHYS/QHP OP CAR RHAB W/ECG: CPT

## 2024-01-26 ENCOUNTER — HOSPITAL ENCOUNTER (OUTPATIENT)
Dept: CARDIAC REHAB | Facility: HOSPITAL | Age: 69
Discharge: HOME | End: 2024-01-26
Attending: INTERNAL MEDICINE
Payer: COMMERCIAL

## 2024-01-26 PROCEDURE — 93798 PHYS/QHP OP CAR RHAB W/ECG: CPT

## 2024-01-29 ENCOUNTER — HOSPITAL ENCOUNTER (OUTPATIENT)
Dept: CARDIAC REHAB | Facility: HOSPITAL | Age: 69
Discharge: HOME | End: 2024-01-29
Attending: INTERNAL MEDICINE
Payer: COMMERCIAL

## 2024-01-29 PROCEDURE — 93798 PHYS/QHP OP CAR RHAB W/ECG: CPT

## 2024-01-31 ENCOUNTER — HOSPITAL ENCOUNTER (OUTPATIENT)
Dept: CARDIAC REHAB | Facility: HOSPITAL | Age: 69
Discharge: HOME | End: 2024-01-31
Attending: INTERNAL MEDICINE
Payer: COMMERCIAL

## 2024-01-31 PROCEDURE — 93798 PHYS/QHP OP CAR RHAB W/ECG: CPT

## 2024-02-02 ENCOUNTER — HOSPITAL ENCOUNTER (OUTPATIENT)
Dept: CARDIAC REHAB | Facility: HOSPITAL | Age: 69
Discharge: HOME | End: 2024-02-02
Attending: INTERNAL MEDICINE
Payer: COMMERCIAL

## 2024-02-02 ENCOUNTER — OFFICE VISIT (OUTPATIENT)
Dept: FAMILY MEDICINE | Facility: CLINIC | Age: 69
End: 2024-02-02
Payer: COMMERCIAL

## 2024-02-02 VITALS
SYSTOLIC BLOOD PRESSURE: 116 MMHG | OXYGEN SATURATION: 96 % | WEIGHT: 154.5 LBS | BODY MASS INDEX: 31.15 KG/M2 | DIASTOLIC BLOOD PRESSURE: 60 MMHG | RESPIRATION RATE: 22 BRPM | HEIGHT: 59 IN | TEMPERATURE: 97.3 F | HEART RATE: 64 BPM

## 2024-02-02 DIAGNOSIS — I25.810 CORONARY ARTERY DISEASE INVOLVING CORONARY BYPASS GRAFT OF NATIVE HEART WITHOUT ANGINA PECTORIS: ICD-10-CM

## 2024-02-02 DIAGNOSIS — N18.30 TYPE 2 DIABETES MELLITUS WITH STAGE 3 CHRONIC KIDNEY DISEASE, WITHOUT LONG-TERM CURRENT USE OF INSULIN, UNSPECIFIED WHETHER STAGE 3A OR 3B CKD (H): Primary | ICD-10-CM

## 2024-02-02 DIAGNOSIS — E11.22 TYPE 2 DIABETES MELLITUS WITH STAGE 3 CHRONIC KIDNEY DISEASE, WITHOUT LONG-TERM CURRENT USE OF INSULIN, UNSPECIFIED WHETHER STAGE 3A OR 3B CKD (H): Primary | ICD-10-CM

## 2024-02-02 DIAGNOSIS — Z28.21 VACCINATION REFUSED BY PATIENT: ICD-10-CM

## 2024-02-02 DIAGNOSIS — R10.9 RIGHT FLANK PAIN: ICD-10-CM

## 2024-02-02 DIAGNOSIS — I10 ESSENTIAL HYPERTENSION: ICD-10-CM

## 2024-02-02 DIAGNOSIS — D64.9 ANEMIA, UNSPECIFIED TYPE: ICD-10-CM

## 2024-02-02 DIAGNOSIS — E03.9 HYPOTHYROIDISM, UNSPECIFIED TYPE: ICD-10-CM

## 2024-02-02 PROBLEM — T82.855A: Status: RESOLVED | Noted: 2023-11-18 | Resolved: 2024-02-02

## 2024-02-02 PROBLEM — R07.9 CHEST PAIN, UNSPECIFIED TYPE: Status: RESOLVED | Noted: 2023-11-13 | Resolved: 2024-02-02

## 2024-02-02 PROBLEM — E55.9 VITAMIN D DEFICIENCY: Status: ACTIVE | Noted: 2022-12-08

## 2024-02-02 LAB
ERYTHROCYTE [DISTWIDTH] IN BLOOD BY AUTOMATED COUNT: 17.1 % (ref 10–15)
FERRITIN SERPL-MCNC: 76 NG/ML (ref 11–328)
HCT VFR BLD AUTO: 38.8 % (ref 35–47)
HGB BLD-MCNC: 12.4 G/DL (ref 11.7–15.7)
MCH RBC QN AUTO: 27.5 PG (ref 26.5–33)
MCHC RBC AUTO-ENTMCNC: 32 G/DL (ref 31.5–36.5)
MCV RBC AUTO: 86 FL (ref 78–100)
PLATELET # BLD AUTO: 231 10E3/UL (ref 150–450)
RBC # BLD AUTO: 4.51 10E6/UL (ref 3.8–5.2)
WBC # BLD AUTO: 4.7 10E3/UL (ref 4–11)

## 2024-02-02 PROCEDURE — 36415 COLL VENOUS BLD VENIPUNCTURE: CPT | Performed by: FAMILY MEDICINE

## 2024-02-02 PROCEDURE — 99214 OFFICE O/P EST MOD 30 MIN: CPT | Mod: 24 | Performed by: FAMILY MEDICINE

## 2024-02-02 PROCEDURE — 82728 ASSAY OF FERRITIN: CPT | Performed by: FAMILY MEDICINE

## 2024-02-02 PROCEDURE — 93798 PHYS/QHP OP CAR RHAB W/ECG: CPT

## 2024-02-02 PROCEDURE — 85027 COMPLETE CBC AUTOMATED: CPT | Performed by: FAMILY MEDICINE

## 2024-02-02 PROCEDURE — G2211 COMPLEX E/M VISIT ADD ON: HCPCS | Performed by: FAMILY MEDICINE

## 2024-02-02 ASSESSMENT — PAIN SCALES - GENERAL: PAINLEVEL: MILD PAIN (2)

## 2024-02-02 NOTE — PROGRESS NOTES
Assessment & Plan     (E11.22,  N18.30) Type 2 diabetes mellitus with stage 3 chronic kidney disease, without long-term current use of insulin, unspecified whether stage 3a or 3b CKD (H)  (primary encounter diagnosis)  Comment: at goal. Doing well. Would like to switch to [facility] - referral made   Plan: Adult Endocrinology  Referral            (I25.810) Coronary artery disease involving coronary bypass graft of native heart without angina pectoris  Comment: doing well and increasing her activity level   Plan:     (R10.9) Right flank pain  Comment: sounds muscular. She is interested in abd us and will try PT. Referrals made. The patient indicates understanding of these issues and agrees with the plan.   Plan: Physical Therapy Referral, US Abdomen Complete            (E03.9) Hypothyroidism, unspecified type  Comment: tsh in goal, on levothy   Plan: Adult Endocrinology  Referral            (I10) Hypertension  Comment: stable   Plan:     (D64.9) Anemia, unspecified type  Comment: improving. Will see where ferritin is to determine if she should stay on iron supplementation   Plan: CBC with platelets, Ferritin            (Z28.21) Vaccination refused by patient  Comment: shingrix,rsv  Plan:               Regular exercise    Subjective   Lisseth is a 68 year old, presenting for the following health issues:  Results (Patient had labs drawn on 12/27/2023, would like to discuss these today.  Looks like things are normal but she is unsure why else she would be here today. ) and Abdominal Pain        2/2/2024     9:42 AM   Additional Questions   Roomed by Sharon CAICEDO     History of Present Illness       Reason for visit:  I think follow up on kidney function    She eats 2-3 servings of fruits and vegetables daily.She consumes 0 sweetened beverage(s) daily.She exercises with enough effort to increase her heart rate 30 to 60 minutes per day.  She exercises with enough effort to increase her heart rate 6 days  per week.   She is taking medications regularly.     Chief Complaint   Patient presents with    Results     Patient had labs drawn on 12/27/2023, would like to discuss these today.  Looks like things are normal but she is unsure why else she would be here today.     Abdominal Pain         Pain History:  When did you first notice your pain? 1 month    Have you seen anyone else for your pain? No  How has your pain affected your ability to work? 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 Abdominal/Flank Pain  Onset/Duration: 1 month   Description:   Character: Dull ache, and sharp   Location: right upper quadrant  Radiation: Back  Intensity: 2/10  Progression of Symptoms:  same and intermittent  Accompanying Signs & Symptoms:  Fever/Chills: No  Gas/Bloating: No  Nausea: No  Vomitting: No  Diarrhea: No  Constipation: YES for years   Dysuria or Hematuria: No  History:   Trauma: No  Previous similar pain: YES- says in the past she would get this pain only while she was driving   Previous tests done: none  Precipitating factors:   Does the pain change with:     Food: No    Bowel Movement: No    Urination: No   Other factors:  No  Therapies tried and outcome: None  No LMP recorded (lmp unknown). Patient is postmenopausal.      Last visit 12/5/23  post hospital - cardiac bypass   Had stents that occluded   Still doing cardiac rehab     Diabetes     Anemia  - hemoglobin improving  Ran out of iron oral, wondering if she should continue it     Gfr low, rechecked and normal       Right flank pain - ongoing for a couple years  Notices it when sitting   Never has it when active   6-7/10 at strongest   1-2/10 usually     Docs in the past have told her that its muscular     Vaccines -doesn't want to do any today  Says she had flu, not in our chart or miic     Interested in switching to [facility] so whole team is at [facility]     Dexa due  "- will schedule       Review of Systems  Constitutional, HEENT, cardiovascular, pulmonary, gi and gu systems are negative, except as otherwise noted.      Objective    /60   Pulse 64   Temp 97.3  F (36.3  C) (Tympanic)   Resp 22   Ht 1.499 m (4' 11\")   Wt 70.1 kg (154 lb 8 oz)   LMP  (LMP Unknown)   SpO2 96%   BMI 31.21 kg/m    Body mass index is 31.21 kg/m .  Physical Exam   GENERAL: alert and no distress  EYES: Eyes grossly normal to inspection, PERRL and conjunctivae and sclerae normal  NECK: no adenopathy, no asymmetry, masses, or scars  RESP: lungs clear to auscultation - no rales, rhonchi or wheezes  CV: regular rate and rhythm, normal S1 S2, no S3 or S4, no murmur, click or rub, no peripheral edema  ABDOMEN: soft, nontender, no hepatosplenomegaly, no masses and bowel sounds normal  MS: no gross musculoskeletal defects noted, no edema  SKIN: no suspicious lesions or rashes  NEURO: Normal strength and tone, mentation intact and speech normal  PSYCH: mentation appears normal, affect normal/bright  LYMPH: no cervical, supraclavicular, axillary, or inguinal adenopathy  Back - no pain with  flexion/extension/twisting or lateral bending  Normal gait and station         Signed Electronically by: Krystal Pollock MD    "

## 2024-02-05 ENCOUNTER — HOSPITAL ENCOUNTER (OUTPATIENT)
Dept: CARDIAC REHAB | Facility: HOSPITAL | Age: 69
Discharge: HOME | End: 2024-02-05
Attending: INTERNAL MEDICINE
Payer: COMMERCIAL

## 2024-02-05 PROCEDURE — 93798 PHYS/QHP OP CAR RHAB W/ECG: CPT

## 2024-02-07 ENCOUNTER — HOSPITAL ENCOUNTER (OUTPATIENT)
Dept: CARDIAC REHAB | Facility: HOSPITAL | Age: 69
Discharge: HOME | End: 2024-02-07
Attending: INTERNAL MEDICINE
Payer: COMMERCIAL

## 2024-02-07 PROCEDURE — 93798 PHYS/QHP OP CAR RHAB W/ECG: CPT

## 2024-02-12 ENCOUNTER — HOSPITAL ENCOUNTER (OUTPATIENT)
Dept: CARDIAC REHAB | Facility: HOSPITAL | Age: 69
Discharge: HOME | End: 2024-02-12
Attending: INTERNAL MEDICINE
Payer: COMMERCIAL

## 2024-02-12 PROCEDURE — 93798 PHYS/QHP OP CAR RHAB W/ECG: CPT

## 2024-02-14 ENCOUNTER — HOSPITAL ENCOUNTER (OUTPATIENT)
Dept: CARDIAC REHAB | Facility: HOSPITAL | Age: 69
Discharge: HOME | End: 2024-02-14
Attending: INTERNAL MEDICINE
Payer: COMMERCIAL

## 2024-02-14 PROCEDURE — 93798 PHYS/QHP OP CAR RHAB W/ECG: CPT

## 2024-02-16 ENCOUNTER — HOSPITAL ENCOUNTER (OUTPATIENT)
Dept: CARDIAC REHAB | Facility: HOSPITAL | Age: 69
Discharge: HOME | End: 2024-02-16
Attending: INTERNAL MEDICINE
Payer: COMMERCIAL

## 2024-02-16 PROCEDURE — 93798 PHYS/QHP OP CAR RHAB W/ECG: CPT

## 2024-02-19 ENCOUNTER — HOSPITAL ENCOUNTER (OUTPATIENT)
Dept: CARDIAC REHAB | Facility: HOSPITAL | Age: 69
Discharge: HOME | End: 2024-02-19
Attending: INTERNAL MEDICINE
Payer: COMMERCIAL

## 2024-02-19 PROCEDURE — 93798 PHYS/QHP OP CAR RHAB W/ECG: CPT

## 2024-02-21 ENCOUNTER — OFFICE VISIT (OUTPATIENT)
Dept: CARDIOLOGY | Facility: CLINIC | Age: 69
End: 2024-02-21
Payer: COMMERCIAL

## 2024-02-21 ENCOUNTER — HOSPITAL ENCOUNTER (OUTPATIENT)
Dept: CARDIAC REHAB | Facility: HOSPITAL | Age: 69
Discharge: HOME | End: 2024-02-21
Attending: INTERNAL MEDICINE
Payer: COMMERCIAL

## 2024-02-21 VITALS
DIASTOLIC BLOOD PRESSURE: 60 MMHG | HEART RATE: 70 BPM | RESPIRATION RATE: 20 BRPM | WEIGHT: 155 LBS | SYSTOLIC BLOOD PRESSURE: 128 MMHG | BODY MASS INDEX: 31.31 KG/M2

## 2024-02-21 DIAGNOSIS — E11.22 TYPE 2 DIABETES MELLITUS WITH STAGE 3 CHRONIC KIDNEY DISEASE, WITHOUT LONG-TERM CURRENT USE OF INSULIN, UNSPECIFIED WHETHER STAGE 3A OR 3B CKD (H): ICD-10-CM

## 2024-02-21 DIAGNOSIS — Z78.9 STATIN INTOLERANCE: ICD-10-CM

## 2024-02-21 DIAGNOSIS — I25.10 CORONARY ARTERY DISEASE INVOLVING NATIVE CORONARY ARTERY OF NATIVE HEART WITHOUT ANGINA PECTORIS: Primary | ICD-10-CM

## 2024-02-21 DIAGNOSIS — I10 ESSENTIAL HYPERTENSION: ICD-10-CM

## 2024-02-21 DIAGNOSIS — Z95.1 S/P CABG (CORONARY ARTERY BYPASS GRAFT): ICD-10-CM

## 2024-02-21 DIAGNOSIS — N18.30 TYPE 2 DIABETES MELLITUS WITH STAGE 3 CHRONIC KIDNEY DISEASE, WITHOUT LONG-TERM CURRENT USE OF INSULIN, UNSPECIFIED WHETHER STAGE 3A OR 3B CKD (H): ICD-10-CM

## 2024-02-21 PROCEDURE — 99214 OFFICE O/P EST MOD 30 MIN: CPT | Performed by: INTERNAL MEDICINE

## 2024-02-21 PROCEDURE — 93798 PHYS/QHP OP CAR RHAB W/ECG: CPT

## 2024-02-21 PROCEDURE — 93797 PHYS/QHP OP CAR RHAB WO ECG: CPT | Mod: 59

## 2024-02-21 PROCEDURE — G2211 COMPLEX E/M VISIT ADD ON: HCPCS | Performed by: INTERNAL MEDICINE

## 2024-02-21 RX ORDER — METOPROLOL TARTRATE 75 MG/1
75 TABLET, FILM COATED ORAL 2 TIMES DAILY
Qty: 180 TABLET | Refills: 3 | Status: SHIPPED | OUTPATIENT
Start: 2024-02-21

## 2024-02-21 RX ORDER — PANTOPRAZOLE SODIUM 40 MG/1
40 TABLET, DELAYED RELEASE ORAL DAILY
COMMUNITY
Start: 2024-01-31 | End: 2024-03-06

## 2024-02-21 RX ORDER — NITROGLYCERIN 0.4 MG/1
0.4 TABLET SUBLINGUAL EVERY 5 MIN PRN
COMMUNITY
Start: 2023-12-05

## 2024-02-21 NOTE — LETTER
2/21/2024    Gaby Santos, APRN CNP  [address] [address]    RE: Lisseth Qureshi       Dear Colleague,     I had the pleasure of seeing Lisseth Qureshi in the [facility].    HEART CARE ENCOUNTER CONSULTATON NOTE      [facility]  [phone]      Assessment/Recommendations   Assessment:   1.  Multivessel coronary disease status post coronary artery bypass surgery after restenosis of multiple stents.  Currently she is asymptomatic.  Doing well from bypass surgery.  2.  Hypertension controlled  3.  Hyperlipidemia, LDL goal < 70.  On Repatha  4.  Statin intolerance   5.  Diabetes mellitus type 2, controlled  6.  Carotid artery plaque on CT scan    Plan:   1.  ASA 81 mg daily  2.  Continue Repatha 140 mg Q14 days.   LDL goal < 70.  Statin intolearnace.    3.  Reduce metoprolol 75 mg twice daily  4. Continue amlodipine for HTN     History of Present Illness/Subjective    HPI: Lisseth Qureshi is a 68 year old female known coronary disease, statin intolerance not on statin therapy with elevated LDL, diabetes, hypertension who presents to cardiology clinic in follow-up after coronary artery bypass surgery.     Currently patient has newly graduated from cardiac rehab.  She is doing excellent with her rehab.  She denies any lightheadedness or syncope.  Multiple slightly depressed during cardiac rehab.  She is on high-dose metoprolol 50 mg twice daily.  Reduce to 70 mg twice daily.  She is tolerating her Repatha.      Currently she did not denies any lightheadedness or syncope.  Denies any chest pain.  No lower extremity orthopnea or PND symptoms.  Extremities healed well.    Extensive conversation regarding her risk factors long-term.  Discussed in-stent restenosis in detail.  Discussed her coronary bypass surgery.  Discussed angiogram.  Reviewed echocardiogram in detail.  Reviewed lipids in detail with patient as well     Physical Examination  Review of Systems    Vitals: /60 (BP Location: Left arm, Patient Position: Sitting, Cuff Size: Adult Regular)   Pulse 70   Resp 20   Wt 70.3 kg (155 lb)   LMP  (LMP Unknown)   BMI 31.31 kg/m    BMI= Body mass index is 31.31 kg/m .  Wt Readings from Last 3 Encounters:   02/21/24 70.3 kg (155 lb)   02/02/24 70.1 kg (154 lb 8 oz)   12/19/23 69.9 kg (154 lb)        Pleasant, no change   ENT/Mouth: membranes moist, no oral lesions or bleeding gums.      EYES:  no scleral icterus, normal conjunctivae       Chest/Lungs:   lungs are clear to auscultation, no rales or wheezing, no sternal scar, equal chest wall expansion    Cardiovascular:   Regular. Normal first and second heart sounds with faint systolic murmur. No rubs, or gallops; the carotid, radial and posterior tibial pulses are intact.  No edema.     Abdomen:  no tenderness; bowel sounds are present   Extremities: no cyanosis or clubbing   Skin: no xanthelasma, warm.    Neurologic: normal  bilateral, no tremors     Psychiatric: alert and oriented x3, calm        Please refer above for cardiac ROS details.        Medical History  Surgical History Family History Social History   Past Medical History:   Diagnosis Date    Diabetes (H)     Heart disease     History of blood transfusion      Past Surgical History:   Procedure Laterality Date    COLONOSCOPY N/A 11/17/2023    Procedure: COLONOSCOPY WITH POLYPECTOMY, BIOPSY AND HEMOCLIP APPLICATION;  Surgeon: David Wong DO;  Location: [facility] OR    CORONARY ARTERY BYPASS GRAFT, WITH ENDOSCOPIC VESSEL PROCUREMENT N/A 11/22/2023    Procedure: CORONARY ARTERY BYPASS GRAFT TIMES THREE, LEFT INTERNAL MAMMARY ARTERY HARVEST, LEFT LEG ENDOSCOPIC VESSEL PROCUREMENT, ANESTHESIA TRANSESOPHAGEAL ECHOCARDIOGRAM, EPI-AORTIC ULTRASOUND;  Surgeon: Yanet Gabriel MD;  Location: [facility] OR    CORONARY STENT PLACEMENT  10/19/2012    glenna 1st obtuse marginal    CV CORONARY ANGIOGRAM N/A 4/18/2023    Procedure: Coronary  Angiogram;  Surgeon: Javier Guerra MD;  Location: [facility] CV    CV CORONARY ANGIOGRAM N/A 2023    Procedure: Coronary Angiogram;  Surgeon: Dilan Archuleta MD;  Location: [facility] LAB CV    CV INSTANTANEOUS WAVE-FREE RATIO N/A 2023    Procedure: Instantaneous Wave-Free Ratio;  Surgeon: Dilan Archuleta MD;  Location: [facility] LAB CV    CV LEFT HEART CATH N/A 2023    Procedure: Left Heart Catheterization;  Surgeon: Javier Guerra MD;  Location: [facility] LAB CV    CV PCI N/A 2023    Procedure: Percutaneous Coronary Intervention;  Surgeon: Javier Guerra MD;  Location: [facility] CV    ESOPHAGOSCOPY, GASTROSCOPY, DUODENOSCOPY (EGD), COMBINED N/A 2023    Procedure: ESOPHAGOGASTRODUODENOSCOPY WITH BIOPSIES AND HEMOCLIP APPLICATION;  Surgeon: David Wong DO;  Location: [facility] OR    SHOULDER SURGERY      frozen shoulder     Family History   Problem Relation Age of Onset    Hypertension Mother     Dementia Mother     Mitral Valve Replacement No family hx of         Social History     Socioeconomic History    Marital status: Single     Spouse name: Not on file    Number of children: Not on file    Years of education: Not on file    Highest education level: Not on file   Occupational History    Not on file   Tobacco Use    Smoking status: Former     Packs/day: 1.50     Years: 35.00     Additional pack years: 0.00     Total pack years: 52.50     Types: Cigarettes     Quit date: 2005     Years since quittin.0     Passive exposure: Past    Smokeless tobacco: Not on file    Tobacco comments:     pt quit 10 years ago (she had smoked x 35 years)   Vaping Use    Vaping Use: Never used   Substance and Sexual Activity    Alcohol use: No     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    Drug use: No    Sexual activity: Not on file   Other Topics Concern    Not on file   Social History Narrative    Exercises 3-4 days per week      Social Determinants of Health     Financial Resource Strain: Low Risk  (2/1/2024)    Financial Resource Strain     Within the past 12 months, have you or your family members you live with been unable to get utilities (heat, electricity) when it was really needed?: No   Food Insecurity: Low Risk  (2/1/2024)    Food Insecurity     Within the past 12 months, did you worry that your food would run out before you got money to buy more?: No     Within the past 12 months, did the food you bought just not last and you didn t have money to get more?: No   Transportation Needs: Low Risk  (2/1/2024)    Transportation Needs     Within the past 12 months, has lack of transportation kept you from medical appointments, getting your medicines, non-medical meetings or appointments, work, or from getting things that you need?: No   Physical Activity: Not on file   Stress: Not on file   Social Connections: Not on file   Interpersonal Safety: Low Risk  (12/5/2023)    Interpersonal Safety     Do you feel physically and emotionally safe where you currently live?: Yes     Within the past 12 months, have you been hit, slapped, kicked or otherwise physically hurt by someone?: No     Within the past 12 months, have you been humiliated or emotionally abused in other ways by your partner or ex-partner?: No   Housing Stability: Low Risk  (2/1/2024)    Housing Stability     Do you have housing? : Yes     Are you worried about losing your housing?: No           Medications  Allergies   Current Outpatient Medications   Medication Sig Dispense Refill    acetaminophen (TYLENOL) 500 MG tablet Take 1-2 tablets (500-1,000 mg) by mouth every 6 hours as needed for mild pain      albuterol (PROAIR HFA/PROVENTIL HFA/VENTOLIN HFA) 108 (90 Base) MCG/ACT inhaler INHALE 2 PUFFS INTO THE LUNGS EVERY 6 HOURS AS NEEDED FOR SHORTNESS OF BREATH, WHEEZING OR COUGH 8.5 g 0    amLODIPine (NORVASC) 10 MG tablet Take 10 mg by mouth daily      aspirin (ASA) 81 MG  chewable tablet 2 tablets (162 mg) by Oral or NG Tube route daily 180 tablet 1    buPROPion (WELLBUTRIN XL) 300 MG 24 hr tablet Take 300 mg by mouth daily      coenzyme Q10 (COQ-10) 100 mg capsule 100 mg every evening      evolocumab (REPATHA SURECLICK) 140 MG/ML prefilled autoinjector Inject 1 mL (140 mg) Subcutaneous every 14 days 6 mL 3    exenatide ER (BYDUREON BCISE) 2 MG/0.85ML auto-injector Inject 2 mg Subcutaneous every 7 days Once a week on Sunday      fish oil-omega-3 fatty acids 1000 MG capsule Take 2 g by mouth every evening      levothyroxine (SYNTHROID, LEVOTHROID) 75 MCG tablet [LEVOTHYROXINE (SYNTHROID, LEVOTHROID) 75 MCG TABLET] Take 75 mcg by mouth daily.      melatonin 3 mg Tab [MELATONIN 3 MG TAB] Take 3 mg by mouth bedtime as needed.      metFORMIN (GLUCOPHAGE XR) 500 MG 24 hr tablet Take 500 mg by mouth daily before breakfast      metFORMIN (GLUCOPHAGE XR) 500 MG 24 hr tablet Take 1,000 mg by mouth daily (with dinner)      Metoprolol Tartrate 75 MG TABS Take 75 mg by mouth 2 times daily 180 tablet 3    multivitamin therapeutic (THERAGRAN) tablet [MULTIVITAMIN THERAPEUTIC (THERAGRAN) TABLET] Take 1 tablet by mouth daily.      nitroGLYcerin (NITROSTAT) 0.4 MG sublingual tablet Place 0.4 mg under the tongue every 5 minutes as needed      pantoprazole (PROTONIX) 40 MG EC tablet Take 40 mg by mouth daily      red yeast rice 600 mg Tab [RED YEAST RICE 600 MG TAB] Take 1 tablet by mouth daily.      senna-docusate (SENOKOT-S/PERICOLACE) 8.6-50 MG tablet 1 tablet by Oral or Feeding Tube route 2 times daily 14 tablet 0    fluticasone (FLONASE) 50 MCG/ACT nasal spray Spray 1 spray into both nostrils daily (Patient not taking: Reported on 2/21/2024) 9 mL 0       Allergies   Allergen Reactions    Codeine      Other reaction(s): *Unknown    Guaifenesin Swelling     Felt throat swelling, Guaifenesin with codeine    Penicillins Unknown     Unknown, allergy occurred at 6 months old  11/22/23 tolerated cefazolin  with open heart surgery     Rosuvastatin Calcium [Rosuvastatin] Unknown          Lab Results    Chemistry/lipid CBC Cardiac Enzymes/BNP/TSH/INR   Recent Labs   Lab Test 05/26/17  1420 05/24/16  0735   CHOL  --  188   HDL  --  47*   LDL 98 124   TRIG  --  85     Recent Labs   Lab Test 05/26/17  1420 05/24/16  0735 03/20/15  1703   LDL 98 124 122     Recent Labs   Lab Test 04/06/23  2300      POTASSIUM 3.8   CHLORIDE 101   CO2 25   *   BUN 20.0   CR 1.05*   GFRESTIMATED 59*   AMBERLY 9.2     Recent Labs   Lab Test 04/06/23  2300 04/10/19  1851   CR 1.05* 1.27*     Recent Labs   Lab Test 03/20/15  1703   A1C 6.5*          Recent Labs   Lab Test 04/06/23  2300   WBC 6.7   HGB 12.3   HCT 37.7   MCV 86        Recent Labs   Lab Test 04/06/23  2300 04/10/19  1851   HGB 12.3 12.2    No results for input(s): TROPONINI in the last 36048 hours.  No results for input(s): BNP, NTBNPI, NTBNP in the last 38981 hours.  No results for input(s): TSH in the last 70397 hours.  Recent Labs   Lab Test 04/10/19  1851   INR 1.01        Oswald Shaffer DO      Patient will need long-term longitudinal management of her lipids as she has statin intolerance and will need ongoing management with PCSK9 inhibitors.  Need ongoing risk modifications for her coronary artery disease and follow-up.    Today's clinic visit entailed:  39 minutes spent by me on the date of the encounter doing chart review, history and exam, documentation and further activities per the note      Thank you for allowing me to participate in the care of your patient.      Sincerely,     Oswald Shaffer DO     [facility]  cc:   No referring provider defined for this encounter.

## 2024-02-21 NOTE — PROGRESS NOTES
HEART CARE ENCOUNTER CONSULTATON NOTE      [facility]  [phone]      Assessment/Recommendations   Assessment:   1.  Multivessel coronary disease status post coronary artery bypass surgery after restenosis of multiple stents.  Currently she is asymptomatic.  Doing well from bypass surgery.  2.  Hypertension controlled  3.  Hyperlipidemia, LDL goal < 70.  On Repatha  4.  Statin intolerance   5.  Diabetes mellitus type 2, controlled  6.  Carotid artery plaque on CT scan    Plan:   1.  ASA 81 mg daily  2.  Continue Repatha 140 mg Q14 days.   LDL goal < 70.  Statin intolearnace.    3.  Reduce metoprolol 75 mg twice daily  4. Continue amlodipine for HTN     History of Present Illness/Subjective    HPI: Lisseth Qureshi is a 68 year old female known coronary disease, statin intolerance not on statin therapy with elevated LDL, diabetes, hypertension who presents to cardiology clinic in follow-up after coronary artery bypass surgery.     Currently patient has newly graduated from cardiac rehab.  She is doing excellent with her rehab.  She denies any lightheadedness or syncope.  Multiple slightly depressed during cardiac rehab.  She is on high-dose metoprolol 50 mg twice daily.  Reduce to 70 mg twice daily.  She is tolerating her Repatha.      Currently she did not denies any lightheadedness or syncope.  Denies any chest pain.  No lower extremity orthopnea or PND symptoms.  Extremities healed well.    Extensive conversation regarding her risk factors long-term.  Discussed in-stent restenosis in detail.  Discussed her coronary bypass surgery.  Discussed angiogram.  Reviewed echocardiogram in detail.  Reviewed lipids in detail with patient as well     Physical Examination  Review of Systems   Vitals: /60 (BP Location: Left arm, Patient Position: Sitting, Cuff Size: Adult Regular)   Pulse 70   Resp 20   Wt 70.3 kg (155 lb)   LMP  (LMP Unknown)   BMI 31.31 kg/m    BMI= Body mass index is 31.31  kg/m .  Wt Readings from Last 3 Encounters:   02/21/24 70.3 kg (155 lb)   02/02/24 70.1 kg (154 lb 8 oz)   12/19/23 69.9 kg (154 lb)        Pleasant, no change   ENT/Mouth: membranes moist, no oral lesions or bleeding gums.      EYES:  no scleral icterus, normal conjunctivae       Chest/Lungs:   lungs are clear to auscultation, no rales or wheezing, no sternal scar, equal chest wall expansion    Cardiovascular:   Regular. Normal first and second heart sounds with faint systolic murmur. No rubs, or gallops; the carotid, radial and posterior tibial pulses are intact.  No edema.     Abdomen:  no tenderness; bowel sounds are present   Extremities: no cyanosis or clubbing   Skin: no xanthelasma, warm.    Neurologic: normal  bilateral, no tremors     Psychiatric: alert and oriented x3, calm        Please refer above for cardiac ROS details.        Medical History  Surgical History Family History Social History   Past Medical History:   Diagnosis Date    Diabetes (H)     Heart disease     History of blood transfusion      Past Surgical History:   Procedure Laterality Date    COLONOSCOPY N/A 11/17/2023    Procedure: COLONOSCOPY WITH POLYPECTOMY, BIOPSY AND HEMOCLIP APPLICATION;  Surgeon: David Wong DO;  Location: [facility] OR    CORONARY ARTERY BYPASS GRAFT, WITH ENDOSCOPIC VESSEL PROCUREMENT N/A 11/22/2023    Procedure: CORONARY ARTERY BYPASS GRAFT TIMES THREE, LEFT INTERNAL MAMMARY ARTERY HARVEST, LEFT LEG ENDOSCOPIC VESSEL PROCUREMENT, ANESTHESIA TRANSESOPHAGEAL ECHOCARDIOGRAM, EPI-AORTIC ULTRASOUND;  Surgeon: Yanet Gabriel MD;  Location: [facility] Main OR    CORONARY STENT PLACEMENT  10/19/2012    glenna 1st obtuse marginal    CV CORONARY ANGIOGRAM N/A 4/18/2023    Procedure: Coronary Angiogram;  Surgeon: Javier Guerra MD;  Location: [facility] CATH LAB CV    CV CORONARY ANGIOGRAM N/A 11/14/2023    Procedure: Coronary Angiogram;  Surgeon: Dilan Archuleta MD;  Location: [facility] CATH LAB CV    CV  INSTANTANEOUS WAVE-FREE RATIO N/A 2023    Procedure: Instantaneous Wave-Free Ratio;  Surgeon: Dilan Archuleta MD;  Location: [facility] CATH LAB CV    CV LEFT HEART CATH N/A 2023    Procedure: Left Heart Catheterization;  Surgeon: Javier Guerra MD;  Location: [facility] CATH LAB CV    CV PCI N/A 2023    Procedure: Percutaneous Coronary Intervention;  Surgeon: Javier Guerra MD;  Location: [facility] LAB CV    ESOPHAGOSCOPY, GASTROSCOPY, DUODENOSCOPY (EGD), COMBINED N/A 2023    Procedure: ESOPHAGOGASTRODUODENOSCOPY WITH BIOPSIES AND HEMOCLIP APPLICATION;  Surgeon: David Wong DO;  Location: [facility] Main OR    SHOULDER SURGERY      frozen shoulder     Family History   Problem Relation Age of Onset    Hypertension Mother     Dementia Mother     Mitral Valve Replacement No family hx of         Social History     Socioeconomic History    Marital status: Single     Spouse name: Not on file    Number of children: Not on file    Years of education: Not on file    Highest education level: Not on file   Occupational History    Not on file   Tobacco Use    Smoking status: Former     Packs/day: 1.50     Years: 35.00     Additional pack years: 0.00     Total pack years: 52.50     Types: Cigarettes     Quit date: 2005     Years since quittin.0     Passive exposure: Past    Smokeless tobacco: Not on file    Tobacco comments:     pt quit 10 years ago (she had smoked x 35 years)   Vaping Use    Vaping Use: Never used   Substance and Sexual Activity    Alcohol use: No     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    Drug use: No    Sexual activity: Not on file   Other Topics Concern    Not on file   Social History Narrative    Exercises 3-4 days per week     Social Determinants of Health     Financial Resource Strain: Low Risk  (2024)    Financial Resource Strain     Within the past 12 months, have you or your family members you live with been unable to get utilities  (heat, electricity) when it was really needed?: No   Food Insecurity: Low Risk  (2/1/2024)    Food Insecurity     Within the past 12 months, did you worry that your food would run out before you got money to buy more?: No     Within the past 12 months, did the food you bought just not last and you didn t have money to get more?: No   Transportation Needs: Low Risk  (2/1/2024)    Transportation Needs     Within the past 12 months, has lack of transportation kept you from medical appointments, getting your medicines, non-medical meetings or appointments, work, or from getting things that you need?: No   Physical Activity: Not on file   Stress: Not on file   Social Connections: Not on file   Interpersonal Safety: Low Risk  (12/5/2023)    Interpersonal Safety     Do you feel physically and emotionally safe where you currently live?: Yes     Within the past 12 months, have you been hit, slapped, kicked or otherwise physically hurt by someone?: No     Within the past 12 months, have you been humiliated or emotionally abused in other ways by your partner or ex-partner?: No   Housing Stability: Low Risk  (2/1/2024)    Housing Stability     Do you have housing? : Yes     Are you worried about losing your housing?: No           Medications  Allergies   Current Outpatient Medications   Medication Sig Dispense Refill    acetaminophen (TYLENOL) 500 MG tablet Take 1-2 tablets (500-1,000 mg) by mouth every 6 hours as needed for mild pain      albuterol (PROAIR HFA/PROVENTIL HFA/VENTOLIN HFA) 108 (90 Base) MCG/ACT inhaler INHALE 2 PUFFS INTO THE LUNGS EVERY 6 HOURS AS NEEDED FOR SHORTNESS OF BREATH, WHEEZING OR COUGH 8.5 g 0    amLODIPine (NORVASC) 10 MG tablet Take 10 mg by mouth daily      aspirin (ASA) 81 MG chewable tablet 2 tablets (162 mg) by Oral or NG Tube route daily 180 tablet 1    buPROPion (WELLBUTRIN XL) 300 MG 24 hr tablet Take 300 mg by mouth daily      coenzyme Q10 (COQ-10) 100 mg capsule 100 mg every evening       evolocumab (REPATHA SURECLICK) 140 MG/ML prefilled autoinjector Inject 1 mL (140 mg) Subcutaneous every 14 days 6 mL 3    exenatide ER (BYDUREON BCISE) 2 MG/0.85ML auto-injector Inject 2 mg Subcutaneous every 7 days Once a week on Sunday      fish oil-omega-3 fatty acids 1000 MG capsule Take 2 g by mouth every evening      levothyroxine (SYNTHROID, LEVOTHROID) 75 MCG tablet [LEVOTHYROXINE (SYNTHROID, LEVOTHROID) 75 MCG TABLET] Take 75 mcg by mouth daily.      melatonin 3 mg Tab [MELATONIN 3 MG TAB] Take 3 mg by mouth bedtime as needed.      metFORMIN (GLUCOPHAGE XR) 500 MG 24 hr tablet Take 500 mg by mouth daily before breakfast      metFORMIN (GLUCOPHAGE XR) 500 MG 24 hr tablet Take 1,000 mg by mouth daily (with dinner)      Metoprolol Tartrate 75 MG TABS Take 75 mg by mouth 2 times daily 180 tablet 3    multivitamin therapeutic (THERAGRAN) tablet [MULTIVITAMIN THERAPEUTIC (THERAGRAN) TABLET] Take 1 tablet by mouth daily.      nitroGLYcerin (NITROSTAT) 0.4 MG sublingual tablet Place 0.4 mg under the tongue every 5 minutes as needed      pantoprazole (PROTONIX) 40 MG EC tablet Take 40 mg by mouth daily      red yeast rice 600 mg Tab [RED YEAST RICE 600 MG TAB] Take 1 tablet by mouth daily.      senna-docusate (SENOKOT-S/PERICOLACE) 8.6-50 MG tablet 1 tablet by Oral or Feeding Tube route 2 times daily 14 tablet 0    fluticasone (FLONASE) 50 MCG/ACT nasal spray Spray 1 spray into both nostrils daily (Patient not taking: Reported on 2/21/2024) 9 mL 0       Allergies   Allergen Reactions    Codeine      Other reaction(s): *Unknown    Guaifenesin Swelling     Felt throat swelling, Guaifenesin with codeine    Penicillins Unknown     Unknown, allergy occurred at 6 months old  11/22/23 tolerated cefazolin with open heart surgery     Rosuvastatin Calcium [Rosuvastatin] Unknown          Lab Results    Chemistry/lipid CBC Cardiac Enzymes/BNP/TSH/INR   Recent Labs   Lab Test 05/26/17  1420 05/24/16  0735   CHOL  --  188   HDL   --  47*   LDL 98 124   TRIG  --  85     Recent Labs   Lab Test 05/26/17  1420 05/24/16  0735 03/20/15  1703   LDL 98 124 122     Recent Labs   Lab Test 04/06/23  2300      POTASSIUM 3.8   CHLORIDE 101   CO2 25   *   BUN 20.0   CR 1.05*   GFRESTIMATED 59*   AMBERLY 9.2     Recent Labs   Lab Test 04/06/23  2300 04/10/19  1851   CR 1.05* 1.27*     Recent Labs   Lab Test 03/20/15  1703   A1C 6.5*          Recent Labs   Lab Test 04/06/23  2300   WBC 6.7   HGB 12.3   HCT 37.7   MCV 86        Recent Labs   Lab Test 04/06/23  2300 04/10/19  1851   HGB 12.3 12.2    No results for input(s): TROPONINI in the last 09484 hours.  No results for input(s): BNP, NTBNPI, NTBNP in the last 07852 hours.  No results for input(s): TSH in the last 87811 hours.  Recent Labs   Lab Test 04/10/19  1851   INR 1.01        Oswald Shaffer DO      Patient will need long-term longitudinal management of her lipids as she has statin intolerance and will need ongoing management with PCSK9 inhibitors.  Need ongoing risk modifications for her coronary artery disease and follow-up.    Today's clinic visit entailed:  39 minutes spent by me on the date of the encounter doing chart review, history and exam, documentation and further activities per the note

## 2024-02-28 DIAGNOSIS — R06.09 DYSPNEA ON EXERTION: ICD-10-CM

## 2024-02-28 DIAGNOSIS — Z78.9 STATIN INTOLERANCE: ICD-10-CM

## 2024-02-28 DIAGNOSIS — I25.110 CORONARY ARTERY DISEASE INVOLVING NATIVE CORONARY ARTERY OF NATIVE HEART WITH UNSTABLE ANGINA PECTORIS (H): ICD-10-CM

## 2024-02-28 DIAGNOSIS — E78.2 MIXED HYPERLIPIDEMIA: ICD-10-CM

## 2024-03-01 RX ORDER — EVOLOCUMAB 140 MG/ML
INJECTION, SOLUTION SUBCUTANEOUS
Qty: 6 ML | Refills: 3 | Status: SHIPPED | OUTPATIENT
Start: 2024-03-01

## 2024-03-06 DIAGNOSIS — K21.00 GASTROESOPHAGEAL REFLUX DISEASE WITH ESOPHAGITIS, UNSPECIFIED WHETHER HEMORRHAGE: Primary | ICD-10-CM

## 2024-03-06 RX ORDER — PANTOPRAZOLE SODIUM 40 MG/1
40 TABLET, DELAYED RELEASE ORAL DAILY
Qty: 30 TABLET | Refills: 1 | Status: SHIPPED | OUTPATIENT
Start: 2024-03-06

## 2024-04-24 ENCOUNTER — OFFICE VISIT (OUTPATIENT)
Dept: FAMILY MEDICINE | Facility: CLINIC | Age: 69
End: 2024-04-24
Payer: COMMERCIAL

## 2024-04-24 ENCOUNTER — HOSPITAL ENCOUNTER (OUTPATIENT)
Dept: ULTRASOUND IMAGING | Facility: HOSPITAL | Age: 69
Discharge: HOME | End: 2024-04-24
Attending: FAMILY MEDICINE | Admitting: FAMILY MEDICINE
Payer: COMMERCIAL

## 2024-04-24 VITALS
OXYGEN SATURATION: 96 % | HEIGHT: 59 IN | TEMPERATURE: 97.5 F | RESPIRATION RATE: 16 BRPM | WEIGHT: 151.7 LBS | DIASTOLIC BLOOD PRESSURE: 94 MMHG | HEART RATE: 58 BPM | SYSTOLIC BLOOD PRESSURE: 153 MMHG | BODY MASS INDEX: 30.58 KG/M2

## 2024-04-24 DIAGNOSIS — M54.2 CERVICAL SPINE PAIN: Primary | ICD-10-CM

## 2024-04-24 DIAGNOSIS — R10.11 RUQ ABDOMINAL PAIN: ICD-10-CM

## 2024-04-24 DIAGNOSIS — R10.9 RIGHT FLANK PAIN: ICD-10-CM

## 2024-04-24 LAB
ALBUMIN SERPL BCG-MCNC: 4.3 G/DL (ref 3.5–5.2)
ALP SERPL-CCNC: 63 U/L (ref 40–150)
ALT SERPL W P-5'-P-CCNC: 17 U/L (ref 0–50)
ANION GAP SERPL CALCULATED.3IONS-SCNC: 11 MMOL/L (ref 7–15)
AST SERPL W P-5'-P-CCNC: 17 U/L (ref 0–45)
BILIRUB SERPL-MCNC: 0.2 MG/DL
BUN SERPL-MCNC: 26.4 MG/DL (ref 8–23)
CALCIUM SERPL-MCNC: 9.7 MG/DL (ref 8.8–10.2)
CHLORIDE SERPL-SCNC: 103 MMOL/L (ref 98–107)
CREAT SERPL-MCNC: 1.01 MG/DL (ref 0.51–0.95)
DEPRECATED HCO3 PLAS-SCNC: 26 MMOL/L (ref 22–29)
EGFRCR SERPLBLD CKD-EPI 2021: 61 ML/MIN/1.73M2
GLUCOSE SERPL-MCNC: 146 MG/DL (ref 70–99)
LIPASE SERPL-CCNC: 36 U/L (ref 13–60)
POTASSIUM SERPL-SCNC: 4.5 MMOL/L (ref 3.4–5.3)
PROT SERPL-MCNC: 7 G/DL (ref 6.4–8.3)
SODIUM SERPL-SCNC: 140 MMOL/L (ref 135–145)

## 2024-04-24 PROCEDURE — 36415 COLL VENOUS BLD VENIPUNCTURE: CPT

## 2024-04-24 PROCEDURE — 80053 COMPREHEN METABOLIC PANEL: CPT

## 2024-04-24 PROCEDURE — 99214 OFFICE O/P EST MOD 30 MIN: CPT

## 2024-04-24 PROCEDURE — 76705 ECHO EXAM OF ABDOMEN: CPT

## 2024-04-24 PROCEDURE — 83690 ASSAY OF LIPASE: CPT

## 2024-04-24 RX ORDER — CYCLOBENZAPRINE HCL 5 MG
5 TABLET ORAL 3 TIMES DAILY PRN
Qty: 60 TABLET | Refills: 0 | Status: SHIPPED | OUTPATIENT
Start: 2024-04-24

## 2024-04-24 ASSESSMENT — ENCOUNTER SYMPTOMS
ABDOMINAL DISTENTION: 0
NECK STIFFNESS: 1
ABDOMINAL PAIN: 1
CONSTIPATION: 1
DIARRHEA: 0
SHORTNESS OF BREATH: 0
NAUSEA: 0
NECK PAIN: 1
DIFFICULTY URINATING: 0
BLOOD IN STOOL: 0

## 2024-04-24 ASSESSMENT — PAIN SCALES - GENERAL: PAINLEVEL: EXTREME PAIN (8)

## 2024-04-24 NOTE — PROGRESS NOTES
Assessment & Plan     Cervical spine pain  Chronic. No pain with palpation of the spine. Pain with palpation of the trapezius, and rotation of the neck from left to right causes pain in the trapezius. No fevers, feeling well, no arm weakness or numbness/tingling. Suspect cervical spine etiology like DJD, cervical stenosis, and others. Discussed imaging with patient, had MRI several years ago. Would like to try PRN flexeril and PT at this time. Red flag symptoms discussed today. If no improvement with PT or worsening symptoms needs to be seen urgently. Patient agrees with plan.   - Physical Therapy  Referral; Future  - cyclobenzaprine (FLEXERIL) 5 MG tablet; Take 1 tablet (5 mg) by mouth 3 times daily as needed for muscle spasms    RUQ abdominal pain  Unclear etiology at this time. Benign exam today. Reports electric like shock in the RUQ. Imaging has been ordered, will complete this. Will repeat CMP and get lipase today. I don't have too much concern for pancreatitis as the pain has been persistent for years and is not constant, but will assess. If elevated liver enzymes, refer to GI or if any abnormality on US. Follow-up based on results.   - Lipase  - Comprehensive metabolic panel (BMP + Alb, Alk Phos, ALT, AST, Total. Bili, TP)    Subjective   Lisseth is a 68 year old, presenting for the following health issues:  Abdominal Pain and Neck Pain        4/24/2024     6:50 AM   Additional Questions   Roomed by MATTHEW Kwong   Accompanied by Self     Pain in her side comes and goes. It has been going on for years, but it is getting worse. She did some reading online and is worried about anterior cutaneous nerve entrapment syndrome. She has a pain across the RUQ. [place] Would only notice it when riding in the car. It is like a shock pain. Electric. When it hits is is very painful. Nothing makes it better when there is a flare of pain. She can live with the pain, it is  "bothersome and hurts. Chronic constipation. No blood in the stool. Position changes can maybe help, its not a given.     Started about 7 years ago on a drive to [place].     CABG in November of 2023.     Follows with GI and endocrinology. She has brought the abdominal pain up to everyone. Not a lot of ideas as to what is going on.    She did not take her amlodipine this morning.     She is on repatha for cholesterol management. Started this in June. This is not when the abdominal pain started.     Has been on metformin since her 30s.     Neck pain is \"really bad\" right now. ROM is limited. When turning head from left to right, to the right she can only turn her neck a certain degree. To the left, pain in the muscle. This has been most of her life, but for a past week can not shake it.     History of Present Illness       Reason for visit:  Neck pain and pain in side  Symptom onset:  More than a month  Symptoms include:  Pain - limited ROM  Symptom intensity:  Moderate  Symptom progression:  Staying the same  Had these symptoms before:  Yes  Has tried/received treatment for these symptoms:  No  What makes it worse:  No  What makes it better:  Nothing for side, ice for neck    She eats 2-3 servings of fruits and vegetables daily.She consumes 0 sweetened beverage(s) daily.She exercises with enough effort to increase her heart rate 30 to 60 minutes per day.  She exercises with enough effort to increase her heart rate 3 or less days per week.   She is taking medications regularly.     Review of Systems   Respiratory:  Negative for shortness of breath.    Cardiovascular:  Negative for chest pain.   Gastrointestinal:  Positive for abdominal pain and constipation. Negative for abdominal distention, blood in stool, diarrhea and nausea.   Genitourinary:  Negative for difficulty urinating.   Musculoskeletal:  Positive for neck pain and neck stiffness.          Objective    BP (!) 153/94 (BP Location: Right arm, Patient " "Position: Sitting, Cuff Size: Adult Regular)   Pulse 58   Temp 97.5  F (36.4  C) (Oral)   Resp 16   Ht 1.499 m (4' 11\")   Wt 68.8 kg (151 lb 11.2 oz)   LMP  (LMP Unknown)   SpO2 96%   BMI 30.64 kg/m    Body mass index is 30.64 kg/m .  Physical Exam  Vitals reviewed.   Constitutional:       General: She is not in acute distress.  Neck:      Meningeal: Brudzinski's sign and Kernig's sign absent.   Pulmonary:      Effort: No respiratory distress.   Abdominal:      General: Bowel sounds are normal. There is no distension.      Palpations: Abdomen is soft. There is no hepatomegaly, splenomegaly or mass.      Tenderness: There is abdominal tenderness in the right upper quadrant. There is no right CVA tenderness or left CVA tenderness.   Musculoskeletal:      Cervical back: Pain with movement and muscular tenderness present. No spinous process tenderness. Decreased range of motion.   Skin:     General: Skin is warm and dry.   Neurological:      General: No focal deficit present.      Mental Status: She is alert. Mental status is at baseline.   Psychiatric:         Mood and Affect: Mood normal.         Judgment: Judgment normal.        At the end of the visit, I confirmed understanding of what was discussed. Lisseth has no further questions or concerns that were brought up at this time.     Gaby Santos DNP, APRN, FNP-C   "

## 2024-06-01 ENCOUNTER — HEALTH MAINTENANCE LETTER (OUTPATIENT)
Age: 69
End: 2024-06-01

## 2024-07-22 ENCOUNTER — TRANSFERRED RECORDS (OUTPATIENT)
Dept: HEALTH INFORMATION MANAGEMENT | Facility: CLINIC | Age: 69
End: 2024-07-22

## 2024-07-23 DIAGNOSIS — Z95.1 S/P CABG (CORONARY ARTERY BYPASS GRAFT): ICD-10-CM

## 2024-07-23 RX ORDER — ASPIRIN 81 MG/1
162 TABLET, CHEWABLE ORAL DAILY
Qty: 180 TABLET | Refills: 2 | Status: SHIPPED | OUTPATIENT
Start: 2024-07-23

## 2024-08-02 ENCOUNTER — TRANSFERRED RECORDS (OUTPATIENT)
Dept: HEALTH INFORMATION MANAGEMENT | Facility: CLINIC | Age: 69
End: 2024-08-02

## 2024-09-20 ENCOUNTER — TELEPHONE (OUTPATIENT)
Dept: CARDIOLOGY | Facility: CLINIC | Age: 69
End: 2024-09-20
Payer: COMMERCIAL

## 2024-09-20 NOTE — TELEPHONE ENCOUNTER
Central Prior Authorization Team   Phone: [phone]    PA Initiation    Medication: Repatha-Renewal  Insurance Company: [employer] ([facility]) - Phone [phone] Fax [phone]  Pharmacy Filling the Rx: [facility] [address] [place] [address]  Filling Pharmacy Phone: [phone]  Filling Pharmacy Fax:    Start Date: 9/20/2024

## 2024-09-24 NOTE — TELEPHONE ENCOUNTER
Prior Authorization Approval    Authorization Effective Date: 9/20/2024  Authorization Expiration Date: 9/20/2025  Medication: Repatha-Renewal  Approved Dose/Quantity:   Reference #:     Insurance Company: [employer] ([facility]) - Phone [phone] Fax [phone]  Expected CoPay:       CoPay Card Available:      Foundation Assistance Needed:    Which Pharmacy is filling the prescription (Not needed for infusion/clinic administered): [facility] - [address]  Pharmacy Notified:  yes  Patient Notified:  yes- Pharmacy will contact patient when ready to /ship

## 2024-10-01 ENCOUNTER — TRANSFERRED RECORDS (OUTPATIENT)
Dept: HEALTH INFORMATION MANAGEMENT | Facility: CLINIC | Age: 69
End: 2024-10-01

## 2024-10-18 ENCOUNTER — OFFICE VISIT (OUTPATIENT)
Dept: FAMILY MEDICINE | Facility: CLINIC | Age: 69
End: 2024-10-18
Payer: COMMERCIAL

## 2024-10-18 VITALS
TEMPERATURE: 98.1 F | OXYGEN SATURATION: 96 % | SYSTOLIC BLOOD PRESSURE: 140 MMHG | HEART RATE: 45 BPM | HEIGHT: 60 IN | WEIGHT: 166.1 LBS | RESPIRATION RATE: 12 BRPM | DIASTOLIC BLOOD PRESSURE: 54 MMHG | BODY MASS INDEX: 32.61 KG/M2

## 2024-10-18 DIAGNOSIS — N18.30 TYPE 2 DIABETES MELLITUS WITH STAGE 3 CHRONIC KIDNEY DISEASE, WITHOUT LONG-TERM CURRENT USE OF INSULIN, UNSPECIFIED WHETHER STAGE 3A OR 3B CKD (H): ICD-10-CM

## 2024-10-18 DIAGNOSIS — Z01.818 PRE-OP EVALUATION: Primary | ICD-10-CM

## 2024-10-18 DIAGNOSIS — H02.103 ECTROPION DUE TO LAXITY OF EYELID, RIGHT: ICD-10-CM

## 2024-10-18 DIAGNOSIS — I25.709 ATHEROSCLEROSIS OF CORONARY ARTERY BYPASS GRAFT OF NATIVE HEART WITH ANGINA PECTORIS (H): ICD-10-CM

## 2024-10-18 DIAGNOSIS — E11.22 TYPE 2 DIABETES MELLITUS WITH STAGE 3 CHRONIC KIDNEY DISEASE, WITHOUT LONG-TERM CURRENT USE OF INSULIN, UNSPECIFIED WHETHER STAGE 3A OR 3B CKD (H): ICD-10-CM

## 2024-10-18 DIAGNOSIS — I10 ESSENTIAL HYPERTENSION: ICD-10-CM

## 2024-10-18 DIAGNOSIS — E03.9 HYPOTHYROIDISM, UNSPECIFIED TYPE: ICD-10-CM

## 2024-10-18 LAB
ANION GAP SERPL CALCULATED.3IONS-SCNC: 11 MMOL/L (ref 7–15)
ATRIAL RATE - MUSE: 47 BPM
BUN SERPL-MCNC: 16.5 MG/DL (ref 8–23)
CALCIUM SERPL-MCNC: 9.7 MG/DL (ref 8.8–10.4)
CHLORIDE SERPL-SCNC: 102 MMOL/L (ref 98–107)
CREAT SERPL-MCNC: 1.1 MG/DL (ref 0.51–0.95)
DIASTOLIC BLOOD PRESSURE - MUSE: NORMAL MMHG
EGFRCR SERPLBLD CKD-EPI 2021: 55 ML/MIN/1.73M2
ERYTHROCYTE [DISTWIDTH] IN BLOOD BY AUTOMATED COUNT: 13 % (ref 10–15)
EST. AVERAGE GLUCOSE BLD GHB EST-MCNC: 189 MG/DL
GLUCOSE SERPL-MCNC: 283 MG/DL (ref 70–99)
HBA1C MFR BLD: 8.2 % (ref 0–5.6)
HCO3 SERPL-SCNC: 25 MMOL/L (ref 22–29)
HCT VFR BLD AUTO: 37.4 % (ref 35–47)
HGB BLD-MCNC: 12 G/DL (ref 11.7–15.7)
INTERPRETATION ECG - MUSE: NORMAL
MCH RBC QN AUTO: 28.6 PG (ref 26.5–33)
MCHC RBC AUTO-ENTMCNC: 32.1 G/DL (ref 31.5–36.5)
MCV RBC AUTO: 89 FL (ref 78–100)
P AXIS - MUSE: 53 DEGREES
PLATELET # BLD AUTO: 206 10E3/UL (ref 150–450)
POTASSIUM SERPL-SCNC: 5 MMOL/L (ref 3.4–5.3)
PR INTERVAL - MUSE: 170 MS
QRS DURATION - MUSE: 76 MS
QT - MUSE: 496 MS
QTC - MUSE: 438 MS
R AXIS - MUSE: 30 DEGREES
RBC # BLD AUTO: 4.19 10E6/UL (ref 3.8–5.2)
SODIUM SERPL-SCNC: 138 MMOL/L (ref 135–145)
SYSTOLIC BLOOD PRESSURE - MUSE: NORMAL MMHG
T AXIS - MUSE: 73 DEGREES
TSH SERPL DL<=0.005 MIU/L-ACNC: 3.32 UIU/ML (ref 0.3–4.2)
VENTRICULAR RATE- MUSE: 47 BPM
WBC # BLD AUTO: 6.2 10E3/UL (ref 4–11)

## 2024-10-18 PROCEDURE — 85027 COMPLETE CBC AUTOMATED: CPT | Performed by: FAMILY MEDICINE

## 2024-10-18 PROCEDURE — 83036 HEMOGLOBIN GLYCOSYLATED A1C: CPT | Performed by: FAMILY MEDICINE

## 2024-10-18 PROCEDURE — 84443 ASSAY THYROID STIM HORMONE: CPT | Performed by: FAMILY MEDICINE

## 2024-10-18 PROCEDURE — G2211 COMPLEX E/M VISIT ADD ON: HCPCS | Performed by: FAMILY MEDICINE

## 2024-10-18 PROCEDURE — 36415 COLL VENOUS BLD VENIPUNCTURE: CPT | Performed by: FAMILY MEDICINE

## 2024-10-18 PROCEDURE — 99215 OFFICE O/P EST HI 40 MIN: CPT | Performed by: FAMILY MEDICINE

## 2024-10-18 PROCEDURE — 93010 ELECTROCARDIOGRAM REPORT: CPT | Performed by: INTERNAL MEDICINE

## 2024-10-18 PROCEDURE — 80048 BASIC METABOLIC PNL TOTAL CA: CPT | Performed by: FAMILY MEDICINE

## 2024-10-18 PROCEDURE — 93005 ELECTROCARDIOGRAM TRACING: CPT | Performed by: FAMILY MEDICINE

## 2024-10-18 RX ORDER — PREDNISOLONE ACETATE 10 MG/ML
1-2 SUSPENSION/ DROPS OPHTHALMIC
COMMUNITY
Start: 2024-06-07

## 2024-10-18 NOTE — PATIENT INSTRUCTIONS
Antiplatelet or Anticoagulation Medication Instructions   - Bleeding risk is low for this procedure (e.g. dental, skin, cataract).    Additional Medication Instructions   - Beta Blockers: Continue taking on the day of surgery.   - Calcium Channel Blockers: May be continued on the day of surgery.   - metformin: DO NOT TAKE day of surgery.   - GLP-1 Injectable (exenitide, liraglutide, semaglutide, dulaglutide, etc.): DO NOT TAKE 7 days before surgery

## 2024-10-18 NOTE — PROGRESS NOTES
Preoperative Evaluation  [facility]  [address]  [facility] [address]  Phone: [phone]  Fax: [phone]  Primary Provider: MAVERICK Cummings CNP  Pre-op Performing Provider: Jhon August MD  Oct 18, 2024             10/18/2024   Surgical Information   What procedure is being done? Eye surgery    Eye surgery   Facility or Hospital where procedure/surgery will be performed: [facility]   Who is doing the procedure / surgery? [facility]   Date of surgery / procedure: 10/23/24    10/23/24   Time of surgery / procedure: Unk    Unk   Where do you plan to recover after surgery? at home with family    at home with family       Multiple values from one day are sorted in reverse-chronological order       Assessment & Plan     The proposed surgical procedure is considered LOW risk.    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   No GLP-1 injection for a week prior to the procedure.     Antiplatelet or Anticoagulation Medication Instructions   - Bleeding risk is low for this procedure (e.g. dental, skin,  cataract).    Additional Medication Instructions   - Beta Blockers: Continue taking on the day of surgery.   - Calcium Channel Blockers: May be continued on the day of surgery.   - metformin: DO NOT TAKE day of surgery.   - GLP-1 Injectable (exenitide, liraglutide, semaglutide, dulaglutide, etc.): DO NOT TAKE 7 days before surgery     Recommendation  Approval given to proceed with proposed procedure, Under local or MAC only       The longitudinal plan of care for the diagnosis(es)/condition(s) as documented were addressed during this visit. Due to the added complexity in care, I will continue to support Lisseth in the subsequent management and with ongoing continuity of care.      I spent a total of 41 minutes on the day of the visit.   Time spent by me doing chart review, history and exam, documentation and further activities per the note    Subjective   Lisseth is a 69 year old, presenting for the following:  Pre-Op Exam (10/23/24)          10/18/2024     7:56 AM   Additional Questions   Roomed by  Laura Paw     HPI related to upcoming procedure: ectropion         10/18/2024   Pre-Op Questionnaire   Have you ever had a heart attack or stroke? No   Have you ever had surgery on your heart or blood vessels, such as a stent placement, a coronary artery bypass, or surgery on an artery in your head, neck, heart, or legs? (!) YES    Do you have chest pain with activity? No   Do you have a history of heart failure? No   Do you currently have a cold, bronchitis or symptoms of other infection? No   Do you have a cough, shortness of breath, or wheezing? No   Do you or anyone in your family have previous history of blood clots? (!) YES    Do you or does anyone in your family have a serious bleeding problem such as prolonged bleeding following surgeries or cuts? No   Have you ever had problems with anemia or been told to take iron pills? (!) YES    Have you had any abnormal blood loss such as black, tarry or bloody stools, or  abnormal vaginal bleeding? No   Have you ever had a blood transfusion? (!) YES   Have you ever had a transfusion reaction? No   Are you willing to have a blood transfusion if it is medically needed before, during, or after your surgery? Yes   Have you or any of your relatives ever had problems with anesthesia? No   Do you have sleep apnea, excessive snoring or daytime drowsiness? No   Do you have any artifical heart valves or other implanted medical devices like a pacemaker, defibrillator, or continuous glucose monitor? No   Do you have artificial joints? No   Are you allergic to latex? No        Preoperative Review of    reviewed - no record of controlled substances prescribed.          Patient Active Problem List    Diagnosis Date Noted    Vaccination refused by patient 02/02/2024     Priority: Medium     shingrix,rsv      Right flank pain 02/02/2024     Priority: Medium    Coronary artery disease involving coronary bypass graft of native heart without angina pectoris 11/22/2023     Priority: Medium     Stents 4/2023 x 2   Occluded fall 2023   Went to three vessel bypass in nov 2023       Iron deficiency 11/14/2023     Priority: Medium    Anemia, unspecified type 11/13/2023     Priority: Medium    Statin intolerance 08/15/2023     Priority: Medium    Vitamin D deficiency 12/08/2022     Priority: Medium    Vertebral artery occlusion, left 04/10/2019     Priority: Medium     with reconstitution of V2 segment at c5 level seen on neck CTA on 4/10/19        Hypothyroidism      Priority: Medium     Created by Conversion  Replacement Utility updated for latest IMO load        Hypertension      Priority: Medium     Created by Conversion  Replacement Utility updated for latest IMO load        Type 2 diabetes mellitus with stage 3 chronic kidney disease, without long-term current use of insulin, unspecified whether stage 3a or 3b CKD (H)      Priority: Medium     Created by Conversion          Past Medical History:    Diagnosis Date    Diabetes (H)     Heart disease     History of blood transfusion      Past Surgical History:   Procedure Laterality Date    COLONOSCOPY N/A 11/17/2023    Procedure: COLONOSCOPY WITH POLYPECTOMY, BIOPSY AND HEMOCLIP APPLICATION;  Surgeon: David Wong DO;  Location: [facility] OR    CORONARY ARTERY BYPASS GRAFT, WITH ENDOSCOPIC VESSEL PROCUREMENT N/A 11/22/2023    Procedure: CORONARY ARTERY BYPASS GRAFT TIMES THREE, LEFT INTERNAL MAMMARY ARTERY HARVEST, LEFT LEG ENDOSCOPIC VESSEL PROCUREMENT, ANESTHESIA TRANSESOPHAGEAL ECHOCARDIOGRAM, EPI-AORTIC ULTRASOUND;  Surgeon: Yanet Gabriel MD;  Location: [facility] OR    CORONARY STENT PLACEMENT  10/19/2012    glenna 1st obtuse marginal    CV CORONARY ANGIOGRAM N/A 4/18/2023    Procedure: Coronary Angiogram;  Surgeon: Javier Guerra MD;  Location: [facility] CATH LAB CV    CV CORONARY ANGIOGRAM N/A 11/14/2023    Procedure: Coronary Angiogram;  Surgeon: Dilan Archuleta MD;  Location: [facility] LAB CV    CV INSTANTANEOUS WAVE-FREE RATIO N/A 11/14/2023    Procedure: Instantaneous Wave-Free Ratio;  Surgeon: Dilan Archuleta MD;  Location: [facility] LAB CV    CV LEFT HEART CATH N/A 4/18/2023    Procedure: Left Heart Catheterization;  Surgeon: Javier Guerra MD;  Location: [facility] LAB CV    CV PCI N/A 4/18/2023    Procedure: Percutaneous Coronary Intervention;  Surgeon: Javier Guerra MD;  Location: [facility] CV    ESOPHAGOSCOPY, GASTROSCOPY, DUODENOSCOPY (EGD), COMBINED N/A 11/17/2023    Procedure: ESOPHAGOGASTRODUODENOSCOPY WITH BIOPSIES AND HEMOCLIP APPLICATION;  Surgeon: David Wong DO;  Location: [facility] OR    SHOULDER SURGERY      frozen shoulder     Current Outpatient Medications   Medication Sig Dispense Refill    acetaminophen (TYLENOL) 500 MG tablet Take 1-2 tablets (500-1,000 mg) by mouth every 6 hours as needed for mild pain      albuterol (PROAIR HFA/PROVENTIL HFA/VENTOLIN HFA) 108 (90 Base)  MCG/ACT inhaler INHALE 2 PUFFS INTO THE LUNGS EVERY 6 HOURS AS NEEDED FOR SHORTNESS OF BREATH, WHEEZING OR COUGH 8.5 g 0    amLODIPine (NORVASC) 10 MG tablet Take 10 mg by mouth daily      aspirin (ASA) 81 MG chewable tablet Take 2 tablets (162 mg) by mouth or NG Tube daily 180 tablet 2    buPROPion (WELLBUTRIN XL) 300 MG 24 hr tablet Take 300 mg by mouth daily      coenzyme Q10 (COQ-10) 100 mg capsule 100 mg every evening      cyclobenzaprine (FLEXERIL) 5 MG tablet Take 1 tablet (5 mg) by mouth 3 times daily as needed for muscle spasms 60 tablet 0    exenatide ER (BYDUREON BCISE) 2 MG/0.85ML auto-injector Inject 2 mg Subcutaneous every 7 days Once a week on Sunday      fish oil-omega-3 fatty acids 1000 MG capsule Take 2 g by mouth every evening      levothyroxine (SYNTHROID, LEVOTHROID) 75 MCG tablet [LEVOTHYROXINE (SYNTHROID, LEVOTHROID) 75 MCG TABLET] Take 75 mcg by mouth daily.      melatonin 3 mg Tab [MELATONIN 3 MG TAB] Take 3 mg by mouth bedtime as needed.      metFORMIN (GLUCOPHAGE XR) 500 MG 24 hr tablet Take 500 mg by mouth daily before breakfast      metFORMIN (GLUCOPHAGE XR) 500 MG 24 hr tablet Take 1,000 mg by mouth daily (with dinner)      Metoprolol Tartrate 75 MG TABS Take 75 mg by mouth 2 times daily 180 tablet 3    multivitamin therapeutic (THERAGRAN) tablet [MULTIVITAMIN THERAPEUTIC (THERAGRAN) TABLET] Take 1 tablet by mouth daily.      nitroGLYcerin (NITROSTAT) 0.4 MG sublingual tablet Place 0.4 mg under the tongue every 5 minutes as needed      pantoprazole (PROTONIX) 40 MG EC tablet Take 1 tablet (40 mg) by mouth daily 30 tablet 1    prednisoLONE acetate (PRED FORTE) 1 % ophthalmic suspension 1-2 drops.      red yeast rice 600 mg Tab [RED YEAST RICE 600 MG TAB] Take 1 tablet by mouth daily.      REPATHA SURECLICK 140 MG/ML prefilled autoinjector INJECT 140MG SUBCUTANEOUSLY  EVERY 2 WEEKS 6 mL 3    senna-docusate (SENOKOT-S/PERICOLACE) 8.6-50 MG tablet 1 tablet by Oral or Feeding Tube route 2  "times daily 14 tablet 0       Allergies   Allergen Reactions    Codeine      Other reaction(s): *Unknown    Guaifenesin Swelling     Felt throat swelling, Guaifenesin with codeine    Penicillins Unknown     Unknown, allergy occurred at 6 months old  25 tolerated cefazolin with open heart surgery     Rosuvastatin Calcium [Rosuvastatin] Unknown        Social History     Tobacco Use    Smoking status: Former     Current packs/day: 0.00     Average packs/day: 1.5 packs/day for 35.0 years (52.5 ttl pk-yrs)     Types: Cigarettes     Start date: 1970     Quit date: 2005     Years since quittin.7     Passive exposure: Past    Smokeless tobacco: Not on file    Tobacco comments:     pt quit 10 years ago (she had smoked x 35 years)   Substance Use Topics    Alcohol use: No     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    Weight as of this encounter: 75.3 kg (166 lb 1.6 oz).    Physical Exam  GENERAL: alert and no distress  NECK: no adenopathy, no asymmetry, masses, or scars  RESP: lungs clear to auscultation - no rales, rhonchi or wheezes  CV: regular rate and rhythm, normal S1 S2, no S3 or S4, no murmur, click or rub, no peripheral edema  ABDOMEN: soft, nontender, no hepatosplenomegaly, no masses and bowel sounds normal  MS: no gross musculoskeletal defects noted, no edema    Recent Labs   Lab Test 24  0746 24  1103 23  0707 23  1454 23  0415 23  1212 23  0823 23  0722 11/15/23  0437 23  0450   HGB  --  12.4  --  11.5*   < > 9.9*   < > 8.3*   < > 8.9*   PLT  --  231  --  362   " < > 132*  --  124*   < > 289   INR  --   --   --   --   --  1.52*  --  1.48*  --   --      --  140 140   < > 145   < >  --    < > 140   POTASSIUM 4.5  --  4.3 5.3   < > 3.9  3.9   < >  --    < > 4.2   CR 1.01*  --  0.92 1.29*   < > 0.87  --   --    < > 1.31*   A1C  --   --   --   --   --   --   --   --   --  6.4*    < > = values in this interval not displayed.        Diagnostics  Recent Results (from the past 168 hour(s))   EKG 12-lead, tracing only    Collection Time: 10/18/24  8:38 AM   Result Value Ref Range    Systolic Blood Pressure  mmHg    Diastolic Blood Pressure  mmHg    Ventricular Rate 47 BPM    Atrial Rate 47 BPM    AR Interval 170 ms    QRS Duration 76 ms     ms    QTc 438 ms    P Axis 53 degrees    R AXIS 30 degrees    T Axis 73 degrees    Interpretation ECG       Sinus bradycardia Sinus arrhythmia  Cannot rule out Anterior infarct , age undetermined  Abnormal ECG  When compared with ECG of 23-Nov-2023 07:34,  Vent. rate has decreased by  23 bpm  Minimal criteria for Anterior infarct are now Present  T wave inversion no longer evident in Inferior leads  T wave inversion no longer evident in Lateral leads  Confirmed by TIERNEY PARKER MD LOC:JN ([id number]) on 10/18/2024 1:38:09 PM     CBC with platelets    Collection Time: 10/18/24  9:03 AM   Result Value Ref Range    WBC Count 6.2 4.0 - 11.0 10e3/uL    RBC Count 4.19 3.80 - 5.20 10e6/uL    Hemoglobin 12.0 11.7 - 15.7 g/dL    Hematocrit 37.4 35.0 - 47.0 %    MCV 89 78 - 100 fL    MCH 28.6 26.5 - 33.0 pg    MCHC 32.1 31.5 - 36.5 g/dL    RDW 13.0 10.0 - 15.0 %    Platelet Count 206 150 - 450 10e3/uL   Basic metabolic panel    Collection Time: 10/18/24  9:03 AM   Result Value Ref Range    Sodium 138 135 - 145 mmol/L    Potassium 5.0 3.4 - 5.3 mmol/L    Chloride 102 98 - 107 mmol/L    Carbon Dioxide (CO2) 25 22 - 29 mmol/L    Anion Gap 11 7 - 15 mmol/L    Urea Nitrogen 16.5 8.0 - 23.0 mg/dL    Creatinine 1.10 (H) 0.51 - 0.95 mg/dL    GFR Estimate 55  (L) >60 mL/min/1.73m2    Calcium 9.7 8.8 - 10.4 mg/dL    Glucose 283 (H) 70 - 99 mg/dL   Hemoglobin A1c    Collection Time: 10/18/24  9:03 AM   Result Value Ref Range    Estimated Average Glucose 189 (H) <117 mg/dL    Hemoglobin A1C 8.2 (H) 0.0 - 5.6 %   TSH with free T4 reflex    Collection Time: 10/18/24  9:03 AM   Result Value Ref Range    TSH 3.32 0.30 - 4.20 uIU/mL          Revised Cardiac Risk Index (RCRI)  The patient has the following serious cardiovascular risks for perioperative complications:   - Coronary Artery Disease (MI, positive stress test, angina, Qs on EKG) = 1 point     RCRI Interpretation: 1 point: Class II (low risk - 0.9% complication rate)         Signed Electronically by: Jhon August MD  A copy of this evaluation report is provided to the requesting physician.

## 2024-10-19 ENCOUNTER — HEALTH MAINTENANCE LETTER (OUTPATIENT)
Age: 69
End: 2024-10-19

## 2024-10-30 ENCOUNTER — TELEPHONE (OUTPATIENT)
Dept: FAMILY MEDICINE | Facility: CLINIC | Age: 69
End: 2024-10-30
Payer: COMMERCIAL

## 2024-10-30 NOTE — TELEPHONE ENCOUNTER
Provider Communication    Who is calling:  Dr. Jessica Reynolds     Facility in which provider is associated:  [facility]    Reason for call:  Requesting call back, pathology came back and indicates amyloidosis.     Okay to leave detailed message?:  Yes at Other phone number:  [phone]

## 2024-11-01 DIAGNOSIS — R89.7 ABNORMAL BIOPSY RESULT: Primary | ICD-10-CM

## 2024-11-01 PROBLEM — E85.89 OTHER AMYLOIDOSIS (H): Status: ACTIVE | Noted: 2024-11-01

## 2024-11-01 NOTE — PROGRESS NOTES
Please call patient:     Next step would be a visit with hematology/oncology. I have placed the referral.     I also want her to come in for a urine protein check. I have ordred this, please help get a lab only apt for this. She will need to pee for this test.     Please also call [facility] and tell them provider Gaby Santos is trying to call back Dr. Jessica Reynolds, but has not been able to reach her. Please give them my personal number of [phone] to have Dr. Jessica Reynolds call me back.     We will also need the records from the pathology that indicates amloidosis, did the patient sign an GRAZYNA so they can send this? Or do we need to have the patient reach out to them and complete and GRAZYNA?     Thank you,     Gaby Santos DNP, APRN, FNP-C   Pt is having severe pain a week after her surgery. Pt states that it is worse now then when she came home. Please call to discuss asap. Thank You

## 2024-11-02 ENCOUNTER — PRE VISIT (OUTPATIENT)
Dept: ONCOLOGY | Facility: CLINIC | Age: 69
End: 2024-11-02
Payer: COMMERCIAL

## 2024-11-02 NOTE — TELEPHONE ENCOUNTER
Action November 4, 2024 1:41 PM ABT   Action Taken Records from [facility] received and sent to HIM for upload and NN email for review     RECORDS STATUS - ALL OTHER DIAGNOSIS      RECORDS RECEIVED FROM: [facility], [facility], [facility]   NOTES STATUS DETAILS   OFFICE NOTE from referring provider MAVERICK Whittaker CNP   OFFICE NOTE from medical oncologist     OFFICE NOTE from other specialist External: [facility]:  10/01/24: Dr. Jessica Reynolds   DISCHARGE SUMMARY from hospital     DISCHARGE REPORT from the ER     OPERATIVE REPORT     MEDICATION LIST [facility]    LABS     PATHOLOGY REPORTS Report in [facility] 10/23/24: [id number]   ANYTHING RELATED TO DIAGNOSIS Epic Most recent 10/18/24   PATHOLOGY FEDEX TRACKING   Tracking #: [id number]   GENONOMIC TESTING     TYPE:     IMAGING (NEED IMAGES & REPORT)     CT SCANS     MRI     XRAYS     ULTRASOUND     PET     IMAGE DISC FEDEX TRACKING   Tracking #:

## 2024-11-04 ENCOUNTER — PATIENT OUTREACH (OUTPATIENT)
Dept: ONCOLOGY | Facility: CLINIC | Age: 69
End: 2024-11-04
Payer: COMMERCIAL

## 2024-11-04 NOTE — PROGRESS NOTES
Called and spoke with patient and provided her the number hematology/oncology. Scheduled lab appt at [facility] and asked if GRAZYNA could be signed.       Left message for [place] niurka Reynolds to call Gaby Santos back on her cell phone.

## 2024-11-04 NOTE — PROGRESS NOTES
New Patient Hematology / Oncology Nurse Navigator Note     Referral Date: 11/1/24    Referring provider: MAVERICK Arana CNP    Referring Clinic/Organization: [facility]     Referred to: Medical Oncology    Requested provider (if applicable): First available - did not specify     Evaluation for : Amyloidosis    2     Clinical History (per Nurse review of records provided):    Pathology available through Media tab    Clinical Assessment / Barriers to Care (Per Nurse):    None identified at time of call, discuss referral concerns and what to expect at initial consult visit.      Records Location: Faxed - Media tab/Scanned     Records Needed:     See Pre-Visit encounter from CSS team for additional details on records collection. Additional questions on records needed for initial consult can be sent to P ONC ADULT NEW PATIENT RECORDS [[id number]] with a copy to P CANCER CARE NURSE NAVIGATION [[id number]]. Please include diagnosis and urgency in subject line.    Additional testing needed prior to consult:     N/A    Referral updates and Plan:     Triage instructions updated and sent to NPS for completion      ZULEMA MujicaN, RN, PHN, OCN  Hematology/Oncology Nurse Navigator   [facility]   [phone]   [email or web address]

## 2024-11-06 ENCOUNTER — LAB (OUTPATIENT)
Dept: LAB | Facility: CLINIC | Age: 69
End: 2024-11-06
Payer: COMMERCIAL

## 2024-11-06 DIAGNOSIS — R89.7 ABNORMAL BIOPSY RESULT: ICD-10-CM

## 2024-11-06 PROCEDURE — 82043 UR ALBUMIN QUANTITATIVE: CPT

## 2024-11-06 PROCEDURE — 82570 ASSAY OF URINE CREATININE: CPT

## 2024-11-07 LAB
CREAT UR-MCNC: 103 MG/DL
MICROALBUMIN UR-MCNC: 103 MG/L
MICROALBUMIN/CREAT UR: 100 MG/G CR (ref 0–25)

## 2024-11-15 NOTE — TELEPHONE ENCOUNTER
Action November 15, 2024 2:59 PM ES   Action Taken Slides from [facility] received and taken to 5th floor path lab for review.    4 slides  [id number] :  10/23/24

## 2024-11-18 ENCOUNTER — LAB REQUISITION (OUTPATIENT)
Dept: LAB | Facility: CLINIC | Age: 69
End: 2024-11-18
Payer: COMMERCIAL

## 2024-11-18 LAB
PATH REPORT.COMMENTS IMP SPEC: NORMAL
PATH REPORT.FINAL DX SPEC: NORMAL
PATH REPORT.GROSS SPEC: NORMAL
PATH REPORT.MICROSCOPIC SPEC OTHER STN: NORMAL
PATH REPORT.RELEVANT HX SPEC: NORMAL
PATH REPORT.RELEVANT HX SPEC: NORMAL
PATH REPORT.SITE OF ORIGIN SPEC: NORMAL

## 2024-11-18 PROCEDURE — 88321 CONSLTJ&REPRT SLD PREP ELSWR: CPT | Performed by: STUDENT IN AN ORGANIZED HEALTH CARE EDUCATION/TRAINING PROGRAM

## 2024-11-22 ENCOUNTER — LAB REQUISITION (OUTPATIENT)
Dept: LAB | Facility: CLINIC | Age: 69
End: 2024-11-22
Payer: COMMERCIAL

## 2024-11-22 LAB
Lab: NORMAL
PERFORMING LABORATORY: NORMAL
SPECIMEN STATUS: NORMAL
TEST NAME: NORMAL

## 2024-11-22 PROCEDURE — 88313 SPECIAL STAINS GROUP 2: CPT | Performed by: STUDENT IN AN ORGANIZED HEALTH CARE EDUCATION/TRAINING PROGRAM

## 2024-11-25 ENCOUNTER — LAB (OUTPATIENT)
Dept: INFUSION THERAPY | Facility: HOSPITAL | Age: 69
End: 2024-11-25
Payer: COMMERCIAL

## 2024-11-25 PROCEDURE — 84166 PROTEIN E-PHORESIS/URINE/CSF: CPT | Performed by: PATHOLOGY

## 2024-11-25 PROCEDURE — 84166 PROTEIN E-PHORESIS/URINE/CSF: CPT | Mod: 26 | Performed by: PATHOLOGY

## 2024-11-25 PROCEDURE — 81050 URINALYSIS VOLUME MEASURE: CPT | Performed by: PATHOLOGY

## 2024-11-25 PROCEDURE — 83883 ASSAY NEPHELOMETRY NOT SPEC: CPT | Performed by: INTERNAL MEDICINE

## 2024-12-03 DIAGNOSIS — E85.89 OTHER AMYLOIDOSIS (H): Primary | ICD-10-CM

## 2024-12-03 LAB — MAYO MISC RESULT: NORMAL

## 2024-12-04 ENCOUNTER — PATIENT OUTREACH (OUTPATIENT)
Dept: ONCOLOGY | Facility: HOSPITAL | Age: 69
End: 2024-12-04
Payer: COMMERCIAL

## 2024-12-04 NOTE — PROGRESS NOTES
[facility]: Cancer Care                                                                                          Sent YingYang message:    Harman Montanez,    Dr. Kaur reviewed your recent lab results and wanted to let you know that your labs didn't show any signs of systemic amyloidosis. He would like for you to have a follow up visit with him in one year. He would like for you to get labs drawn a week prior. Please give our clinic a call in about 6 months in May or June to schedule the lab and follow up appointment with Dr. Kaur.        Patient graduated from care coordination services due to being a yearly follow up patient.     Signature:  Anay Osullivan RN

## 2025-01-02 DIAGNOSIS — E78.2 MIXED HYPERLIPIDEMIA: ICD-10-CM

## 2025-01-02 DIAGNOSIS — I25.110 CORONARY ARTERY DISEASE INVOLVING NATIVE CORONARY ARTERY OF NATIVE HEART WITH UNSTABLE ANGINA PECTORIS (H): ICD-10-CM

## 2025-01-02 DIAGNOSIS — R06.09 DYSPNEA ON EXERTION: ICD-10-CM

## 2025-01-02 DIAGNOSIS — Z78.9 STATIN INTOLERANCE: ICD-10-CM

## 2025-01-07 RX ORDER — EVOLOCUMAB 140 MG/ML
INJECTION, SOLUTION SUBCUTANEOUS
Qty: 6 ML | Refills: 3 | Status: SHIPPED | OUTPATIENT
Start: 2025-01-07

## 2025-01-26 ENCOUNTER — HEALTH MAINTENANCE LETTER (OUTPATIENT)
Age: 70
End: 2025-01-26

## 2025-03-07 NOTE — PROGRESS NOTES
HEART CARE ENCOUNTER NOTE      [facility]  [phone]    Primary Care: Shivani Santos  Primary Cardiologist: Dr. Shaffer    Assessment/Recommendations   Assessment:  CAD  S/p CABG (LIMA to LAD, SVG to OM1, SVG to right PDA) in November 2023 following restenosis of multiple stents  Denies anginal symptoms  Hypertension  Well-controlled the office today  Hyperlipidemia  Statin intolerant  On Repatha, LDL 58      Other pertinent medical hx reviewed:  Type 2 diabetes  Carotid plaque on CT  Former smoker    Plan:  Get updated LDL and LPA given her recurrent obstructive CAD and onset at young age (though has significant risk factors including tobacco use and type 2 diabetes that could also explain this)  Okay to reduce metoprolol to 25 mg twice daily while monitoring blood pressures    Follow up with Dr. Shaffer in 1 year or sooner if needed.    The longitudinal plan of care for the diagnosis(es)/condition(s) as documented were addressed during this visit. Due to the added complexity in care, I will continue to support Lisseth in the subsequent management and with ongoing continuity of care.      History of Present Illness/Subjective     Lisseth Qureshi is a 69 year old female with PMHx of CAD with multiple revascularizations with PCI and CABG in 2023, hypertension, hyperlipidemia, type 2 diabetes, carotid plaque on CT, former smoker presenting for follow-up.      She was last seen by Dr. Shaffer on 2/21/2024.  At that time, patient was following up from CABG and was doing well.  She just finished cardiac rehab and denied any cardiac symptoms.  She was instructed to continue current medications including aspirin, Repatha and reduce her metoprolol to 75 twice a day.    She has been feeling well since her last visit has no cardiac complaints today.  She recently retired notices that she is sleeping more now.  Still regularly exercises at [email or web address] and has some home gym equipment  Wondering if she can  "come off of metoprolol to reduce her pill burden    She denies fatigue, lightheadedness, shortness of breath, dyspnea on exertion, orthopnea, PND, palpitations, chest pain, abdominal fullness/bloating, and lower extremity edema.     Cardiac Testing         Echo:    TTE, 4/18/2023  Left ventricular size, wall motion and function are normal. The ejection  fraction is 60-65%.  Normal right ventricle size and systolic function.  No hemodynamically significant valvular abnormalities on 2D or color flow  imaging.      Cardiac Cath 11/14/2023:  CONCLUSIONS: Multivessel coronary artery disease involving the mid LAD (iFR 0.85), the proximal to mid left circumflex (iFR 0.67-0.70), and severe in-stent restenosis of the dominant mid right coronary artery.  RECOMMENDATIONS:   1. Usual postprocedure cares, please see orders.  2. Recommend cardiothoracic surgery consultation for evaluation of surgical revascularization - potential targets include the left anterior descending artery, the OM 1 and 2 branches, and distal right coronary artery.  3. Aggressive risk factor modification for secondary prevention.  4. Defer evaluation and management of patient's acute anemia to primary service.      Labs:  LDL 58  Creatinine 1.08  Potassium 4.9  Hemoglobin 12.6  Platelet 207  A1c 8.2    Physical Examination    Vitals: /50 (BP Location: Left arm, Patient Position: Sitting, Cuff Size: Adult Regular)   Pulse 58   Resp 17   Ht 1.511 m (4' 11.5\")   Wt 75.3 kg (166 lb)   LMP  (LMP Unknown)   BMI 32.97 kg/m      General: Well appearing, in no acute distress. Resting comfortably in exam chair  Skin: No clubbing, no cyanosis.  Eyes: Extra ocular movements intact  Neck: No jugular venous distention, no carotid bruits, carotids have a normal upstroke  Lungs: Clear to auscultation bilaterally, no wheezing or rhonchi.  Heart: Regular rhythm, PMI not displaced, S1, S2 normal, no S3, no S4, no heaves, no rub and no murmur.  Abdomen: Soft, " nontender  Extremities: No peripheral edema . Grade 2/4 distal pulses bilaterally.  Neuro: Oriented to person, place and time, alert, cooperative, gait coordinated.    BP Readings from Last 3 Encounters:   03/12/25 126/50   11/27/24 134/72   11/22/24 (!) 143/65       Pulse Readings from Last 3 Encounters:   03/12/25 58   11/27/24 56   11/22/24 58       Wt Readings from Last 3 Encounters:   03/12/25 75.3 kg (166 lb)   11/22/24 75.8 kg (167 lb 3.2 oz)   10/18/24 75.3 kg (166 lb 1.6 oz)         Review of Systems      Please refer above for cardiac ROS details.       History     MEDICAL HISTORY:  Past Medical History:   Diagnosis Date    Diabetes (H)     Heart disease     History of blood transfusion      SURGICAL HISTORY  Past Surgical History:   Procedure Laterality Date    COLONOSCOPY N/A 11/17/2023    Procedure: COLONOSCOPY WITH POLYPECTOMY, BIOPSY AND HEMOCLIP APPLICATION;  Surgeon: David Wong DO;  Location: [facility] OR    CORONARY ARTERY BYPASS GRAFT, WITH ENDOSCOPIC VESSEL PROCUREMENT N/A 11/22/2023    Procedure: CORONARY ARTERY BYPASS GRAFT TIMES THREE, LEFT INTERNAL MAMMARY ARTERY HARVEST, LEFT LEG ENDOSCOPIC VESSEL PROCUREMENT, ANESTHESIA TRANSESOPHAGEAL ECHOCARDIOGRAM, EPI-AORTIC ULTRASOUND;  Surgeon: Yanet Gabriel MD;  Location: [facility] OR    CORONARY STENT PLACEMENT  10/19/2012    glenna 1st obtuse marginal    CV CORONARY ANGIOGRAM N/A 4/18/2023    Procedure: Coronary Angiogram;  Surgeon: Javier Guerra MD;  Location: [facility] CATH LAB CV    CV CORONARY ANGIOGRAM N/A 11/14/2023    Procedure: Coronary Angiogram;  Surgeon: Dilan Archuleta MD;  Location: [facility] LAB CV    CV INSTANTANEOUS WAVE-FREE RATIO N/A 11/14/2023    Procedure: Instantaneous Wave-Free Ratio;  Surgeon: Dilan Archuleta MD;  Location: [facility] CATH LAB CV    CV LEFT HEART CATH N/A 4/18/2023    Procedure: Left Heart Catheterization;  Surgeon: Javier Guerra MD;  Location: [facility] LAB CV    CV PCI N/A  2023    Procedure: Percutaneous Coronary Intervention;  Surgeon: Javier Guerra MD;  Location: [facility] CATH LAB CV    ESOPHAGOSCOPY, GASTROSCOPY, DUODENOSCOPY (EGD), COMBINED N/A 2023    Procedure: ESOPHAGOGASTRODUODENOSCOPY WITH BIOPSIES AND HEMOCLIP APPLICATION;  Surgeon: David Wong DO;  Location: [facility] Main OR    SHOULDER SURGERY      frozen shoulder      FAMILY HISTORY:  Family History   Problem Relation Age of Onset    Hypertension Mother     Dementia Mother     Mitral Valve Replacement No family hx of     FAMILY HISTORY:  Family History   Problem Relation Age of Onset    Hypertension Mother     Dementia Mother     Mitral Valve Replacement No family hx of       SOCIAL HISTORY:  Social History     Socioeconomic History    Marital status: Single     Spouse name: Not on file    Number of children: Not on file    Years of education: Not on file    Highest education level: Not on file   Occupational History    Not on file   Tobacco Use    Smoking status: Former     Current packs/day: 0.00     Average packs/day: 1.5 packs/day for 35.0 years (52.5 ttl pk-yrs)     Types: Cigarettes     Start date: 1970     Quit date: 2005     Years since quittin.1     Passive exposure: Past    Smokeless tobacco: Not on file    Tobacco comments:     pt quit 10 years ago (she had smoked x 35 years)   Vaping Use    Vaping status: Never Used   Substance and Sexual Activity    Alcohol use: No     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    Drug use: No    Sexual activity: Not on file   Other Topics Concern    Not on file   Social History Narrative    Exercises 3-4 days per week     Social Drivers of Health     Financial Resource Strain: Low Risk  (2024)    Financial Resource Strain     Within the past 12 months, have you or your family members you live with been unable to get utilities (heat, electricity) when it was really needed?: No   Food Insecurity: Low Risk  (2024)    Food  Insecurity     Within the past 12 months, did you worry that your food would run out before you got money to buy more?: No     Within the past 12 months, did the food you bought just not last and you didn t have money to get more?: No   Transportation Needs: Low Risk  (2/1/2024)    Transportation Needs     Within the past 12 months, has lack of transportation kept you from medical appointments, getting your medicines, non-medical meetings or appointments, work, or from getting things that you need?: No   Physical Activity: Not on file   Stress: Not on file   Social Connections: Not on file   Interpersonal Safety: Low Risk  (10/18/2024)    Interpersonal Safety     Do you feel physically and emotionally safe where you currently live?: Yes     Within the past 12 months, have you been hit, slapped, kicked or otherwise physically hurt by someone?: No     Within the past 12 months, have you been humiliated or emotionally abused in other ways by your partner or ex-partner?: No   Housing Stability: Low Risk  (2/1/2024)    Housing Stability     Do you have housing? : Yes     Are you worried about losing your housing?: No    ALLERGIES:  Allergies   Allergen Reactions    Codeine      Other reaction(s): *Unknown    Guaifenesin Swelling     Felt throat swelling, Guaifenesin with codeine    Penicillins Unknown     Unknown, allergy occurred at 6 months old  11/22/23 tolerated cefazolin with open heart surgery     Rosuvastatin Calcium [Rosuvastatin] Unknown            Medications:     Current Outpatient Medications   Medication Sig Dispense Refill    acetaminophen (TYLENOL) 500 MG tablet Take 1-2 tablets (500-1,000 mg) by mouth every 6 hours as needed for mild pain      albuterol (PROAIR HFA/PROVENTIL HFA/VENTOLIN HFA) 108 (90 Base) MCG/ACT inhaler INHALE 2 PUFFS INTO THE LUNGS EVERY 6 HOURS AS NEEDED FOR SHORTNESS OF BREATH, WHEEZING OR COUGH 8.5 g 0    amLODIPine (NORVASC) 10 MG tablet Take 10 mg by mouth daily      aspirin (ASA)  81 MG chewable tablet Take 2 tablets (162 mg) by mouth or NG Tube daily 180 tablet 2    buPROPion (WELLBUTRIN XL) 300 MG 24 hr tablet Take 300 mg by mouth daily      cyclobenzaprine (FLEXERIL) 5 MG tablet Take 1 tablet (5 mg) by mouth 3 times daily as needed for muscle spasms 60 tablet 0    exenatide ER (BYDUREON BCISE) 2 MG/0.85ML auto-injector Inject 2 mg Subcutaneous every 7 days Once a week on Sunday      fish oil-omega-3 fatty acids 1000 MG capsule Take 2 g by mouth every evening      levothyroxine (SYNTHROID, LEVOTHROID) 75 MCG tablet [LEVOTHYROXINE (SYNTHROID, LEVOTHROID) 75 MCG TABLET] Take 75 mcg by mouth daily.      melatonin 3 mg Tab [MELATONIN 3 MG TAB] Take 3 mg by mouth bedtime as needed.      metFORMIN (GLUCOPHAGE XR) 500 MG 24 hr tablet Take 500 mg by mouth daily before breakfast      metFORMIN (GLUCOPHAGE XR) 500 MG 24 hr tablet Take 1,000 mg by mouth daily (with dinner)      Metoprolol Tartrate 75 MG TABS Take 1 tablet by mouth 2 times daily. ---Cardiology follow up needed for further refills--- 180 tablet 0    multivitamin therapeutic (THERAGRAN) tablet [MULTIVITAMIN THERAPEUTIC (THERAGRAN) TABLET] Take 1 tablet by mouth daily.      nitroGLYcerin (NITROSTAT) 0.4 MG sublingual tablet Place 0.4 mg under the tongue every 5 minutes as needed      pantoprazole (PROTONIX) 40 MG EC tablet Take 1 tablet (40 mg) by mouth daily 30 tablet 1    prednisoLONE acetate (PRED FORTE) 1 % ophthalmic suspension 1-2 drops. (Patient not taking: Reported on 11/27/2024)      red yeast rice 600 mg Tab [RED YEAST RICE 600 MG TAB] Take 1 tablet by mouth daily. (Patient not taking: Reported on 11/27/2024)      REPATHA SURECLICK 140 MG/ML prefilled autoinjector INJECT 140MG SUBCUTANEOUSLY  EVERY 2 WEEKS 6 mL 3    senna-docusate (SENOKOT-S/PERICOLACE) 8.6-50 MG tablet 1 tablet by Oral or Feeding Tube route 2 times daily (Patient not taking: Reported on 11/27/2024) 14 tablet 0           Seamus Boyd PA-C  March 12,  2025      This note was partially generated using Dragon voice recognition system, and there may be some incorrect words,  spellings, and punctuation that were not noted in checking the note before saving.

## 2025-03-12 ENCOUNTER — OFFICE VISIT (OUTPATIENT)
Dept: CARDIOLOGY | Facility: CLINIC | Age: 70
End: 2025-03-12
Payer: COMMERCIAL

## 2025-03-12 VITALS
WEIGHT: 166 LBS | HEART RATE: 58 BPM | HEIGHT: 60 IN | RESPIRATION RATE: 17 BRPM | BODY MASS INDEX: 32.59 KG/M2 | DIASTOLIC BLOOD PRESSURE: 50 MMHG | SYSTOLIC BLOOD PRESSURE: 126 MMHG

## 2025-03-12 DIAGNOSIS — Z95.1 S/P CABG (CORONARY ARTERY BYPASS GRAFT): ICD-10-CM

## 2025-03-12 DIAGNOSIS — Z78.9 STATIN INTOLERANCE: ICD-10-CM

## 2025-03-12 DIAGNOSIS — N18.30 TYPE 2 DIABETES MELLITUS WITH STAGE 3 CHRONIC KIDNEY DISEASE, WITHOUT LONG-TERM CURRENT USE OF INSULIN, UNSPECIFIED WHETHER STAGE 3A OR 3B CKD (H): ICD-10-CM

## 2025-03-12 DIAGNOSIS — I25.10 CORONARY ARTERY DISEASE INVOLVING NATIVE CORONARY ARTERY OF NATIVE HEART WITHOUT ANGINA PECTORIS: Primary | ICD-10-CM

## 2025-03-12 DIAGNOSIS — E11.22 TYPE 2 DIABETES MELLITUS WITH STAGE 3 CHRONIC KIDNEY DISEASE, WITHOUT LONG-TERM CURRENT USE OF INSULIN, UNSPECIFIED WHETHER STAGE 3A OR 3B CKD (H): ICD-10-CM

## 2025-03-12 DIAGNOSIS — E78.2 MIXED HYPERLIPIDEMIA: ICD-10-CM

## 2025-03-12 PROCEDURE — G2211 COMPLEX E/M VISIT ADD ON: HCPCS

## 2025-03-12 PROCEDURE — 83721 ASSAY OF BLOOD LIPOPROTEIN: CPT

## 2025-03-12 PROCEDURE — 83695 ASSAY OF LIPOPROTEIN(A): CPT

## 2025-03-12 PROCEDURE — 36415 COLL VENOUS BLD VENIPUNCTURE: CPT

## 2025-03-12 PROCEDURE — 99214 OFFICE O/P EST MOD 30 MIN: CPT

## 2025-03-12 PROCEDURE — 3078F DIAST BP <80 MM HG: CPT

## 2025-03-12 PROCEDURE — 3074F SYST BP LT 130 MM HG: CPT

## 2025-03-12 RX ORDER — METOPROLOL TARTRATE 25 MG/1
25 TABLET, FILM COATED ORAL 2 TIMES DAILY
Qty: 180 TABLET | Refills: 3 | Status: SHIPPED | OUTPATIENT
Start: 2025-03-12

## 2025-03-12 RX ORDER — LORAZEPAM 0.5 MG/1
0.5 TABLET ORAL EVERY 8 HOURS PRN
COMMUNITY
Start: 2024-03-13

## 2025-03-12 NOTE — LETTER
3/12/2025    Gaby Santos, MAVERICK CNP  [address] [facility] [address]    RE: Lisseth GARCIA Titus       Dear Colleague,     I had the pleasure of seeing Lisseth Qureshi in the [facility].  HEART CARE ENCOUNTER NOTE      [facility]  [phone]    Primary Care: Gaby Santos  Primary Cardiologist: Dr. Shaffer    Assessment/Recommendations   Assessment:  CAD  S/p CABG (LIMA to LAD, SVG to OM1, SVG to right PDA) in November 2023 following restenosis of multiple stents  Denies anginal symptoms  Hypertension  Well-controlled the office today  Hyperlipidemia  Statin intolerant  On Repatha, LDL 58      Other pertinent medical hx reviewed:  Type 2 diabetes  Carotid plaque on CT  Former smoker    Plan:  Get updated LDL and LPA given her recurrent obstructive CAD and onset at young age (though has significant risk factors including tobacco use and type 2 diabetes that could also explain this)  Okay to reduce metoprolol to 25 mg twice daily while monitoring blood pressures    Follow up with Dr. Shaffer in 1 year or sooner if needed.    The longitudinal plan of care for the diagnosis(es)/condition(s) as documented were addressed during this visit. Due to the added complexity in care, I will continue to support Lisseth in the subsequent management and with ongoing continuity of care.      History of Present Illness/Subjective     Lisseth Qureshi is a 69 year old female with PMHx of CAD with multiple revascularizations with PCI and CABG in 2023, hypertension, hyperlipidemia, type 2 diabetes, carotid plaque on CT, former smoker presenting for follow-up.      She was last seen by Dr. Shaffer on 2/21/2024.  At that time, patient was following up from CABG and was doing well.  She just finished cardiac rehab and denied any cardiac symptoms.  She was instructed to continue current medications including aspirin, Repatha and reduce her metoprolol to 75 twice a day.    She has been feeling well  "since her last visit has no cardiac complaints today.  She recently retired notices that she is sleeping more now.  Still regularly exercises at [employer] and has some home gym equipment  Wondering if she can come off of metoprolol to reduce her pill burden    She denies fatigue, lightheadedness, shortness of breath, dyspnea on exertion, orthopnea, PND, palpitations, chest pain, abdominal fullness/bloating, and lower extremity edema.     Cardiac Testing         Echo:    TTE, 4/18/2023  Left ventricular size, wall motion and function are normal. The ejection  fraction is 60-65%.  Normal right ventricle size and systolic function.  No hemodynamically significant valvular abnormalities on 2D or color flow  imaging.      Cardiac Cath 11/14/2023:  CONCLUSIONS: Multivessel coronary artery disease involving the mid LAD (iFR 0.85), the proximal to mid left circumflex (iFR 0.67-0.70), and severe in-stent restenosis of the dominant mid right coronary artery.  RECOMMENDATIONS:   1. Usual postprocedure cares, please see orders.  2. Recommend cardiothoracic surgery consultation for evaluation of surgical revascularization - potential targets include the left anterior descending artery, the OM 1 and 2 branches, and distal right coronary artery.  3. Aggressive risk factor modification for secondary prevention.  4. Defer evaluation and management of patient's acute anemia to primary service.      Labs:  LDL 58  Creatinine 1.08  Potassium 4.9  Hemoglobin 12.6  Platelet 207  A1c 8.2    Physical Examination    Vitals: /50 (BP Location: Left arm, Patient Position: Sitting, Cuff Size: Adult Regular)   Pulse 58   Resp 17   Ht 1.511 m (4' 11.5\")   Wt 75.3 kg (166 lb)   LMP  (LMP Unknown)   BMI 32.97 kg/m      General: Well appearing, in no acute distress. Resting comfortably in exam chair  Skin: No clubbing, no cyanosis.  Eyes: Extra ocular movements intact  Neck: No jugular venous distention, no carotid bruits, carotids have " a normal upstroke  Lungs: Clear to auscultation bilaterally, no wheezing or rhonchi.  Heart: Regular rhythm, PMI not displaced, S1, S2 normal, no S3, no S4, no heaves, no rub and no murmur.  Abdomen: Soft, nontender  Extremities: No peripheral edema . Grade 2/4 distal pulses bilaterally.  Neuro: Oriented to person, place and time, alert, cooperative, gait coordinated.    BP Readings from Last 3 Encounters:   03/12/25 126/50   11/27/24 134/72   11/22/24 (!) 143/65       Pulse Readings from Last 3 Encounters:   03/12/25 58   11/27/24 56   11/22/24 58       Wt Readings from Last 3 Encounters:   03/12/25 75.3 kg (166 lb)   11/22/24 75.8 kg (167 lb 3.2 oz)   10/18/24 75.3 kg (166 lb 1.6 oz)         Review of Systems      Please refer above for cardiac ROS details.       History     MEDICAL HISTORY:  Past Medical History:   Diagnosis Date     Diabetes (H)      Heart disease      History of blood transfusion      SURGICAL HISTORY  Past Surgical History:   Procedure Laterality Date     COLONOSCOPY N/A 11/17/2023    Procedure: COLONOSCOPY WITH POLYPECTOMY, BIOPSY AND HEMOCLIP APPLICATION;  Surgeon: David Wong DO;  Location: [facility] OR     CORONARY ARTERY BYPASS GRAFT, WITH ENDOSCOPIC VESSEL PROCUREMENT N/A 11/22/2023    Procedure: CORONARY ARTERY BYPASS GRAFT TIMES THREE, LEFT INTERNAL MAMMARY ARTERY HARVEST, LEFT LEG ENDOSCOPIC VESSEL PROCUREMENT, ANESTHESIA TRANSESOPHAGEAL ECHOCARDIOGRAM, EPI-AORTIC ULTRASOUND;  Surgeon: Yanet Gabriel MD;  Location: [facility] OR     CORONARY STENT PLACEMENT  10/19/2012    glenna 1st obtuse marginal     CV CORONARY ANGIOGRAM N/A 4/18/2023    Procedure: Coronary Angiogram;  Surgeon: Javier Guerra MD;  Location: [facility] CATH LAB CV     CV CORONARY ANGIOGRAM N/A 11/14/2023    Procedure: Coronary Angiogram;  Surgeon: Dilan Archuleta MD;  Location: [facility] CATH LAB CV     CV INSTANTANEOUS WAVE-FREE RATIO N/A 11/14/2023    Procedure: Instantaneous Wave-Free Ratio;   Surgeon: Dilan Archuleta MD;  Location: [facility] LAB CV     CV LEFT HEART CATH N/A 2023    Procedure: Left Heart Catheterization;  Surgeon: Javier Guerra MD;  Location: [facility] CATH LAB CV     CV PCI N/A 2023    Procedure: Percutaneous Coronary Intervention;  Surgeon: Javier Guerra MD;  Location: [facility] LAB CV     ESOPHAGOSCOPY, GASTROSCOPY, DUODENOSCOPY (EGD), COMBINED N/A 2023    Procedure: ESOPHAGOGASTRODUODENOSCOPY WITH BIOPSIES AND HEMOCLIP APPLICATION;  Surgeon: David Wong DO;  Location: [facility] Main OR     SHOULDER SURGERY      frozen shoulder      FAMILY HISTORY:  Family History   Problem Relation Age of Onset     Hypertension Mother      Dementia Mother      Mitral Valve Replacement No family hx of     FAMILY HISTORY:  Family History   Problem Relation Age of Onset     Hypertension Mother      Dementia Mother      Mitral Valve Replacement No family hx of       SOCIAL HISTORY:  Social History     Socioeconomic History     Marital status: Single     Spouse name: Not on file     Number of children: Not on file     Years of education: Not on file     Highest education level: Not on file   Occupational History     Not on file   Tobacco Use     Smoking status: Former     Current packs/day: 0.00     Average packs/day: 1.5 packs/day for 35.0 years (52.5 ttl pk-yrs)     Types: Cigarettes     Start date: 1970     Quit date: 2005     Years since quittin.1     Passive exposure: Past     Smokeless tobacco: Not on file     Tobacco comments:     pt quit 10 years ago (she had smoked x 35 years)   Vaping Use     Vaping status: Never Used   Substance and Sexual Activity     Alcohol use: No     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     Drug use: No     Sexual activity: Not on file   Other Topics Concern     Not on file   Social History Narrative    Exercises 3-4 days per week     Social Drivers of Health     Financial Resource Strain: Low Risk   (2/1/2024)    Financial Resource Strain      Within the past 12 months, have you or your family members you live with been unable to get utilities (heat, electricity) when it was really needed?: No   Food Insecurity: Low Risk  (2/1/2024)    Food Insecurity      Within the past 12 months, did you worry that your food would run out before you got money to buy more?: No      Within the past 12 months, did the food you bought just not last and you didn t have money to get more?: No   Transportation Needs: Low Risk  (2/1/2024)    Transportation Needs      Within the past 12 months, has lack of transportation kept you from medical appointments, getting your medicines, non-medical meetings or appointments, work, or from getting things that you need?: No   Physical Activity: Not on file   Stress: Not on file   Social Connections: Not on file   Interpersonal Safety: Low Risk  (10/18/2024)    Interpersonal Safety      Do you feel physically and emotionally safe where you currently live?: Yes      Within the past 12 months, have you been hit, slapped, kicked or otherwise physically hurt by someone?: No      Within the past 12 months, have you been humiliated or emotionally abused in other ways by your partner or ex-partner?: No   Housing Stability: Low Risk  (2/1/2024)    Housing Stability      Do you have housing? : Yes      Are you worried about losing your housing?: No    ALLERGIES:  Allergies   Allergen Reactions     Codeine      Other reaction(s): *Unknown     Guaifenesin Swelling     Felt throat swelling, Guaifenesin with codeine     Penicillins Unknown     Unknown, allergy occurred at 6 months old  11/22/23 tolerated cefazolin with open heart surgery      Rosuvastatin Calcium [Rosuvastatin] Unknown            Medications:     Current Outpatient Medications   Medication Sig Dispense Refill     acetaminophen (TYLENOL) 500 MG tablet Take 1-2 tablets (500-1,000 mg) by mouth every 6 hours as needed for mild pain        albuterol (PROAIR HFA/PROVENTIL HFA/VENTOLIN HFA) 108 (90 Base) MCG/ACT inhaler INHALE 2 PUFFS INTO THE LUNGS EVERY 6 HOURS AS NEEDED FOR SHORTNESS OF BREATH, WHEEZING OR COUGH 8.5 g 0     amLODIPine (NORVASC) 10 MG tablet Take 10 mg by mouth daily       aspirin (ASA) 81 MG chewable tablet Take 2 tablets (162 mg) by mouth or NG Tube daily 180 tablet 2     buPROPion (WELLBUTRIN XL) 300 MG 24 hr tablet Take 300 mg by mouth daily       cyclobenzaprine (FLEXERIL) 5 MG tablet Take 1 tablet (5 mg) by mouth 3 times daily as needed for muscle spasms 60 tablet 0     exenatide ER (BYDUREON BCISE) 2 MG/0.85ML auto-injector Inject 2 mg Subcutaneous every 7 days Once a week on Sunday       fish oil-omega-3 fatty acids 1000 MG capsule Take 2 g by mouth every evening       levothyroxine (SYNTHROID, LEVOTHROID) 75 MCG tablet [LEVOTHYROXINE (SYNTHROID, LEVOTHROID) 75 MCG TABLET] Take 75 mcg by mouth daily.       melatonin 3 mg Tab [MELATONIN 3 MG TAB] Take 3 mg by mouth bedtime as needed.       metFORMIN (GLUCOPHAGE XR) 500 MG 24 hr tablet Take 500 mg by mouth daily before breakfast       metFORMIN (GLUCOPHAGE XR) 500 MG 24 hr tablet Take 1,000 mg by mouth daily (with dinner)       Metoprolol Tartrate 75 MG TABS Take 1 tablet by mouth 2 times daily. ---Cardiology follow up needed for further refills--- 180 tablet 0     multivitamin therapeutic (THERAGRAN) tablet [MULTIVITAMIN THERAPEUTIC (THERAGRAN) TABLET] Take 1 tablet by mouth daily.       nitroGLYcerin (NITROSTAT) 0.4 MG sublingual tablet Place 0.4 mg under the tongue every 5 minutes as needed       pantoprazole (PROTONIX) 40 MG EC tablet Take 1 tablet (40 mg) by mouth daily 30 tablet 1     prednisoLONE acetate (PRED FORTE) 1 % ophthalmic suspension 1-2 drops. (Patient not taking: Reported on 11/27/2024)       red yeast rice 600 mg Tab [RED YEAST RICE 600 MG TAB] Take 1 tablet by mouth daily. (Patient not taking: Reported on 11/27/2024)       REPATHA SURECLICK 140 MG/ML  prefilled autoinjector INJECT 140MG SUBCUTANEOUSLY  EVERY 2 WEEKS 6 mL 3     senna-docusate (SENOKOT-S/PERICOLACE) 8.6-50 MG tablet 1 tablet by Oral or Feeding Tube route 2 times daily (Patient not taking: Reported on 11/27/2024) 14 tablet 0           Seamus Boyd PA-C  March 12, 2025      This note was partially generated using Dragon voice recognition system, and there may be some incorrect words,  spellings, and punctuation that were not noted in checking the note before saving.      Thank you for allowing me to participate in the care of your patient.      Sincerely,     Seamus Boyd PA-C     [facility]  cc:   Oswald Shaffer DO  [address] [place] [address]

## 2025-03-12 NOTE — PATIENT INSTRUCTIONS
It was great to see you today! Your care team includes myself and Dr. Shaffer.    Recommendations:  Lab today to check Lp(a) and LDL  Reduce metoprolol to 25mg twice daily, watch blood pressures with this reduced dose  Discuss jardiance with your endocrinologist for diabetes   Monitor your blood pressure at home, once per day, about 1-2 hours after taking your morning medications.  Please allow at least 5 minutes of rest prior to checking your blood pressure.  Alternatively, you can take three blood pressure readings ~5 minutes apart and average them.  Keep a home blood pressure log.    Notify the office if you are having worsening shortness of breath or chest pain.    Continue a heart healthy diet (plant based or mediterranean diet).  Work on increasing physical activity with an end goal of 150 minutes per week of mild to moderate intensity exercise (brisk walk, biking, swimming)     Follow up with Dr. Shaffer in 1 year or sooner if needed      If you have questions, concerns, or new concerning symptoms prior to your next appointment, please contact the Cardiology Nursing team at [phone].    For scheduling issues/changes, please call [phone].

## 2025-03-13 LAB
APO A-I SERPL-MCNC: 231 MG/DL
LDLC SERPL DIRECT ASSAY-MCNC: 104 MG/DL

## 2025-03-14 ENCOUNTER — TRANSFERRED RECORDS (OUTPATIENT)
Dept: MULTI SPECIALTY CLINIC | Facility: CLINIC | Age: 70
End: 2025-03-14

## 2025-03-14 LAB — HBA1C MFR BLD: 8.5 %

## 2025-05-02 ENCOUNTER — ANCILLARY PROCEDURE (OUTPATIENT)
Dept: MAMMOGRAPHY | Facility: CLINIC | Age: 70
End: 2025-05-02
Payer: COMMERCIAL

## 2025-05-02 DIAGNOSIS — Z12.31 VISIT FOR SCREENING MAMMOGRAM: ICD-10-CM

## 2025-05-02 PROCEDURE — 77063 BREAST TOMOSYNTHESIS BI: CPT

## 2025-05-03 ENCOUNTER — HEALTH MAINTENANCE LETTER (OUTPATIENT)
Age: 70
End: 2025-05-03

## 2025-05-06 ENCOUNTER — DOCUMENTATION ONLY (OUTPATIENT)
Dept: FAMILY MEDICINE | Facility: CLINIC | Age: 70
End: 2025-05-06
Payer: COMMERCIAL

## 2025-05-06 NOTE — TELEPHONE ENCOUNTER
Please call: Patient has lab apt scheduled however patient just had A1C checked 30 days ago, Not due for repeat at this time. Also had LDL checked, but I can order the lipid profile if he wants, though not necessary. Let me know if they want the lipid panel ordered or if they want the lab apt cancelled.     If they do not have an appointment with me, please notify that since it has been over a year since we saw each other I no longer order medications/labs if needed until they have a physical.     Gaby Santos DNP, APRN, FNP-C

## 2025-05-06 NOTE — PROGRESS NOTES
Lisseth Qureshi has an upcoming lab appointment:    Future Appointments   Date Time Provider Department Center   5/9/2025  1:15 PM TS LAB [facility]     Patient is scheduled for the following lab(s): A1c per pt    There is no order available. Please review and place either future orders or HMPO (Review of Health Maintenance Protocol Orders), as appropriate.    Health Maintenance Due   Topic    ANNUAL REVIEW OF HM ORDERS     LIPID     A1C      Analia Saunders

## 2025-05-06 NOTE — PROGRESS NOTES
Called and spoke to patient. Relayed message and agreed to cancel appointment.   Also, sent a MyChart message regarding scheduling a Physical appointment.

## 2025-06-03 ENCOUNTER — MYC MEDICAL ADVICE (OUTPATIENT)
Dept: FAMILY MEDICINE | Facility: CLINIC | Age: 70
End: 2025-06-03
Payer: COMMERCIAL

## 2025-06-03 NOTE — TELEPHONE ENCOUNTER
Patient Quality Outreach    Patient is due for the following:   Diabetes -  A1C  Physical Annual Wellness Visit      Topic Date Due    Zoster (Shingles) Vaccine (1 of 2) Never done    COVID-19 Vaccine (6 - 2024-25 season) 09/01/2024       Action(s) Taken:   Schedule a Annual Wellness Visit & diabetes follow up     Type of outreach:    Sent [employer] message.    Questions for provider review:    None         Carmela Herrera MA  Chart routed to None.

## 2025-06-05 NOTE — TELEPHONE ENCOUNTER
"Patient Quality Outreach    Patient is due for the following:   See below    Action(s) Taken:   PER PATIENT MESSAGE  \"I think part of the problem that I just realized is that I saw Dr. Pollock a couple times when I was unable to see my PCP Shivani at [place]. However, I have not changed providers so I will go by what Shivani says I am to do. \"    Patient requests NO further contact/outreach  from Dr. Pollock's Care/ Team [facility].      Type of outreach:    Patient requests NO further contact/outreach    Questions for provider review:    None         Carmela Herrera MA  Chart routed to None.      "

## 2025-07-05 ENCOUNTER — HEALTH MAINTENANCE LETTER (OUTPATIENT)
Age: 70
End: 2025-07-05

## (undated) DEVICE — Device

## (undated) DEVICE — WIRE GUIDE HI-TRQ  WHISPER MS JTIP 0.014"X190CM 1005357HJ

## (undated) DEVICE — CABLE MYO/LEAD PACING WHITE DISP 019-530

## (undated) DEVICE — ELECTRODE DEFIB CADENCE 22550R

## (undated) DEVICE — ENDO SNARE EXACTO COLD 9MM LOOP 2.4MMX230CM 00711115

## (undated) DEVICE — SU PROLENE 7-0 BV175-6 24' M8737

## (undated) DEVICE — SOL WATER IRRIG 1000ML BOTTLE 2F7114

## (undated) DEVICE — TUBING INSUFFLATION W/FILTER 28-0207

## (undated) DEVICE — CUSTOM PACK CORONARY SAN5BCRHEA

## (undated) DEVICE — CABLE MYO/LEAD PACING BLUE DISP 019-535

## (undated) DEVICE — SU PLEDGET SOFT TFE 3/8"X3/26"X1/16" PCP40

## (undated) DEVICE — SUCTION SLEEVE NEPTUNE 2 125MM 0703-005-125

## (undated) DEVICE — KIT HAND CONTROL ACIST 014644 AR-P54

## (undated) DEVICE — ELECTRODE ADULT PACING MULTI P-211-M1

## (undated) DEVICE — LIGACLIP LARGE AESCULAP O4120-1

## (undated) DEVICE — SYR ANGIOGRAPHY MULTIUSE KIT ACIST 014612

## (undated) DEVICE — VESSEL LOOP BLUE MAXI 30-723

## (undated) DEVICE — CATH ANGIO JUDKINS JL3.5 6FRX100CM INFINITI 534618T

## (undated) DEVICE — HEMOSTASIS BONE OSTENE 2.5G SYNTHETIC 1503832

## (undated) DEVICE — CATH ANGIO JUDKINS JL4 6FRX100CM INFINITI 534620T

## (undated) DEVICE — SUCTION MANIFOLD NEPTUNE 2 SYS 1 PORT 702-025-000

## (undated) DEVICE — TUBING SUCTION MEDI-VAC 1/4"X20' N620A

## (undated) DEVICE — CATH BALLOON EMERGE 2.0X20MM H7493918920200

## (undated) DEVICE — DRSG DRAIN 4X4" 7086

## (undated) DEVICE — KIT ENDO VASOVIEW HARVESTING SYSTEM VH-3200

## (undated) DEVICE — SU STRATAFIX PDS PLUS 2-0 SPIRAL CT-1 30CM SXPP1B410

## (undated) DEVICE — BANDAGE ELASTIC 4X550 LF DBL 593-94LF

## (undated) DEVICE — ESU ELEC BLADE E-SEP INSULATED NEPTUNE 70MM 0703-070-002

## (undated) DEVICE — ESU PENCIL SMOKE EVAC W/ROCKER SWITCH 0703-047-000

## (undated) DEVICE — SUTURE STRATAFIX PDS 3-0 SH

## (undated) DEVICE — PITCHER STERILE 1000ML  SSK9004A

## (undated) DEVICE — SU RETRACT-O-TAPE 1041

## (undated) DEVICE — CONNECTOR BLAKE DRAIN SGL BCC1

## (undated) DEVICE — CUSTOM PACK CV ST JOES SCV5BCVHEA

## (undated) DEVICE — BLANKET BAIR ADLT PLYMR 60X36IN 63000

## (undated) DEVICE — SU DERMABOND ADVANCED .7ML DNX12

## (undated) DEVICE — SU STRATAFIX PDS PLUS 0 CT 45CM SXPP1A406

## (undated) DEVICE — CATH THORACIC STRAIGHT CLOTSTOP 32FR

## (undated) DEVICE — HEMOSTAT COMPRESSION VASC REGULAR OD24 CM 3524

## (undated) DEVICE — CELL SAVER

## (undated) DEVICE — GOWN IMPERVIOUS BREATHABLE SMART LG 89015

## (undated) DEVICE — GLOVE LINER COTTON MED 32-2076

## (undated) DEVICE — CATH GUIDING 6FR AL .75 LA6AL75

## (undated) DEVICE — SU ETHIBOND 2-0 SHDA 30" X563H

## (undated) DEVICE — CATH DIAGNOSTIC RADIAL 5FR TIG 4.0

## (undated) DEVICE — PLATE GROUNDING ADULT W/CORD 9165L

## (undated) DEVICE — CUSTOM PACK CAB SCV5BCBHEA

## (undated) DEVICE — GUIDEWIRE VASCULAR COMET II 185CML PRESSURE H74939359110

## (undated) DEVICE — DEVICE TISSUE STABILIZATION OCTOBASE 28707

## (undated) DEVICE — CATH SUCTION 14FR W/O CTRL DYND41962

## (undated) DEVICE — PREP CHLORAPREP 26ML TINTED HI-LITE ORANGE 930815

## (undated) DEVICE — DEVICE INFLATION SYR W/ HEMOSTASIS VALVE 12IN EXT IN4904

## (undated) DEVICE — MANIFOLD KIT ANGIO AUTOMATED 014613

## (undated) DEVICE — SUCTION DRY CHEST DRAIN OASIS 3600-100

## (undated) DEVICE — PACK MAJOR BASIN 673

## (undated) DEVICE — CATH ANGIO JUDKINS R4 6FRX100CM INFINITI 534621T

## (undated) DEVICE — SHTH INTRO 0.021IN ID 6FR DIA

## (undated) DEVICE — CLIP SPRING FOGARTY SOFTJAW CSOFT6

## (undated) DEVICE — RX SURGIFLO HEMOSTATIC MATRIX W/THROMBIN 8ML 2994

## (undated) DEVICE — CLIP HEMOSTATIC ASSURANCE W11 MM 00711882

## (undated) DEVICE — SUCTION CANISTER MEDIVAC LINER 3000ML W/LID 65651-530

## (undated) DEVICE — SU PROLENE 6-0 C-1DA 30" 8706H

## (undated) DEVICE — CATH BALLOON NC EMERGE 2.50X12MM H7493926712250

## (undated) DEVICE — ESU ELEC BLADE 2.75" COATED/INSULATED E1455

## (undated) DEVICE — LEAD PACER MYOCARDIAL BIPOLAR TEMPORARY 53CM 6495F

## (undated) DEVICE — CATH DIAG RADIAL 5FR TIG 4.5 100CM

## (undated) DEVICE — SPONGE RAY-TEC 4X8" 7318

## (undated) DEVICE — SURGICEL POWDER ABSORBABLE HEMOSTAT 3GM 3013SP

## (undated) DEVICE — GLOVE GAMMEX NEOPRENE ULTRA SZ 7 LF 8514

## (undated) DEVICE — EXCHANGE WIRE .035 260 STAR/JFC/035/260/ M001491681

## (undated) DEVICE — CATH BALLOON EMERGE 2.25X20MMH7493918920220

## (undated) DEVICE — SU ETHIBOND 3-0 BBDA 36" X588H

## (undated) DEVICE — SOL NACL 0.9% IRRIG 1000ML BOTTLE 2F7124

## (undated) DEVICE — SU STRATAFIX MONOCRYL 4-0 SPIRAL PS-2 SXMP1B118

## (undated) DEVICE — FORCEP BIOPSY 2.3MM DISP COATED 000388

## (undated) RX ORDER — HEPARIN SODIUM 1000 [USP'U]/ML
INJECTION, SOLUTION INTRAVENOUS; SUBCUTANEOUS
Status: DISPENSED
Start: 2023-11-22

## (undated) RX ORDER — FENTANYL CITRATE 50 UG/ML
INJECTION, SOLUTION INTRAMUSCULAR; INTRAVENOUS
Status: DISPENSED
Start: 2023-11-22

## (undated) RX ORDER — ASPIRIN 81 MG/1
TABLET, CHEWABLE ORAL
Status: DISPENSED
Start: 2023-11-14

## (undated) RX ORDER — FENTANYL CITRATE-0.9 % NACL/PF 10 MCG/ML
PLASTIC BAG, INJECTION (ML) INTRAVENOUS
Status: DISPENSED
Start: 2023-11-22

## (undated) RX ORDER — FENTANYL CITRATE 50 UG/ML
INJECTION, SOLUTION INTRAMUSCULAR; INTRAVENOUS
Status: DISPENSED
Start: 2023-04-18

## (undated) RX ORDER — FENTANYL CITRATE 50 UG/ML
INJECTION, SOLUTION INTRAMUSCULAR; INTRAVENOUS
Status: DISPENSED
Start: 2023-11-14

## (undated) RX ORDER — LIDOCAINE HYDROCHLORIDE 10 MG/ML
INJECTION, SOLUTION EPIDURAL; INFILTRATION; INTRACAUDAL; PERINEURAL
Status: DISPENSED
Start: 2023-11-22

## (undated) RX ORDER — CLOPIDOGREL 300 MG/1
TABLET, FILM COATED ORAL
Status: DISPENSED
Start: 2023-04-18

## (undated) RX ORDER — EPTIFIBATIDE 2 MG/ML
INJECTION, SOLUTION INTRAVENOUS
Status: DISPENSED
Start: 2023-04-18

## (undated) RX ORDER — LIDOCAINE HYDROCHLORIDE 10 MG/ML
INJECTION, SOLUTION EPIDURAL; INFILTRATION; INTRACAUDAL; PERINEURAL
Status: DISPENSED
Start: 2023-11-17

## (undated) RX ORDER — DIAZEPAM 5 MG
TABLET ORAL
Status: DISPENSED
Start: 2023-11-14

## (undated) RX ORDER — PROPOFOL 10 MG/ML
INJECTION, EMULSION INTRAVENOUS
Status: DISPENSED
Start: 2023-11-17

## (undated) RX ORDER — EPHEDRINE SULFATE 50 MG/ML
INJECTION, SOLUTION INTRAMUSCULAR; INTRAVENOUS; SUBCUTANEOUS
Status: DISPENSED
Start: 2023-11-22

## (undated) RX ORDER — PROPOFOL 10 MG/ML
INJECTION, EMULSION INTRAVENOUS
Status: DISPENSED
Start: 2023-11-22

## (undated) RX ORDER — VANCOMYCIN HYDROCHLORIDE 1 G/20ML
INJECTION, POWDER, LYOPHILIZED, FOR SOLUTION INTRAVENOUS
Status: DISPENSED
Start: 2023-11-22

## (undated) RX ORDER — LABETALOL HYDROCHLORIDE 5 MG/ML
INJECTION, SOLUTION INTRAVENOUS
Status: DISPENSED
Start: 2023-11-17